# Patient Record
Sex: MALE | Race: WHITE | NOT HISPANIC OR LATINO
[De-identification: names, ages, dates, MRNs, and addresses within clinical notes are randomized per-mention and may not be internally consistent; named-entity substitution may affect disease eponyms.]

---

## 2016-12-20 RX ORDER — METFORMIN HYDROCHLORIDE 850 MG/1
1 TABLET ORAL
Qty: 0 | Refills: 0 | DISCHARGE
Start: 2016-12-20 | End: 2017-01-19

## 2016-12-28 NOTE — ED PROVIDER NOTE - OBJECTIVE STATEMENT
Enedelia Gilbert, DO: 66M with hx DM p/w left foot pain & cardiac stent x2 & h/o venous ulcer x 1 mo. Pt followed by o/p podiatrist (Dr. Salas) & was started on Bactrim last Thursday for necrosis of ulcer. Since Thursday, pt has had increasing pain and erythema at extending to dorsal aspect of foot with purulent drainage from site. Pt denies systemic symptoms including f/c, n/v.  Pending LE vascular stent but was delayed 2/2 cardiac stent x2.  PMD: Dr. Lissy Conley

## 2016-12-28 NOTE — ED PROVIDER NOTE - CONSTITUTIONAL NEGATIVE STATEMENT, MLM
+LLE ulcer with purulence & erythema extending up to foot.  No fever, no chills, no change in vision, no chest pain, no SOB, no cough, no abdominal pain, no nausea, no vomiting, no joint pain, no focal neurologic complaints, no psychiatric issues, otherwise as HPI or negative no fever and no chills.

## 2016-12-28 NOTE — ED ADULT NURSE NOTE - OBJECTIVE STATEMENT
65 yo M sent by podiatrist for IV abx for stage 2 ulcer on left foot. Pt c/o pain in left foot on ambulation, denies pain while at rest. VSS at this time. NAD. A&ox3, lung sounds clear and equal bilaterally. Afebrile.   Will continue to monitor this pt. PMHx of DM, HTN, PVD.

## 2016-12-28 NOTE — H&P ADULT. - FAMILY HISTORY
<<-----Click on this checkbox to enter Family History Family history of essential hypertension     Sibling  Still living? Unknown  Family history of diabetes mellitus, Age at diagnosis: Age Unknown

## 2016-12-28 NOTE — ED ADULT NURSE NOTE - FAMILY HISTORY
Mother  Still living? Unknown  Family history of essential hypertension, Age at diagnosis: Age Unknown     Sibling  Still living? Unknown  Family history of diabetes mellitus, Age at diagnosis: Age Unknown

## 2016-12-28 NOTE — ED PROVIDER NOTE - ATTENDING CONTRIBUTION TO CARE
66 yom pmhx cad w recent stents, recent dx of pad planning for outpatient vasc procdure for pad, recent dx of diabetes - blood sugars under improved control over past few days, recent discharge from hospital for left foot ulcer, presents today w worsening redness and swelling around site of left foot ulcer laterally. + erythema, no lymphangitis, some mild fluctuance but family states area was actively draining. seen by her podiatrist Dr. Salas and sent in for iv abx and admission. KARINA Haines MD 66 yom pmhx cad w recent stents, recent dx of pad planning for outpatient vasc procdure for pad, recent dx of diabetes - blood sugars under improved control over past few days, recent discharge from hospital for left foot ulcer, presents today w worsening redness and swelling around site of left foot ulcer laterally. + erythema, no lymphangitis, some mild fluctuance but family states area was actively draining. seen by her podiatrist Dr. Salas and sent in for iv abx and admission. xrays concerning for osteo - podiatry consulted. KARINA Haines MD

## 2016-12-28 NOTE — ED PROVIDER NOTE - MUSCULOSKELETAL MINIMAL EXAM
shallow ucler to lateral aspect of plantar left foot w minimal purulent drainage and surrounding erythema/normal range of motion

## 2016-12-28 NOTE — H&P ADULT. - PROBLEM SELECTOR PROBLEM 3
Type 2 diabetes mellitus with other circulatory complication, without long-term current use of insulin

## 2016-12-28 NOTE — H&P ADULT. - PROBLEM SELECTOR PLAN 4
recently diagnosed during recent admission on HUANG, lower extremity intervention held due to PCI/cardiac stenting  -vascular consult in am

## 2016-12-28 NOTE — H&P ADULT. - RADIOLOGY RESULTS AND INTERPRETATION
Foot XR: personally reviewed, Soft tissue ulcer adjacent to left fifth metatarsal head, with erosion of metatarsal head, new from the prior exam, concerning for osteomyelitis.

## 2016-12-28 NOTE — H&P ADULT. - PROBLEM SELECTOR PLAN 1
Patient with chronic foot ulcer, previous admission without signs of infection.  Since discharge new erythema, purulence.  Repeat foot xray on this admission shows a soft tissue ulcer adjacent to L fifth metatarsal head, with erosion of metatarsal head, new from the prior exam, concerning for osteomyelitis.  Podiatry evaluated in ED, s/p sharp excisional debridement at bedside.   -Continue IV antibiotics for now- vanc/zosyn, vanc trough prior to fourth dose  -f/u wound cultures  -repeat ESR/CRP  -f/u foot MRI to better evaluate for osteo   -daily dressing changes

## 2016-12-28 NOTE — ED PROVIDER NOTE - PHYSICAL EXAMINATION
Enedelia Gilbert, DO:  Gen: NAD, AOx3, non-toxic  Head: NCAT  HEENT: PERRL, oral mucosa moist, normal conjunctiva  Lung: CTAB, no respiratory distress, no wheezes/rhonchi/rales B/L, speaking in full sentences.  CV: rrr, no murmurs, Normal perfusion  Abd: soft, NTND, no guarding, no CVA tenderness  MSK: No edema, no visible deformities  Neuro: No focal sensory or motor deficits  Skin: No rash   Psych: normal affect Enedelia Gilbert, DO:  Gen: NAD, AOx3, non-toxic  Head: NCAT  HEENT: PERRL, oral mucosa moist, normal conjunctiva  Lung: CTAB, no respiratory distress, no wheezes/rhonchi/rales B/L, speaking in full sentences.  CV: rrr, no murmurs, Normal perfusion  MSK: LLE with 1+ pedal edema & 2x2 cm necrotic ulcer with purulent ulcer on left heel  Neuro: No focal sensory or motor deficits  Skin: LLE pedal erythema  Psych: normal affect

## 2016-12-28 NOTE — H&P ADULT. - HISTORY OF PRESENT ILLNESS
Mr. Hannah is a 65 y/o m with PMH DM II (A1c 10.7), CAD s/p PCI with RAMONA x 2 12/16, PAD, foot ulcer, recent admission 12/13-12/20 for evaluation of foot ulcer now sent in by outpatient podiatrist for concern for infected foot ulcer.  Patient recently admitted 12/13-12/20 for vascular/podiatry eval of foot ulcer x 1 month.  ESR/CRP normal, per ID no signs of infection and no need for abx.  Had vascular eval and found to have likely PAD.  Had abnormal stress, underwent PCI with RAMONA to LAD x2 12/19.  New diagnosis of DM, A1c 10.7.  Discharged 12/20 with outpatient f/u.  Patient noticed wound opened up a few days after discharge, went to see outpatient podiatrist Dr. Salas who cleaned the wound, noted new erythema and necrosis and started patient on bactrim.  Patient followed up with podiatrist last night who was concerned for wound infection and told patient to go to hospital for IV antibiotics and further evaluation.  Reports more erythema of the foot and some drainage that looked like pus.  Has pain when he stands on foot but that has been going on for a while.  Has not noticed new/more swelling.  No fevers, chills, nausea, vomiting, diarrhea, dysuria, anorexia.

## 2016-12-28 NOTE — H&P ADULT. - PMH
Coronary artery disease involving native coronary artery of native heart without angina pectoris    Essential hypertension    Peripheral artery disease    Type 2 diabetes mellitus with other circulatory complication, without long-term current use of insulin    Venous ulcer of left leg

## 2016-12-28 NOTE — H&P ADULT. - ASSESSMENT
Mr. Hannah is a 67 y/o m with PMH DM II (A1c 10.7), CAD s/p PCI with RAMONA x 2 12/16, PAD, foot ulcer, recent admission 12/13-12/20 for evaluation of foot ulcer now sent in by outpatient podiatrist given concern for new infection of foot ulcer.

## 2016-12-28 NOTE — H&P ADULT. - PROBLEM SELECTOR PLAN 3
hold oral hypoglycemics while inpatient   lantus + LAUREN, uptitrate as necessary  carb controlled diet hold oral hypoglycemics while inpatient    LAUREN, add lantus as needed to maintain FS <180   carb controlled diet hold oral hypoglycemics while inpatient    lantus + LAUREN, titrate as needed to maintain FS <180   carb controlled diet

## 2017-01-02 NOTE — PROVIDER CONTACT NOTE (OTHER) - ASSESSMENT
Pt bp 172/81, hr 69, other VSS. Pt making adequate urine output. Pt denies any chest pain or SOB. Pt resting comfortably in bed

## 2017-01-04 NOTE — DIETITIAN INITIAL EVALUATION ADULT. - OTHER INFO
Visited patient for length of stay. Presents with diabetes complication of foot ulcer. Patient has good appetite since admission.  Reports usual body weight of 234 pounds, current weight noted on (12/28) 229 pounds. Weight change possibly due to edema +1 on left foot, scale inaccuracies. States he has not received diabetes diet education in the past, but with current complication he is motivated to manage his diabetes; very receptive to diabetes diet education. Visited patient for length of stay. Presents with diabetes complication of foot ulcer. Patient has good appetite/intake (100% x3 meals/day) since admission.  Reports usual body weight of 234 pounds, current weight noted on (12/28) 229 pounds. Weight change possibly due to edema +1 on left foot, scale inaccuracies. States he has not received diabetes diet education in the past, but with current complication he is motivated to manage his diabetes; very receptive to diabetes diet education.

## 2017-01-04 NOTE — DIETITIAN INITIAL EVALUATION ADULT. - ENERGY NEEDS
Height 68", Weight (12/28) 229 pounds, BMI 35kg/m^2.  pounds.  pounds. 149% of IBW.  Pertinent Information: edema +1 Left foot. Wounds (1/3) left foot, right foot, left leg.   Patient with non-healing foot ulcer, (1/4) scheduled for angiogram LLE, possible angioplasty, possible stent.

## 2017-01-04 NOTE — DIETITIAN INITIAL EVALUATION ADULT. - ORAL INTAKE PTA
Follows regular/unrestricted diet PTA, with good appetite. Breakfast: butter roll, 2-3 cups coffee; lunch: sandwich (ham and cheese) and wine; dinner: pasta/pork chops/chicken cutlets, potatoes. Does not snack, drinks seltzer and no sugary drinks. Patient reports not taking supplements PTA. NKFA./good

## 2017-01-04 NOTE — BRIEF OPERATIVE NOTE - COMMENTS
Near conclusion of the procedure, the pt c/o tremor and lightheadedness. Pt was found to be hypertensive to 200/80's, tachypneic, and hypoxemic to high 70s. FS was 147, CBC, BMP,PT/PTT/INR, EKG, CXR was ordered and pt was brought to recovery. Pt was given 10mg labetolol. Near conclusion of the procedure, the pt c/o tremor and lightheadedness. Pt was found to be hypertensive to 200/80's, tachypneic, and hypoxemic to high 70s. FS was 147, CBC, BMP,PT/PTT/INR, EKG, CXR was ordered and pt was brought to recovery. Pt was given 10mg labetolol. BP and RR decreased and SpO2 increased above 95%. Trops negative x1, CBC, Elytes WNL. Near conclusion of the procedure, the pt c/o tremor and lightheadedness. Pt was found to be hypertensive to 200/80's, tachypneic, and hypoxemic to high 70s. FS was 147, CBC, BMP,PT/PTT/INR, EKG, CXR, and cardiac enzymes were ordered and pt was brought to recovery. Pt was given 10mg labetolol. BP and RR decreased and SpO2 increased above 95%. Trops negative x1, CBC, Elytes WNL.

## 2017-01-04 NOTE — PROVIDER CONTACT NOTE (CRITICAL VALUE NOTIFICATION) - ACTION/TREATMENT ORDERED:
Pt was in CT angio recovery  is notified  NP is aware
no action at this time. Will continue to monitor.

## 2017-01-04 NOTE — BRIEF OPERATIVE NOTE - PROCEDURE
Angioplasty of anterior tibial artery  01/04/2017    Active  [id number]  Angiogram, extremity, left  01/04/2017    Active  [id number]

## 2017-01-04 NOTE — DIETITIAN INITIAL EVALUATION ADULT. - PROBLEM SELECTOR PLAN 3
hold oral hypoglycemics while inpatient    lantus + LAUREN, titrate as needed to maintain FS <180   carb controlled diet

## 2017-01-04 NOTE — DIETITIAN INITIAL EVALUATION ADULT. - NS AS NUTRI INTERV MEALS SNACK
Reviewed consistent carb diet ( what foods have carbs, how to space out carbs throughout the day, pairing carbs with protein and fiber). Reviewed the plate method ( half the plate vegetables, quarter starch, quarter protein). All questions answered. Handout and contact for diabetes support groups distributed. Continue to monitor glucose and fingersticks. RDN remains available upon request./General/healthful diet/Carbohydrate - modified diet Reviewed consistent carb diet ( what foods have carbs, how to space out carbs throughout the day, pairing carbs with protein and fiber). Reviewed the plate method ( half the plate vegetables, quarter starch, quarter protein). All questions answered. Handout and contact for Diabetes Wellness Programs distributed. Continue to monitor glucose and fingersticks. RDN remains available upon request./Carbohydrate - modified diet/General/healthful diet

## 2017-01-05 NOTE — PROVIDER CONTACT NOTE (OTHER) - ASSESSMENT
critical results of aptt came back 186.7. RN spoke to MD Benitez if AM hep subq should be held. MD said yes. pt with right groin drsg CDI. no hematoma present critical results of aptt came back 186.7. RN spoke to MD Benitez if AM hep subq should be held. MD said yes. ASA and plavix was given in CT recovery room before pt arrive to floor. pt with right groin drsg CDI. no hematoma present

## 2017-01-07 NOTE — PROVIDER CONTACT NOTE (OTHER) - SITUATION
175/79, HR 69
/78, pt asymptomatic. Lisinopril 10 mg given at 2145 and BP repeated manually and was 177/82. NP notified
pt bp elevated

## 2017-01-07 NOTE — PHYSICAL THERAPY INITIAL EVALUATION ADULT - RANGE OF MOTION EXAMINATION, REHAB EVAL
Right UE ROM was WFL (within functional limits)/Left UE ROM was WFL (within functional limits)/Left LE ROM was WFL (within functional limits)/Right LE ROM was WFL (within functional limits)

## 2017-01-07 NOTE — PHYSICAL THERAPY INITIAL EVALUATION ADULT - PRECAUTIONS/LIMITATIONS, REHAB EVAL
ESR/CRP normal, per ID no signs of infection and no need for abx.  Had vascular eval and found to have likely PAD.  Had abnormal stress, underwent PCI with RAMONA to LAD x2 12/19.  New diagnosis of DM, A1c 10.7.  Discharged 12/20 with outpatient f/u.  Patient noticed wound opened up a few days after discharge, went to see outpatient podiatrist Dr. Salas who cleaned the wound, noted new erythema and necrosis and started patient on bactrim.  Patient followed up with podiatrist last night who was concerned for wound infection and told patient to go to hospital for IV antibiotics and further evaluation.  Reports more erythema of the foot and some drainage that looked like pus.  Has pain when he stands on foot but that has been going on for a while.  Has not noticed new/more swelling.  No fevers, chills, nausea, vomiting, diarrhea, dysuria, anorexia.

## 2017-01-07 NOTE — PHYSICAL THERAPY INITIAL EVALUATION ADULT - PERTINENT HX OF CURRENT PROBLEM, REHAB EVAL
Mr. Hannah is a 67 y/o m with PMH DM II (A1c 10.7), CAD s/p PCI with RAMONA x 2 12/16, PAD, foot ulcer, recent admission 12/13-12/20 for evaluation of foot ulcer now sent in by outpatient podiatrist for concern for infected foot ulcer.  Patient recently admitted 12/13-12/20 for vascular/podiatry eval of foot ulcer x 1 month.

## 2017-01-07 NOTE — PROVIDER CONTACT NOTE (OTHER) - ACTION/TREATMENT ORDERED:
MD Benitez made awared. will continue to monitor
NP Shallow aware, repeat BP manually and NP will come see pt.
No further interventions at this point. BECKA Jo states that we should wait 2 hrs from when the lopressor was given. Will continue to monitor.
will continue to monitor

## 2017-01-07 NOTE — PHYSICAL THERAPY INITIAL EVALUATION ADULT - PRECAUTIONS/LIMITATIONS, REHAB EVAL
no known precautions/limitations/ESR/CRP normal, per ID no signs of infection and no need for abx.  Had vascular eval and found to have likely PAD.  Had abnormal stress, underwent PCI with RAMONA to LAD x2 12/19.  New diagnosis of DM, A1c 10.7.  Discharged 12/20 with outpatient f/u.  Patient noticed wound opened up a few days after discharge, went to see outpatient podiatrist Dr. Salas who cleaned the wound, noted new erythema and necrosis and started patient on bactrim.  Patient followed up with podiatrist last night who was concerned for wound infection and told patient to go to hospital for IV antibiotics and further evaluation.  Reports more erythema of the foot and some drainage that looked like pus.  Has pain when he stands on foot but that has been going on for a while.  Has not noticed new/more swelling.  No fevers, chills, nausea, vomiting, diarrhea, dysuria, anorexia.

## 2017-01-09 NOTE — DISCHARGE NOTE ADULT - ADDITIONAL INSTRUCTIONS
Follow up with Dr. Conley early next week for management of your Diabetes. It is very important that you call tomorrow to schedule an appointment for Monday or Tuesday next week for follow up and management of your Diabetes.  Follow up with Dr. Farooq (Foot MD) within 1 week after discharge from hospital.   Follow up with Dr. Gomez (Infectious Disease MD) in 1 month.  [facility] Wound Care Nurse will come to your home for daily foot dressing changes. Follow up with Dr. Conley on Monday, 1/16 or Tuesday 1/17  next week for management of your Diabetes. It is very important that you call tomorrow to schedule an appointment for Monday or Tuesday next week for follow up and management of your Diabetes.  Follow up with Dr. Farooq (Foot MD) within 1 week after discharge from hospital.   Follow up with Dr. Gomez (Infectious Disease MD) in 1 month.  Follow up with Dr. Morrison (Vascular Surgery) within 1 week after discharge from hospital.  [facility] Wound Care Nurse will come to your home for daily foot dressing changes.

## 2017-01-09 NOTE — DISCHARGE NOTE ADULT - HOME CARE AGENCY
[place] [phone] for RN visit to assess for wound care teaching and skilled nursing needs for start of care the day after discharge

## 2017-01-09 NOTE — DISCHARGE NOTE ADULT - PATIENT PORTAL LINK FT
“You can access the [employer] Patient Portal, offered by [facility], by registering with the following website: [email or web address]”

## 2017-01-09 NOTE — DISCHARGE NOTE ADULT - HOSPITAL COURSE
osteomyelitis, fifth  metatarsal,  seen by  podiatry  and  vascular,  s/p  angio,  severe  disease, post  angio,  still  with  bad  pad,  no  intervention per  pofaitry  and  vascular  dr giles,  cad,  htn,  dm,  dc  on  ab, per  id, local  wound  care, f/p  omd, podiatry  and  vascular osteomyelitis, fifth  metatarsal,  seen by  podiatry  and  vascular,  s/p  angio,  severe  disease, post  angio,  still  with  bad  pad,  no  intervention per  podiatry  and  vascular  dr giles,  cad,  htn,  dm,  dc  on  ab, per  id, local  wound  care, f/p  omd, podiatry  and  vascular

## 2017-01-09 NOTE — DISCHARGE NOTE ADULT - MEDICATION SUMMARY - MEDICATIONS TO TAKE
I will START or STAY ON the medications listed below when I get home from the hospital:    aspirin 81 mg oral delayed release tablet  -- 1 tab(s) by mouth once a day  -- Indication: For Coronary artery disease involving native coronary artery of native heart without angina pectoris    lisinopril 40 mg oral tablet  -- 1 tab(s) by mouth once a day  -- Indication: For Essential hypertension    Amaryl 2 mg oral tablet  -- 1 tab(s) by mouth once a day  -- Avoid prolonged or excessive exposure to direct and/or artificial sunlight while taking this medication.  Do not drink alcoholic beverages when taking this medication.  It is very important that you take or use this exactly as directed.  Do not skip doses or discontinue unless directed by your doctor.    -- Indication: For Type 2 diabetes mellitus with other circulatory complication, without long-term current use of insulin    metFORMIN 500 mg oral tablet, extended release  -- 1 tab(s) by mouth once a day to start 12/21/2016  -- Check with your doctor before becoming pregnant.  Do not drink alcoholic beverages when taking this medication.  Obtain medical advice before taking any non-prescription drugs as some may affect the action of this medication.  Swallow whole.  Do not crush.  Take with food or milk.    -- Indication: For Type 2 diabetes mellitus with other circulatory complication, without long-term current use of insulin    atorvastatin 20 mg oral tablet  -- 1 tab(s) by mouth once a day (at bedtime)  -- Indication: For Cholesterol    clopidogrel 75 mg oral tablet  -- 1 tab(s) by mouth once a day  -- Indication: For Coronary artery disease involving native coronary artery of native heart without angina pectoris    metoprolol tartrate 50 mg oral tablet  -- 1 tab(s) by mouth 2 times a day  -- Indication: For Essential hypertension    cefadroxil 1000 mg oral tablet  -- 1 tab(s) by mouth every 12 hours  -- Finish all this medication unless otherwise directed by prescriber.    -- Indication: For Osteomyelitis

## 2017-01-09 NOTE — DISCHARGE NOTE ADULT - CARE PROVIDER_API CALL
Lissy Conley), Medicine  Hospitalists  [address] [place] [address]  Phone: [phone]  Fax: [phone]    Anahi Farooq (SUSANNA), Surgery Orthopaedic Surgery  [address] [facility] [address]  [place] [address]  Phone: [phone]  Fax: [phone]    Oscar Gomez), Infectious Disease; Internal Medicine  [address] [facility] [address] [place] [address]  Phone: [phone]  Fax: [phone] Lissy Conley), Medicine  Hospitalists  [address] [place] [address]  Phone: [phone]  Fax: [phone]    Anahi Farooq (SUSANNA), Surgery Orthopaedic Surgery  [address] [facility] [address]  [place] [address]  Phone: [phone]  Fax: [phone]    Oscar Gomez), Infectious Disease; Internal Medicine  [address] [facility] [address]  [place] [address]  Phone: [phone]  Fax: [phone]    Irma Morrison), Surgery  [address] [place] [address]  Phone: [phone]  Fax: [phone]

## 2017-01-09 NOTE — DISCHARGE NOTE ADULT - PLAN OF CARE
continue management of chronic osteomyelitis of left foot 5th mpj wound abx: finish course of abx per ID recommendation  wound care: cleanse wound with saline and apply betadine to left foot wound and cover with 4x4 gauze and wai. daily dressing change.  follow up: please follow up with Dr. Farooq within 1wk of discharge. Call [phone] to make an appointment.  weight bear: as tolerated in surgical shoe to left foot heel abx: finish course of abx per ID recommendation  Wound care:   Cleanse wound with saline and apply betadine to left  lateral left foot 5th MPJ foot wound and cover with 4x4 gauze and Ramon.   follow up: please follow up with Dr. Farooq within 1 wk of discharge. Call [phone] to make an appointment.  weight bear: as tolerated in surgical shoe to left foot heel Your Hemoglobin A1C (HgA1C) was 10.7 last admission.  Make sure you get your HgA1c checked every three months.  If you take oral diabetes medications, check your blood glucose two times a day.  It's important not to skip any meals.  Keep a log of your blood glucose results and always take it with you to your doctor appointments.  Keep a list of your current medications including injectables and over the counter medications and bring this medication list with you to all your doctor appointments.  If you have not seen your ophthalmologist this year call for appointment.  Check your feet daily for redness, sores, or openings. Do not self treat. If no improvement in two days call your primary care physician for an appointment.  Low blood sugar (hypoglycemia) is a blood sugar below 70mg/dl. Check your blood sugar if you feel signs/symptoms of hypoglycemia. If your blood sugar is below 70 take 15 grams of carbohydrates (ex 4 oz of apple juice, 3-4 glucose tablets, or 4-6 oz of regular soda) wait 15 minutes and repeat blood sugar to make sure it comes up above 70.  If your blood sugar is above 70 and you are due for a meal, have a meal.  If you are not due for a meal have a snack.  This snack helps keeps your blood sugar at a safe range.  ** Hemoglobin A1C noted to be 10.7 last admission. Discussed with Dr. Conley who advised that pt be discharged home on his previous oral home medications and recommends that pt follow up with her in her Office early next week for evaluation and continued care. Take your medications as prescribed.  Note that your dose of Lisinopril is increased.  Follow up with your medical doctor to establish long term blood pressure treatment goals. Take your medications as prescribed.  Follow up with your medical doctor to establish long term treatment goals. Antibiotics:   Finish course of antibiotics per ID recommendation    Wound care Daily:   Cleanse wound with saline and apply betadine to left  lateral left foot 5th MPJ foot wound and cover with 4x4 gauze and Ramon.   Follow up: Please follow up with Dr. Farooq within 1 wk of discharge. Call [phone] to make an appointment.  Weight bear: as tolerated in surgical shoe to left foot heel Your Hemoglobin A1C (HgA1C) was 10.7 on 12/14/2016.   Make sure you get your HgA1c checked every three months.  If you take oral diabetes medications, check your blood glucose two times a day.  It's important not to skip any meals.  Keep a log of your blood glucose results and always take it with you to your doctor appointments.  Keep a list of your current medications including injectables and over the counter medications and bring this medication list with you to all your doctor appointments.  If you have not seen your ophthalmologist this year call for appointment.  Check your feet daily for redness, sores, or openings. Do not self treat. If no improvement in two days call your primary care physician for an appointment.  Low blood sugar (hypoglycemia) is a blood sugar below 70mg/dl. Check your blood sugar if you feel signs/symptoms of hypoglycemia. If your blood sugar is below 70 take 15 grams of carbohydrates (ex 4 oz of apple juice, 3-4 glucose tablets, or 4-6 oz of regular soda) wait 15 minutes and repeat blood sugar to make sure it comes up above 70.  If your blood sugar is above 70 and you are due for a meal, have a meal.  If you are not due for a meal have a snack.  This snack helps keeps your blood sugar at a safe range.  ** Hemoglobin A1C noted to be 10.7 on 12/14/2016.  Discussed HbA1C with Dr. Conley who advised that pt be discharged home on his previous oral home diabetic medications and recommends that pt follow up with her in her Office early next week for evaluation and continued care.

## 2017-01-09 NOTE — DISCHARGE NOTE ADULT - SECONDARY DIAGNOSIS.
Type 2 diabetes mellitus with other circulatory complication, without long-term current use of insulin Essential hypertension Coronary artery disease involving native coronary artery of native heart without angina pectoris

## 2017-01-09 NOTE — DISCHARGE NOTE ADULT - CARE PLAN
Principal Discharge DX:	Foot ulcer due to secondary DM  Goal:	continue management of chronic osteomyelitis of left foot 5th mpj wound  Instructions for follow-up, activity and diet:	abx: finish course of abx per ID recommendation  wound care: cleanse wound with saline and apply betadine to left foot wound and cover with 4x4 gauze and wai. daily dressing change.  follow up: please follow up with Dr. Farooq within 1wk of discharge. Call [phone] to make an appointment.  weight bear: as tolerated in surgical shoe to left foot heel Principal Discharge DX:	Foot ulcer due to secondary DM  Goal:	continue management of chronic osteomyelitis of left foot 5th mpj wound  Instructions for follow-up, activity and diet:	abx: finish course of abx per ID recommendation  wound care: cleanse wound with saline and apply betadine to left foot wound and cover with 4x4 gauze and wai. daily dressing change.  follow up: please follow up with Dr. Farooq within 1wk of discharge. Call [phone] to make an appointment.  weight bear: as tolerated in surgical shoe to left foot heel Principal Discharge DX:	Foot ulcer due to secondary DM  Goal:	continue management of chronic osteomyelitis of left foot 5th mpj wound  Instructions for follow-up, activity and diet:	abx: finish course of abx per ID recommendation  Wound care:   Cleanse wound with saline and apply betadine to left  lateral left foot 5th MPJ foot wound and cover with 4x4 gauze and Ramon.   follow up: please follow up with Dr. Farooq within 1 wk of discharge. Call [phone] to make an appointment.  weight bear: as tolerated in surgical shoe to left foot heel Principal Discharge DX:	Foot ulcer due to secondary DM  Goal:	continue management of chronic osteomyelitis of left foot 5th mpj wound  Instructions for follow-up, activity and diet:	abx: finish course of abx per ID recommendation  Wound care:   Cleanse wound with saline and apply betadine to left  lateral left foot 5th MPJ foot wound and cover with 4x4 gauze and Ramon.   follow up: please follow up with Dr. Farooq within 1 wk of discharge. Call [phone] to make an appointment.  weight bear: as tolerated in surgical shoe to left foot heel Principal Discharge DX:	Foot ulcer due to secondary DM  Goal:	continue management of chronic osteomyelitis of left foot 5th mpj wound  Instructions for follow-up, activity and diet:	abx: finish course of abx per ID recommendation  Wound care:   Cleanse wound with saline and apply betadine to left  lateral left foot 5th MPJ foot wound and cover with 4x4 gauze and Ramon.   follow up: please follow up with Dr. Farooq within 1 wk of discharge. Call [phone] to make an appointment.  weight bear: as tolerated in surgical shoe to left foot heel Principal Discharge DX:	Foot ulcer due to secondary DM  Goal:	continue management of chronic osteomyelitis of left foot 5th mpj wound  Instructions for follow-up, activity and diet:	abx: finish course of abx per ID recommendation  Wound care:   Cleanse wound with saline and apply betadine to left  lateral left foot 5th MPJ foot wound and cover with 4x4 gauze and Ramon.   follow up: please follow up with Dr. Farooq within 1 wk of discharge. Call [phone] to make an appointment.  weight bear: as tolerated in surgical shoe to left foot heel Principal Discharge DX:	Foot ulcer due to secondary DM  Goal:	continue management of chronic osteomyelitis of left foot 5th mpj wound  Instructions for follow-up, activity and diet:	Antibiotics:   Finish course of antibiotics per ID recommendation    Wound care Daily:   Cleanse wound with saline and apply betadine to left  lateral left foot 5th MPJ foot wound and cover with 4x4 gauze and Ramon.   Follow up: Please follow up with Dr. Farooq within 1 wk of discharge. Call [phone] to make an appointment.  Weight bear: as tolerated in surgical shoe to left foot heel  Secondary Diagnosis:	Type 2 diabetes mellitus with other circulatory complication, without long-term current use of insulin  Instructions for follow-up, activity and diet:	Your Hemoglobin A1C (HgA1C) was 10.7 last admission.  Make sure you get your HgA1c checked every three months.  If you take oral diabetes medications, check your blood glucose two times a day.  It's important not to skip any meals.  Keep a log of your blood glucose results and always take it with you to your doctor appointments.  Keep a list of your current medications including injectables and over the counter medications and bring this medication list with you to all your doctor appointments.  If you have not seen your ophthalmologist this year call for appointment.  Check your feet daily for redness, sores, or openings. Do not self treat. If no improvement in two days call your primary care physician for an appointment.  Low blood sugar (hypoglycemia) is a blood sugar below 70mg/dl. Check your blood sugar if you feel signs/symptoms of hypoglycemia. If your blood sugar is below 70 take 15 grams of carbohydrates (ex 4 oz of apple juice, 3-4 glucose tablets, or 4-6 oz of regular soda) wait 15 minutes and repeat blood sugar to make sure it comes up above 70.  If your blood sugar is above 70 and you are due for a meal, have a meal.  If you are not due for a meal have a snack.  This snack helps keeps your blood sugar at a safe range.  ** Hemoglobin A1C noted to be 10.7 last admission. Discussed with Dr. Conley who advised that pt be discharged home on his previous oral home medications and recommends that pt follow up with her in her Office early next week for evaluation and continued care.  Secondary Diagnosis:	Essential hypertension  Instructions for follow-up, activity and diet:	Take your medications as prescribed.  Note that your dose of Lisinopril is increased.  Follow up with your medical doctor to establish long term blood pressure treatment goals.  Secondary Diagnosis:	Coronary artery disease involving native coronary artery of native heart without angina pectoris  Instructions for follow-up, activity and diet:	Take your medications as prescribed.  Follow up with your medical doctor to establish long term treatment goals. Principal Discharge DX:	Foot ulcer due to secondary DM  Goal:	continue management of chronic osteomyelitis of left foot 5th mpj wound  Instructions for follow-up, activity and diet:	Antibiotics:   Finish course of antibiotics per ID recommendation    Wound care Daily:   Cleanse wound with saline and apply betadine to left  lateral left foot 5th MPJ foot wound and cover with 4x4 gauze and Ramon.   Follow up: Please follow up with Dr. Farooq within 1 wk of discharge. Call [phone] to make an appointment.  Weight bear: as tolerated in surgical shoe to left foot heel  Secondary Diagnosis:	Type 2 diabetes mellitus with other circulatory complication, without long-term current use of insulin  Instructions for follow-up, activity and diet:	Your Hemoglobin A1C (HgA1C) was 10.7 on 12/14/2016.   Make sure you get your HgA1c checked every three months.  If you take oral diabetes medications, check your blood glucose two times a day.  It's important not to skip any meals.  Keep a log of your blood glucose results and always take it with you to your doctor appointments.  Keep a list of your current medications including injectables and over the counter medications and bring this medication list with you to all your doctor appointments.  If you have not seen your ophthalmologist this year call for appointment.  Check your feet daily for redness, sores, or openings. Do not self treat. If no improvement in two days call your primary care physician for an appointment.  Low blood sugar (hypoglycemia) is a blood sugar below 70mg/dl. Check your blood sugar if you feel signs/symptoms of hypoglycemia. If your blood sugar is below 70 take 15 grams of carbohydrates (ex 4 oz of apple juice, 3-4 glucose tablets, or 4-6 oz of regular soda) wait 15 minutes and repeat blood sugar to make sure it comes up above 70.  If your blood sugar is above 70 and you are due for a meal, have a meal.  If you are not due for a meal have a snack.  This snack helps keeps your blood sugar at a safe range.  ** Hemoglobin A1C noted to be 10.7 on 12/14/2016.  Discussed HbA1C with Dr. Conley who advised that pt be discharged home on his previous oral home diabetic medications and recommends that pt follow up with her in her Office early next week for evaluation and continued care.  Secondary Diagnosis:	Essential hypertension  Instructions for follow-up, activity and diet:	Take your medications as prescribed.  Note that your dose of Lisinopril is increased.  Follow up with your medical doctor to establish long term blood pressure treatment goals.  Secondary Diagnosis:	Coronary artery disease involving native coronary artery of native heart without angina pectoris  Instructions for follow-up, activity and diet:	Take your medications as prescribed.  Follow up with your medical doctor to establish long term treatment goals. Principal Discharge DX:	Foot ulcer due to secondary DM  Goal:	continue management of chronic osteomyelitis of left foot 5th mpj wound  Instructions for follow-up, activity and diet:	Antibiotics:   Finish course of antibiotics per ID recommendation    Wound care Daily:   Cleanse wound with saline and apply betadine to left  lateral left foot 5th MPJ foot wound and cover with 4x4 gauze and Ramon.   Follow up: Please follow up with Dr. Farooq within 1 wk of discharge. Call [phone] to make an appointment.  Weight bear: as tolerated in surgical shoe to left foot heel  Secondary Diagnosis:	Type 2 diabetes mellitus with other circulatory complication, without long-term current use of insulin  Instructions for follow-up, activity and diet:	Your Hemoglobin A1C (HgA1C) was 10.7 on 12/14/2016.   Make sure you get your HgA1c checked every three months.  If you take oral diabetes medications, check your blood glucose two times a day.  It's important not to skip any meals.  Keep a log of your blood glucose results and always take it with you to your doctor appointments.  Keep a list of your current medications including injectables and over the counter medications and bring this medication list with you to all your doctor appointments.  If you have not seen your ophthalmologist this year call for appointment.  Check your feet daily for redness, sores, or openings. Do not self treat. If no improvement in two days call your primary care physician for an appointment.  Low blood sugar (hypoglycemia) is a blood sugar below 70mg/dl. Check your blood sugar if you feel signs/symptoms of hypoglycemia. If your blood sugar is below 70 take 15 grams of carbohydrates (ex 4 oz of apple juice, 3-4 glucose tablets, or 4-6 oz of regular soda) wait 15 minutes and repeat blood sugar to make sure it comes up above 70.  If your blood sugar is above 70 and you are due for a meal, have a meal.  If you are not due for a meal have a snack.  This snack helps keeps your blood sugar at a safe range.  ** Hemoglobin A1C noted to be 10.7 on 12/14/2016.  Discussed HbA1C with Dr. Conley who advised that pt be discharged home on his previous oral home diabetic medications and recommends that pt follow up with her in her Office early next week for evaluation and continued care.  Secondary Diagnosis:	Essential hypertension  Instructions for follow-up, activity and diet:	Take your medications as prescribed.  Note that your dose of Lisinopril is increased.  Follow up with your medical doctor to establish long term blood pressure treatment goals.  Secondary Diagnosis:	Coronary artery disease involving native coronary artery of native heart without angina pectoris  Instructions for follow-up, activity and diet:	Take your medications as prescribed.  Follow up with your medical doctor to establish long term treatment goals.

## 2017-01-09 NOTE — DISCHARGE NOTE ADULT - PROVIDER TOKENS
TOKEN:'[id number]' TOKEN:'[id number]'

## 2017-01-09 NOTE — DISCHARGE NOTE ADULT - CARE PROVIDERS DIRECT ADDRESSES
,DirectAddress_Unknown,DirectAddress_Unknown,DirectAddress_Unknown,DirectAddress_Unknown ,DirectAddress_Unknown,DirectAddress_Unknown,DirectAddress_Unknown,DirectAddress_Unknown,DirectAddress_Unknown

## 2017-01-23 ENCOUNTER — APPOINTMENT (OUTPATIENT)
Dept: VASCULAR SURGERY | Facility: CLINIC | Age: 67
End: 2017-01-23

## 2017-01-23 VITALS
WEIGHT: 220 LBS | DIASTOLIC BLOOD PRESSURE: 98 MMHG | SYSTOLIC BLOOD PRESSURE: 179 MMHG | TEMPERATURE: 98.2 F | HEIGHT: 69 IN | HEART RATE: 65 BPM | BODY MASS INDEX: 32.58 KG/M2

## 2017-02-08 ENCOUNTER — APPOINTMENT (OUTPATIENT)
Dept: WOUND CARE | Facility: CLINIC | Age: 67
End: 2017-02-08

## 2017-02-22 ENCOUNTER — APPOINTMENT (OUTPATIENT)
Dept: WOUND CARE | Facility: CLINIC | Age: 67
End: 2017-02-22

## 2017-02-27 ENCOUNTER — APPOINTMENT (OUTPATIENT)
Dept: VASCULAR SURGERY | Facility: CLINIC | Age: 67
End: 2017-02-27

## 2017-02-27 VITALS
HEIGHT: 69 IN | SYSTOLIC BLOOD PRESSURE: 186 MMHG | WEIGHT: 220 LBS | TEMPERATURE: 98 F | BODY MASS INDEX: 32.58 KG/M2 | HEART RATE: 67 BPM | DIASTOLIC BLOOD PRESSURE: 93 MMHG

## 2017-03-08 ENCOUNTER — APPOINTMENT (OUTPATIENT)
Dept: WOUND CARE | Facility: CLINIC | Age: 67
End: 2017-03-08

## 2017-08-10 NOTE — PATIENT PROFILE ADULT. - BLOOD AVOIDANCE/RESTRICTIONS, PROFILE
Silver Nitrate Text: The wound bed was treated with silver nitrate after the biopsy was performed. none

## 2019-11-14 ENCOUNTER — INPATIENT (INPATIENT)
Facility: HOSPITAL | Age: 69
LOS: 18 days | Discharge: NURSING, REHAB OR LONG-TERM CARE | DRG: 253 | End: 2019-12-03
Attending: SURGERY | Admitting: STUDENT IN AN ORGANIZED HEALTH CARE EDUCATION/TRAINING PROGRAM
Payer: MEDICARE

## 2019-11-14 VITALS
TEMPERATURE: 98 F | WEIGHT: 229.94 LBS | HEIGHT: 68 IN | OXYGEN SATURATION: 99 % | RESPIRATION RATE: 20 BRPM | DIASTOLIC BLOOD PRESSURE: 85 MMHG | SYSTOLIC BLOOD PRESSURE: 200 MMHG | HEART RATE: 82 BPM

## 2019-11-14 LAB
ALBUMIN SERPL ELPH-MCNC: 4.6 G/DL — SIGNIFICANT CHANGE UP (ref 3.3–5)
ALP SERPL-CCNC: 73 U/L — SIGNIFICANT CHANGE UP (ref 40–120)
ALT FLD-CCNC: 13 U/L — SIGNIFICANT CHANGE UP (ref 10–45)
ANION GAP SERPL CALC-SCNC: 12 MMOL/L — SIGNIFICANT CHANGE UP (ref 5–17)
APTT BLD: 31 SEC — SIGNIFICANT CHANGE UP (ref 27.5–36.3)
AST SERPL-CCNC: 15 U/L — SIGNIFICANT CHANGE UP (ref 10–40)
BASOPHILS # BLD AUTO: 0.03 K/UL — SIGNIFICANT CHANGE UP (ref 0–0.2)
BASOPHILS NFR BLD AUTO: 0.3 % — SIGNIFICANT CHANGE UP (ref 0–2)
BILIRUB SERPL-MCNC: 0.4 MG/DL — SIGNIFICANT CHANGE UP (ref 0.2–1.2)
BUN SERPL-MCNC: 22 MG/DL — SIGNIFICANT CHANGE UP (ref 7–23)
CALCIUM SERPL-MCNC: 10.1 MG/DL — SIGNIFICANT CHANGE UP (ref 8.4–10.5)
CHLORIDE SERPL-SCNC: 102 MMOL/L — SIGNIFICANT CHANGE UP (ref 96–108)
CO2 SERPL-SCNC: 23 MMOL/L — SIGNIFICANT CHANGE UP (ref 22–31)
CREAT SERPL-MCNC: 0.97 MG/DL — SIGNIFICANT CHANGE UP (ref 0.5–1.3)
EOSINOPHIL # BLD AUTO: 0.09 K/UL — SIGNIFICANT CHANGE UP (ref 0–0.5)
EOSINOPHIL NFR BLD AUTO: 1 % — SIGNIFICANT CHANGE UP (ref 0–6)
ERYTHROCYTE [SEDIMENTATION RATE] IN BLOOD: 44 MM/HR — HIGH (ref 0–20)
GAS PNL BLDV: SIGNIFICANT CHANGE UP
GLUCOSE SERPL-MCNC: 166 MG/DL — HIGH (ref 70–99)
HCT VFR BLD CALC: 35.4 % — LOW (ref 39–50)
HGB BLD-MCNC: 12.4 G/DL — LOW (ref 13–17)
IMM GRANULOCYTES NFR BLD AUTO: 0.2 % — SIGNIFICANT CHANGE UP (ref 0–1.5)
INR BLD: 1 RATIO — SIGNIFICANT CHANGE UP (ref 0.88–1.16)
LYMPHOCYTES # BLD AUTO: 2.19 K/UL — SIGNIFICANT CHANGE UP (ref 1–3.3)
LYMPHOCYTES # BLD AUTO: 23.5 % — SIGNIFICANT CHANGE UP (ref 13–44)
MCHC RBC-ENTMCNC: 28.2 PG — SIGNIFICANT CHANGE UP (ref 27–34)
MCHC RBC-ENTMCNC: 35 GM/DL — SIGNIFICANT CHANGE UP (ref 32–36)
MCV RBC AUTO: 80.5 FL — SIGNIFICANT CHANGE UP (ref 80–100)
MONOCYTES # BLD AUTO: 0.62 K/UL — SIGNIFICANT CHANGE UP (ref 0–0.9)
MONOCYTES NFR BLD AUTO: 6.7 % — SIGNIFICANT CHANGE UP (ref 2–14)
NEUTROPHILS # BLD AUTO: 6.35 K/UL — SIGNIFICANT CHANGE UP (ref 1.8–7.4)
NEUTROPHILS NFR BLD AUTO: 68.3 % — SIGNIFICANT CHANGE UP (ref 43–77)
NRBC # BLD: 0 /100 WBCS — SIGNIFICANT CHANGE UP (ref 0–0)
PLATELET # BLD AUTO: 227 K/UL — SIGNIFICANT CHANGE UP (ref 150–400)
POTASSIUM SERPL-MCNC: 4.3 MMOL/L — SIGNIFICANT CHANGE UP (ref 3.5–5.3)
POTASSIUM SERPL-SCNC: 4.3 MMOL/L — SIGNIFICANT CHANGE UP (ref 3.5–5.3)
PROT SERPL-MCNC: 7.9 G/DL — SIGNIFICANT CHANGE UP (ref 6–8.3)
PROTHROM AB SERPL-ACNC: 11.5 SEC — SIGNIFICANT CHANGE UP (ref 10–12.9)
RBC # BLD: 4.4 M/UL — SIGNIFICANT CHANGE UP (ref 4.2–5.8)
RBC # FLD: 13.2 % — SIGNIFICANT CHANGE UP (ref 10.3–14.5)
SODIUM SERPL-SCNC: 137 MMOL/L — SIGNIFICANT CHANGE UP (ref 135–145)
WBC # BLD: 9.3 K/UL — SIGNIFICANT CHANGE UP (ref 3.8–10.5)
WBC # FLD AUTO: 9.3 K/UL — SIGNIFICANT CHANGE UP (ref 3.8–10.5)

## 2019-11-14 PROCEDURE — 99285 EMERGENCY DEPT VISIT HI MDM: CPT

## 2019-11-14 RX ORDER — CEFEPIME 1 G/1
2000 INJECTION, POWDER, FOR SOLUTION INTRAMUSCULAR; INTRAVENOUS ONCE
Refills: 0 | Status: COMPLETED | OUTPATIENT
Start: 2019-11-14 | End: 2019-11-14

## 2019-11-14 RX ORDER — VANCOMYCIN HCL 1 G
1000 VIAL (EA) INTRAVENOUS ONCE
Refills: 0 | Status: COMPLETED | OUTPATIENT
Start: 2019-11-14 | End: 2019-11-15

## 2019-11-14 RX ADMIN — CEFEPIME 100 MILLIGRAM(S): 1 INJECTION, POWDER, FOR SOLUTION INTRAMUSCULAR; INTRAVENOUS at 22:42

## 2019-11-14 NOTE — ED PROVIDER NOTE - MUSCULOSKELETAL MINIMAL EXAM
RLE: Right foot with wounds to lateral distal 5th metatarsal with necrotic center and additional wound noted to base of R great toe with extension into webspace with +foul purulent drainage. +periwound ttp both wounds. No crepitus. Full AROM ankle with 5/5 strength. Sensation intact. 2+ DP pulses b/l.

## 2019-11-14 NOTE — ED PROVIDER NOTE - PROGRESS NOTE DETAILS
Podiatry returned page, will see pt. - BILLIE GoetzC podiatry evaluated pt, given foul odor concerned for anaerobic infection, requested adding clindamycin on. attending aware, order in, pt tba medicine per podiatry. - Charles Felder PA-C Pt accepted to hospitalist service. Will revital. - Charles Felder PA-C

## 2019-11-14 NOTE — CONSULT NOTE ADULT - ASSESSMENT
68M w/ PMHx DM, HTN, CAD, HLD s/p stents w/ c/c of R foot chronic non-healing wounds  - Pt seen and evaluated bedside  - /85, WBC 9.3, ESR/CRP pending  - R foot 1st interspace wound, 2.0x2.0cm, depth to 1st and 2nd met capsule, wound bed necrotic, serous drainage, malodorous, periwound macerated, etiology 2/2 interdigital maceration  - R foot submetatarsal 1 wound, 2.5x1.5cm, wound bed fibrogranular, no active drainage, no malodor, periwound macerated, etiology 2/2 pressure   - R foot lateral border of foot stable eschar, 4.0x1.0cm, no active drainage, no malodor, periwound erythematous, etiology 2/2 pressure  - Excisional debridement of right foot 1st interspace & submetatarsal 1 wounds to the level of but not beyond subq  - Wound culture obtained from 1st interspace wound  - Dressed wounds with 1st interspace wound w/ iodoform packing, submet 1 wound w/ aquacel & DSD  - Pod rec empiric IV Abx coverage w/ Clindamycin & Zosyn   - Ordered HUANG/PVR to evaluate blood supply  - No acute podiatric surgical intervention  - Podiatry will continue to monitor  - d/w attending 68M w/ PMHx DM, HTN, CAD, HLD s/p stents w/ c/c of R foot chronic non-healing wounds  - Pt seen and evaluated bedside  - /85, WBC 9.3, ESR/CRP pending  - R foot 1st interspace wound, 2.0x2.0cm, depth to 1st and 2nd met capsule, wound bed necrotic, serous drainage, malodorous, periwound macerated, etiology 2/2 interdigital maceration  - R foot submetatarsal 1 wound, 2.5x1.5cm, wound bed fibrogranular, no active drainage, no malodor, periwound macerated, etiology 2/2 pressure   - R foot lateral border of foot stable eschar, 4.0x1.0cm, no active drainage, no malodor, periwound erythematous, etiology 2/2 pressure  - Excisional debridement of right foot 1st interspace & submetatarsal 1 wounds to the level of but not beyond subq  - Wound culture obtained from 1st interspace wound  - Dressed wounds with 1st interspace wound w/ iodoform packing, submet 1 wound w/ aquacel & DSD  - Pod rec empiric IV Abx coverage w/ Clindamycin & Zosyn   - Ordered HUANG/PVR to evaluate blood supply  - Pod rec vascular consult to evaluate vascular supply to lower extremity given history of angiogram on contralateral limb   - No acute podiatric surgical intervention  - Podiatry will continue to monitor  - d/w attending

## 2019-11-14 NOTE — ED PROVIDER NOTE - SKIN WOUND TYPE
+necrotic wound with slight purulent discharge noted to lateral 5th R metatarsal and base of R great toe.

## 2019-11-14 NOTE — ED PROVIDER NOTE - CLINICAL SUMMARY MEDICAL DECISION MAKING FREE TEXT BOX
DAVID Javed MD: 69 yo male PMhx HTN, T2DM, CAD s/p stents, HLD presents to the ED c/o diabetic R foot wound x 2-3 weeks, getting worse, sent in by podiatrist for further workup. Ddx includes, however, is not limited to: gangrene (wet vs. dry), PAD, infectious process, osteo, other. Plan: basic labs, bcx, IVF, IV ABX, Xrays, Podiatry consult, TBA

## 2019-11-14 NOTE — ED PROVIDER NOTE - OBJECTIVE STATEMENT
67 yo male PMhx HTN, T2DM, CAD s/p stents, HLD presents to the ED c/o R foot wound x 2-3 weeks. Reports has had wounds to lateral right foot and great toe for which he's been following up with podiatrist for, has been on 10 day course of abx (cannot recall name) with no improvement. Over the past few days has had drainage from both wounds with a foul odor. Saw Dr. Ojeda (associate of Dr. [place]) in office today for re-eval and was sent to hospital given state of wounds. Pt endorsing minimal pain to the foot. Denies fever/chills, weakness, numbness/tingling, n/v/d, trauma/injury, cp, sob.     Podiatrist: Ward Montana 69 yo male PMhx HTN, T2DM, CAD s/p stents, HLD presents to the ED c/o R foot wound x 2-3 weeks. Reports has had wounds to lateral right foot and great toe for which he's been following up with podiatrist for, has been on 10 day course of abx (cannot recall name) with no improvement. Over the past few days has had drainage from both wounds with a foul odor. Saw Dr. Ojeda (associate of Dr. [place]) in office today for re-eval and was sent to hospital given state of wounds. Pt endorsing minimal pain to the foot. Denies fever/chills, weakness, numbness/tingling, n/v/d, trauma/injury, cp, sob.   Podiatrist: Ward Montana

## 2019-11-14 NOTE — ED ADULT NURSE NOTE - OBJECTIVE STATEMENT
Pt is a 68y Male, A&Ox3, presents to [facility] ED c/o right foot ulcer. Pt saw podiatrist today and sent to ED for r/o osteo in rt foot. Pt arrived to ED with rt foot dressing, dressing appears clean, dry, and intact. Unable to assess wound. PMH of DM Type 2 on metformin. Denies pain at rest, rates pain 8/10 upon ambulation. Pt able to move foot, denies neuropathy, decreased sensation to foot. Wife at bedside, safety and comfort measures provided.

## 2019-11-14 NOTE — CONSULT NOTE ADULT - SUBJECTIVE AND OBJECTIVE BOX
Patient is a 68y old  Male who presents with a chief complaint of chronic R foot wounds with worsening appearance    HPI: 69 yo male PMhx HTN, T2DM, CAD s/p stents, HLD presents to the ED c/o R foot wound x 2-3 weeks. Reports has had wounds to lateral right foot and great toe for which he's been following up with podiatrist for, has been on 10 day course of abx (cannot recall name) with no improvement. Over the past few days has had drainage from both wounds with a foul odor. Saw Dr. Sandhu (associate of Dr. Montana) in office today for re-eval and was sent to hospital given state of wounds. Pt endorsing minimal pain to the foot. Denies fever/chills, weakness, numbness/tingling, n/v/d, trauma/injury, cp, sob.   Podiatrist: Ward Montana       PAST MEDICAL & SURGICAL HISTORY:  Essential hypertension  Coronary artery disease involving native coronary artery of native heart without angina pectoris  Peripheral artery disease  Type 2 diabetes mellitus with other circulatory complication, without long-term current use of insulin  Venous ulcer of left leg  No significant past surgical history      MEDICATIONS  (STANDING):  clindamycin IVPB 900 milliGRAM(s) IV Intermittent Once  vancomycin  IVPB 1000 milliGRAM(s) IV Intermittent once    MEDICATIONS  (PRN):      Allergies    No Known Allergies    Intolerances        VITALS:    Vital Signs Last 24 Hrs  T(C): 36.7 (14 Nov 2019 19:44), Max: 36.7 (14 Nov 2019 19:44)  T(F): 98 (14 Nov 2019 19:44), Max: 98 (14 Nov 2019 19:44)  HR: 82 (14 Nov 2019 19:44) (82 - 82)  BP: 200/85 (14 Nov 2019 19:44) (200/85 - 200/85)  BP(mean): --  RR: 20 (14 Nov 2019 19:44) (20 - 20)  SpO2: 99% (14 Nov 2019 19:44) (99% - 99%)    LABS:                          12.4   9.30  )-----------( 227      ( 14 Nov 2019 22:49 )             35.4       11-14    137  |  102  |  22  ----------------------------<  166<H>  4.3   |  23  |  0.97    Ca    10.1      14 Nov 2019 22:49    TPro  7.9  /  Alb  4.6  /  TBili  0.4  /  DBili  x   /  AST  15  /  ALT  13  /  AlkPhos  73  11-14      CAPILLARY BLOOD GLUCOSE          PT/INR - ( 14 Nov 2019 22:49 )   PT: 11.5 sec;   INR: 1.00 ratio         PTT - ( 14 Nov 2019 22:49 )  PTT:31.0 sec    LOWER EXTREMITY PHYSICAL EXAM:    Vascular: DP/PT 0/4, B/L, CFT <3 seconds B/L, Temperature gradient warm to cool B/L.   Neuro: Epicritic sensation grossly diminished to level of ankleB/L.  Skin:  Wound #1: R foot 1st interspace wound, 2.0x2.0cm, depth to 1st and 2nd met capsule, wound bed necrotic, serous drainage, malodorous, periwound macerated, etiology 2/2 interdigital maceration    Wound #2: R foot submetatarsal 1 wound, 2.5x1.5cm, wound bed fibrogranular, no active drainage, no malodor, periwound macerated, etiology 2/2 pressure     Wound #3: R foot lateral border of foot stable eschar, 4.0x1.0cm, no active drainage, no malodor, periwound erythematous, etiology 2/2 pressure    RADIOLOGY & ADDITIONAL STUDIES:

## 2019-11-15 DIAGNOSIS — E78.5 HYPERLIPIDEMIA, UNSPECIFIED: ICD-10-CM

## 2019-11-15 DIAGNOSIS — I10 ESSENTIAL (PRIMARY) HYPERTENSION: ICD-10-CM

## 2019-11-15 DIAGNOSIS — I25.10 ATHEROSCLEROTIC HEART DISEASE OF NATIVE CORONARY ARTERY WITHOUT ANGINA PECTORIS: ICD-10-CM

## 2019-11-15 DIAGNOSIS — Z98.890 OTHER SPECIFIED POSTPROCEDURAL STATES: Chronic | ICD-10-CM

## 2019-11-15 DIAGNOSIS — Z02.9 ENCOUNTER FOR ADMINISTRATIVE EXAMINATIONS, UNSPECIFIED: ICD-10-CM

## 2019-11-15 DIAGNOSIS — Z79.899 OTHER LONG TERM (CURRENT) DRUG THERAPY: ICD-10-CM

## 2019-11-15 DIAGNOSIS — I73.9 PERIPHERAL VASCULAR DISEASE, UNSPECIFIED: ICD-10-CM

## 2019-11-15 DIAGNOSIS — E11.9 TYPE 2 DIABETES MELLITUS WITHOUT COMPLICATIONS: ICD-10-CM

## 2019-11-15 DIAGNOSIS — Z29.9 ENCOUNTER FOR PROPHYLACTIC MEASURES, UNSPECIFIED: ICD-10-CM

## 2019-11-15 DIAGNOSIS — E11.628 TYPE 2 DIABETES MELLITUS WITH OTHER SKIN COMPLICATIONS: ICD-10-CM

## 2019-11-15 LAB
ALBUMIN SERPL ELPH-MCNC: 4.2 G/DL — SIGNIFICANT CHANGE UP (ref 3.3–5)
ALP SERPL-CCNC: 66 U/L — SIGNIFICANT CHANGE UP (ref 40–120)
ALT FLD-CCNC: 11 U/L — SIGNIFICANT CHANGE UP (ref 10–45)
ANION GAP SERPL CALC-SCNC: 12 MMOL/L — SIGNIFICANT CHANGE UP (ref 5–17)
AST SERPL-CCNC: 10 U/L — SIGNIFICANT CHANGE UP (ref 10–40)
BASOPHILS # BLD AUTO: 0.01 K/UL — SIGNIFICANT CHANGE UP (ref 0–0.2)
BASOPHILS NFR BLD AUTO: 0.1 % — SIGNIFICANT CHANGE UP (ref 0–2)
BILIRUB SERPL-MCNC: 0.4 MG/DL — SIGNIFICANT CHANGE UP (ref 0.2–1.2)
BUN SERPL-MCNC: 21 MG/DL — SIGNIFICANT CHANGE UP (ref 7–23)
CALCIUM SERPL-MCNC: 9.5 MG/DL — SIGNIFICANT CHANGE UP (ref 8.4–10.5)
CHLORIDE SERPL-SCNC: 100 MMOL/L — SIGNIFICANT CHANGE UP (ref 96–108)
CO2 SERPL-SCNC: 26 MMOL/L — SIGNIFICANT CHANGE UP (ref 22–31)
CREAT SERPL-MCNC: 0.87 MG/DL — SIGNIFICANT CHANGE UP (ref 0.5–1.3)
CRP SERPL-MCNC: 1.1 MG/DL — HIGH (ref 0–0.4)
EOSINOPHIL # BLD AUTO: 0.09 K/UL — SIGNIFICANT CHANGE UP (ref 0–0.5)
EOSINOPHIL NFR BLD AUTO: 1.2 % — SIGNIFICANT CHANGE UP (ref 0–6)
GLUCOSE SERPL-MCNC: 279 MG/DL — HIGH (ref 70–99)
HBA1C BLD-MCNC: 7.9 % — HIGH (ref 4–5.6)
HCT VFR BLD CALC: 33 % — LOW (ref 39–50)
HGB BLD-MCNC: 11.3 G/DL — LOW (ref 13–17)
IMM GRANULOCYTES NFR BLD AUTO: 0.3 % — SIGNIFICANT CHANGE UP (ref 0–1.5)
LYMPHOCYTES # BLD AUTO: 1.63 K/UL — SIGNIFICANT CHANGE UP (ref 1–3.3)
LYMPHOCYTES # BLD AUTO: 22.3 % — SIGNIFICANT CHANGE UP (ref 13–44)
MAGNESIUM SERPL-MCNC: 1.7 MG/DL — SIGNIFICANT CHANGE UP (ref 1.6–2.6)
MCHC RBC-ENTMCNC: 27.4 PG — SIGNIFICANT CHANGE UP (ref 27–34)
MCHC RBC-ENTMCNC: 34.2 GM/DL — SIGNIFICANT CHANGE UP (ref 32–36)
MCV RBC AUTO: 80.1 FL — SIGNIFICANT CHANGE UP (ref 80–100)
MONOCYTES # BLD AUTO: 0.66 K/UL — SIGNIFICANT CHANGE UP (ref 0–0.9)
MONOCYTES NFR BLD AUTO: 9 % — SIGNIFICANT CHANGE UP (ref 2–14)
NEUTROPHILS # BLD AUTO: 4.9 K/UL — SIGNIFICANT CHANGE UP (ref 1.8–7.4)
NEUTROPHILS NFR BLD AUTO: 67.1 % — SIGNIFICANT CHANGE UP (ref 43–77)
NRBC # BLD: 0 /100 WBCS — SIGNIFICANT CHANGE UP (ref 0–0)
PHOSPHATE SERPL-MCNC: 2.9 MG/DL — SIGNIFICANT CHANGE UP (ref 2.5–4.5)
PLATELET # BLD AUTO: 208 K/UL — SIGNIFICANT CHANGE UP (ref 150–400)
POTASSIUM SERPL-MCNC: 4.5 MMOL/L — SIGNIFICANT CHANGE UP (ref 3.5–5.3)
POTASSIUM SERPL-SCNC: 4.5 MMOL/L — SIGNIFICANT CHANGE UP (ref 3.5–5.3)
PROT SERPL-MCNC: 7.1 G/DL — SIGNIFICANT CHANGE UP (ref 6–8.3)
RBC # BLD: 4.12 M/UL — LOW (ref 4.2–5.8)
RBC # FLD: 13.2 % — SIGNIFICANT CHANGE UP (ref 10.3–14.5)
SODIUM SERPL-SCNC: 138 MMOL/L — SIGNIFICANT CHANGE UP (ref 135–145)
WBC # BLD: 7.31 K/UL — SIGNIFICANT CHANGE UP (ref 3.8–10.5)
WBC # FLD AUTO: 7.31 K/UL — SIGNIFICANT CHANGE UP (ref 3.8–10.5)

## 2019-11-15 PROCEDURE — 99223 1ST HOSP IP/OBS HIGH 75: CPT

## 2019-11-15 PROCEDURE — 99233 SBSQ HOSP IP/OBS HIGH 50: CPT

## 2019-11-15 PROCEDURE — 99223 1ST HOSP IP/OBS HIGH 75: CPT | Mod: GC

## 2019-11-15 PROCEDURE — 73718 MRI LOWER EXTREMITY W/O DYE: CPT | Mod: 26,RT

## 2019-11-15 PROCEDURE — 73630 X-RAY EXAM OF FOOT: CPT | Mod: 26,RT

## 2019-11-15 PROCEDURE — 12345: CPT | Mod: NC,GC

## 2019-11-15 RX ORDER — AMLODIPINE BESYLATE 2.5 MG/1
5 TABLET ORAL DAILY
Refills: 0 | Status: DISCONTINUED | OUTPATIENT
Start: 2019-11-15 | End: 2019-11-15

## 2019-11-15 RX ORDER — SODIUM CHLORIDE 9 MG/ML
1000 INJECTION, SOLUTION INTRAVENOUS
Refills: 0 | Status: DISCONTINUED | OUTPATIENT
Start: 2019-11-15 | End: 2019-11-20

## 2019-11-15 RX ORDER — LISINOPRIL 2.5 MG/1
40 TABLET ORAL EVERY 24 HOURS
Refills: 0 | Status: DISCONTINUED | OUTPATIENT
Start: 2019-11-16 | End: 2019-11-17

## 2019-11-15 RX ORDER — INSULIN GLARGINE 100 [IU]/ML
9 INJECTION, SOLUTION SUBCUTANEOUS AT BEDTIME
Refills: 0 | Status: DISCONTINUED | OUTPATIENT
Start: 2019-11-15 | End: 2019-11-15

## 2019-11-15 RX ORDER — INSULIN LISPRO 100/ML
VIAL (ML) SUBCUTANEOUS
Refills: 0 | Status: DISCONTINUED | OUTPATIENT
Start: 2019-11-15 | End: 2019-11-15

## 2019-11-15 RX ORDER — ASPIRIN/CALCIUM CARB/MAGNESIUM 324 MG
81 TABLET ORAL DAILY
Refills: 0 | Status: DISCONTINUED | OUTPATIENT
Start: 2019-11-15 | End: 2019-11-20

## 2019-11-15 RX ORDER — DEXTROSE 50 % IN WATER 50 %
25 SYRINGE (ML) INTRAVENOUS ONCE
Refills: 0 | Status: DISCONTINUED | OUTPATIENT
Start: 2019-11-15 | End: 2019-11-20

## 2019-11-15 RX ORDER — DEXTROSE 50 % IN WATER 50 %
15 SYRINGE (ML) INTRAVENOUS ONCE
Refills: 0 | Status: DISCONTINUED | OUTPATIENT
Start: 2019-11-15 | End: 2019-11-20

## 2019-11-15 RX ORDER — LISINOPRIL 2.5 MG/1
40 TABLET ORAL DAILY
Refills: 0 | Status: DISCONTINUED | OUTPATIENT
Start: 2019-11-15 | End: 2019-11-15

## 2019-11-15 RX ORDER — GLUCAGON INJECTION, SOLUTION 0.5 MG/.1ML
1 INJECTION, SOLUTION SUBCUTANEOUS ONCE
Refills: 0 | Status: DISCONTINUED | OUTPATIENT
Start: 2019-11-15 | End: 2019-11-20

## 2019-11-15 RX ORDER — ENOXAPARIN SODIUM 100 MG/ML
40 INJECTION SUBCUTANEOUS DAILY
Refills: 0 | Status: DISCONTINUED | OUTPATIENT
Start: 2019-11-15 | End: 2019-11-17

## 2019-11-15 RX ORDER — ISOSORBIDE MONONITRATE 60 MG/1
30 TABLET, EXTENDED RELEASE ORAL DAILY
Refills: 0 | Status: DISCONTINUED | OUTPATIENT
Start: 2019-11-15 | End: 2019-11-15

## 2019-11-15 RX ORDER — INFLUENZA VIRUS VACCINE 15; 15; 15; 15 UG/.5ML; UG/.5ML; UG/.5ML; UG/.5ML
0.5 SUSPENSION INTRAMUSCULAR ONCE
Refills: 0 | Status: DISCONTINUED | OUTPATIENT
Start: 2019-11-15 | End: 2019-12-03

## 2019-11-15 RX ORDER — DEXTROSE 50 % IN WATER 50 %
12.5 SYRINGE (ML) INTRAVENOUS ONCE
Refills: 0 | Status: DISCONTINUED | OUTPATIENT
Start: 2019-11-15 | End: 2019-11-20

## 2019-11-15 RX ORDER — INSULIN GLARGINE 100 [IU]/ML
15 INJECTION, SOLUTION SUBCUTANEOUS AT BEDTIME
Refills: 0 | Status: DISCONTINUED | OUTPATIENT
Start: 2019-11-15 | End: 2019-11-15

## 2019-11-15 RX ORDER — CLOPIDOGREL BISULFATE 75 MG/1
75 TABLET, FILM COATED ORAL DAILY
Refills: 0 | Status: DISCONTINUED | OUTPATIENT
Start: 2019-11-15 | End: 2019-11-20

## 2019-11-15 RX ORDER — INSULIN LISPRO 100/ML
VIAL (ML) SUBCUTANEOUS
Refills: 0 | Status: DISCONTINUED | OUTPATIENT
Start: 2019-11-15 | End: 2019-11-20

## 2019-11-15 RX ORDER — METOPROLOL TARTRATE 50 MG
50 TABLET ORAL
Refills: 0 | Status: DISCONTINUED | OUTPATIENT
Start: 2019-11-15 | End: 2019-11-20

## 2019-11-15 RX ORDER — METOPROLOL TARTRATE 50 MG
50 TABLET ORAL
Refills: 0 | Status: DISCONTINUED | OUTPATIENT
Start: 2019-11-15 | End: 2019-11-15

## 2019-11-15 RX ORDER — AMLODIPINE BESYLATE 2.5 MG/1
5 TABLET ORAL DAILY
Refills: 0 | Status: DISCONTINUED | OUTPATIENT
Start: 2019-11-15 | End: 2019-11-16

## 2019-11-15 RX ORDER — HYDROCHLOROTHIAZIDE 25 MG
25 TABLET ORAL DAILY
Refills: 0 | Status: DISCONTINUED | OUTPATIENT
Start: 2019-11-15 | End: 2019-11-15

## 2019-11-15 RX ORDER — SODIUM HYPOCHLORITE 0.125 %
1 SOLUTION, NON-ORAL MISCELLANEOUS DAILY
Refills: 0 | Status: DISCONTINUED | OUTPATIENT
Start: 2019-11-15 | End: 2019-11-20

## 2019-11-15 RX ORDER — PIPERACILLIN AND TAZOBACTAM 4; .5 G/20ML; G/20ML
3.38 INJECTION, POWDER, LYOPHILIZED, FOR SOLUTION INTRAVENOUS ONCE
Refills: 0 | Status: COMPLETED | OUTPATIENT
Start: 2019-11-15 | End: 2019-11-15

## 2019-11-15 RX ORDER — HYDROCHLOROTHIAZIDE 25 MG
25 TABLET ORAL DAILY
Refills: 0 | Status: DISCONTINUED | OUTPATIENT
Start: 2019-11-16 | End: 2019-11-17

## 2019-11-15 RX ORDER — ATORVASTATIN CALCIUM 80 MG/1
20 TABLET, FILM COATED ORAL AT BEDTIME
Refills: 0 | Status: DISCONTINUED | OUTPATIENT
Start: 2019-11-15 | End: 2019-11-20

## 2019-11-15 RX ORDER — INSULIN LISPRO 100/ML
5 VIAL (ML) SUBCUTANEOUS
Refills: 0 | Status: DISCONTINUED | OUTPATIENT
Start: 2019-11-15 | End: 2019-11-16

## 2019-11-15 RX ORDER — PIPERACILLIN AND TAZOBACTAM 4; .5 G/20ML; G/20ML
3.38 INJECTION, POWDER, LYOPHILIZED, FOR SOLUTION INTRAVENOUS EVERY 8 HOURS
Refills: 0 | Status: DISCONTINUED | OUTPATIENT
Start: 2019-11-15 | End: 2019-11-20

## 2019-11-15 RX ADMIN — Medication 5 UNIT(S): at 10:38

## 2019-11-15 RX ADMIN — Medication 100 MILLIGRAM(S): at 13:48

## 2019-11-15 RX ADMIN — Medication 4: at 10:40

## 2019-11-15 RX ADMIN — Medication 2: at 14:09

## 2019-11-15 RX ADMIN — Medication 5 UNIT(S): at 14:08

## 2019-11-15 RX ADMIN — Medication 100 MILLIGRAM(S): at 02:51

## 2019-11-15 RX ADMIN — CLOPIDOGREL BISULFATE 75 MILLIGRAM(S): 75 TABLET, FILM COATED ORAL at 17:15

## 2019-11-15 RX ADMIN — Medication 1 APPLICATION(S): at 12:52

## 2019-11-15 RX ADMIN — Medication 50 MILLIGRAM(S): at 17:10

## 2019-11-15 RX ADMIN — Medication 2: at 18:03

## 2019-11-15 RX ADMIN — ENOXAPARIN SODIUM 40 MILLIGRAM(S): 100 INJECTION SUBCUTANEOUS at 14:15

## 2019-11-15 RX ADMIN — Medication 81 MILLIGRAM(S): at 12:51

## 2019-11-15 RX ADMIN — ATORVASTATIN CALCIUM 20 MILLIGRAM(S): 80 TABLET, FILM COATED ORAL at 22:44

## 2019-11-15 RX ADMIN — AMLODIPINE BESYLATE 5 MILLIGRAM(S): 2.5 TABLET ORAL at 17:15

## 2019-11-15 RX ADMIN — Medication 250 MILLIGRAM(S): at 00:47

## 2019-11-15 RX ADMIN — PIPERACILLIN AND TAZOBACTAM 25 GRAM(S): 4; .5 INJECTION, POWDER, LYOPHILIZED, FOR SOLUTION INTRAVENOUS at 22:57

## 2019-11-15 RX ADMIN — PIPERACILLIN AND TAZOBACTAM 25 GRAM(S): 4; .5 INJECTION, POWDER, LYOPHILIZED, FOR SOLUTION INTRAVENOUS at 14:15

## 2019-11-15 RX ADMIN — Medication 5 UNIT(S): at 18:02

## 2019-11-15 RX ADMIN — LISINOPRIL 40 MILLIGRAM(S): 2.5 TABLET ORAL at 08:59

## 2019-11-15 RX ADMIN — Medication 100 MILLIGRAM(S): at 22:44

## 2019-11-15 RX ADMIN — PIPERACILLIN AND TAZOBACTAM 200 GRAM(S): 4; .5 INJECTION, POWDER, LYOPHILIZED, FOR SOLUTION INTRAVENOUS at 08:22

## 2019-11-15 NOTE — PROGRESS NOTE ADULT - PROBLEM SELECTOR PLAN 5
will c/w lisinopril  confirm outpatient BP meds -will resume home BP meds  - Metoprolol, Imdur, lisinopril, amlodipine, hctz -c/w atorvastatin

## 2019-11-15 NOTE — H&P ADULT - NSHPLABSRESULTS_GEN_ALL_CORE
12.4   9.30  )-----------( 227      ( 14 Nov 2019 22:49 )             35.4       11-14    137  |  102  |  22  ----------------------------<  166<H>  4.3   |  23  |  0.97    Ca    10.1      14 Nov 2019 22:49    TPro  7.9  /  Alb  4.6  /  TBili  0.4  /  DBili  x   /  AST  15  /  ALT  13  /  AlkPhos  73  11-14                  PT/INR - ( 14 Nov 2019 22:49 )   PT: 11.5 sec;   INR: 1.00 ratio         PTT - ( 14 Nov 2019 22:49 )  PTT:31.0 sec    Lactate Trend            CAPILLARY BLOOD GLUCOSE Labs reviewed   12.4   9.30  )-----------( 227      ( 14 Nov 2019 22:49 )             35.4       11-14    137  |  102  |  22  ----------------------------<  166<H>  4.3   |  23  |  0.97    Ca    10.1      14 Nov 2019 22:49    TPro  7.9  /  Alb  4.6  /  TBili  0.4  /  DBili  x   /  AST  15  /  ALT  13  /  AlkPhos  73  11-14                  PT/INR - ( 14 Nov 2019 22:49 )   PT: 11.5 sec;   INR: 1.00 ratio         PTT - ( 14 Nov 2019 22:49 )  PTT:31.0 sec    Lactate Trend      EKG ordered    XR foot pending  HUANG/PVR ordered

## 2019-11-15 NOTE — PROGRESS NOTE ADULT - PROBLEM SELECTOR PLAN 1
-Podiatry Dressed wounds with 1st interspace wound w/ iodoform packing, submet 1 wound w/ aquacel & DSD  -s/p IV vanc/cefepime; will initiate empiric IV Abx coverage w/ IV Clindamycin & Zosyn as per podiatry recs  - f/u HUANG/PVR to evaluate blood supply  - Pod rec vascular consult to evaluate vascular supply to lower extremity given history of angiogram on contralateral limb   - No plan for acute podiatric surgical intervention  - trend ESR  - f/u wound culture; check blood culture -Podiatry Dressed wounds with 1st interspace wound w/ iodoform packing, submet 1 wound w/ aquacel & DSD  -s/p IV vanc/cefepime; will initiate empiric IV Abx coverage w/ IV Clindamycin & Zosyn as per podiatry recs  - f/u HUANG/PVR to evaluate blood supply  - if HUANG showing PAD will consult vasc for possible revascularization  - No plan for acute podiatric surgical intervention  - f/u wound culture; check blood culture Concern for osteo given elevated ESR: and Mild cortical irregularity seen on xray  -Podiatry Dressed wounds with 1st interspace wound w/ iodoform packing, submet 1 wound w/ aquacel & DSD  -c/w IV Clindamycin & Zosyn as per podiatry recs  -MRI R foot pending  - f/u wound culture; f/u blood culture

## 2019-11-15 NOTE — H&P ADULT - PROBLEM SELECTOR PLAN 2
A1C 10.6 in 2016, will obtain repeat A1C 10.6 in 2016, will obtain repeat  start on ISS, given that diabetes is poorly controlled will also start on 15 lantus for now, can consider adding premeal as well A1C 10.6 in 2016, will obtain repeat  start on ISS, given that diabetes is poorly controlled will also start on 15 lantus for now, can add 5u premeal as well

## 2019-11-15 NOTE — PROGRESS NOTE ADULT - ASSESSMENT
68M w/ PMHx DM, HTN, CAD, HLD s/p stents w/ c/c of R foot chronic non-healing wounds  - Pt seen and evaluated bedside  - afebrile, no leukocytosis  - right foot 1st interspace wound necrotic and malodorous, remaining wounds stable  - rx: dakins  - redressed w/ DSD  - Pod rec vascular consult to evaluate vascular supply to lower extremity given history of angiogram on contralateral limb, will f/u recs   - Ordered HUANG/PVR  - concern for potential OM underlying 1st interspace wound  - Ordered RF MRI  - awaiting culture results, cont w/ IV abx  - final pod plan pending [facility] recs/ MRI results   - Podiatry will continue to monitor  - seen w/ attending

## 2019-11-15 NOTE — PATIENT PROFILE ADULT - INDICATE TYPE
Health Care Proxy (HCP) Health Care Proxy (HCP)/pt is instructed to bring the copy of healthcare pham

## 2019-11-15 NOTE — PROGRESS NOTE ADULT - PROBLEM SELECTOR PLAN 8
Transitions of Care Status:  1.  Name of PCP:  2.  PCP Contacted on Admission: [ ] Y    [ ] N    3.  PCP contacted at Discharge: [ ] Y    [ ] N    [ ] N/A  4.  Post-Discharge Appointment Date and Location:  5.  Summary of Handoff given to PCP: Transitions of Care Status:  1.  Name of PCP:   2.  PCP Contacted on Admission: [ ] Y    [ ] N    3.  PCP contacted at Discharge: [ ] Y    [ ] N    [ ] N/A  4.  Post-Discharge Appointment Date and Location:  5.  Summary of Handoff given to PCP:

## 2019-11-15 NOTE — PROGRESS NOTE ADULT - ASSESSMENT
69 yo male PMhx HTN, T2DM, CAD s/p stents, HLD presents admitted for diabetic wound infection requiring IV abx with podiatry following 69 yo male PMhx HTN, T2DM, CAD s/p stents, PAD, HLD presents admitted for diabetic wound infection requiring IV abx r/o osteo

## 2019-11-15 NOTE — H&P ADULT - NSICDXPASTSURGICALHX_GEN_ALL_CORE_FT
PAST SURGICAL HISTORY:  No significant past surgical history PAST SURGICAL HISTORY:  S/P debridement

## 2019-11-15 NOTE — PROVIDER CONTACT NOTE (OTHER) - SITUATION
Pt is hypertensive. BP is 174/78,  Pt is asymptomatic at this time. Denies any headache or blurry vision.

## 2019-11-15 NOTE — H&P ADULT - PROBLEM SELECTOR PLAN 6
Pt does not know his medications except that her takes metformin 500mg BID at home and does not take insulin; conveyed that his family will bring in med list in AM, pharmacy is closed, allscripts does not appear to have full med list, please follow up med rec in AM, will use old med rec from previous hospital stay to continue meds for chronic medical conditions

## 2019-11-15 NOTE — PROGRESS NOTE ADULT - PROBLEM SELECTOR PLAN 2
A1C 10.6 in 2016, will obtain repeat  start on ISS, given that diabetes is poorly controlled will also start on 15 lantus for now, can add 5u premeal as well s/p LLE intervention in 2017  -HUANG pending  -if HUANG showing PAD will consult vasc for possible revascularization  -c/w home ASA/plavix

## 2019-11-15 NOTE — PROGRESS NOTE ADULT - PROBLEM SELECTOR PLAN 3
c/w ASA, atorvastatin  ?plavix; will need to confirm  last stent 3 years ago - c/w ASA, plavix, atorvastatin  - last stent 3 years ago not on insulin at home, a1c pending  -c/w Lispro 5 units TID with meals  -lower to low dose sliding scale  -given  and downtrending, will not give lantus tonight  -monitor FS

## 2019-11-15 NOTE — H&P ADULT - NSICDXFAMILYHX_GEN_ALL_CORE_FT
FAMILY HISTORY:  Family history of essential hypertension    Sibling  Still living? Unknown  Family history of diabetes mellitus, Age at diagnosis: Age Unknown FAMILY HISTORY:  Family history of essential hypertension  FH: diabetes mellitus, mother    Sibling  Still living? Unknown  Family history of diabetes mellitus, Age at diagnosis: Age Unknown

## 2019-11-15 NOTE — H&P ADULT - ASSESSMENT
69 yo male PMhx HTN, T2DM, CAD s/p stents, HLD presents to the ED c/o R foot wound x 2-3 weeks. Reports has had wounds to lateral right foot and great toe for which he's been following up with podiatrist for, has been on 10 day course of abx (cannot recall name) with no improvement. Over the past few days has had drainage from both wounds with a foul odor. Saw Dr. Ojeda (associate of Dr. [place]) in office today for re-eval and was sent to hospital given state of wounds. Pt endorsing minimal pain to the foot. Denies fever/chills, weakness, numbness/tingling, n/v/d, trauma/injury, cp, sob. 69 yo male PMhx HTN, T2DM, CAD s/p stents, HLD presents admitted for diabetic wound infection requiring IV abx with podiatry following

## 2019-11-15 NOTE — PROGRESS NOTE ADULT - SUBJECTIVE AND OBJECTIVE BOX
Patient is a 68y old  Male who presents with a chief complaint of Foot infection (15 Nov 2019 04:31)     INTERVAL HPI/OVERNIGHT EVENTS: No acute events overnight.    SUBJECTIVE: Pt seen and examined this morning.    MEDICATIONS  (STANDING):  aspirin  chewable 81 milliGRAM(s) Oral daily  atorvastatin 20 milliGRAM(s) Oral at bedtime  clindamycin IVPB 600 milliGRAM(s) IV Intermittent every 8 hours  dextrose 5%. 1000 milliLiter(s) (50 mL/Hr) IV Continuous <Continuous>  dextrose 50% Injectable 12.5 Gram(s) IV Push once  dextrose 50% Injectable 25 Gram(s) IV Push once  dextrose 50% Injectable 25 Gram(s) IV Push once  enoxaparin Injectable 40 milliGRAM(s) SubCutaneous daily  influenza   Vaccine 0.5 milliLiter(s) IntraMuscular once  insulin glargine Injectable (LANTUS) 15 Unit(s) SubCutaneous at bedtime  insulin lispro (HumaLOG) corrective regimen sliding scale   SubCutaneous three times a day before meals  insulin lispro Injectable (HumaLOG) 5 Unit(s) SubCutaneous three times a day before meals  lisinopril 40 milliGRAM(s) Oral daily  piperacillin/tazobactam IVPB. 3.375 Gram(s) IV Intermittent once  piperacillin/tazobactam IVPB.. 3.375 Gram(s) IV Intermittent every 8 hours    MEDICATIONS  (PRN):  dextrose 40% Gel 15 Gram(s) Oral once PRN Blood Glucose LESS THAN 70 milliGRAM(s)/deciliter  glucagon  Injectable 1 milliGRAM(s) IntraMuscular once PRN Glucose LESS THAN 70 milligrams/deciliter    Allergies    No Known Allergies    Intolerances    REVIEW OF SYSTEMS:  CONSTITUTIONAL: No fever, weight loss, or fatigue  EYES: No eye pain, visual disturbances, or discharge  ENMT:  No difficulty hearing, tinnitus, vertigo; No sinus or throat pain  RESPIRATORY: No cough, wheezing, chills or hemoptysis; No shortness of breath  CARDIOVASCULAR: No chest pain, palpitations, dizziness, or leg swelling  GASTROINTESTINAL: No abdominal or epigastric pain. No nausea, vomiting, or hematemesis; No diarrhea or constipation. No melena or hematochezia.  GENITOURINARY: No dysuria, frequency, hematuria, or incontinence  NEUROLOGICAL: No headaches, loss of strength, numbness, or tremors  SKIN: No itching, burning, rashes, or lesions   LYMPH NODES: No enlarged glands  ENDOCRINE: No heat or cold intolerance; No polydipsia or polyuria  MUSCULOSKELETAL: No joint pain or swelling;   PSYCHIATRIC: Denies depression, anxiety  HEME/LYMPH: No easy bruising, or bleeding gums  ALLERGY AND IMMUNOLOGIC: No hives or eczema    Vital Signs Last 24 Hrs  T(C): 36.8 (15 Nov 2019 02:10), Max: 36.9 (15 Nov 2019 01:32)  T(F): 98.3 (15 Nov 2019 02:10), Max: 98.5 (15 Nov 2019 01:32)  HR: 79 (15 Nov 2019 02:10) (74 - 82)  BP: 188/82 (15 Nov 2019 02:10) (173/74 - 200/85)  BP(mean): --  RR: 18 (15 Nov 2019 02:10) (18 - 20)  SpO2: 98% (15 Nov 2019 02:10) (97% - 100%)    PHYSICAL EXAM:  GENERAL: NAD, well-developed  HEENT: NC/AT, EOMI, PERRL  NECK: Supple, No JVD  CHEST/LUNG: Clear to auscultation bilaterally; No rales, rhonchi, wheezing, or rubs  CARD: Regular rate and rhythm; No murmurs, rubs, or gallops. No LE edema  ABDOMEN: Soft, Nontender, Nondistended; Bowel sounds present  EXTREMITIES:  2+ pitting edema present up to knees b/l. right foot with dressing present, malodorous. left foot with healed wound at site of previous debridement.   SKIN: No rashes or lesions  NEURO: AOx3, moving all extremities    LABS:                        12.4   9.30  )-----------( 227      ( 14 Nov 2019 22:49 )             35.4     14 Nov 2019 22:49    137    |  102    |  22     ----------------------------<  166    4.3     |  23     |  0.97     Ca    10.1       14 Nov 2019 22:49    TPro  7.9    /  Alb  4.6    /  TBili  0.4    /  DBili  x      /  AST  15     /  ALT  13     /  AlkPhos  73     14 Nov 2019 22:49    PT/INR - ( 14 Nov 2019 22:49 )   PT: 11.5 sec;   INR: 1.00 ratio         PTT - ( 14 Nov 2019 22:49 )  PTT:31.0 sec  CAPILLARY BLOOD GLUCOSE    BLOOD CULTURE    RADIOLOGY & ADDITIONAL TESTS:    Imaging Personally Reviewed:  [ ] YES     Consultant(s) Notes Reviewed:      Care Discussed with Consultants/Other Providers: Patient is a 68y old  Male who presents with a chief complaint of Foot infection (15 Nov 2019 04:31)     INTERVAL HPI/OVERNIGHT EVENTS: No acute events overnight.    SUBJECTIVE: Pt seen and examined this morning. States he feels generally well this morning. Denies any pain in his feet. Denies any fever, N/V, abd pain, SOB, CP.     MEDICATIONS  (STANDING):  aspirin  chewable 81 milliGRAM(s) Oral daily  atorvastatin 20 milliGRAM(s) Oral at bedtime  clindamycin IVPB 600 milliGRAM(s) IV Intermittent every 8 hours  dextrose 5%. 1000 milliLiter(s) (50 mL/Hr) IV Continuous <Continuous>  dextrose 50% Injectable 12.5 Gram(s) IV Push once  dextrose 50% Injectable 25 Gram(s) IV Push once  dextrose 50% Injectable 25 Gram(s) IV Push once  enoxaparin Injectable 40 milliGRAM(s) SubCutaneous daily  influenza   Vaccine 0.5 milliLiter(s) IntraMuscular once  insulin glargine Injectable (LANTUS) 15 Unit(s) SubCutaneous at bedtime  insulin lispro (HumaLOG) corrective regimen sliding scale   SubCutaneous three times a day before meals  insulin lispro Injectable (HumaLOG) 5 Unit(s) SubCutaneous three times a day before meals  lisinopril 40 milliGRAM(s) Oral daily  piperacillin/tazobactam IVPB. 3.375 Gram(s) IV Intermittent once  piperacillin/tazobactam IVPB.. 3.375 Gram(s) IV Intermittent every 8 hours    MEDICATIONS  (PRN):  dextrose 40% Gel 15 Gram(s) Oral once PRN Blood Glucose LESS THAN 70 milliGRAM(s)/deciliter  glucagon  Injectable 1 milliGRAM(s) IntraMuscular once PRN Glucose LESS THAN 70 milligrams/deciliter    Allergies    No Known Allergies    Intolerances    REVIEW OF SYSTEMS:  CONSTITUTIONAL: No fever, weight loss, or fatigue  EYES: No eye pain, visual disturbances, or discharge  ENMT:  No difficulty hearing, tinnitus, vertigo; No sinus or throat pain  RESPIRATORY: No cough, wheezing, chills or hemoptysis; No shortness of breath  CARDIOVASCULAR: No chest pain, palpitations, dizziness, or leg swelling  GASTROINTESTINAL: No abdominal or epigastric pain. No nausea, vomiting, or hematemesis; No diarrhea or constipation. No melena or hematochezia.  GENITOURINARY: No dysuria, frequency, hematuria, or incontinence  NEUROLOGICAL: No headaches, loss of strength, numbness, or tremors  SKIN: No itching, burning, rashes, or lesions   LYMPH NODES: No enlarged glands  ENDOCRINE: No heat or cold intolerance; No polydipsia or polyuria  MUSCULOSKELETAL: No joint pain or swelling;   PSYCHIATRIC: Denies depression, anxiety  HEME/LYMPH: No easy bruising, or bleeding gums  ALLERGY AND IMMUNOLOGIC: No hives or eczema    Vital Signs Last 24 Hrs  T(C): 36.8 (15 Nov 2019 02:10), Max: 36.9 (15 Nov 2019 01:32)  T(F): 98.3 (15 Nov 2019 02:10), Max: 98.5 (15 Nov 2019 01:32)  HR: 79 (15 Nov 2019 02:10) (74 - 82)  BP: 188/82 (15 Nov 2019 02:10) (173/74 - 200/85)  BP(mean): --  RR: 18 (15 Nov 2019 02:10) (18 - 20)  SpO2: 98% (15 Nov 2019 02:10) (97% - 100%)    PHYSICAL EXAM:  GENERAL: NAD, well-developed  HEENT: NC/AT, EOMI, PERRL  NECK: Supple, No JVD  CHEST/LUNG: Clear to auscultation bilaterally; No rales, rhonchi, wheezing, or rubs  CARD: Regular rate and rhythm; No murmurs, rubs, or gallops. No LE edema  ABDOMEN: Soft, Nontender, Nondistended; Bowel sounds present  EXTREMITIES:  2+ pitting edema present up to knees b/l. right foot with dressing present, malodorous. left foot with healed wound at site of previous debridement. Old abrasion over left anterior leg covered with bandage.  SKIN: No rashes or lesions  NEURO: AOx3, moving all extremities    LABS:                        12.4   9.30  )-----------( 227      ( 14 Nov 2019 22:49 )             35.4     14 Nov 2019 22:49    137    |  102    |  22     ----------------------------<  166    4.3     |  23     |  0.97     Ca    10.1       14 Nov 2019 22:49    TPro  7.9    /  Alb  4.6    /  TBili  0.4    /  DBili  x      /  AST  15     /  ALT  13     /  AlkPhos  73     14 Nov 2019 22:49    PT/INR - ( 14 Nov 2019 22:49 )   PT: 11.5 sec;   INR: 1.00 ratio         PTT - ( 14 Nov 2019 22:49 )  PTT:31.0 sec  CAPILLARY BLOOD GLUCOSE    BLOOD CULTURE    RADIOLOGY & ADDITIONAL TESTS:    Imaging Personally Reviewed:  [ ] YES     Consultant(s) Notes Reviewed:      Care Discussed with Consultants/Other Providers: Patient is a 68y old  Male who presents with a chief complaint of Foot infection (15 Nov 2019 04:31)     INTERVAL HPI/OVERNIGHT EVENTS: No acute events overnight.    SUBJECTIVE: Pt seen and examined this morning. States he feels generally well this morning. Denies any pain in his feet. Denies any fever, chills, N/V, abd pain, SOB, CP.     MEDICATIONS  (STANDING):  aspirin  chewable 81 milliGRAM(s) Oral daily  atorvastatin 20 milliGRAM(s) Oral at bedtime  clindamycin IVPB 600 milliGRAM(s) IV Intermittent every 8 hours  dextrose 5%. 1000 milliLiter(s) (50 mL/Hr) IV Continuous <Continuous>  dextrose 50% Injectable 12.5 Gram(s) IV Push once  dextrose 50% Injectable 25 Gram(s) IV Push once  dextrose 50% Injectable 25 Gram(s) IV Push once  enoxaparin Injectable 40 milliGRAM(s) SubCutaneous daily  influenza   Vaccine 0.5 milliLiter(s) IntraMuscular once  insulin glargine Injectable (LANTUS) 15 Unit(s) SubCutaneous at bedtime  insulin lispro (HumaLOG) corrective regimen sliding scale   SubCutaneous three times a day before meals  insulin lispro Injectable (HumaLOG) 5 Unit(s) SubCutaneous three times a day before meals  lisinopril 40 milliGRAM(s) Oral daily  piperacillin/tazobactam IVPB. 3.375 Gram(s) IV Intermittent once  piperacillin/tazobactam IVPB.. 3.375 Gram(s) IV Intermittent every 8 hours    MEDICATIONS  (PRN):  dextrose 40% Gel 15 Gram(s) Oral once PRN Blood Glucose LESS THAN 70 milliGRAM(s)/deciliter  glucagon  Injectable 1 milliGRAM(s) IntraMuscular once PRN Glucose LESS THAN 70 milligrams/deciliter    Allergies        Vital Signs Last 24 Hrs  T(C): 36.8 (15 Nov 2019 02:10), Max: 36.9 (15 Nov 2019 01:32)  T(F): 98.3 (15 Nov 2019 02:10), Max: 98.5 (15 Nov 2019 01:32)  HR: 79 (15 Nov 2019 02:10) (74 - 82)  BP: 188/82 (15 Nov 2019 02:10) (173/74 - 200/85)  BP(mean): --  RR: 18 (15 Nov 2019 02:10) (18 - 20)  SpO2: 98% (15 Nov 2019 02:10) (97% - 100%)    PHYSICAL EXAM:  GENERAL: NAD, well-developed  HEENT: NC/AT,   NECK: Supple, No JVD  CHEST/LUNG: Clear to auscultation bilaterally; No rales, rhonchi, wheezing, or rubs  CARD: Regular rate and rhythm; No murmurs, rubs, or gallops. No LE edema  ABDOMEN: Soft, Nontender, Nondistended; Bowel sounds present  EXTREMITIES:  b/l. right foot with dressing present, malodorous. left foot with dry healed wound at site of previous debridement. Old abrasion over left anterior leg covered with bandage.  SKIN: No rashes or lesions  NEURO: AOx3, moving all extremities    LABS:                        12.4   9.30  )-----------( 227      ( 14 Nov 2019 22:49 )             35.4     14 Nov 2019 22:49    137    |  102    |  22     ----------------------------<  166    4.3     |  23     |  0.97     Ca    10.1       14 Nov 2019 22:49    TPro  7.9    /  Alb  4.6    /  TBili  0.4    /  DBili  x      /  AST  15     /  ALT  13     /  AlkPhos  73     14 Nov 2019 22:49    PT/INR - ( 14 Nov 2019 22:49 )   PT: 11.5 sec;   INR: 1.00 ratio         PTT - ( 14 Nov 2019 22:49 )  PTT:31.0 sec  CAPILLARY BLOOD GLUCOSE    BLOOD CULTURE    RADIOLOGY & ADDITIONAL TESTS:    Imaging Personally Reviewed:  [ x] YES  R foot xray: Impression: Mild cortical irregularity/questionable erosion at the medial   aspect of the distal phalanx of the right first toe which may represent   osteomyelitis. An MRI may be performed for confirmation.    Consultant(s) Notes Reviewed:      Care Discussed with Consultants/Other Providers:

## 2019-11-15 NOTE — PROGRESS NOTE ADULT - PROBLEM SELECTOR PLAN 6
Pt does not know his medications except that her takes metformin 500mg BID at home and does not take insulin; conveyed that his family will bring in med list in AM, pharmacy is closed, [employer]scripts does not appear to have full med list, please follow up med rec in AM, will use old med rec from previous hospital stay to continue meds for chronic medical conditions - updated list brought in by patient's wife  - med rec updated BP elevated but improved to 120s  -c/w Metoprolol and amlodipine,   -hold and restart isosorbide, HCTZ, lisinopril pending further trend of bp  -monitor bp

## 2019-11-15 NOTE — H&P ADULT - NSICDXPASTMEDICALHX_GEN_ALL_CORE_FT
PAST MEDICAL HISTORY:  Coronary artery disease involving native coronary artery of native heart without angina pectoris     Essential hypertension     Peripheral artery disease     Type 2 diabetes mellitus with other circulatory complication, without long-term current use of insulin     Venous ulcer of left leg

## 2019-11-15 NOTE — H&P ADULT - PROBLEM SELECTOR PLAN 5
will start on amlodipine 5mg daily will start on amlodipine 5mg daily and lisinopril  confirm outpatient BP meds will c/w lisinopril  confirm outpatient BP meds

## 2019-11-15 NOTE — H&P ADULT - NSHPSOCIALHISTORY_GEN_ALL_CORE
Pt lives with his wife, is able to ambulate and carry out his ADLs.  He drinks socially and smoked 1/3 pack per day for 20 yrs, but quit 30 yrs ago.

## 2019-11-15 NOTE — H&P ADULT - PROBLEM SELECTOR PLAN 1
-Podiatry Dressed wounds with 1st interspace wound w/ iodoform packing, submet 1 wound w/ aquacel & DSD  -will initiate empiric IV Abx coverage w/ Clindamycin & Zosyn as per podiatry recs  - f/u HUANG/PVR to evaluate blood supply  - Pod rec vascular consult to evaluate vascular supply to lower extremity given history of angiogram on contralateral limb   - No plan for acute podiatric surgical intervention -Podiatry Dressed wounds with 1st interspace wound w/ iodoform packing, submet 1 wound w/ aquacel & DSD  -s/p IV vanc/cefepime; will initiate empiric IV Abx coverage w/ IV Clindamycin & Zosyn as per podiatry recs  - f/u HUANG/PVR to evaluate blood supply  - Pod rec vascular consult to evaluate vascular supply to lower extremity given history of angiogram on contralateral limb   - No plan for acute podiatric surgical intervention  - trend ESR  - f/u wound culture; check blood culture

## 2019-11-15 NOTE — H&P ADULT - NSHPPHYSICALEXAM_GEN_ALL_CORE
VITAL SIGNS (Last 24 hrs):  T(C): 36.8 (11-15-19 @ 02:10), Max: 36.9 (11-15-19 @ 01:32)  HR: 79 (11-15-19 @ 02:10) (74 - 82)  BP: 188/82 (11-15-19 @ 02:10) (173/74 - 200/85)  RR: 18 (11-15-19 @ 02:10) (18 - 20)  SpO2: 98% (11-15-19 @ 02:10) (97% - 100%)  Wt(kg): --  Daily Height in cm: 172.72 (15 Nov 2019 02:10)    Daily     I&O's Summary    PHYSICAL EXAM:    GENERAL: Comfortable, no acute distress   HEAD:  Normocephalic, atraumatic  HEENT: Moist mucous membranes  NECK: Supple, No JVD  NERVOUS SYSTEM:  Alert & Oriented X3, Motor Strength 5/5 B/L upper and lower extremities  CHEST/LUNG: Clear to auscultation bilaterally  HEART: Regular rate and rhythm, no murmur   ABDOMEN: Soft, Nontender, Nondistended, Bowel sounds present  EXTREMITIES:  Bandaged R foot with decreased sensation of the great toe, foot is malodorous; no wounds visible in left foot; lateral portion of L foot has a scab at the site of the previous debridement; 2+ pitting edema noted b/l  MUSCULOSKELETAL- No muscle tenderness, no joint tenderness  SKIN- warm and dry, no rash

## 2019-11-15 NOTE — H&P ADULT - NSHPREVIEWOFSYSTEMS_GEN_ALL_CORE
REVIEW OF SYSTEMS:    CONSTITUTIONAL: No weakness, fevers or chills  EYES/ENT: No visual changes;  No vertigo or throat pain   NECK: No pain or stiffness  RESPIRATORY: No cough, wheezing, hemoptysis; No shortness of breath  CARDIOVASCULAR: No chest pain or palpitations  GASTROINTESTINAL: No abdominal or epigastric pain. No nausea, vomiting, or hematemesis; No diarrhea or constipation. No melena or hematochezia.  GENITOURINARY: No dysuria, frequency or hematuria  NEUROLOGICAL:+mild decreased sensation in R foot  SKIN: No itching, burning, rashes, or lesions   All other review of systems is negative unless indicated above. REVIEW OF SYSTEMS:    CONSTITUTIONAL: No weakness, fevers or chills  EYES/ENT: No visual changes;  No vertigo or throat pain   NECK: No pain or stiffness  RESPIRATORY: No cough, wheezing, hemoptysis; No shortness of breath  CARDIOVASCULAR: No chest pain or palpitations  GASTROINTESTINAL: No abdominal or epigastric pain. No nausea, vomiting, or hematemesis; No diarrhea or constipation. No melena or hematochezia.  GENITOURINARY: No dysuria, frequency or hematuria  NEUROLOGICAL:+mild decreased sensation in R foot  SKIN: +R foot wound  All other review of systems is negative unless indicated above.

## 2019-11-15 NOTE — PROGRESS NOTE ADULT - PROBLEM SELECTOR PLAN 7
DVT ppx: lovenox  Diet: Carb consistent  Dispo: pending PT eval DVT ppx: lovenox  Diet: Carb consistent

## 2019-11-15 NOTE — H&P ADULT - HISTORY OF PRESENT ILLNESS
12/30/16 noted to have cutaneous ulceration along the lateral aspect of the L fifth MPJ with adjacent osteomyelitis in the fifth metatarsal head and base of  the fifth proximal phalanx. Treated w/ andrew/zosyn and debrided by podiatry .He should continue with local wound care and hyperbaric oxygen therapy per wound care center recommendations. 67 yo male PMhx HTN, T2DM, CAD s/p stents, HLD presents to the ED c/o R foot wound x 5 weeks. The pt reports that he was found to have calluses in the R foot by his podiatrist, who removed the calluses, which caused a wound to form that was worsening. It was associated with malodorous discharge. The pt received regular wound care and bandaging, but it continued to get worse.  He received a 10 day course of Abx, which has not helped resolve his symptoms.The pt reports to experiencing pain when he stands on the foot, but reports that he has been able to carry out his ADLs despite the foot pain. The pt saw Dr. Ojeda (associate of Dr. [place]) in office today for re-eval and was sent to hospital given state of wounds.       Reports has had wounds to lateral right foot and great toe for which he's been following up with podiatrist for, has been on 10 day course of abx (cannot recall name) with no improvement. Over the past few days has had drainage from both wounds with a foul odor. Saw Dr. Ojeda (associate of Dr. [place]) in office yesterday for re-eval and was sent to hospital for further management. The pt reports decreased sensation in his foot, but denie, fever/chills, weakness,  n/v/d/SOB/CP  or other issues. Pt also has a hx of osteo of the fifth metatarsal head and base of  the fifth proximal phalanx.MRI on 12/30/16 showed a cutaneous ulceration along the lateral aspect of the L fifth MPJ with adjacent osteomyelitis in the fifth metatarsal head and base of  the fifth proximal phalanx. Treated w/ vanc/zosyn and debrided by podiatry.It was recommended that He should continue with local wound care and hyperbaric oxygen therapy per wound care center recommendations. 67 yo male PMhx HTN, T2DM, CAD s/p stents, HLD presents to the ED c/o R foot wound x 5 weeks. The pt reports that he was found to have calluses in the R foot by his podiatrist, who removed the calluses, which caused a wound to form that was worsening. It was associated with malodorous discharge. The pt received regular wound care and bandaging, but it continued to get worse.  He received a 10 day course of Abx, which has not helped resolve his symptoms.The pt reports to experiencing pain when he stands on the foot, but reports that he has been able to carry out his ADLs despite the foot pain. The pt saw Dr. Ojeda (associate of Dr. [place]) in office yesterday for re-eval and was sent to hospital given state of wounds. The pt reports decreased sensation in his foot, but denies fever/chills, weakness,  n/v/d/SOB/CP  or other issues. Pt also has a hx of osteo of the fifth metatarsal head and base of  the fifth proximal phalanx.MRI on 12/30/16 showed a cutaneous ulceration along the lateral aspect of the L fifth MPJ with adjacent osteomyelitis in the fifth metatarsal head and base of  the fifth proximal phalanx. Treated w/ vanc/zosyn and debrided by podiatry.It was recommended that He should continue with local wound care and hyperbaric oxygen therapy per wound care center recommendations.

## 2019-11-15 NOTE — PROGRESS NOTE ADULT - SUBJECTIVE AND OBJECTIVE BOX
Patient is a 68y old  Male who presents with a chief complaint of Foot infection (15 Nov 2019 07:13)       INTERVAL HPI/OVERNIGHT EVENTS:  Patient seen and evaluated at bedside.  Pt is resting comfortable in NAD. Denies N/V/F/C.    Allergies    No Known Allergies    Intolerances        Vital Signs Last 24 Hrs  T(C): 36.7 (15 Nov 2019 08:27), Max: 36.9 (15 Nov 2019 01:32)  T(F): 98.1 (15 Nov 2019 08:27), Max: 98.5 (15 Nov 2019 01:32)  HR: 80 (15 Nov 2019 08:27) (74 - 82)  BP: 174/78 (15 Nov 2019 08:27) (173/74 - 200/85)  BP(mean): --  RR: 18 (15 Nov 2019 02:10) (18 - 20)  SpO2: 98% (15 Nov 2019 08:27) (97% - 100%)    LABS:                        11.3   7.31  )-----------( 208      ( 15 Nov 2019 07:12 )             33.0     11-15    138  |  100  |  21  ----------------------------<  279<H>  4.5   |  26  |  0.87    Ca    9.5      15 Nov 2019 07:12  Phos  2.9     11-15  Mg     1.7     11-15    TPro  7.1  /  Alb  4.2  /  TBili  0.4  /  DBili  x   /  AST  10  /  ALT  11  /  AlkPhos  66  11-15    PT/INR - ( 14 Nov 2019 22:49 )   PT: 11.5 sec;   INR: 1.00 ratio         PTT - ( 14 Nov 2019 22:49 )  PTT:31.0 sec    CAPILLARY BLOOD GLUCOSE      POCT Blood Glucose.: 248 mg/dL (15 Nov 2019 08:32)      Lower Extremity Physical Exam:  Vascular: DP/PT 0/4, B/L, CFT <3 seconds B/L, Temperature gradient warm to cool B/L.   Neuro: Epicritic sensation grossly diminished to level of ankleB/L.  Skin:  Wound #1: R foot 1st interspace wound, 2.0x2.0cm, depth to 1st and 2nd met capsule, wound bed necrotic, serous drainage, malodorous, periwound macerated, etiology 2/2 interdigital maceration/ arterial insufficiency     Wound #2: R foot submetatarsal 1 wound, 2.5x1.5cm, wound bed fibrogranular, no active drainage, no malodor, periwound macerated, etiology 2/2 pressure     Wound #3: R foot lateral border of foot stable eschar, 4.0x1.0cm, no active drainage, no malodor, periwound erythematous, etiology 2/2 pressure    RADIOLOGY & ADDITIONAL TESTS:    < from: Xray Foot AP + Lateral + Oblique, Right (11.15.19 @ 00:26) >  EXAM:  FOOT COMPLETE RIGHT (MIN 3 VIEW)                            PROCEDURE DATE:  11/15/2019            INTERPRETATION:  Indication: Right fifth metatarsal wound. Right first   toe wound. Rule out osteomyelitis.    Technique: 3 views of the rightfoot.    Comparison: None.    Findings: No acute fracture or dislocation is seen in the right foot.   There is mild cortical irregularity/questionable erosion at the medial   aspect of the distal phalanx of the right first toe. There are mild   hammertoe deformities. There are vascular calcifications. No subcutaneous   air is seen.    Impression: Mild cortical irregularity/questionable erosion at the medial   aspect of the distal phalanx of the right first toe which may represent   osteomyelitis.An MRI may be performed for confirmation.                    SEBASTIAN LE M.D., ATTENDING RADIOLOGIST  This document has been electronically signed. Nov 15 2019  8:26AM              < end of copied text >

## 2019-11-16 DIAGNOSIS — R11.2 NAUSEA WITH VOMITING, UNSPECIFIED: ICD-10-CM

## 2019-11-16 DIAGNOSIS — M86.9 OSTEOMYELITIS, UNSPECIFIED: ICD-10-CM

## 2019-11-16 LAB
ANION GAP SERPL CALC-SCNC: 13 MMOL/L — SIGNIFICANT CHANGE UP (ref 5–17)
ANION GAP SERPL CALC-SCNC: 13 MMOL/L — SIGNIFICANT CHANGE UP (ref 5–17)
BUN SERPL-MCNC: 17 MG/DL — SIGNIFICANT CHANGE UP (ref 7–23)
BUN SERPL-MCNC: 18 MG/DL — SIGNIFICANT CHANGE UP (ref 7–23)
CALCIUM SERPL-MCNC: 9.2 MG/DL — SIGNIFICANT CHANGE UP (ref 8.4–10.5)
CALCIUM SERPL-MCNC: 9.9 MG/DL — SIGNIFICANT CHANGE UP (ref 8.4–10.5)
CHLORIDE SERPL-SCNC: 100 MMOL/L — SIGNIFICANT CHANGE UP (ref 96–108)
CHLORIDE SERPL-SCNC: 102 MMOL/L — SIGNIFICANT CHANGE UP (ref 96–108)
CK MB CFR SERPL CALC: 2.8 NG/ML — SIGNIFICANT CHANGE UP (ref 0–6.7)
CK MB CFR SERPL CALC: 2.9 NG/ML — SIGNIFICANT CHANGE UP (ref 0–6.7)
CK SERPL-CCNC: 59 U/L — SIGNIFICANT CHANGE UP (ref 30–200)
CK SERPL-CCNC: 69 U/L — SIGNIFICANT CHANGE UP (ref 30–200)
CO2 SERPL-SCNC: 23 MMOL/L — SIGNIFICANT CHANGE UP (ref 22–31)
CO2 SERPL-SCNC: 24 MMOL/L — SIGNIFICANT CHANGE UP (ref 22–31)
CREAT SERPL-MCNC: 0.79 MG/DL — SIGNIFICANT CHANGE UP (ref 0.5–1.3)
CREAT SERPL-MCNC: 0.8 MG/DL — SIGNIFICANT CHANGE UP (ref 0.5–1.3)
CRP SERPL-MCNC: 2.15 MG/DL — HIGH (ref 0–0.4)
GLUCOSE SERPL-MCNC: 216 MG/DL — HIGH (ref 70–99)
GLUCOSE SERPL-MCNC: 274 MG/DL — HIGH (ref 70–99)
HCT VFR BLD CALC: 34.4 % — LOW (ref 39–50)
HCT VFR BLD CALC: 35.8 % — LOW (ref 39–50)
HCV AB S/CO SERPL IA: 0.29 S/CO — SIGNIFICANT CHANGE UP (ref 0–0.99)
HCV AB SERPL-IMP: SIGNIFICANT CHANGE UP
HGB BLD-MCNC: 11.7 G/DL — LOW (ref 13–17)
HGB BLD-MCNC: 12.6 G/DL — LOW (ref 13–17)
MAGNESIUM SERPL-MCNC: 1.7 MG/DL — SIGNIFICANT CHANGE UP (ref 1.6–2.6)
MAGNESIUM SERPL-MCNC: 1.9 MG/DL — SIGNIFICANT CHANGE UP (ref 1.6–2.6)
MCHC RBC-ENTMCNC: 27 PG — SIGNIFICANT CHANGE UP (ref 27–34)
MCHC RBC-ENTMCNC: 28.3 PG — SIGNIFICANT CHANGE UP (ref 27–34)
MCHC RBC-ENTMCNC: 34 GM/DL — SIGNIFICANT CHANGE UP (ref 32–36)
MCHC RBC-ENTMCNC: 35.2 GM/DL — SIGNIFICANT CHANGE UP (ref 32–36)
MCV RBC AUTO: 79.3 FL — LOW (ref 80–100)
MCV RBC AUTO: 80.3 FL — SIGNIFICANT CHANGE UP (ref 80–100)
NRBC # BLD: 0 /100 WBCS — SIGNIFICANT CHANGE UP (ref 0–0)
NRBC # BLD: 0 /100 WBCS — SIGNIFICANT CHANGE UP (ref 0–0)
PHOSPHATE SERPL-MCNC: 3.3 MG/DL — SIGNIFICANT CHANGE UP (ref 2.5–4.5)
PHOSPHATE SERPL-MCNC: 3.5 MG/DL — SIGNIFICANT CHANGE UP (ref 2.5–4.5)
PLATELET # BLD AUTO: 210 K/UL — SIGNIFICANT CHANGE UP (ref 150–400)
PLATELET # BLD AUTO: 229 K/UL — SIGNIFICANT CHANGE UP (ref 150–400)
POTASSIUM SERPL-MCNC: 4.2 MMOL/L — SIGNIFICANT CHANGE UP (ref 3.5–5.3)
POTASSIUM SERPL-MCNC: 4.6 MMOL/L — SIGNIFICANT CHANGE UP (ref 3.5–5.3)
POTASSIUM SERPL-SCNC: 4.2 MMOL/L — SIGNIFICANT CHANGE UP (ref 3.5–5.3)
POTASSIUM SERPL-SCNC: 4.6 MMOL/L — SIGNIFICANT CHANGE UP (ref 3.5–5.3)
RBC # BLD: 4.34 M/UL — SIGNIFICANT CHANGE UP (ref 4.2–5.8)
RBC # BLD: 4.46 M/UL — SIGNIFICANT CHANGE UP (ref 4.2–5.8)
RBC # FLD: 13.1 % — SIGNIFICANT CHANGE UP (ref 10.3–14.5)
RBC # FLD: 13.3 % — SIGNIFICANT CHANGE UP (ref 10.3–14.5)
SODIUM SERPL-SCNC: 136 MMOL/L — SIGNIFICANT CHANGE UP (ref 135–145)
SODIUM SERPL-SCNC: 139 MMOL/L — SIGNIFICANT CHANGE UP (ref 135–145)
TROPONIN T, HIGH SENSITIVITY RESULT: 19 NG/L — SIGNIFICANT CHANGE UP (ref 0–51)
TROPONIN T, HIGH SENSITIVITY RESULT: 19 NG/L — SIGNIFICANT CHANGE UP (ref 0–51)
WBC # BLD: 10.55 K/UL — HIGH (ref 3.8–10.5)
WBC # BLD: 7.9 K/UL — SIGNIFICANT CHANGE UP (ref 3.8–10.5)
WBC # FLD AUTO: 10.55 K/UL — HIGH (ref 3.8–10.5)
WBC # FLD AUTO: 7.9 K/UL — SIGNIFICANT CHANGE UP (ref 3.8–10.5)

## 2019-11-16 PROCEDURE — 71045 X-RAY EXAM CHEST 1 VIEW: CPT | Mod: 26

## 2019-11-16 PROCEDURE — 93010 ELECTROCARDIOGRAM REPORT: CPT

## 2019-11-16 PROCEDURE — 99233 SBSQ HOSP IP/OBS HIGH 50: CPT | Mod: GC

## 2019-11-16 RX ORDER — INSULIN LISPRO 100/ML
6 VIAL (ML) SUBCUTANEOUS
Refills: 0 | Status: DISCONTINUED | OUTPATIENT
Start: 2019-11-16 | End: 2019-11-20

## 2019-11-16 RX ORDER — INSULIN GLARGINE 100 [IU]/ML
9 INJECTION, SOLUTION SUBCUTANEOUS AT BEDTIME
Refills: 0 | Status: DISCONTINUED | OUTPATIENT
Start: 2019-11-16 | End: 2019-11-17

## 2019-11-16 RX ORDER — ISOSORBIDE MONONITRATE 60 MG/1
30 TABLET, EXTENDED RELEASE ORAL DAILY
Refills: 0 | Status: DISCONTINUED | OUTPATIENT
Start: 2019-11-16 | End: 2019-11-20

## 2019-11-16 RX ORDER — AMLODIPINE BESYLATE 2.5 MG/1
5 TABLET ORAL ONCE
Refills: 0 | Status: COMPLETED | OUTPATIENT
Start: 2019-11-16 | End: 2019-11-16

## 2019-11-16 RX ORDER — AMLODIPINE BESYLATE 2.5 MG/1
10 TABLET ORAL DAILY
Refills: 0 | Status: DISCONTINUED | OUTPATIENT
Start: 2019-11-16 | End: 2019-11-16

## 2019-11-16 RX ORDER — AMLODIPINE BESYLATE 2.5 MG/1
10 TABLET ORAL DAILY
Refills: 0 | Status: DISCONTINUED | OUTPATIENT
Start: 2019-11-17 | End: 2019-11-20

## 2019-11-16 RX ORDER — HYDROMORPHONE HYDROCHLORIDE 2 MG/ML
0.5 INJECTION INTRAMUSCULAR; INTRAVENOUS; SUBCUTANEOUS ONCE
Refills: 0 | Status: DISCONTINUED | OUTPATIENT
Start: 2019-11-16 | End: 2019-11-16

## 2019-11-16 RX ORDER — INSULIN GLARGINE 100 [IU]/ML
6 INJECTION, SOLUTION SUBCUTANEOUS AT BEDTIME
Refills: 0 | Status: DISCONTINUED | OUTPATIENT
Start: 2019-11-16 | End: 2019-11-16

## 2019-11-16 RX ORDER — VANCOMYCIN HCL 1 G
1000 VIAL (EA) INTRAVENOUS EVERY 12 HOURS
Refills: 0 | Status: DISCONTINUED | OUTPATIENT
Start: 2019-11-16 | End: 2019-11-17

## 2019-11-16 RX ORDER — ONDANSETRON 8 MG/1
4 TABLET, FILM COATED ORAL ONCE
Refills: 0 | Status: COMPLETED | OUTPATIENT
Start: 2019-11-16 | End: 2019-11-16

## 2019-11-16 RX ORDER — ONDANSETRON 8 MG/1
4 TABLET, FILM COATED ORAL EVERY 6 HOURS
Refills: 0 | Status: DISCONTINUED | OUTPATIENT
Start: 2019-11-16 | End: 2019-11-20

## 2019-11-16 RX ADMIN — HYDROMORPHONE HYDROCHLORIDE 0.5 MILLIGRAM(S): 2 INJECTION INTRAMUSCULAR; INTRAVENOUS; SUBCUTANEOUS at 12:08

## 2019-11-16 RX ADMIN — AMLODIPINE BESYLATE 5 MILLIGRAM(S): 2.5 TABLET ORAL at 13:52

## 2019-11-16 RX ADMIN — Medication 100 MILLIGRAM(S): at 05:02

## 2019-11-16 RX ADMIN — Medication 81 MILLIGRAM(S): at 12:08

## 2019-11-16 RX ADMIN — LISINOPRIL 40 MILLIGRAM(S): 2.5 TABLET ORAL at 12:09

## 2019-11-16 RX ADMIN — Medication 2: at 13:45

## 2019-11-16 RX ADMIN — Medication 100 MILLIGRAM(S): at 13:00

## 2019-11-16 RX ADMIN — Medication 25 MILLIGRAM(S): at 05:12

## 2019-11-16 RX ADMIN — CLOPIDOGREL BISULFATE 75 MILLIGRAM(S): 75 TABLET, FILM COATED ORAL at 12:08

## 2019-11-16 RX ADMIN — Medication 2: at 09:24

## 2019-11-16 RX ADMIN — Medication 250 MILLIGRAM(S): at 20:25

## 2019-11-16 RX ADMIN — Medication 50 MILLIGRAM(S): at 05:12

## 2019-11-16 RX ADMIN — HYDROMORPHONE HYDROCHLORIDE 0.5 MILLIGRAM(S): 2 INJECTION INTRAMUSCULAR; INTRAVENOUS; SUBCUTANEOUS at 12:38

## 2019-11-16 RX ADMIN — Medication 0.5 MILLIGRAM(S): at 18:11

## 2019-11-16 RX ADMIN — ISOSORBIDE MONONITRATE 30 MILLIGRAM(S): 60 TABLET, EXTENDED RELEASE ORAL at 17:31

## 2019-11-16 RX ADMIN — PIPERACILLIN AND TAZOBACTAM 25 GRAM(S): 4; .5 INJECTION, POWDER, LYOPHILIZED, FOR SOLUTION INTRAVENOUS at 05:37

## 2019-11-16 RX ADMIN — PIPERACILLIN AND TAZOBACTAM 25 GRAM(S): 4; .5 INJECTION, POWDER, LYOPHILIZED, FOR SOLUTION INTRAVENOUS at 15:06

## 2019-11-16 RX ADMIN — INSULIN GLARGINE 9 UNIT(S): 100 INJECTION, SOLUTION SUBCUTANEOUS at 22:05

## 2019-11-16 RX ADMIN — AMLODIPINE BESYLATE 5 MILLIGRAM(S): 2.5 TABLET ORAL at 05:12

## 2019-11-16 RX ADMIN — ENOXAPARIN SODIUM 40 MILLIGRAM(S): 100 INJECTION SUBCUTANEOUS at 12:09

## 2019-11-16 RX ADMIN — Medication 50 MILLIGRAM(S): at 17:32

## 2019-11-16 RX ADMIN — ONDANSETRON 4 MILLIGRAM(S): 8 TABLET, FILM COATED ORAL at 17:41

## 2019-11-16 RX ADMIN — ATORVASTATIN CALCIUM 20 MILLIGRAM(S): 80 TABLET, FILM COATED ORAL at 22:09

## 2019-11-16 RX ADMIN — PIPERACILLIN AND TAZOBACTAM 25 GRAM(S): 4; .5 INJECTION, POWDER, LYOPHILIZED, FOR SOLUTION INTRAVENOUS at 22:09

## 2019-11-16 RX ADMIN — Medication 1 APPLICATION(S): at 12:09

## 2019-11-16 RX ADMIN — ONDANSETRON 4 MILLIGRAM(S): 8 TABLET, FILM COATED ORAL at 12:41

## 2019-11-16 RX ADMIN — Medication 4: at 18:16

## 2019-11-16 RX ADMIN — Medication 5 UNIT(S): at 09:24

## 2019-11-16 NOTE — PROGRESS NOTE ADULT - PROBLEM SELECTOR PLAN 6
BP elevated but improved to 120s  -c/w Metoprolol and amlodipine,   -hold and restart isosorbide, HCTZ, lisinopril pending further trend of bp  -monitor bp -c/w atorvastatin

## 2019-11-16 NOTE — PHYSICAL THERAPY INITIAL EVALUATION ADULT - PLANNED THERAPY INTERVENTIONS, PT EVAL
wound care management balance training/gait training/wound care management; GOAL: Pt will negotiate 3 stairs with no HR/straight cane and step to pattern with (I) in 2 weeks.

## 2019-11-16 NOTE — PHYSICAL THERAPY INITIAL EVALUATION ADULT - MODALITIES TREATMENT COMMENTS
PT wound care received for L shin diabetic ulcer, wound received C/D/I, measured: 3.5cmx2.0cmx0.1cm, no erythema, no purulent, no malodor, cleansed with NS, cavilon to periwound, medihoney to wound bed to promote autolytic reaction, covered by allyvene foam,

## 2019-11-16 NOTE — PROGRESS NOTE ADULT - ASSESSMENT
68M w/ R foot chronic non-healing wounds  -Pt seen and evaluated   -Vitals stable, no leukocytosis  -Right foot 1st interspace wound w/ fibronecrotic base and malodorous. Remaining wounds are stable w/ no signs of infection.  -Sharp excisional debridement of fibronecrotic tissue in 1st interspace wound to level of and including subcutaneous tissue.  -Wound was flushed w/ copious saline and dressed w/ dakins and dry sterile dressing  -MRI showing osteo of sesamoids and 1st proximal phalanx  -Recommend vascular consult to evaluate supply to lower extremity given nonpalpable pulses and history of angiogram on contralateral limb  -Awaiting HUANG/PVR and vascular recs   -Will likely need OR for debridement and resection of infected bone pending vascular recs  -Please document medical/cardiac optimization for surgery w/ local anesthesia and IV sedation   -Continue IV abx  -Discussed w/ attending

## 2019-11-16 NOTE — PHYSICAL THERAPY INITIAL EVALUATION ADULT - PERTINENT HX OF CURRENT PROBLEM, REHAB EVAL
68y old  Male who presents with a chief complaint of Foot infection, concerning for osteomyelitis, XR R foot 11/15, mild cortical irregularity at the medial aspect of the distal phalanx of the R first toe

## 2019-11-16 NOTE — PROGRESS NOTE ADULT - PROBLEM SELECTOR PLAN 9
Transitions of Care Status:  1.  Name of PCP:   2.  PCP Contacted on Admission: [ ] Y    [ ] N    3.  PCP contacted at Discharge: [ ] Y    [ ] N    [ ] N/A  4.  Post-Discharge Appointment Date and Location:  5.  Summary of Handoff given to PCP:

## 2019-11-16 NOTE — PROGRESS NOTE ADULT - PROBLEM SELECTOR PLAN 1
Concern for osteo given elevated ESR: and Mild cortical irregularity seen on xray  -Podiatry Dressed wounds with 1st interspace wound w/ iodoform packing, submet 1 wound w/ aquacel & DSD  -c/w IV Clindamycin & Zosyn as per podiatry recs  -MRI R foot pending  - f/u wound culture; f/u blood culture Concern for osteo given elevated ESR: and Mild cortical irregularity seen on xray  -Podiatry Dressed wounds with 1st interspace wound w/ iodoform packing, submet 1 wound w/ aquacel & DSD  - c/w IV Clindamycin & Zosyn as per podiatry recs  - MRI R foot performed, pending read  - f/u wound culture; f/u blood culture Concern for osteo given elevated ESR: and Mild cortical irregularity seen on xray  -Podiatry Dressed wounds with 1st interspace wound w/ iodoform packing, submet 1 wound w/ aquacel & DSD  - c/w IV Zosyn as per podiatry recs, clinda d/c'd due to possible side effect of nausea. can consider vanc  - MRI R foot performed, pending read  - f/u wound culture; f/u blood culture confirmed R foot osteo on MRI, elevated ESR/CRP:  - c/w IV Zosyn, clinda d/c'd due to possible side effect of nausea, vomiting, started vanc 1 gram q12, monitor troughs  - wound culture with GBS and pseudomonas, f/u sensitivies  - blood culture NGTD

## 2019-11-16 NOTE — PROGRESS NOTE ADULT - PROBLEM SELECTOR PLAN 5
-c/w atorvastatin - last stents 3 years ago, trops negative, ekg nsr with RBBB unclear if new, no chest pain, sob  - c/w ASA, plavix, atorvastatin, restart imdur 30mg

## 2019-11-16 NOTE — PROGRESS NOTE ADULT - PROBLEM SELECTOR PLAN 8
Transitions of Care Status:  1.  Name of PCP:   2.  PCP Contacted on Admission: [ ] Y    [ ] N    3.  PCP contacted at Discharge: [ ] Y    [ ] N    [ ] N/A  4.  Post-Discharge Appointment Date and Location:  5.  Summary of Handoff given to PCP: DVT ppx: lovenox  Diet: Carb consistent

## 2019-11-16 NOTE — PROGRESS NOTE ADULT - SUBJECTIVE AND OBJECTIVE BOX
Patient is a 68y old  Male who presents with a chief complaint of Foot infection (15 Nov 2019 09:39)    INTERVAL HPI/OVERNIGHT EVENTS: No acute events overnight.    SUBJECTIVE: Pt seen and examined this morning.    MEDICATIONS  (STANDING):  amLODIPine   Tablet 5 milliGRAM(s) Oral daily  aspirin  chewable 81 milliGRAM(s) Oral daily  atorvastatin 20 milliGRAM(s) Oral at bedtime  clindamycin IVPB 600 milliGRAM(s) IV Intermittent every 8 hours  clopidogrel Tablet 75 milliGRAM(s) Oral daily  Dakins Solution - 1/4 Strength 1 Application(s) Topical daily  dextrose 5%. 1000 milliLiter(s) (50 mL/Hr) IV Continuous <Continuous>  dextrose 50% Injectable 12.5 Gram(s) IV Push once  dextrose 50% Injectable 25 Gram(s) IV Push once  dextrose 50% Injectable 25 Gram(s) IV Push once  enoxaparin Injectable 40 milliGRAM(s) SubCutaneous daily  hydrochlorothiazide 25 milliGRAM(s) Oral daily  influenza   Vaccine 0.5 milliLiter(s) IntraMuscular once  insulin lispro (HumaLOG) corrective regimen sliding scale   SubCutaneous three times a day before meals  insulin lispro Injectable (HumaLOG) 5 Unit(s) SubCutaneous three times a day before meals  lisinopril 40 milliGRAM(s) Oral every 24 hours  metoprolol tartrate 50 milliGRAM(s) Oral two times a day  piperacillin/tazobactam IVPB.. 3.375 Gram(s) IV Intermittent every 8 hours    MEDICATIONS  (PRN):  dextrose 40% Gel 15 Gram(s) Oral once PRN Blood Glucose LESS THAN 70 milliGRAM(s)/deciliter  glucagon  Injectable 1 milliGRAM(s) IntraMuscular once PRN Glucose LESS THAN 70 milligrams/deciliter    Allergies    No Known Allergies    Intolerances    REVIEW OF SYSTEMS:  CONSTITUTIONAL: No fever, weight loss, or fatigue  EYES: No eye pain, visual disturbances, or discharge  ENMT:  No difficulty hearing, tinnitus, vertigo; No sinus or throat pain  RESPIRATORY: No cough, wheezing, chills or hemoptysis; No shortness of breath  CARDIOVASCULAR: No chest pain, palpitations, dizziness, or leg swelling  GASTROINTESTINAL: No abdominal or epigastric pain. No nausea, vomiting, or hematemesis; No diarrhea or constipation. No melena or hematochezia.  GENITOURINARY: No dysuria, frequency, hematuria, or incontinence  NEUROLOGICAL: No headaches, loss of strength, numbness, or tremors  SKIN: No itching, burning, rashes, or lesions   LYMPH NODES: No enlarged glands  ENDOCRINE: No heat or cold intolerance; No polydipsia or polyuria  MUSCULOSKELETAL: No joint pain or swelling;   PSYCHIATRIC: Denies depression, anxiety  HEME/LYMPH: No easy bruising, or bleeding gums  ALLERGY AND IMMUNOLOGIC: No hives or eczema    Vital Signs Last 24 Hrs  T(C): 36.9 (16 Nov 2019 04:25), Max: 37 (15 Nov 2019 14:30)  T(F): 98.4 (16 Nov 2019 04:25), Max: 98.6 (15 Nov 2019 14:30)  HR: 73 (16 Nov 2019 04:25) (73 - 88)  BP: 139/71 (16 Nov 2019 04:25) (124/75 - 174/78)  BP(mean): --  RR: 18 (16 Nov 2019 04:25) (18 - 18)  SpO2: 98% (16 Nov 2019 04:25) (96% - 99%)    PHYSICAL EXAM:  GENERAL: NAD, well-developed  HEENT: NC/AT,   NECK: Supple, No JVD  CHEST/LUNG: Clear to auscultation bilaterally; No rales, rhonchi, wheezing, or rubs  CARD: Regular rate and rhythm; No murmurs, rubs, or gallops. No LE edema  ABDOMEN: Soft, Nontender, Nondistended; Bowel sounds present  EXTREMITIES:  b/l. right foot with dressing present, malodorous. left foot with dry healed wound at site of previous debridement. Old abrasion over left anterior leg covered with bandage.  SKIN: No rashes or lesions  NEURO: AOx3, moving all extremities      LABS:                        11.3   7.31  )-----------( 208      ( 15 Nov 2019 07:12 )             33.0     15 Nov 2019 07:12    138    |  100    |  21     ----------------------------<  279    4.5     |  26     |  0.87     Ca    9.5        15 Nov 2019 07:12  Phos  2.9       15 Nov 2019 07:12  Mg     1.7       15 Nov 2019 07:12    TPro  7.1    /  Alb  4.2    /  TBili  0.4    /  DBili  x      /  AST  10     /  ALT  11     /  AlkPhos  66     15 Nov 2019 07:12    PT/INR - ( 14 Nov 2019 22:49 )   PT: 11.5 sec;   INR: 1.00 ratio      PTT - ( 14 Nov 2019 22:49 )  PTT:31.0 sec  CAPILLARY BLOOD GLUCOSE    POCT Blood Glucose.: 189 mg/dL (15 Nov 2019 22:26)  POCT Blood Glucose.: 161 mg/dL (15 Nov 2019 17:58)  POCT Blood Glucose.: 173 mg/dL (15 Nov 2019 14:07)  POCT Blood Glucose.: 241 mg/dL (15 Nov 2019 10:40)  POCT Blood Glucose.: 248 mg/dL (15 Nov 2019 08:32)    BLOOD CULTURE    RADIOLOGY & ADDITIONAL TESTS:  < from: Xray Foot AP + Lateral + Oblique, Right (11.15.19 @ 00:26) >  Impression: Mild cortical irregularity/questionable erosion at the medial   aspect of the distal phalanx of the right first toe which may represent   osteomyelitis.An MRI may be performed for confirmation.    Imaging Personally Reviewed:  [ ] YES     Consultant(s) Notes Reviewed:      Care Discussed with Consultants/Other Providers: Patient is a 68y old  Male who presents with a chief complaint of Foot infection (15 Nov 2019 09:39)    INTERVAL HPI/OVERNIGHT EVENTS: No acute events overnight.    SUBJECTIVE: Pt seen and examined this morning. Continues to endorse dull ache around his right foot. Unchanged from yesterday. Denies any other complaints. No CP, SOB, fever, N/V or abdominal pain.    MEDICATIONS  (STANDING):  amLODIPine   Tablet 5 milliGRAM(s) Oral daily  aspirin  chewable 81 milliGRAM(s) Oral daily  atorvastatin 20 milliGRAM(s) Oral at bedtime  clindamycin IVPB 600 milliGRAM(s) IV Intermittent every 8 hours  clopidogrel Tablet 75 milliGRAM(s) Oral daily  Dakins Solution - 1/4 Strength 1 Application(s) Topical daily  dextrose 5%. 1000 milliLiter(s) (50 mL/Hr) IV Continuous <Continuous>  dextrose 50% Injectable 12.5 Gram(s) IV Push once  dextrose 50% Injectable 25 Gram(s) IV Push once  dextrose 50% Injectable 25 Gram(s) IV Push once  enoxaparin Injectable 40 milliGRAM(s) SubCutaneous daily  hydrochlorothiazide 25 milliGRAM(s) Oral daily  influenza   Vaccine 0.5 milliLiter(s) IntraMuscular once  insulin lispro (HumaLOG) corrective regimen sliding scale   SubCutaneous three times a day before meals  insulin lispro Injectable (HumaLOG) 5 Unit(s) SubCutaneous three times a day before meals  lisinopril 40 milliGRAM(s) Oral every 24 hours  metoprolol tartrate 50 milliGRAM(s) Oral two times a day  piperacillin/tazobactam IVPB.. 3.375 Gram(s) IV Intermittent every 8 hours    MEDICATIONS  (PRN):  dextrose 40% Gel 15 Gram(s) Oral once PRN Blood Glucose LESS THAN 70 milliGRAM(s)/deciliter  glucagon  Injectable 1 milliGRAM(s) IntraMuscular once PRN Glucose LESS THAN 70 milligrams/deciliter    Allergies    No Known Allergies    Intolerances    REVIEW OF SYSTEMS:  CONSTITUTIONAL: No fever, weight loss, or fatigue  EYES: No eye pain, visual disturbances, or discharge  ENMT:  No difficulty hearing, tinnitus, vertigo; No sinus or throat pain  RESPIRATORY: No cough, wheezing, chills or hemoptysis; No shortness of breath  CARDIOVASCULAR: No chest pain, palpitations, dizziness, or leg swelling  GASTROINTESTINAL: No abdominal or epigastric pain. No nausea, vomiting, or hematemesis; No diarrhea or constipation. No melena or hematochezia.  GENITOURINARY: No dysuria, frequency, hematuria, or incontinence  NEUROLOGICAL: No headaches, loss of strength, numbness, or tremors  SKIN: No itching, burning, rashes, or lesions   LYMPH NODES: No enlarged glands  ENDOCRINE: No heat or cold intolerance; No polydipsia or polyuria  MUSCULOSKELETAL: No joint pain or swelling;   PSYCHIATRIC: Denies depression, anxiety  HEME/LYMPH: No easy bruising, or bleeding gums  ALLERGY AND IMMUNOLOGIC: No hives or eczema    Vital Signs Last 24 Hrs  T(C): 36.9 (16 Nov 2019 04:25), Max: 37 (15 Nov 2019 14:30)  T(F): 98.4 (16 Nov 2019 04:25), Max: 98.6 (15 Nov 2019 14:30)  HR: 73 (16 Nov 2019 04:25) (73 - 88)  BP: 139/71 (16 Nov 2019 04:25) (124/75 - 174/78)  BP(mean): --  RR: 18 (16 Nov 2019 04:25) (18 - 18)  SpO2: 98% (16 Nov 2019 04:25) (96% - 99%)    PHYSICAL EXAM:  GENERAL: NAD, well-developed  HEENT: NC/AT,   NECK: Supple, No JVD  CHEST/LUNG: Clear to auscultation bilaterally; No rales, rhonchi, wheezing, or rubs  CARD: Regular rate and rhythm; No murmurs, rubs, or gallops. No LE edema  ABDOMEN: Soft, Nontender, Nondistended; Bowel sounds present  EXTREMITIES:  b/l. right foot with dressing present, malodorous. left foot with dry healed wound at site of previous debridement. Old abrasion over left anterior leg covered with bandage.  SKIN: No rashes or lesions  NEURO: AOx3, moving all extremities    LABS:                        11.3   7.31  )-----------( 208      ( 15 Nov 2019 07:12 )             33.0     15 Nov 2019 07:12    138    |  100    |  21     ----------------------------<  279    4.5     |  26     |  0.87     Ca    9.5        15 Nov 2019 07:12  Phos  2.9       15 Nov 2019 07:12  Mg     1.7       15 Nov 2019 07:12    TPro  7.1    /  Alb  4.2    /  TBili  0.4    /  DBili  x      /  AST  10     /  ALT  11     /  AlkPhos  66     15 Nov 2019 07:12    PT/INR - ( 14 Nov 2019 22:49 )   PT: 11.5 sec;   INR: 1.00 ratio      PTT - ( 14 Nov 2019 22:49 )  PTT:31.0 sec  CAPILLARY BLOOD GLUCOSE    POCT Blood Glucose.: 189 mg/dL (15 Nov 2019 22:26)  POCT Blood Glucose.: 161 mg/dL (15 Nov 2019 17:58)  POCT Blood Glucose.: 173 mg/dL (15 Nov 2019 14:07)  POCT Blood Glucose.: 241 mg/dL (15 Nov 2019 10:40)  POCT Blood Glucose.: 248 mg/dL (15 Nov 2019 08:32)    BLOOD CULTURE    RADIOLOGY & ADDITIONAL TESTS:  < from: Xray Foot AP + Lateral + Oblique, Right (11.15.19 @ 00:26) >  Impression: Mild cortical irregularity/questionable erosion at the medial   aspect of the distal phalanx of the right first toe which may represent   osteomyelitis.An MRI may be performed for confirmation.    Imaging Personally Reviewed:  [ ] YES     Consultant(s) Notes Reviewed:      Care Discussed with Consultants/Other Providers: Patient is a 68y old  Male who presents with a chief complaint of Foot infection (15 Nov 2019 09:39)    INTERVAL HPI/OVERNIGHT EVENTS: No acute events overnight.    SUBJECTIVE: Pt seen and examined this morning. Continues to endorse dull ache around his right foot. Unchanged from yesterday. Had nausea and vomiting today, denies chest pain, sob, palpitations, lightheadedness, headache, diarrhea, abdominal pain.     MEDICATIONS  (STANDING):  amLODIPine   Tablet 5 milliGRAM(s) Oral daily  aspirin  chewable 81 milliGRAM(s) Oral daily  atorvastatin 20 milliGRAM(s) Oral at bedtime  clindamycin IVPB 600 milliGRAM(s) IV Intermittent every 8 hours  clopidogrel Tablet 75 milliGRAM(s) Oral daily  Dakins Solution - 1/4 Strength 1 Application(s) Topical daily  dextrose 5%. 1000 milliLiter(s) (50 mL/Hr) IV Continuous <Continuous>  dextrose 50% Injectable 12.5 Gram(s) IV Push once  dextrose 50% Injectable 25 Gram(s) IV Push once  dextrose 50% Injectable 25 Gram(s) IV Push once  enoxaparin Injectable 40 milliGRAM(s) SubCutaneous daily  hydrochlorothiazide 25 milliGRAM(s) Oral daily  influenza   Vaccine 0.5 milliLiter(s) IntraMuscular once  insulin lispro (HumaLOG) corrective regimen sliding scale   SubCutaneous three times a day before meals  insulin lispro Injectable (HumaLOG) 5 Unit(s) SubCutaneous three times a day before meals  lisinopril 40 milliGRAM(s) Oral every 24 hours  metoprolol tartrate 50 milliGRAM(s) Oral two times a day  piperacillin/tazobactam IVPB.. 3.375 Gram(s) IV Intermittent every 8 hours    MEDICATIONS  (PRN):  dextrose 40% Gel 15 Gram(s) Oral once PRN Blood Glucose LESS THAN 70 milliGRAM(s)/deciliter  glucagon  Injectable 1 milliGRAM(s) IntraMuscular once PRN Glucose LESS THAN 70 milligrams/deciliter    Allergies    No Known Allergies        Vital Signs Last 24 Hrs  T(C): 36.9 (16 Nov 2019 04:25), Max: 37 (15 Nov 2019 14:30)  T(F): 98.4 (16 Nov 2019 04:25), Max: 98.6 (15 Nov 2019 14:30)  HR: 73 (16 Nov 2019 04:25) (73 - 88)  BP: 139/71 (16 Nov 2019 04:25) (124/75 - 174/78)  BP(mean): --  RR: 18 (16 Nov 2019 04:25) (18 - 18)  SpO2: 98% (16 Nov 2019 04:25) (96% - 99%)    PHYSICAL EXAM:  GENERAL: NAD, well-developed, nontoxic  HEENT: NC/AT,   NECK: Supple, No JVD  CHEST/LUNG: Clear to auscultation bilaterally; No rales, rhonchi, wheezing, or rubs  CARD: Regular rate and rhythm; No murmurs, rubs, or gallops. No LE edema  ABDOMEN: Soft, Nontender, Nondistended; Bowel sounds present  EXTREMITIES:  b/l. right foot with dressing present, malodorous. left foot with dry healed wound at site of previous debridement. Old abrasion over left anterior leg covered with bandage.  SKIN: No rashes or lesions  NEURO: AOx3, moving all extremities    LABS:                        11.3   7.31  )-----------( 208      ( 15 Nov 2019 07:12 )             33.0     15 Nov 2019 07:12    138    |  100    |  21     ----------------------------<  279    4.5     |  26     |  0.87     Ca    9.5        15 Nov 2019 07:12  Phos  2.9       15 Nov 2019 07:12  Mg     1.7       15 Nov 2019 07:12    TPro  7.1    /  Alb  4.2    /  TBili  0.4    /  DBili  x      /  AST  10     /  ALT  11     /  AlkPhos  66     15 Nov 2019 07:12    PT/INR - ( 14 Nov 2019 22:49 )   PT: 11.5 sec;   INR: 1.00 ratio      PTT - ( 14 Nov 2019 22:49 )  PTT:31.0 sec  CAPILLARY BLOOD GLUCOSE    POCT Blood Glucose.: 189 mg/dL (15 Nov 2019 22:26)  POCT Blood Glucose.: 161 mg/dL (15 Nov 2019 17:58)  POCT Blood Glucose.: 173 mg/dL (15 Nov 2019 14:07)  POCT Blood Glucose.: 241 mg/dL (15 Nov 2019 10:40)  POCT Blood Glucose.: 248 mg/dL (15 Nov 2019 08:32)    BLOOD CULTURE    RADIOLOGY & ADDITIONAL TESTS:  < from: Xray Foot AP + Lateral + Oblique, Right (11.15.19 @ 00:26) >  Impression: Mild cortical irregularity/questionable erosion at the medial   aspect of the distal phalanx of the right first toe which may represent   osteomyelitis.An MRI may be performed for confirmation.    Imaging Personally Reviewed:  [x ] YES  MRI R foot: Impression:  Plantar medial soft tissue ulceration with osteomyelitis of the adjacent   hallux sesamoids.  Dorsal soft tissue ulceration at the first interspace with osteomyelitis   at the proximal lateral aspect of the first proximal phalanx.  Soft tissue ulceration with osteomyelitis within the adjacent lateral   aspect of the fifth metatarsal head.      Consultant(s) Notes Reviewed:      Care Discussed with Consultants/Other Providers:

## 2019-11-16 NOTE — PROVIDER CONTACT NOTE (OTHER) - SITUATION
Pt is nauseous and profusely sweating. BP is 170 / 74 with HR 61  Pt is afebrile; 98.0F Pt is nauseous and profusely sweating. BP is 170 / 74 with HR  of 61  Pt is afebrile; 98.0F  Zofran given at 12:41 with no relief. Pt is nauseous, vomiting and profusely sweating. BP is 170 / 74 with HR  of 61  Pt is afebrile; 98.0F  Zofran given at 12:41 with no relief.

## 2019-11-16 NOTE — PROGRESS NOTE ADULT - ASSESSMENT
67 yo male PMhx HTN, T2DM, CAD s/p stents, PAD, HLD presents admitted for diabetic wound infection requiring IV abx r/o osteo, now pending MRI, HUANG and further vascular workup. 67 yo male PMhx HTN, T2DM, CAD s/p stents, PAD, HLD presents admitted for diabetic wound infection confirmed osteo awaiting ABIs and vascular workup.

## 2019-11-16 NOTE — PHYSICAL THERAPY INITIAL EVALUATION ADULT - ADDITIONAL COMMENTS
lives in pvt house, 4 steps to enter house with rail as per pt, lives in pvt house with spouse, 4 steps to enter house with rail, one floor set up, PTA, pt amb (I), (I) with ADLs. as per pt, lives in pvt house with spouse, 3 steps to enter house with rail, one floor set up (except 2 steps to den, no HR), PTA, pt amb (I), (I) with ADLs.

## 2019-11-16 NOTE — PROVIDER CONTACT NOTE (OTHER) - SITUATION
Pt is still nauseous and vomiting. BP is elevated as well. Bp 170/86   30mg of Isorsobide and 50mg of Lopressor given as ordered.

## 2019-11-16 NOTE — PROGRESS NOTE ADULT - PROBLEM SELECTOR PLAN 3
not on insulin at home, a1c pending  -c/w Lispro 5 units TID with meals  -lower to low dose sliding scale  -given  and downtrending, will not give lantus tonight  -monitor FS s/p LLE intervention in 2017  -HUANG pending  -if HUANG showing PAD will consult vasc for possible revascularization  -c/w home ASA/plavix

## 2019-11-16 NOTE — PROGRESS NOTE ADULT - SUBJECTIVE AND OBJECTIVE BOX
Podiatry pager #: [phone] ([facility])/ [phone] ([facility])    Patient is a 68y old  Male who presents with a chief complaint of Foot infection (16 Nov 2019 06:17)       INTERVAL HPI/OVERNIGHT EVENTS:  Patient seen and evaluated at bedside.  Pt is resting comfortable in NAD. Denies N/V/F/C.     Allergies    No Known Allergies    Intolerances        Vital Signs Last 24 Hrs  T(C): 36.7 (16 Nov 2019 13:05), Max: 37 (15 Nov 2019 14:30)  T(F): 98 (16 Nov 2019 13:05), Max: 98.6 (15 Nov 2019 14:30)  HR: 91 (16 Nov 2019 13:05) (73 - 91)  BP: 170/74 (16 Nov 2019 13:05) (124/75 - 170/74)  BP(mean): --  RR: 18 (16 Nov 2019 04:25) (18 - 18)  SpO2: 98% (16 Nov 2019 04:25) (97% - 99%)    LABS:                        11.7   7.90  )-----------( 210      ( 16 Nov 2019 06:54 )             34.4     11-16    136  |  100  |  18  ----------------------------<  216<H>  4.2   |  23  |  0.80    Ca    9.2      16 Nov 2019 06:46  Phos  3.5     11-16  Mg     1.7     11-16    TPro  7.1  /  Alb  4.2  /  TBili  0.4  /  DBili  x   /  AST  10  /  ALT  11  /  AlkPhos  66  11-15    PT/INR - ( 14 Nov 2019 22:49 )   PT: 11.5 sec;   INR: 1.00 ratio         PTT - ( 14 Nov 2019 22:49 )  PTT:31.0 sec    CAPILLARY BLOOD GLUCOSE      POCT Blood Glucose.: 210 mg/dL (16 Nov 2019 09:22)  POCT Blood Glucose.: 189 mg/dL (15 Nov 2019 22:26)  POCT Blood Glucose.: 161 mg/dL (15 Nov 2019 17:58)  POCT Blood Glucose.: 173 mg/dL (15 Nov 2019 14:07)      Lower Extremity Physical Exam:  Vascular: DP/PT 0/4, B/L, CFT <3 seconds B/L, Temperature gradient warm to cool B/L.   Neuro: Epicritic sensation grossly diminished to level of ankleB/L.  Skin:  Wound #1: R foot 1st interspace wound, 2.0x2.0cm, depth to 1st and 2nd met capsule, wound bed necrotic, serous drainage, malodorous, periwound macerated, etiology 2/2 interdigital maceration/ arterial insufficiency     Wound #2: R foot submetatarsal 1 wound, 2.5x1.5cm, wound bed fibrogranular, no active drainage, no malodor, periwound macerated, etiology 2/2 pressure     Wound #3: R foot lateral border of foot stable eschar, 4.0x1.0cm, no active drainage, no malodor, periwound erythematous, etiology 2/2 pressure      RADIOLOGY & ADDITIONAL TESTS:  < from: MR Foot No Cont, Right (11.15.19 @ 23:27) >  EXAM:  MR FOOT RT                            PROCEDURE DATE:  11/15/2019            INTERPRETATION:  Clinical Information: Right foot wound at the first   interspace and second metatarsal region, rule out osteomyelitis.    Comparison: Right foot radiographs from 11/15/2019.    Technique: MRI of the right forefoot was performed utilizing multiplanar   imaging. Intravenous contrast could not be administered due to the   patient's inability to tolerate the examination.    Findings:     There is a skin ulceration spanning 1 cm at the plantar medial aspect of   the foot beneath the tibial hallux sesamoid. There is hypointense T1 and   hyperintense T2 marrow signal within the tibial hallux sesamoid   consistent with osteomyelitis. There is also marrow signal abnormality   within the fibular hallux sesamoid which is nonspecific but also   suggestive of osteomyelitis in the clinical setting of infection.     There is also hypointense T1 and hyperintense T2 marrow signal and within   the plantaraspect of the proximal metaphysis of the first proximal   phalanx laterally with an associated soft tissue ulceration at the dorsal   aspect of the first interspace which is also consistent with   osteomyelitis.     There is loss of the soft tissues laterally over the head of the fifth   metatarsal with adjacent hyperintense T2 marrow signal and the suggestion   of erosions also consistent with osteomyelitis.    There is soft tissue edema and intramuscular edema consistent with   cellulitis and myositis which is nonspecific but may be infectious in   etiology.    Impression:  Plantar medial soft tissue ulceration with osteomyelitis of the adjacent   hallux sesamoids.  Dorsal soft tissue ulceration at the first interspace with osteomyelitis   at the proximal lateral aspect of the first proximal phalanx.  Soft tissue ulceration with osteomyelitis within the adjacent lateral   aspect of the fifth metatarsal head.                      KARYNA YOUNG M.D., ATTENDING RADIOLOGIST  This document has been electronically signed. Nov 16 2019  1:19PM    < end of copied text >

## 2019-11-16 NOTE — PROGRESS NOTE ADULT - PROBLEM SELECTOR PLAN 2
s/p LLE intervention in 2017  -HUANG pending  -if HUANG showing PAD will consult vasc for possible revascularization  -c/w home ASA/plavix nausea and vomiting today with no other cardiac or GI symptoms, possibly related to side effect of IV clindamycin  -EKG today with NSR and RBBB which is new from 2017 (no ekg in between), cardiac enzymes flat at 19  x2, ACS ruled out   -FS elevated, but AG wnl, not DKA  -BP also elevated, will do CT head r/o intracranial process  -d/c clinda, change to vanc  -zofran prn  -monitor for improvement

## 2019-11-16 NOTE — PROGRESS NOTE ADULT - PROBLEM SELECTOR PLAN 4
- last stents 3 years ago  - c/w ASA, plavix, atorvastatin not on insulin at home, a1c 7.9, FS elevated  -start lantus 9 units qhs  -increase Lispro to 6 units TID with meals  -c/w LAUREN  -monitor FS

## 2019-11-16 NOTE — PROGRESS NOTE ADULT - PROBLEM SELECTOR PLAN 7
DVT ppx: lovenox  Diet: Carb consistent BP elevated   -c/w Metoprolol and amlodipine to 10mg  -restart home isosorbide 30mg, HCTZ 25mg lisinopril 40mg  -monitor bp

## 2019-11-17 DIAGNOSIS — N17.9 ACUTE KIDNEY FAILURE, UNSPECIFIED: ICD-10-CM

## 2019-11-17 LAB
-  AMIKACIN: SIGNIFICANT CHANGE UP
-  AZTREONAM: SIGNIFICANT CHANGE UP
-  CEFEPIME: SIGNIFICANT CHANGE UP
-  CEFTAZIDIME: SIGNIFICANT CHANGE UP
-  CIPROFLOXACIN: SIGNIFICANT CHANGE UP
-  GENTAMICIN: SIGNIFICANT CHANGE UP
-  IMIPENEM: SIGNIFICANT CHANGE UP
-  LEVOFLOXACIN: SIGNIFICANT CHANGE UP
-  MEROPENEM: SIGNIFICANT CHANGE UP
-  PIPERACILLIN/TAZOBACTAM: SIGNIFICANT CHANGE UP
-  TOBRAMYCIN: SIGNIFICANT CHANGE UP
ANION GAP SERPL CALC-SCNC: 15 MMOL/L — SIGNIFICANT CHANGE UP (ref 5–17)
BUN SERPL-MCNC: 28 MG/DL — HIGH (ref 7–23)
CALCIUM SERPL-MCNC: 9 MG/DL — SIGNIFICANT CHANGE UP (ref 8.4–10.5)
CHLORIDE SERPL-SCNC: 94 MMOL/L — LOW (ref 96–108)
CO2 SERPL-SCNC: 25 MMOL/L — SIGNIFICANT CHANGE UP (ref 22–31)
CREAT SERPL-MCNC: 1.31 MG/DL — HIGH (ref 0.5–1.3)
CULTURE RESULTS: SIGNIFICANT CHANGE UP
GLUCOSE SERPL-MCNC: 269 MG/DL — HIGH (ref 70–99)
HCT VFR BLD CALC: 31.7 % — LOW (ref 39–50)
HGB BLD-MCNC: 10.8 G/DL — LOW (ref 13–17)
MAGNESIUM SERPL-MCNC: 1.9 MG/DL — SIGNIFICANT CHANGE UP (ref 1.6–2.6)
MCHC RBC-ENTMCNC: 27.3 PG — SIGNIFICANT CHANGE UP (ref 27–34)
MCHC RBC-ENTMCNC: 34.1 GM/DL — SIGNIFICANT CHANGE UP (ref 32–36)
MCV RBC AUTO: 80.1 FL — SIGNIFICANT CHANGE UP (ref 80–100)
METHOD TYPE: SIGNIFICANT CHANGE UP
NRBC # BLD: 0 /100 WBCS — SIGNIFICANT CHANGE UP (ref 0–0)
ORGANISM # SPEC MICROSCOPIC CNT: SIGNIFICANT CHANGE UP
ORGANISM # SPEC MICROSCOPIC CNT: SIGNIFICANT CHANGE UP
PHOSPHATE SERPL-MCNC: 4.2 MG/DL — SIGNIFICANT CHANGE UP (ref 2.5–4.5)
PLATELET # BLD AUTO: 222 K/UL — SIGNIFICANT CHANGE UP (ref 150–400)
POTASSIUM SERPL-MCNC: 4.1 MMOL/L — SIGNIFICANT CHANGE UP (ref 3.5–5.3)
POTASSIUM SERPL-SCNC: 4.1 MMOL/L — SIGNIFICANT CHANGE UP (ref 3.5–5.3)
RBC # BLD: 3.96 M/UL — LOW (ref 4.2–5.8)
RBC # FLD: 13.1 % — SIGNIFICANT CHANGE UP (ref 10.3–14.5)
SODIUM SERPL-SCNC: 134 MMOL/L — LOW (ref 135–145)
SPECIMEN SOURCE: SIGNIFICANT CHANGE UP
WBC # BLD: 8.87 K/UL — SIGNIFICANT CHANGE UP (ref 3.8–10.5)
WBC # FLD AUTO: 8.87 K/UL — SIGNIFICANT CHANGE UP (ref 3.8–10.5)

## 2019-11-17 PROCEDURE — 70450 CT HEAD/BRAIN W/O DYE: CPT | Mod: 26

## 2019-11-17 PROCEDURE — 99233 SBSQ HOSP IP/OBS HIGH 50: CPT | Mod: GC

## 2019-11-17 RX ORDER — SODIUM CHLORIDE 9 MG/ML
1000 INJECTION INTRAMUSCULAR; INTRAVENOUS; SUBCUTANEOUS
Refills: 0 | Status: DISCONTINUED | OUTPATIENT
Start: 2019-11-17 | End: 2019-11-19

## 2019-11-17 RX ORDER — INSULIN GLARGINE 100 [IU]/ML
12 INJECTION, SOLUTION SUBCUTANEOUS AT BEDTIME
Refills: 0 | Status: DISCONTINUED | OUTPATIENT
Start: 2019-11-17 | End: 2019-11-17

## 2019-11-17 RX ORDER — HEPARIN SODIUM 5000 [USP'U]/ML
5000 INJECTION INTRAVENOUS; SUBCUTANEOUS EVERY 12 HOURS
Refills: 0 | Status: DISCONTINUED | OUTPATIENT
Start: 2019-11-17 | End: 2019-11-20

## 2019-11-17 RX ORDER — HYDRALAZINE HCL 50 MG
10 TABLET ORAL EVERY 8 HOURS
Refills: 0 | Status: DISCONTINUED | OUTPATIENT
Start: 2019-11-17 | End: 2019-11-17

## 2019-11-17 RX ORDER — INSULIN GLARGINE 100 [IU]/ML
14 INJECTION, SOLUTION SUBCUTANEOUS AT BEDTIME
Refills: 0 | Status: DISCONTINUED | OUTPATIENT
Start: 2019-11-17 | End: 2019-11-18

## 2019-11-17 RX ADMIN — INSULIN GLARGINE 14 UNIT(S): 100 INJECTION, SOLUTION SUBCUTANEOUS at 21:28

## 2019-11-17 RX ADMIN — PIPERACILLIN AND TAZOBACTAM 25 GRAM(S): 4; .5 INJECTION, POWDER, LYOPHILIZED, FOR SOLUTION INTRAVENOUS at 14:55

## 2019-11-17 RX ADMIN — Medication 1 APPLICATION(S): at 12:26

## 2019-11-17 RX ADMIN — Medication 4: at 17:32

## 2019-11-17 RX ADMIN — AMLODIPINE BESYLATE 10 MILLIGRAM(S): 2.5 TABLET ORAL at 05:18

## 2019-11-17 RX ADMIN — SODIUM CHLORIDE 60 MILLILITER(S): 9 INJECTION INTRAMUSCULAR; INTRAVENOUS; SUBCUTANEOUS at 15:01

## 2019-11-17 RX ADMIN — Medication 50 MILLIGRAM(S): at 05:17

## 2019-11-17 RX ADMIN — HEPARIN SODIUM 5000 UNIT(S): 5000 INJECTION INTRAVENOUS; SUBCUTANEOUS at 17:33

## 2019-11-17 RX ADMIN — Medication 6 UNIT(S): at 08:33

## 2019-11-17 RX ADMIN — PIPERACILLIN AND TAZOBACTAM 25 GRAM(S): 4; .5 INJECTION, POWDER, LYOPHILIZED, FOR SOLUTION INTRAVENOUS at 21:28

## 2019-11-17 RX ADMIN — ISOSORBIDE MONONITRATE 30 MILLIGRAM(S): 60 TABLET, EXTENDED RELEASE ORAL at 12:25

## 2019-11-17 RX ADMIN — Medication 6 UNIT(S): at 12:25

## 2019-11-17 RX ADMIN — Medication 250 MILLIGRAM(S): at 08:37

## 2019-11-17 RX ADMIN — Medication 2: at 08:34

## 2019-11-17 RX ADMIN — Medication 81 MILLIGRAM(S): at 12:25

## 2019-11-17 RX ADMIN — CLOPIDOGREL BISULFATE 75 MILLIGRAM(S): 75 TABLET, FILM COATED ORAL at 12:25

## 2019-11-17 RX ADMIN — Medication 3: at 12:25

## 2019-11-17 RX ADMIN — PIPERACILLIN AND TAZOBACTAM 25 GRAM(S): 4; .5 INJECTION, POWDER, LYOPHILIZED, FOR SOLUTION INTRAVENOUS at 05:18

## 2019-11-17 RX ADMIN — Medication 50 MILLIGRAM(S): at 17:40

## 2019-11-17 RX ADMIN — Medication 25 MILLIGRAM(S): at 05:17

## 2019-11-17 RX ADMIN — ATORVASTATIN CALCIUM 20 MILLIGRAM(S): 80 TABLET, FILM COATED ORAL at 21:28

## 2019-11-17 RX ADMIN — Medication 6 UNIT(S): at 17:33

## 2019-11-17 NOTE — PROGRESS NOTE ADULT - PROBLEM SELECTOR PLAN 5
- last stents 3 years ago, trops negative, ekg nsr with RBBB unclear if new, no chest pain, sob  - c/w ASA, plavix, atorvastatin, imdur 30mg BP stable  -hold ACE/HCTZ given DENISSE, if BP uptrends can uptitrate home Imdur currently 30mg daily  -c/w home Metoprolol, amlodipine 10mg  -monitor bp

## 2019-11-17 NOTE — PROGRESS NOTE ADULT - PROBLEM SELECTOR PLAN 1
confirmed R foot osteo on MRI, elevated ESR/CRP:  - c/w IV Zosyn, clinda d/c'd due to possible side effect of nausea, vomiting, started vanc 1 gram q12, monitor troughs  - wound culture with GBS and pseudomonas, f/u sensitivies  - blood culture NGTD confirmed R foot osteo on MRI, elevated ESR/CRP:  - c/w IV Zosyn given pseudomonas on wound cx  - clinda d/c'd due to side effect of nausea, vomiting  - vanc dc'ed as no MRSA on culture and new DENISSE, nephrotoxic combo of vanc/zosyn. if decompensation, can restart vancomycin and dose by levels  -ID consult   -wound culture with GBS and pseudomonas  -podiatry following  - blood culture NGTD, afebrile, leukocytosis resolved, monitor cbc

## 2019-11-17 NOTE — PROGRESS NOTE ADULT - PROBLEM SELECTOR PLAN 4
not on insulin at home, a1c 7.9, FS elevated  - C/w lantus 9 units qhs  - C/w Lispro to 6 units TID with meals  -c/w LAUREN  -monitor FS s/p LLE intervention in 2017  -HUANG pending, previous HUANG showed PAD b/l  -vascular consulted  -c/w home ASA/plavix

## 2019-11-17 NOTE — PROVIDER CONTACT NOTE (OTHER) - ACTION/TREATMENT ORDERED:
Dr Patterson at bedside
Dr Patterson aware.   Zofran to be ordered.
Dr Patterson aware.
MD made aware. Ok to not give lantus tonight.
MD made aware, no action required at this time, continue to monitor

## 2019-11-17 NOTE — CONSULT NOTE ADULT - SUBJECTIVE AND OBJECTIVE BOX
GENERAL SURGERY CONSULT NOTE  --------------------------------------------------------------------------------------------    Patient is a 68y old  Male who presents with a chief complaint of Foot infection (17 Nov 2019 22:59)      HPI: 68M with PMH HTN, DM2, CAD s/p stents, HLD presented to the ED complaining of R foot wound x5 weeks. Pt reports that he had a callus removed 5 weeks ago and since then the wound has been worsening. He noticed some malodorous discharge that was white in color. He was put on 10 day course of antibiotics and provided local wound care, but his wounds did not improve. Pt was sent to the ER with concerns for osteomyelitis. No fever, chills, lightheadedness, dizziness, SOB, chest pain, nausea, vomiting.     In January 2017 that patient had a LLE angiogram for a non-healing ulcer that showed occlusive disease of left PT, AT, and peroneal arteries. Pt's wife reports that the angioplasty was not able to be completed so he saw Dr. Herring as an outpatient who did an additional angiogram and angioplasty. After this procedure, the patient's LLE wound healed. In 2017 the R HUANG was 1.03 and L HUANG was 0.64. R toe was 0.5 and L toe was 0.9.     ROS: 10-system review is otherwise negative except HPI above.      PAST MEDICAL & SURGICAL HISTORY:  Essential hypertension  Coronary artery disease involving native coronary artery of native heart without angina pectoris  Peripheral artery disease  Type 2 diabetes mellitus with other circulatory complication, without long-term current use of insulin  Venous ulcer of left leg  S/P debridement    FAMILY HISTORY:  FH: diabetes mellitus: mother  Family history of diabetes mellitus (Sibling): brother  Family history of essential hypertension    SOCIAL HISTORY: smoked 0.25 pack per day for 20-25 years. quit smoking 35 years ago. no drug use. alcohol on special occasions. lives with wife.     ALLERGIES: No Known Allergies    CURRENT MEDICATIONS  MEDICATIONS (STANDING): amLODIPine   Tablet 10 milliGRAM(s) Oral daily  aspirin  chewable 81 milliGRAM(s) Oral daily  atorvastatin 20 milliGRAM(s) Oral at bedtime  clopidogrel Tablet 75 milliGRAM(s) Oral daily  dextrose 5%. 1000 milliLiter(s) IV Continuous <Continuous>  dextrose 50% Injectable 12.5 Gram(s) IV Push once  dextrose 50% Injectable 25 Gram(s) IV Push once  dextrose 50% Injectable 25 Gram(s) IV Push once  heparin  Injectable 5000 Unit(s) SubCutaneous every 12 hours  influenza   Vaccine 0.5 milliLiter(s) IntraMuscular once  insulin glargine Injectable (LANTUS) 14 Unit(s) SubCutaneous at bedtime  insulin lispro (HumaLOG) corrective regimen sliding scale   SubCutaneous three times a day before meals  insulin lispro Injectable (HumaLOG) 6 Unit(s) SubCutaneous three times a day before meals  isosorbide   mononitrate ER Tablet (IMDUR) 30 milliGRAM(s) Oral daily  metoprolol tartrate 50 milliGRAM(s) Oral two times a day  piperacillin/tazobactam IVPB.. 3.375 Gram(s) IV Intermittent every 8 hours  sodium chloride 0.9%. 1000 milliLiter(s) IV Continuous <Continuous>    MEDICATIONS (PRN):dextrose 40% Gel 15 Gram(s) Oral once PRN Blood Glucose LESS THAN 70 milliGRAM(s)/deciliter  glucagon  Injectable 1 milliGRAM(s) IntraMuscular once PRN Glucose LESS THAN 70 milligrams/deciliter  ondansetron Injectable 4 milliGRAM(s) IV Push every 6 hours PRN Nausea and/or Vomiting    --------------------------------------------------------------------------------------------    Vitals:   T(C): 37 (11-17-19 @ 21:53), Max: 37 (11-17-19 @ 21:53)  HR: 74 (11-17-19 @ 21:53) (61 - 74)  BP: 135/65 (11-17-19 @ 21:53) (124/62 - 135/65)  RR: 18 (11-17-19 @ 21:53) (18 - 18)  SpO2: 97% (11-17-19 @ 21:53) (97% - 97%)  CAPILLARY BLOOD GLUCOSE    11-16 @ 07:01  -  11-17 @ 07:00  --------------------------------------------------------  IN:  Total IN: 0 mL    OUT:    Voided: 1000 mL  Total OUT: 1000 mL    Total NET: -1000 mL      11-17 @ 07:01  -  11-17 @ 23:05  --------------------------------------------------------  IN:    IV PiggyBack: 250 mL    Oral Fluid: 600 mL  Total IN: 850 mL    OUT:    Voided: 950 mL  Total OUT: 950 mL    Total NET: -100 mL    Height (cm): 172.7 (11-15 @ 02:10)  Weight (kg): 106.4 (11-15 @ 02:10)  BMI (kg/m2): 35.7 (11-15 @ 02:10)  BSA (m2): 2.19 (11-15 @ 02:10)    PHYSICAL EXAM:   General: NAD, Lying in bed comfortably  Neuro: A+Ox3  HEENT: NC/AT, EOMI  Neck: Soft, supple  Cardio: RRR, nml S1/S2  Resp: Good effort, CTA b/l  GI/Abd: Soft, NT/ND, no rebound/guarding, no masses palpated  Vascular: All 4 extremities warm. palpable femoral pulse b/l.   RLE: doppler DP/PT, warm to touch. open wound between 1st and 2nd toe and on dorsal surface beneath 1st toe. no purulent drainage.   LLE: doppler DP/PT, warm  Skin: Intact, no breakdown  Lymphatic/Nodes: No palpable lymphadenopathy  Musculoskeletal: All 4 extremities moving spontaneously, no limitations  --------------------------------------------------------------------------------------------    LABS  CBC (11-17 @ 07:40)                              10.8<L>                         8.87    )----------------(  222        --    % Neutrophils, --    % Lymphocytes, ANC: --                                  31.7<L>  CBC (11-16 @ 13:57)                              12.6<L>                         10.55<H>  )----------------(  229        --    % Neutrophils, --    % Lymphocytes, ANC: --                                  35.8<L>    BMP (11-17 @ 07:40)             134<L>  |  94<L>   |  28<H> 		Ca++ --      Ca 9.0                ---------------------------------( 269<H>		Mg 1.9                4.1     |  25      |  1.31<H>			Ph 4.2     BMP (11-16 @ 13:57)             139     |  102     |  17    		Ca++ --      Ca 9.9                ---------------------------------( 274<H>		Mg 1.9                4.6     |  24      |  0.79  			Ph 3.3           Cardiac Markers (11-16 @ 18:34)     HSTrop: -- / CKMB: -- / CK: 59  Cardiac Markers (11-16 @ 13:57)     HSTrop: -- / CKMB: -- / CK: 69        --------------------------------------------------------------------------------------------    MICROBIOLOGY    -> .Blood Blood Culture (11-15 @ 17:02)     NG    NG    No growth to date.    -> .Abscess Leg - Right Culture (11-15 @ 03:30)     NG    Pseudomonas aeruginosa    Moderate Pseudomonas aeruginosa  Moderate Streptococcus agalactiae (Group B) isolated  Group B streptococci are susceptible to ampicillin,  penicillin and cefazolin, but may be resistant to  erythromycin and clindamycin.  Recommendations for intrapartum prophylaxis for Group B  streptococci are penicillin or ampicillin.      --------------------------------------------------------------------------------------------    IMAGING  MRI R foot - concern for osteomyelitis

## 2019-11-17 NOTE — PROGRESS NOTE ADULT - ASSESSMENT
67 yo male PMhx HTN, T2DM, CAD s/p stents, PAD, HLD presents admitted for diabetic wound infection confirmed osteo awaiting ABIs and vascular workup.

## 2019-11-17 NOTE — PROGRESS NOTE ADULT - PROBLEM SELECTOR PLAN 9
Transitions of Care Status:  1.  Name of PCP:   2.  PCP Contacted on Admission: [ ] Y    [ ] N    3.  PCP contacted at Discharge: [ ] Y    [ ] N    [ ] N/A  4.  Post-Discharge Appointment Date and Location:  5.  Summary of Handoff given to PCP: DVT ppx: change to hsq given DENISSE  Diet: Carb consistent

## 2019-11-17 NOTE — CONSULT NOTE ADULT - ASSESSMENT
ASSESSMENT: Patient is a 68M with chronic, non-healing R foot wounds secondary to PVD.     PLAN:    - HUANG/PVRs  - Ambulate as tolerated  - Wound care as per podiatry  - Discussed with vascular surgery fellow

## 2019-11-17 NOTE — PROVIDER CONTACT NOTE (OTHER) - BACKGROUND
Pt admitted with nonhealing ulcer in right foot
Diabetic pt, admitted with non healing ulcer in right foot.
Pt admitted for diabetic foot wounds

## 2019-11-17 NOTE — PROGRESS NOTE ADULT - PROBLEM SELECTOR PLAN 6
-c/w atorvastatin not on insulin at home, a1c 7.9, FS elevated but improving, no DKA  - increase lantus to 14 units qhs  - C/w Lispro 6 units TID with meals  -c/w LAUREN, adjust insulin, judicious use given DNEISSE  -monitor FS

## 2019-11-17 NOTE — PROVIDER CONTACT NOTE (OTHER) - ASSESSMENT
Pt. AOx4. VSS. Pt not in any distress.
Pt A&Ox4. VSS. No s/s of hyperglycemia. Pt stated that he ate fruit prior to FS. No bedtime sliding scale at this time, only lantus ordered.

## 2019-11-17 NOTE — PROGRESS NOTE ADULT - PROBLEM SELECTOR PLAN 2
nausea and vomiting today with no other cardiac or GI symptoms, possibly related to side effect of IV clindamycin  -EKG 11/16 with NSR and RBBB which is new from 2017 (no ekg in between), cardiac enzymes flat at 19  x2, ACS ruled out   -FS elevated, but AG wnl, not DKA  -BP also elevated, will do CT head r/o intracranial process  -d/c clinda, change to vanc  -zofran prn  -monitor for improvement new DENISSE likely prerenal given vomiting yesterday, poor intake, ACE/HCTZ  -d/c vanc given nephrotoxic combo with zosyn and low suspicion for MRSA,   -start IVH  -bladder scan  -repeat bmp, if creatinine worsening will require renal eval and urine studies  -hold ACE and HCTZ until DENISSE resolved

## 2019-11-17 NOTE — PROGRESS NOTE ADULT - SUBJECTIVE AND OBJECTIVE BOX
PROGRESS NOTE:   Authored by Dr. Adithya Leung MD, PAGER: [phone] [facility], [id number] [facility]    Patient is a 68y old  Male who presents with a chief complaint of Foot infection (16 Nov 2019 13:27)      SUBJECTIVE / OVERNIGHT EVENTS:    ADDITIONAL REVIEW OF SYSTEMS:    MEDICATIONS  (STANDING):  amLODIPine   Tablet 10 milliGRAM(s) Oral daily  aspirin  chewable 81 milliGRAM(s) Oral daily  atorvastatin 20 milliGRAM(s) Oral at bedtime  clopidogrel Tablet 75 milliGRAM(s) Oral daily  Dakins Solution - 1/4 Strength 1 Application(s) Topical daily  dextrose 5%. 1000 milliLiter(s) (50 mL/Hr) IV Continuous <Continuous>  dextrose 50% Injectable 12.5 Gram(s) IV Push once  dextrose 50% Injectable 25 Gram(s) IV Push once  dextrose 50% Injectable 25 Gram(s) IV Push once  enoxaparin Injectable 40 milliGRAM(s) SubCutaneous daily  hydrochlorothiazide 25 milliGRAM(s) Oral daily  influenza   Vaccine 0.5 milliLiter(s) IntraMuscular once  insulin glargine Injectable (LANTUS) 9 Unit(s) SubCutaneous at bedtime  insulin lispro (HumaLOG) corrective regimen sliding scale   SubCutaneous three times a day before meals  insulin lispro Injectable (HumaLOG) 6 Unit(s) SubCutaneous three times a day before meals  isosorbide   mononitrate ER Tablet (IMDUR) 30 milliGRAM(s) Oral daily  lisinopril 40 milliGRAM(s) Oral every 24 hours  metoprolol tartrate 50 milliGRAM(s) Oral two times a day  piperacillin/tazobactam IVPB.. 3.375 Gram(s) IV Intermittent every 8 hours  vancomycin  IVPB 1000 milliGRAM(s) IV Intermittent every 12 hours    MEDICATIONS  (PRN):  dextrose 40% Gel 15 Gram(s) Oral once PRN Blood Glucose LESS THAN 70 milliGRAM(s)/deciliter  glucagon  Injectable 1 milliGRAM(s) IntraMuscular once PRN Glucose LESS THAN 70 milligrams/deciliter  ondansetron Injectable 4 milliGRAM(s) IV Push every 6 hours PRN Nausea and/or Vomiting      CAPILLARY BLOOD GLUCOSE      POCT Blood Glucose.: 328 mg/dL (16 Nov 2019 21:24)  POCT Blood Glucose.: 307 mg/dL (16 Nov 2019 18:00)  POCT Blood Glucose.: 237 mg/dL (16 Nov 2019 13:44)  POCT Blood Glucose.: 210 mg/dL (16 Nov 2019 09:22)    I&O's Summary    16 Nov 2019 07:01  -  17 Nov 2019 07:00  --------------------------------------------------------  IN: 0 mL / OUT: 1000 mL / NET: -1000 mL        PHYSICAL EXAM:  Vital Signs Last 24 Hrs  T(C): 36.5 (17 Nov 2019 04:53), Max: 36.7 (16 Nov 2019 13:05)  T(F): 97.7 (17 Nov 2019 04:53), Max: 98 (16 Nov 2019 13:05)  HR: 61 (17 Nov 2019 04:53) (60 - 91)  BP: 124/62 (17 Nov 2019 04:53) (124/62 - 170/86)  BP(mean): --  RR: 18 (17 Nov 2019 04:53) (18 - 18)  SpO2: 97% (17 Nov 2019 04:53) (97% - 98%)    CONSTITUTIONAL: NAD, well-developed  RESPIRATORY: Normal respiratory effort; lungs are clear to auscultation bilaterally  CARDIOVASCULAR: Regular rate and rhythm, normal S1 and S2, no murmur/rub/gallop; No lower extremity edema; Peripheral pulses are 2+ bilaterally  ABDOMEN: Nontender to palpation, normoactive bowel sounds, no rebound/guarding; No hepatosplenomegaly  MUSCLOSKELETAL: no clubbing or cyanosis of digits; no joint swelling or tenderness to palpation  PSYCH: A+O to person, place, and time; affect appropriate    LABS:                        10.8   8.87  )-----------( 222      ( 17 Nov 2019 07:40 )             31.7     11-17    134<L>  |  94<L>  |  28<H>  ----------------------------<  269<H>  4.1   |  25  |  1.31<H>    Ca    9.0      17 Nov 2019 07:40  Phos  4.2     11-17  Mg     1.9     11-17        CARDIAC MARKERS ( 16 Nov 2019 18:34 )  x     / x     / 59 U/L / x     / 2.8 ng/mL  CARDIAC MARKERS ( 16 Nov 2019 13:57 )  x     / x     / 69 U/L / x     / 2.9 ng/mL          Culture - Blood (collected 15 Nov 2019 17:02)  Source: .Blood Blood  Preliminary Report (16 Nov 2019 18:00):    No growth to date.    Culture - Blood (collected 15 Nov 2019 17:02)  Source: .Blood Blood  Preliminary Report (16 Nov 2019 18:00):    No growth to date.    Culture - Abscess with Gram Stain (collected 15 Nov 2019 03:30)  Source: .Abscess Leg - Right  Preliminary Report (16 Nov 2019 10:37):    Moderate Pseudomonas aeruginosa    Moderate Streptococcus agalactiae (Group B) isolated    Group B streptococci are susceptible to ampicillin,    penicillin and cefazolin, but may be resistant to    erythromycin and clindamycin.    Recommendations for intrapartum prophylaxis for Group B    streptococci are penicillin or ampicillin.        RADIOLOGY & ADDITIONAL TESTS:  Results Reviewed:   Imaging Personally Reviewed:  Electrocardiogram Personally Reviewed:    COORDINATION OF CARE:  Care Discussed with Consultants/Other Providers [Y/N]:  Prior or Outpatient Records Reviewed [Y/N]: PROGRESS NOTE:   Authored by Dr. Adithya Leung MD, PAGER: [phone] [facility], [id number] [facility]    Patient is a 68y old  Male who presents with a chief complaint of Foot infection (16 Nov 2019 13:27)      SUBJECTIVE / OVERNIGHT EVENTS:  No overnight events. Feels good today, no n/v, cp, sob,  palpitations, lightheadedness, f/c. No LE pain    ADDITIONAL REVIEW OF SYSTEMS: negative    MEDICATIONS  (STANDING):  amLODIPine   Tablet 10 milliGRAM(s) Oral daily  aspirin  chewable 81 milliGRAM(s) Oral daily  atorvastatin 20 milliGRAM(s) Oral at bedtime  clopidogrel Tablet 75 milliGRAM(s) Oral daily  Dakins Solution - 1/4 Strength 1 Application(s) Topical daily  dextrose 5%. 1000 milliLiter(s) (50 mL/Hr) IV Continuous <Continuous>  dextrose 50% Injectable 12.5 Gram(s) IV Push once  dextrose 50% Injectable 25 Gram(s) IV Push once  dextrose 50% Injectable 25 Gram(s) IV Push once  enoxaparin Injectable 40 milliGRAM(s) SubCutaneous daily  hydrochlorothiazide 25 milliGRAM(s) Oral daily  influenza   Vaccine 0.5 milliLiter(s) IntraMuscular once  insulin glargine Injectable (LANTUS) 9 Unit(s) SubCutaneous at bedtime  insulin lispro (HumaLOG) corrective regimen sliding scale   SubCutaneous three times a day before meals  insulin lispro Injectable (HumaLOG) 6 Unit(s) SubCutaneous three times a day before meals  isosorbide   mononitrate ER Tablet (IMDUR) 30 milliGRAM(s) Oral daily  lisinopril 40 milliGRAM(s) Oral every 24 hours  metoprolol tartrate 50 milliGRAM(s) Oral two times a day  piperacillin/tazobactam IVPB.. 3.375 Gram(s) IV Intermittent every 8 hours  vancomycin  IVPB 1000 milliGRAM(s) IV Intermittent every 12 hours    MEDICATIONS  (PRN):  dextrose 40% Gel 15 Gram(s) Oral once PRN Blood Glucose LESS THAN 70 milliGRAM(s)/deciliter  glucagon  Injectable 1 milliGRAM(s) IntraMuscular once PRN Glucose LESS THAN 70 milligrams/deciliter  ondansetron Injectable 4 milliGRAM(s) IV Push every 6 hours PRN Nausea and/or Vomiting      CAPILLARY BLOOD GLUCOSE      POCT Blood Glucose.: 328 mg/dL (16 Nov 2019 21:24)  POCT Blood Glucose.: 307 mg/dL (16 Nov 2019 18:00)  POCT Blood Glucose.: 237 mg/dL (16 Nov 2019 13:44)  POCT Blood Glucose.: 210 mg/dL (16 Nov 2019 09:22)    I&O's Summary    16 Nov 2019 07:01  -  17 Nov 2019 07:00  --------------------------------------------------------  IN: 0 mL / OUT: 1000 mL / NET: -1000 mL        PHYSICAL EXAM:  Vital Signs Last 24 Hrs  T(C): 36.5 (17 Nov 2019 04:53), Max: 36.7 (16 Nov 2019 13:05)  T(F): 97.7 (17 Nov 2019 04:53), Max: 98 (16 Nov 2019 13:05)  HR: 61 (17 Nov 2019 04:53) (60 - 91)  BP: 124/62 (17 Nov 2019 04:53) (124/62 - 170/86)  BP(mean): --  RR: 18 (17 Nov 2019 04:53) (18 - 18)  SpO2: 97% (17 Nov 2019 04:53) (97% - 98%)    PHYSICAL EXAM:  GENERAL: NAD, well-developed, nontoxic  HEENT: NC/AT,   NECK: Supple, No JVD  CHEST/LUNG: Clear to auscultation bilaterally; No rales, rhonchi, wheezing, or rubs  CARD: Regular rate and rhythm; No murmurs, rubs, or gallops. No LE edema  ABDOMEN: Soft, Nontender, Nondistended; Bowel sounds present  EXTREMITIES:  b/l. right foot with dressing present, malodorous. left foot with dry healed wound at site of previous debridement. Old abrasion over left anterior leg covered with bandage.  SKIN: No rashes or lesions  NEURO: AOx3, moving all extremities  LABS:                        10.8   8.87  )-----------( 222      ( 17 Nov 2019 07:40 )             31.7     11-17    134<L>  |  94<L>  |  28<H>  ----------------------------<  269<H>  4.1   |  25  |  1.31<H>    Ca    9.0      17 Nov 2019 07:40  Phos  4.2     11-17  Mg     1.9     11-17        CARDIAC MARKERS ( 16 Nov 2019 18:34 )  x     / x     / 59 U/L / x     / 2.8 ng/mL  CARDIAC MARKERS ( 16 Nov 2019 13:57 )  x     / x     / 69 U/L / x     / 2.9 ng/mL          Culture - Blood (collected 15 Nov 2019 17:02)  Source: .Blood Blood  Preliminary Report (16 Nov 2019 18:00):    No growth to date.    Culture - Blood (collected 15 Nov 2019 17:02)  Source: .Blood Blood  Preliminary Report (16 Nov 2019 18:00):    No growth to date.    Culture - Abscess with Gram Stain (collected 15 Nov 2019 03:30)  Source: .Abscess Leg - Right  Preliminary Report (16 Nov 2019 10:37):    Moderate Pseudomonas aeruginosa    Moderate Streptococcus agalactiae (Group B) isolated    Group B streptococci are susceptible to ampicillin,    penicillin and cefazolin, but may be resistant to    erythromycin and clindamycin.    Recommendations for intrapartum prophylaxis for Group B    streptococci are penicillin or ampicillin.        RADIOLOGY & ADDITIONAL TESTS:  Results Reviewed:   Imaging Personally Reviewed:  Electrocardiogram Personally Reviewed:    COORDINATION OF CARE:  Care Discussed with Consultants/Other Providers [Y/N]:  Prior or Outpatient Records Reviewed [Y/N]:

## 2019-11-17 NOTE — PROGRESS NOTE ADULT - PROBLEM SELECTOR PLAN 7
BP elevated   -c/w Metoprolol and amlodipine to 10mg  -restart home isosorbide 30mg, HCTZ 25mg lisinopril 40mg  -monitor bp - last stents 3 years ago, trops negative, ekg nsr with RBBB unclear if new, no chest pain, sob, aCS ruled out  - c/w ASA, plavix, atorvastatin, imdur 30mg

## 2019-11-17 NOTE — PROGRESS NOTE ADULT - PROBLEM SELECTOR PLAN 3
s/p LLE intervention in 2017  -HUANG pending  -if HUANG showing PAD will consult vasc for possible revascularization  -c/w home ASA/plavix resolved, related to side effect of IV clindamycin now dc/ed  -EKG 11/16 with NSR and RBBB which is new from 2017 (no ekg in between), cardiac enzymes flat at 19  x2, ACS ruled out   -CT head negative intracranial process  -zofran prn

## 2019-11-18 LAB
ALBUMIN SERPL ELPH-MCNC: 4 G/DL — SIGNIFICANT CHANGE UP (ref 3.3–5)
ALP SERPL-CCNC: 61 U/L — SIGNIFICANT CHANGE UP (ref 40–120)
ALT FLD-CCNC: 10 U/L — SIGNIFICANT CHANGE UP (ref 10–45)
ANION GAP SERPL CALC-SCNC: 11 MMOL/L — SIGNIFICANT CHANGE UP (ref 5–17)
AST SERPL-CCNC: 11 U/L — SIGNIFICANT CHANGE UP (ref 10–40)
BILIRUB SERPL-MCNC: 0.4 MG/DL — SIGNIFICANT CHANGE UP (ref 0.2–1.2)
BUN SERPL-MCNC: 26 MG/DL — HIGH (ref 7–23)
CALCIUM SERPL-MCNC: 9.3 MG/DL — SIGNIFICANT CHANGE UP (ref 8.4–10.5)
CHLORIDE SERPL-SCNC: 101 MMOL/L — SIGNIFICANT CHANGE UP (ref 96–108)
CO2 SERPL-SCNC: 26 MMOL/L — SIGNIFICANT CHANGE UP (ref 22–31)
CREAT SERPL-MCNC: 1.17 MG/DL — SIGNIFICANT CHANGE UP (ref 0.5–1.3)
GLUCOSE SERPL-MCNC: 205 MG/DL — HIGH (ref 70–99)
HCT VFR BLD CALC: 32.1 % — LOW (ref 39–50)
HGB BLD-MCNC: 11.1 G/DL — LOW (ref 13–17)
MAGNESIUM SERPL-MCNC: 1.9 MG/DL — SIGNIFICANT CHANGE UP (ref 1.6–2.6)
MCHC RBC-ENTMCNC: 27.5 PG — SIGNIFICANT CHANGE UP (ref 27–34)
MCHC RBC-ENTMCNC: 34.6 GM/DL — SIGNIFICANT CHANGE UP (ref 32–36)
MCV RBC AUTO: 79.7 FL — LOW (ref 80–100)
NRBC # BLD: 0 /100 WBCS — SIGNIFICANT CHANGE UP (ref 0–0)
PHOSPHATE SERPL-MCNC: 3.2 MG/DL — SIGNIFICANT CHANGE UP (ref 2.5–4.5)
PLATELET # BLD AUTO: 207 K/UL — SIGNIFICANT CHANGE UP (ref 150–400)
POTASSIUM SERPL-MCNC: 4.1 MMOL/L — SIGNIFICANT CHANGE UP (ref 3.5–5.3)
POTASSIUM SERPL-SCNC: 4.1 MMOL/L — SIGNIFICANT CHANGE UP (ref 3.5–5.3)
PROT SERPL-MCNC: 6.7 G/DL — SIGNIFICANT CHANGE UP (ref 6–8.3)
RBC # BLD: 4.03 M/UL — LOW (ref 4.2–5.8)
RBC # FLD: 13 % — SIGNIFICANT CHANGE UP (ref 10.3–14.5)
SODIUM SERPL-SCNC: 138 MMOL/L — SIGNIFICANT CHANGE UP (ref 135–145)
VANCOMYCIN FLD-MCNC: 6.5 UG/ML — SIGNIFICANT CHANGE UP
WBC # BLD: 7.69 K/UL — SIGNIFICANT CHANGE UP (ref 3.8–10.5)
WBC # FLD AUTO: 7.69 K/UL — SIGNIFICANT CHANGE UP (ref 3.8–10.5)

## 2019-11-18 PROCEDURE — 99232 SBSQ HOSP IP/OBS MODERATE 35: CPT

## 2019-11-18 PROCEDURE — 93923 UPR/LXTR ART STDY 3+ LVLS: CPT | Mod: 26

## 2019-11-18 PROCEDURE — 99223 1ST HOSP IP/OBS HIGH 75: CPT

## 2019-11-18 PROCEDURE — 99233 SBSQ HOSP IP/OBS HIGH 50: CPT | Mod: GC

## 2019-11-18 RX ORDER — INSULIN GLARGINE 100 [IU]/ML
18 INJECTION, SOLUTION SUBCUTANEOUS AT BEDTIME
Refills: 0 | Status: DISCONTINUED | OUTPATIENT
Start: 2019-11-18 | End: 2019-11-19

## 2019-11-18 RX ORDER — INSULIN LISPRO 100/ML
VIAL (ML) SUBCUTANEOUS AT BEDTIME
Refills: 0 | Status: DISCONTINUED | OUTPATIENT
Start: 2019-11-18 | End: 2019-11-20

## 2019-11-18 RX ADMIN — PIPERACILLIN AND TAZOBACTAM 25 GRAM(S): 4; .5 INJECTION, POWDER, LYOPHILIZED, FOR SOLUTION INTRAVENOUS at 05:50

## 2019-11-18 RX ADMIN — ATORVASTATIN CALCIUM 20 MILLIGRAM(S): 80 TABLET, FILM COATED ORAL at 21:38

## 2019-11-18 RX ADMIN — CLOPIDOGREL BISULFATE 75 MILLIGRAM(S): 75 TABLET, FILM COATED ORAL at 13:59

## 2019-11-18 RX ADMIN — PIPERACILLIN AND TAZOBACTAM 25 GRAM(S): 4; .5 INJECTION, POWDER, LYOPHILIZED, FOR SOLUTION INTRAVENOUS at 14:10

## 2019-11-18 RX ADMIN — Medication 6 UNIT(S): at 09:56

## 2019-11-18 RX ADMIN — AMLODIPINE BESYLATE 10 MILLIGRAM(S): 2.5 TABLET ORAL at 05:49

## 2019-11-18 RX ADMIN — HEPARIN SODIUM 5000 UNIT(S): 5000 INJECTION INTRAVENOUS; SUBCUTANEOUS at 17:28

## 2019-11-18 RX ADMIN — INSULIN GLARGINE 18 UNIT(S): 100 INJECTION, SOLUTION SUBCUTANEOUS at 22:12

## 2019-11-18 RX ADMIN — Medication 1 APPLICATION(S): at 14:03

## 2019-11-18 RX ADMIN — Medication 81 MILLIGRAM(S): at 14:00

## 2019-11-18 RX ADMIN — Medication 2: at 14:00

## 2019-11-18 RX ADMIN — Medication 2: at 09:56

## 2019-11-18 RX ADMIN — Medication 50 MILLIGRAM(S): at 05:52

## 2019-11-18 RX ADMIN — ISOSORBIDE MONONITRATE 30 MILLIGRAM(S): 60 TABLET, EXTENDED RELEASE ORAL at 14:00

## 2019-11-18 RX ADMIN — Medication 2: at 17:29

## 2019-11-18 RX ADMIN — Medication 6 UNIT(S): at 14:00

## 2019-11-18 RX ADMIN — Medication 6 UNIT(S): at 17:29

## 2019-11-18 RX ADMIN — HEPARIN SODIUM 5000 UNIT(S): 5000 INJECTION INTRAVENOUS; SUBCUTANEOUS at 05:49

## 2019-11-18 RX ADMIN — PIPERACILLIN AND TAZOBACTAM 25 GRAM(S): 4; .5 INJECTION, POWDER, LYOPHILIZED, FOR SOLUTION INTRAVENOUS at 21:38

## 2019-11-18 RX ADMIN — Medication 50 MILLIGRAM(S): at 17:29

## 2019-11-18 NOTE — PROGRESS NOTE ADULT - SUBJECTIVE AND OBJECTIVE BOX
Surgery Progress Note    S: No acute distress. Continuing to complain of some pain R. LE.  Afebrile.     O:  Vital Signs Last 24 Hrs  T(C): 36.6 (18 Nov 2019 14:09), Max: 37 (17 Nov 2019 21:53)  T(F): 97.9 (18 Nov 2019 14:09), Max: 98.6 (17 Nov 2019 21:53)  HR: 72 (18 Nov 2019 14:09) (72 - 76)  BP: 157/72 (18 Nov 2019 14:09) (135/65 - 157/72)  BP(mean): --  RR: 18 (18 Nov 2019 14:09) (18 - 18)  SpO2: 98% (18 Nov 2019 14:09) (97% - 98%)    I&O's Detail    17 Nov 2019 07:01  -  18 Nov 2019 07:00  --------------------------------------------------------  IN:    IV PiggyBack: 450 mL    Oral Fluid: 600 mL    sodium chloride 0.9%.: 720 mL  Total IN: 1770 mL    OUT:    Voided: 1630 mL  Total OUT: 1630 mL    Total NET: 140 mL      18 Nov 2019 07:01  -  18 Nov 2019 17:34  --------------------------------------------------------  IN:    Oral Fluid: 480 mL  Total IN: 480 mL    OUT:    Voided: 900 mL  Total OUT: 900 mL    Total NET: -420 mL          MEDICATIONS  (STANDING):  amLODIPine   Tablet 10 milliGRAM(s) Oral daily  aspirin  chewable 81 milliGRAM(s) Oral daily  atorvastatin 20 milliGRAM(s) Oral at bedtime  clopidogrel Tablet 75 milliGRAM(s) Oral daily  Dakins Solution - 1/4 Strength 1 Application(s) Topical daily  dextrose 5%. 1000 milliLiter(s) (50 mL/Hr) IV Continuous <Continuous>  dextrose 50% Injectable 12.5 Gram(s) IV Push once  dextrose 50% Injectable 25 Gram(s) IV Push once  dextrose 50% Injectable 25 Gram(s) IV Push once  heparin  Injectable 5000 Unit(s) SubCutaneous every 12 hours  influenza   Vaccine 0.5 milliLiter(s) IntraMuscular once  insulin glargine Injectable (LANTUS) 14 Unit(s) SubCutaneous at bedtime  insulin lispro (HumaLOG) corrective regimen sliding scale   SubCutaneous three times a day before meals  insulin lispro Injectable (HumaLOG) 6 Unit(s) SubCutaneous three times a day before meals  isosorbide   mononitrate ER Tablet (IMDUR) 30 milliGRAM(s) Oral daily  metoprolol tartrate 50 milliGRAM(s) Oral two times a day  piperacillin/tazobactam IVPB.. 3.375 Gram(s) IV Intermittent every 8 hours  sodium chloride 0.9%. 1000 milliLiter(s) (60 mL/Hr) IV Continuous <Continuous>    MEDICATIONS  (PRN):  dextrose 40% Gel 15 Gram(s) Oral once PRN Blood Glucose LESS THAN 70 milliGRAM(s)/deciliter  glucagon  Injectable 1 milliGRAM(s) IntraMuscular once PRN Glucose LESS THAN 70 milligrams/deciliter  ondansetron Injectable 4 milliGRAM(s) IV Push every 6 hours PRN Nausea and/or Vomiting                            11.1   7.69  )-----------( 207      ( 18 Nov 2019 07:25 )             32.1       11-18    138  |  101  |  26<H>  ----------------------------<  205<H>  4.1   |  26  |  1.17    Ca    9.3      18 Nov 2019 07:19  Phos  3.2     11-18  Mg     1.9     11-18    TPro  6.7  /  Alb  4.0  /  TBili  0.4  /  DBili  x   /  AST  11  /  ALT  10  /  AlkPhos  61  11-18      PHYSICAL EXAM:   General: NAD, Lying in bed comfortably  Neuro: A+Ox3  HEENT: NC/AT, EOMI  Neck: Soft, supple  Cardio: RRR, nml S1/S2  Resp: Good effort, CTA b/l  GI/Abd: Soft, NT/ND, no rebound/guarding, no masses palpated  Vascular: All 4 extremities warm. palpable femoral pulse b/l.   RLE: doppler DP/PT, warm to touch. open wound between 1st and 2nd toe and on dorsal surface beneath 1st toe. no purulent drainage.   LLE: doppler DP/PT, warm  Skin: Intact, no breakdown  Lymphatic/Nodes: No palpable lymphadenopathy  Musculoskeletal: All 4 extremities moving spontaneously, no limitations

## 2019-11-18 NOTE — PROGRESS NOTE ADULT - SUBJECTIVE AND OBJECTIVE BOX
Patient is a 68y old  Male who presents with a chief complaint of Foot infection (18 Nov 2019 05:59)       INTERVAL HPI/OVERNIGHT EVENTS:  Patient seen and evaluated at bedside.  Pt is resting comfortable in NAD. Denies N/V/F/C.      Allergies    No Known Allergies    Intolerances        Vital Signs Last 24 Hrs  T(C): 36.6 (18 Nov 2019 05:44), Max: 37 (17 Nov 2019 21:53)  T(F): 97.9 (18 Nov 2019 05:44), Max: 98.6 (17 Nov 2019 21:53)  HR: 76 (18 Nov 2019 05:44) (69 - 76)  BP: 151/73 (18 Nov 2019 05:44) (127/69 - 151/73)  BP(mean): --  RR: 18 (18 Nov 2019 05:44) (18 - 18)  SpO2: 98% (18 Nov 2019 05:44) (97% - 98%)    LABS:                        11.1   7.69  )-----------( 207      ( 18 Nov 2019 07:25 )             32.1     11-18    138  |  101  |  26<H>  ----------------------------<  205<H>  4.1   |  26  |  1.17    Ca    9.3      18 Nov 2019 07:19  Phos  3.2     11-18  Mg     1.9     11-18    TPro  6.7  /  Alb  4.0  /  TBili  0.4  /  DBili  x   /  AST  11  /  ALT  10  /  AlkPhos  61  11-18        CAPILLARY BLOOD GLUCOSE      POCT Blood Glucose.: 248 mg/dL (17 Nov 2019 21:26)  POCT Blood Glucose.: 350 mg/dL (17 Nov 2019 17:07)  POCT Blood Glucose.: 266 mg/dL (17 Nov 2019 12:10)  POCT Blood Glucose.: 228 mg/dL (17 Nov 2019 08:25)      Lower Extremity Physical Exam:  Vascular: DP/PT 0/4, B/L, CFT <3 seconds B/L, Temperature gradient warm to cool B/L.   Neuro: Epicritic sensation grossly diminished to level of ankleB/L.  Skin:  Wound #1: R foot 1st interspace wound, 2.0x2.0cm, depth to 1st and 2nd met capsule, wound bed necrotic, serous drainage, malodorous, periwound macerated, etiology 2/2 interdigital maceration/ arterial insufficiency     Wound #2: R foot submetatarsal 1 wound, 2.5x1.5cm, wound bed fibrogranular, no active drainage, no malodor, periwound macerated, etiology 2/2 pressure     Wound #3: R foot lateral border of foot stable eschar, 4.0x1.0cm, no active drainage, no malodor, periwound erythematous, etiology 2/2 pressure

## 2019-11-18 NOTE — PROGRESS NOTE ADULT - PROBLEM SELECTOR PLAN 1
confirmed R foot osteo on MRI, elevated ESR/CRP:  - c/w IV Zosyn (started 11/15) given pseudomonas on wound cx  - clinda d/c'd due to side effect of nausea, vomiting  - vanc dc'ed as no MRSA on culture and new DENISSE, nephrotoxic combo of vanc/zosyn. if decompensation, can restart vancomycin and dose by levels  -ID consult   -wound culture with GBS and pseudomonas  -podiatry following  - blood culture NGTD, afebrile, leukocytosis resolved, monitor cbc confirmed R foot osteo on MRI, elevated ESR/CRP:  - c/w IV Zosyn (started 11/15) given pseudomonas on wound cx  - clinda d/c'd due to side effect of nausea, vomiting  - vanc dc'ed as no MRSA on culture and new DENISSE, nephrotoxic combo of vanc/zosyn  - ID consulted, rec zosyn only  - wound culture with GBS and pseudomonas  - podiatry following  - blood culture NGTD, afebrile, leukocytosis resolved, monitor cbc

## 2019-11-18 NOTE — CONSULT NOTE ADULT - ASSESSMENT
68M with DM, CAD s/p PCI, admitted 11/15/19 with a Right foot wound for five weeks, MRI showing osteomyelitis of the hallux.   Cultures growing Pseudomonas (pan sensitive)   Afebrile, WBC normal   On Zosyn   OR resection Thurs tentatively     Darryl Coleman MD   Infectious Disease   Pager [phone]   After 5PM and on weekends please page fellow on call or call [phone] 68M with DM, CAD s/p PCI, left foot osteomyelitis 12/2016, admitted 11/15/19 with a necrotic Right foot ulcer in the first interspace with osteomyelitis on MRI; also wound with osteomyelitis or the fifth metatarsal head.   Cultures growing Pseudomonas (pan sensitive) and GBS.   No MRSA in 2016, only GBS.   Afebrile, no leukocytosis, clinically well.   Tentative OR resection on Thurs. I think surgery offers the best chance of cure although he's reluctant about losing part of his foot.     Suggest  -continue Zosyn   -f/u HUANG/PVR   -intervention per surgery  -diabetic control per primary team     Spoke with primary team     Darryl Coleman MD   Infectious Disease   Pager [phone]   After 5PM and on weekends please page fellow on call or call [phone]

## 2019-11-18 NOTE — PROGRESS NOTE ADULT - ASSESSMENT
68M w/ R foot chronic non-healing wounds  -Pt seen and evaluated   -Right foot 1st interspace wound w/ fibronecrotic base and malodorous. Remaining wounds are stable w/ no signs of infection.  -Wound was flushed w/ copious saline and dressed w/ dakins and dry sterile dressing  -MRI showing osteo of sesamoids and 1st proximal phalanx  -Awaiting HUANG/PVR and vascular recs   -Will likely need OR for debridement and resection of infected bone pending vascular recs  -Please document medical/cardiac optimization for surgery w/ local anesthesia and IV sedation   -Continue IV abx  -Discussed w/ attending 68M w/ R foot chronic non-healing wounds  -Pt seen and evaluated   -Right foot 1st interspace wound w/ fibronecrotic base and malodorous. Remaining wounds are stable w/ no signs of infection.  -Wound was flushed w/ copious saline and dressed w/ dakins and dry sterile dressing  -MRI showing osteo of sesamoids and 1st proximal phalanx  -Awaiting HUANG/PVR and vascular recs   -Tentatively booked for OR for wound debridement and partial 1st ray resection on Thurs 11/21 @ 3pm pending vascular recs  -Please document medical/cardiac optimization for surgery w/ local anesthesia and IV sedation   -Continue IV abx  -Discussed w/ attending

## 2019-11-18 NOTE — PROGRESS NOTE ADULT - ASSESSMENT
69 yo male PMhx HTN, T2DM, CAD s/p stents, PAD, HLD presents admitted for diabetic wound infection, confirmed osteo via imaging, with course c/b DENISSE likely 2/2 nephrotoxic medications currently on IV abx awaiting ABIs and vascular workup.

## 2019-11-18 NOTE — PROGRESS NOTE ADULT - ASSESSMENT
68M with chronic, non-healing R foot wounds secondary to PVD.     PLAN:    - HUANG/PVRs  - Wound care as per podiatry  - ID recs appreciated     Vascular Surgery   [phone]

## 2019-11-18 NOTE — PROGRESS NOTE ADULT - PROBLEM SELECTOR PLAN 2
new DENISSE likely prerenal given vomiting on 11/16, poor intake, ACE/HCTZ  -vanco d/c'd given nephrotoxic combo with zosyn and low suspicion for MRSA,   -IVF  -bladder scan  -monitor bmps, if creatinine worsening will require renal eval and urine studies  -hold ACE and HCTZ until DENISSE resolved new DENISSE likely prerenal given vomiting on 11/16, poor intake, ACE/HCTZ  -vanco d/c'd given nephrotoxic combo with zosyn and low suspicion for MRSA,   -IVF, NS at 60cc/hr  -bladder scan  -monitor bmps, if creatinine worsening will require renal eval and urine studies  -hold ACE and HCTZ until DENISSE resolved

## 2019-11-18 NOTE — PROGRESS NOTE ADULT - PROBLEM SELECTOR PLAN 5
BP stable  -holding ACE/HCTZ given DENISSE, if BP uptrends can uptitrate home Imdur currently 30mg daily  -c/w home Metoprolol, amlodipine 10mg  -monitor bp

## 2019-11-18 NOTE — PROGRESS NOTE ADULT - PROBLEM SELECTOR PLAN 3
resolved, likely related to side effect of IV clindamycin now dc/ed  -EKG 11/16 with NSR and RBBB which is new from 2017 (no ekg in between), cardiac enzymes flat at 19  x2, ACS ruled out   -CT head negative intracranial process  -zofran prn

## 2019-11-18 NOTE — PROGRESS NOTE ADULT - PROBLEM SELECTOR PLAN 4
s/p LLE intervention in 2017  -HUANG pending, previous HUANG showed PAD b/l  -vascular consulted  -c/w home ASA/plavix s/p LLE intervention in 2017  -HUANG performed today, previous HUAGN showed PAD b/l  -vascular consulted  -c/w home ASA/plavix

## 2019-11-18 NOTE — CONSULT NOTE ADULT - SUBJECTIVE AND OBJECTIVE BOX
HPI:  68M with DM, CAD s/p PCI, admitted 11/15/19 with a Right foot wound for five weeks, MRI showing osteomyelitis of the hallux.   Cultures growing Pseudomonas (pan sensitive)     Wound occurred after calluses were removed     s/p 10 day course of Abx,       PAST MEDICAL & SURGICAL HISTORY:  Essential hypertension  Coronary artery disease involving native coronary artery of native heart without angina pectoris  Peripheral artery disease  Type 2 diabetes mellitus with other circulatory complication, without long-term current use of insulin  Venous ulcer of left leg  S/P debridement      Allergies    No Known Allergies    Intolerances        ANTIMICROBIALS:  piperacillin/tazobactam IVPB.. 3.375 every 8 hours      OTHER MEDS:  amLODIPine   Tablet 10 milliGRAM(s) Oral daily  aspirin  chewable 81 milliGRAM(s) Oral daily  atorvastatin 20 milliGRAM(s) Oral at bedtime  clopidogrel Tablet 75 milliGRAM(s) Oral daily  Dakins Solution - 1/4 Strength 1 Application(s) Topical daily  dextrose 40% Gel 15 Gram(s) Oral once PRN  dextrose 5%. 1000 milliLiter(s) IV Continuous <Continuous>  dextrose 50% Injectable 12.5 Gram(s) IV Push once  dextrose 50% Injectable 25 Gram(s) IV Push once  dextrose 50% Injectable 25 Gram(s) IV Push once  glucagon  Injectable 1 milliGRAM(s) IntraMuscular once PRN  heparin  Injectable 5000 Unit(s) SubCutaneous every 12 hours  influenza   Vaccine 0.5 milliLiter(s) IntraMuscular once  insulin glargine Injectable (LANTUS) 14 Unit(s) SubCutaneous at bedtime  insulin lispro (HumaLOG) corrective regimen sliding scale   SubCutaneous three times a day before meals  insulin lispro Injectable (HumaLOG) 6 Unit(s) SubCutaneous three times a day before meals  isosorbide   mononitrate ER Tablet (IMDUR) 30 milliGRAM(s) Oral daily  metoprolol tartrate 50 milliGRAM(s) Oral two times a day  ondansetron Injectable 4 milliGRAM(s) IV Push every 6 hours PRN  sodium chloride 0.9%. 1000 milliLiter(s) IV Continuous <Continuous>      SOCIAL HISTORY:    FAMILY HISTORY:  FH: diabetes mellitus: mother  Family history of diabetes mellitus (Sibling): brother  Family history of essential hypertension      ROS:  Unobtainable because:   All other systems negative   Constitutional: no fever, no chills, no weight loss, no night sweats  Eye: no vision changes  ENT:  no sore throat, no rhinorrhea  Cardiovascular:  no chest pain, no palpitation  Respiratory:  no SOB, no cough  GI:  no abd pain, no vomiting, no diarrhea  urinary: no dysuria, no hematuria, no flank pain  musculoskeletal:  no joint pain, no joint swelling  skin:  no rash  neurology:  no headache, no seizure, no change in mental status  psych: no anxiety    Physical Exam:  General:    NAD, non toxic  Head: atraumatic, normocephalic  Eyes: normal sclera and conjunctiva  ENT:   no oropharyngeal lesions, no LAD, neck supple  Cardio:    regular rate and rhythm   Respiratory:   clear b/l, no wheezing  abd:   soft, BS +, not tender, no hepatosplenomegaly  :  no CVAT, no suprapubic tenderness, no dyer  Musculoskeletal: no joint swelling, no edema  Skin:    no rash  vascular: no phlebitis  Neurologic:     no focal deficits  psych: normal affect       Drug Dosing Weight  Height (cm): 172.7 (15 Nov 2019 02:10)  Weight (kg): 106.4 (15 Nov 2019 02:10)  BMI (kg/m2): 35.7 (15 Nov 2019 02:10)  BSA (m2): 2.19 (15 Nov 2019 02:10)    Vital Signs Last 24 Hrs  T(F): 97.9 (11-18-19 @ 05:44), Max: 98.6 (11-15-19 @ 14:30)    Vital Signs Last 24 Hrs  HR: 76 (11-18-19 @ 05:44) (69 - 76)  BP: 151/73 (11-18-19 @ 05:44) (127/69 - 151/73)  RR: 18 (11-18-19 @ 05:44)  SpO2: 98% (11-18-19 @ 05:44) (97% - 98%)  Wt(kg): --                          11.1   7.69  )-----------( 207      ( 18 Nov 2019 07:25 )             32.1       11-18    138  |  101  |  26<H>  ----------------------------<  205<H>  4.1   |  26  |  1.17    Ca    9.3      18 Nov 2019 07:19  Phos  3.2     11-18  Mg     1.9     11-18    TPro  6.7  /  Alb  4.0  /  TBili  0.4  /  DBili  x   /  AST  11  /  ALT  10  /  AlkPhos  61  11-18          MICROBIOLOGY:  Vancomycin Level, Random: 6.5 ug/mL (11-18-19 @ 07:22)  v  .Blood Blood  11-15-19   No growth to date.  --  --      .Abscess Leg - Right  11-15-19   Moderate Pseudomonas aeruginosa  Moderate Streptococcus agalactiae (Group B) isolated  Group B streptococci are susceptible to ampicillin,  penicillin and cefazolin, but may be resistant to  erythromycin and clindamycin.  Recommendations for intrapartum prophylaxis for Group B  streptococci are penicillin or ampicillin.  --  Pseudomonas aeruginosa                  RADIOLOGY:    Images below reviewed personally HPI:  68M with DM, CAD s/p PCI, osteomyelitis of the left fifth metatarsal head 12/2016, admitted 11/15/19 with a Right foot wound. He initially developed the wound after calluses were removed and he developed associated cellulitis in October, treated for 10 tens with PO antibiotics with improvement but the wound continued to worsen.   Here MRI showing osteomyelitis of the hallux and fifth metatarsal head.   Cultures growing Pseudomonas (pan sensitive).   Never had fevers or chills. Only has pain when he's walking. No diarrhea, cough, dysuria.   He's hoping he can avoid surgery but if not, that he won't lose any toes.     PAST MEDICAL & SURGICAL HISTORY:  Essential hypertension  Coronary artery disease involving native coronary artery of native heart without angina pectoris  Peripheral artery disease  Type 2 diabetes mellitus with other circulatory complication, without long-term current use of insulin  Venous ulcer of left leg  S/P debridement    Allergies    No Known Allergies    Intolerances    ANTIMICROBIALS:  piperacillin/tazobactam IVPB.. 3.375 every 8 hours    OTHER MEDS:  amLODIPine   Tablet 10 milliGRAM(s) Oral daily  aspirin  chewable 81 milliGRAM(s) Oral daily  atorvastatin 20 milliGRAM(s) Oral at bedtime  clopidogrel Tablet 75 milliGRAM(s) Oral daily  Dakins Solution - 1/4 Strength 1 Application(s) Topical daily  dextrose 40% Gel 15 Gram(s) Oral once PRN  dextrose 5%. 1000 milliLiter(s) IV Continuous <Continuous>  dextrose 50% Injectable 12.5 Gram(s) IV Push once  dextrose 50% Injectable 25 Gram(s) IV Push once  dextrose 50% Injectable 25 Gram(s) IV Push once  glucagon  Injectable 1 milliGRAM(s) IntraMuscular once PRN  heparin  Injectable 5000 Unit(s) SubCutaneous every 12 hours  influenza   Vaccine 0.5 milliLiter(s) IntraMuscular once  insulin glargine Injectable (LANTUS) 14 Unit(s) SubCutaneous at bedtime  insulin lispro (HumaLOG) corrective regimen sliding scale   SubCutaneous three times a day before meals  insulin lispro Injectable (HumaLOG) 6 Unit(s) SubCutaneous three times a day before meals  isosorbide   mononitrate ER Tablet (IMDUR) 30 milliGRAM(s) Oral daily  metoprolol tartrate 50 milliGRAM(s) Oral two times a day  ondansetron Injectable 4 milliGRAM(s) IV Push every 6 hours PRN  sodium chloride 0.9%. 1000 milliLiter(s) IV Continuous <Continuous>    SOCIAL HISTORY: Former smoker. .     FAMILY HISTORY:  FH: diabetes mellitus: mother  Family history of diabetes mellitus (Sibling): brother  Family history of essential hypertension    ROS:  All other systems negative   Constitutional: no fever, no chills   Eye: no vision changes  ENT:  no sore throat, no rhinorrhea  Cardiovascular: no chest pain, no palpitation  Respiratory:  no SOB, no cough  GI:  no abd pain, no vomiting, no diarrhea  urinary: no dysuria, no hematuria, no flank pain  musculoskeletal: no joint pain, no joint swelling  skin:  right foot wound   neurology: no headache, no change in mental status  psych: no anxiety    Physical Exam:  General: NAD, non toxic, overweight   Head: atraumatic, normocephalic  Eyes: normal sclera and conjunctiva  ENT: no oropharyngeal lesions, no LAD, neck supple  Cardio: regular rate and rhythm   Respiratory: nonlabored on room air, clear b/l, no wheezing  abd: soft, BS +, not tender   : no suprapubic tenderness, no dyer  Musculoskeletal: no joint swelling, no edema  Skin: right foot first interweb space with deep necrotic ulcer   vascular: no phlebitis  Neurologic:  no focal deficits  psych: normal affect       Drug Dosing Weight  Height (cm): 172.7 (15 Nov 2019 02:10)  Weight (kg): 106.4 (15 Nov 2019 02:10)  BMI (kg/m2): 35.7 (15 Nov 2019 02:10)  BSA (m2): 2.19 (15 Nov 2019 02:10)    Vital Signs Last 24 Hrs  T(F): 97.9 (11-18-19 @ 05:44), Max: 98.6 (11-15-19 @ 14:30)    Vital Signs Last 24 Hrs  HR: 76 (11-18-19 @ 05:44) (69 - 76)  BP: 151/73 (11-18-19 @ 05:44) (127/69 - 151/73)  RR: 18 (11-18-19 @ 05:44)  SpO2: 98% (11-18-19 @ 05:44) (97% - 98%)  Wt(kg): --                          11.1   7.69  )-----------( 207      ( 18 Nov 2019 07:25 )             32.1       11-18    138  |  101  |  26<H>  ----------------------------<  205<H>  4.1   |  26  |  1.17    Ca    9.3      18 Nov 2019 07:19  Phos  3.2     11-18  Mg     1.9     11-18    TPro  6.7  /  Alb  4.0  /  TBili  0.4  /  DBili  x   /  AST  11  /  ALT  10  /  AlkPhos  61  11-18    MICROBIOLOGY:  Vancomycin Level, Random: 6.5 ug/mL (11-18-19 @ 07:22)    Culture - Blood (collected 11-15-19 @ 17:02)  Source: .Blood Blood  Preliminary Report (11-16-19 @ 18:00):    No growth to date.    Culture - Blood (collected 11-15-19 @ 17:02)  Source: .Blood Blood  Preliminary Report (11-16-19 @ 18:00):    No growth to date.    Culture - Abscess with Gram Stain (collected 11-15-19 @ 03:30)  Source: .Abscess Leg - Right  Final Report (11-17-19 @ 13:34):    Moderate Pseudomonas aeruginosa    Moderate Streptococcus agalactiae (Group B)     Group B streptococci are susceptible to ampicillin,    penicillin and cefazolin, but may be resistant to    erythromycin and clindamycin.    Recommendations for intrapartum prophylaxis for Group B    streptococci are penicillin or ampicillin.  Organism: Pseudomonas aeruginosa (11-17-19 @ 13:34)  Organism: Pseudomonas aeruginosa (11-17-19 @ 13:34)      -  Amikacin: S <=16      -  Aztreonam: S <=4      -  Cefepime: S <=2      -  Ceftazidime: S 4      -  Ciprofloxacin: S <=1      -  Gentamicin: S 4      -  Imipenem: S <=1      -  Levofloxacin: S <=2      -  Meropenem: S <=1      -  Piperacillin/Tazobactam: S <=8      -  Tobramycin: S <=2      Method Type: KRYSTLE    RADIOLOGY:  Images below reviewed personally  MR Foot No Cont, Right (11.15.19 @ 23:27)   Plantar medial soft tissue ulceration with osteomyelitis of the adjacent hallux sesamoids.  Dorsal soft tissue ulceration at the first interspace with osteomyelitis at the proximal lateral aspect of the first proximal phalanx.  Soft tissue ulceration with osteomyelitis within the adjacent lateral aspect of the fifth metatarsal head.    MRI Foot w/wo Cont, Left (12.30.16 @ 10:23)   Cutaneous ulceration along the lateral aspect of the fifth MPJ with adjacent osteomyelitis in the fifth metatarsal head and base of the fifth proximal phalanx.

## 2019-11-18 NOTE — PROGRESS NOTE ADULT - SUBJECTIVE AND OBJECTIVE BOX
PROGRESS NOTE:   Authored By: Alfonzo Patterson MD   Pager: [phone], [phone]    Patient is a 68y old  Male who presents with a chief complaint of Foot infection (17 Nov 2019 22:59)    OVERNIGHT EVENTS: No acute events overnight    SUBJECTIVE: Pt seen and examined at bedside this morning.     ADDITIONAL REVIEW OF SYSTEMS:    MEDICATIONS  (STANDING):  amLODIPine   Tablet 10 milliGRAM(s) Oral daily  aspirin  chewable 81 milliGRAM(s) Oral daily  atorvastatin 20 milliGRAM(s) Oral at bedtime  clopidogrel Tablet 75 milliGRAM(s) Oral daily  Dakins Solution - 1/4 Strength 1 Application(s) Topical daily  dextrose 5%. 1000 milliLiter(s) (50 mL/Hr) IV Continuous <Continuous>  dextrose 50% Injectable 12.5 Gram(s) IV Push once  dextrose 50% Injectable 25 Gram(s) IV Push once  dextrose 50% Injectable 25 Gram(s) IV Push once  heparin  Injectable 5000 Unit(s) SubCutaneous every 12 hours  influenza   Vaccine 0.5 milliLiter(s) IntraMuscular once  insulin glargine Injectable (LANTUS) 14 Unit(s) SubCutaneous at bedtime  insulin lispro (HumaLOG) corrective regimen sliding scale   SubCutaneous three times a day before meals  insulin lispro Injectable (HumaLOG) 6 Unit(s) SubCutaneous three times a day before meals  isosorbide   mononitrate ER Tablet (IMDUR) 30 milliGRAM(s) Oral daily  metoprolol tartrate 50 milliGRAM(s) Oral two times a day  piperacillin/tazobactam IVPB.. 3.375 Gram(s) IV Intermittent every 8 hours  sodium chloride 0.9%. 1000 milliLiter(s) (60 mL/Hr) IV Continuous <Continuous>    MEDICATIONS  (PRN):  dextrose 40% Gel 15 Gram(s) Oral once PRN Blood Glucose LESS THAN 70 milliGRAM(s)/deciliter  glucagon  Injectable 1 milliGRAM(s) IntraMuscular once PRN Glucose LESS THAN 70 milligrams/deciliter  ondansetron Injectable 4 milliGRAM(s) IV Push every 6 hours PRN Nausea and/or Vomiting    CAPILLARY BLOOD GLUCOSE    POCT Blood Glucose.: 248 mg/dL (17 Nov 2019 21:26)  POCT Blood Glucose.: 350 mg/dL (17 Nov 2019 17:07)  POCT Blood Glucose.: 266 mg/dL (17 Nov 2019 12:10)  POCT Blood Glucose.: 228 mg/dL (17 Nov 2019 08:25)    I&O's Summary    16 Nov 2019 07:01  -  17 Nov 2019 07:00  --------------------------------------------------------  IN: 0 mL / OUT: 1000 mL / NET: -1000 mL    17 Nov 2019 07:01  -  18 Nov 2019 06:01  --------------------------------------------------------  IN: 850 mL / OUT: 950 mL / NET: -100 mL    PHYSICAL EXAM:  Vital Signs Last 24 Hrs  T(C): 36.6 (18 Nov 2019 05:44), Max: 37 (17 Nov 2019 21:53)  T(F): 97.9 (18 Nov 2019 05:44), Max: 98.6 (17 Nov 2019 21:53)  HR: 76 (18 Nov 2019 05:44) (69 - 76)  BP: 151/73 (18 Nov 2019 05:44) (127/69 - 151/73)  BP(mean): --  RR: 18 (18 Nov 2019 05:44) (18 - 18)  SpO2: 98% (18 Nov 2019 05:44) (97% - 98%)    GENERAL: NAD, well-developed, appears comfortable  HEENT: NC/AT,   NECK: Supple, No JVD  CHEST/LUNG: Clear to auscultation bilaterally; No rales, rhonchi, wheezing, or rubs  CARD: Regular rate and rhythm; No murmurs, rubs, or gallops. No LE edema  ABDOMEN: Soft, Nontender, Nondistended; Bowel sounds present  EXTREMITIES:  b/l. right foot with dressing present, malodorous. Left foot with dry healed wound at site of previous debridement. Old abrasion over left anterior leg covered with bandage.  SKIN: No rashes or lesions  NEURO: AOx3, moving all extremities    LABS:                        10.8   8.87  )-----------( 222      ( 17 Nov 2019 07:40 )             31.7     11-17    134<L>  |  94<L>  |  28<H>  ----------------------------<  269<H>  4.1   |  25  |  1.31<H>    Ca    9.0      17 Nov 2019 07:40  Phos  4.2     11-17  Mg     1.9     11-17    CARDIAC MARKERS ( 16 Nov 2019 18:34 )  x     / x     / 59 U/L / x     / 2.8 ng/mL  CARDIAC MARKERS ( 16 Nov 2019 13:57 )  x     / x     / 69 U/L / x     / 2.9 ng/mL    Culture - Blood (collected 15 Nov 2019 17:02)  Source: .Blood Blood  Preliminary Report (16 Nov 2019 18:00):    No growth to date.    Culture - Blood (collected 15 Nov 2019 17:02)  Source: .Blood Blood  Preliminary Report (16 Nov 2019 18:00):    No growth to date.    RADIOLOGY & ADDITIONAL TESTS:  Results Reviewed:   < from: MR Foot No Cont, Right (11.15.19 @ 23:27) >  Impression:  Plantar medial soft tissue ulceration with osteomyelitis of the adjacent   hallux sesamoids.  Dorsal soft tissue ulceration at the first interspace with osteomyelitis   at the proximal lateral aspect of the first proximal phalanx.  Soft tissue ulceration with osteomyelitis within the adjacent lateral   aspect of the fifth metatarsal head.    < end of copied text >    Imaging Personally Reviewed:  Electrocardiogram Personally Reviewed:    COORDINATION OF CARE:  Care Discussed with Consultants/Other Providers [Y/N]:  Prior or Outpatient Records Reviewed [Y/N]: PROGRESS NOTE:   Authored By: Alfonzo Patterson MD   Pager: [phone], [phone]    Patient is a 68y old  Male who presents with a chief complaint of Foot infection (17 Nov 2019 22:59)    OVERNIGHT EVENTS: No acute events overnight    SUBJECTIVE: Pt seen and examined at bedside this morning. Continues to endorse dull ache in his right foot. Reports that his nausea from previous has improved. Denies any numbness or tingling. Denies any chest pain, SOB or abdominal pain.    ADDITIONAL REVIEW OF SYSTEMS:    MEDICATIONS  (STANDING):  amLODIPine   Tablet 10 milliGRAM(s) Oral daily  aspirin  chewable 81 milliGRAM(s) Oral daily  atorvastatin 20 milliGRAM(s) Oral at bedtime  clopidogrel Tablet 75 milliGRAM(s) Oral daily  Dakins Solution - 1/4 Strength 1 Application(s) Topical daily  dextrose 5%. 1000 milliLiter(s) (50 mL/Hr) IV Continuous <Continuous>  dextrose 50% Injectable 12.5 Gram(s) IV Push once  dextrose 50% Injectable 25 Gram(s) IV Push once  dextrose 50% Injectable 25 Gram(s) IV Push once  heparin  Injectable 5000 Unit(s) SubCutaneous every 12 hours  influenza   Vaccine 0.5 milliLiter(s) IntraMuscular once  insulin glargine Injectable (LANTUS) 14 Unit(s) SubCutaneous at bedtime  insulin lispro (HumaLOG) corrective regimen sliding scale   SubCutaneous three times a day before meals  insulin lispro Injectable (HumaLOG) 6 Unit(s) SubCutaneous three times a day before meals  isosorbide   mononitrate ER Tablet (IMDUR) 30 milliGRAM(s) Oral daily  metoprolol tartrate 50 milliGRAM(s) Oral two times a day  piperacillin/tazobactam IVPB.. 3.375 Gram(s) IV Intermittent every 8 hours  sodium chloride 0.9%. 1000 milliLiter(s) (60 mL/Hr) IV Continuous <Continuous>    MEDICATIONS  (PRN):  dextrose 40% Gel 15 Gram(s) Oral once PRN Blood Glucose LESS THAN 70 milliGRAM(s)/deciliter  glucagon  Injectable 1 milliGRAM(s) IntraMuscular once PRN Glucose LESS THAN 70 milligrams/deciliter  ondansetron Injectable 4 milliGRAM(s) IV Push every 6 hours PRN Nausea and/or Vomiting    CAPILLARY BLOOD GLUCOSE    POCT Blood Glucose.: 248 mg/dL (17 Nov 2019 21:26)  POCT Blood Glucose.: 350 mg/dL (17 Nov 2019 17:07)  POCT Blood Glucose.: 266 mg/dL (17 Nov 2019 12:10)  POCT Blood Glucose.: 228 mg/dL (17 Nov 2019 08:25)    I&O's Summary    16 Nov 2019 07:01  -  17 Nov 2019 07:00  --------------------------------------------------------  IN: 0 mL / OUT: 1000 mL / NET: -1000 mL    17 Nov 2019 07:01  -  18 Nov 2019 06:01  --------------------------------------------------------  IN: 850 mL / OUT: 950 mL / NET: -100 mL    PHYSICAL EXAM:  Vital Signs Last 24 Hrs  T(C): 36.6 (18 Nov 2019 05:44), Max: 37 (17 Nov 2019 21:53)  T(F): 97.9 (18 Nov 2019 05:44), Max: 98.6 (17 Nov 2019 21:53)  HR: 76 (18 Nov 2019 05:44) (69 - 76)  BP: 151/73 (18 Nov 2019 05:44) (127/69 - 151/73)  BP(mean): --  RR: 18 (18 Nov 2019 05:44) (18 - 18)  SpO2: 98% (18 Nov 2019 05:44) (97% - 98%)    GENERAL: NAD, well-developed, appears comfortable  HEENT: NC/AT,   NECK: Supple, No JVD  CHEST/LUNG: Clear to auscultation bilaterally; No rales, rhonchi, wheezing, or rubs  CARD: Regular rate and rhythm; No murmurs, rubs, or gallops. No LE edema  ABDOMEN: Soft, Nontender, Nondistended; Bowel sounds present  EXTREMITIES:  b/l. right foot with dressing present, malodorous. Left foot with dry healed wound at site of previous debridement. Old abrasion over left anterior leg covered with bandage.  SKIN: No rashes or lesions  NEURO: AOx3, moving all extremities    LABS:                        10.8   8.87  )-----------( 222      ( 17 Nov 2019 07:40 )             31.7     11-17    134<L>  |  94<L>  |  28<H>  ----------------------------<  269<H>  4.1   |  25  |  1.31<H>    Ca    9.0      17 Nov 2019 07:40  Phos  4.2     11-17  Mg     1.9     11-17    CARDIAC MARKERS ( 16 Nov 2019 18:34 )  x     / x     / 59 U/L / x     / 2.8 ng/mL  CARDIAC MARKERS ( 16 Nov 2019 13:57 )  x     / x     / 69 U/L / x     / 2.9 ng/mL    Culture - Blood (collected 15 Nov 2019 17:02)  Source: .Blood Blood  Preliminary Report (16 Nov 2019 18:00):    No growth to date.    Culture - Blood (collected 15 Nov 2019 17:02)  Source: .Blood Blood  Preliminary Report (16 Nov 2019 18:00):    No growth to date.    RADIOLOGY & ADDITIONAL TESTS:  Results Reviewed:   < from: MR Foot No Cont, Right (11.15.19 @ 23:27) >  Impression:  Plantar medial soft tissue ulceration with osteomyelitis of the adjacent   hallux sesamoids.  Dorsal soft tissue ulceration at the first interspace with osteomyelitis   at the proximal lateral aspect of the first proximal phalanx.  Soft tissue ulceration with osteomyelitis within the adjacent lateral   aspect of the fifth metatarsal head.    < end of copied text >    Imaging Personally Reviewed:  Electrocardiogram Personally Reviewed:    COORDINATION OF CARE:  Care Discussed with Consultants/Other Providers [Y/N]:  Prior or Outpatient Records Reviewed [Y/N]:

## 2019-11-18 NOTE — PROGRESS NOTE ADULT - PROBLEM SELECTOR PLAN 7
- last stents 3 years ago, trops negative, ekg nsr with RBBB unclear if new, no chest pain, sob, aCS ruled out  - c/w ASA, plavix, atorvastatin, imdur 30mg

## 2019-11-18 NOTE — PROGRESS NOTE ADULT - ATTENDING COMMENTS
I have discussed the case with the surgical house staff. I have personally seen, examined, and participated in the care of this patient. I have reviewed all pertinent clinical information.    Right foot wounds for several weeks. Followed by podiatry. Trial of abx for cellulitis pre hospital admission.  Denies history of claudication.     Palpable femoral pulses. Non palpable pedal pulses.   Foot exam as above.    Plan for HUANG/PVR.  Likely will need RLE angiogram with possible intervention.  Will need medical/cardiac optimization prior to procedure.  Plan discussed with patient and wife at bedside. All questions answered.

## 2019-11-19 ENCOUNTER — TRANSCRIPTION ENCOUNTER (OUTPATIENT)
Age: 69
End: 2019-11-19

## 2019-11-19 DIAGNOSIS — Z01.818 ENCOUNTER FOR OTHER PREPROCEDURAL EXAMINATION: ICD-10-CM

## 2019-11-19 LAB
ANION GAP SERPL CALC-SCNC: 14 MMOL/L — SIGNIFICANT CHANGE UP (ref 5–17)
APPEARANCE UR: CLEAR — SIGNIFICANT CHANGE UP
BILIRUB UR-MCNC: NEGATIVE — SIGNIFICANT CHANGE UP
BUN SERPL-MCNC: 22 MG/DL — SIGNIFICANT CHANGE UP (ref 7–23)
CALCIUM SERPL-MCNC: 9.5 MG/DL — SIGNIFICANT CHANGE UP (ref 8.4–10.5)
CHLORIDE SERPL-SCNC: 100 MMOL/L — SIGNIFICANT CHANGE UP (ref 96–108)
CO2 SERPL-SCNC: 26 MMOL/L — SIGNIFICANT CHANGE UP (ref 22–31)
COLOR SPEC: SIGNIFICANT CHANGE UP
CREAT SERPL-MCNC: 1.1 MG/DL — SIGNIFICANT CHANGE UP (ref 0.5–1.3)
DIFF PNL FLD: NEGATIVE — SIGNIFICANT CHANGE UP
GLUCOSE SERPL-MCNC: 182 MG/DL — HIGH (ref 70–99)
GLUCOSE UR QL: NEGATIVE — SIGNIFICANT CHANGE UP
HCT VFR BLD CALC: 33.9 % — LOW (ref 39–50)
HGB BLD-MCNC: 11.5 G/DL — LOW (ref 13–17)
KETONES UR-MCNC: NEGATIVE — SIGNIFICANT CHANGE UP
LEUKOCYTE ESTERASE UR-ACNC: NEGATIVE — SIGNIFICANT CHANGE UP
MAGNESIUM SERPL-MCNC: 2 MG/DL — SIGNIFICANT CHANGE UP (ref 1.6–2.6)
MCHC RBC-ENTMCNC: 27.1 PG — SIGNIFICANT CHANGE UP (ref 27–34)
MCHC RBC-ENTMCNC: 33.9 GM/DL — SIGNIFICANT CHANGE UP (ref 32–36)
MCV RBC AUTO: 79.8 FL — LOW (ref 80–100)
NITRITE UR-MCNC: NEGATIVE — SIGNIFICANT CHANGE UP
NRBC # BLD: 0 /100 WBCS — SIGNIFICANT CHANGE UP (ref 0–0)
PH UR: 6 — SIGNIFICANT CHANGE UP (ref 5–8)
PHOSPHATE SERPL-MCNC: 2.9 MG/DL — SIGNIFICANT CHANGE UP (ref 2.5–4.5)
PLATELET # BLD AUTO: 215 K/UL — SIGNIFICANT CHANGE UP (ref 150–400)
POTASSIUM SERPL-MCNC: 4.3 MMOL/L — SIGNIFICANT CHANGE UP (ref 3.5–5.3)
POTASSIUM SERPL-SCNC: 4.3 MMOL/L — SIGNIFICANT CHANGE UP (ref 3.5–5.3)
PROT UR-MCNC: ABNORMAL
RBC # BLD: 4.25 M/UL — SIGNIFICANT CHANGE UP (ref 4.2–5.8)
RBC # FLD: 13 % — SIGNIFICANT CHANGE UP (ref 10.3–14.5)
SODIUM SERPL-SCNC: 140 MMOL/L — SIGNIFICANT CHANGE UP (ref 135–145)
SP GR SPEC: 1.02 — SIGNIFICANT CHANGE UP (ref 1.01–1.02)
UROBILINOGEN FLD QL: NEGATIVE — SIGNIFICANT CHANGE UP
WBC # BLD: 7.78 K/UL — SIGNIFICANT CHANGE UP (ref 3.8–10.5)
WBC # FLD AUTO: 7.78 K/UL — SIGNIFICANT CHANGE UP (ref 3.8–10.5)

## 2019-11-19 PROCEDURE — 99233 SBSQ HOSP IP/OBS HIGH 50: CPT | Mod: GC

## 2019-11-19 PROCEDURE — 99232 SBSQ HOSP IP/OBS MODERATE 35: CPT

## 2019-11-19 RX ORDER — SODIUM CHLORIDE 9 MG/ML
1000 INJECTION, SOLUTION INTRAVENOUS
Refills: 0 | Status: DISCONTINUED | OUTPATIENT
Start: 2019-11-19 | End: 2019-11-20

## 2019-11-19 RX ORDER — INSULIN GLARGINE 100 [IU]/ML
4.5 INJECTION, SOLUTION SUBCUTANEOUS AT BEDTIME
Refills: 0 | Status: DISCONTINUED | OUTPATIENT
Start: 2019-11-19 | End: 2019-11-20

## 2019-11-19 RX ORDER — INSULIN GLARGINE 100 [IU]/ML
9 INJECTION, SOLUTION SUBCUTANEOUS AT BEDTIME
Refills: 0 | Status: DISCONTINUED | OUTPATIENT
Start: 2019-11-19 | End: 2019-11-19

## 2019-11-19 RX ADMIN — AMLODIPINE BESYLATE 10 MILLIGRAM(S): 2.5 TABLET ORAL at 05:55

## 2019-11-19 RX ADMIN — INSULIN GLARGINE 4.5 UNIT(S): 100 INJECTION, SOLUTION SUBCUTANEOUS at 23:18

## 2019-11-19 RX ADMIN — Medication 50 MILLIGRAM(S): at 05:55

## 2019-11-19 RX ADMIN — HEPARIN SODIUM 5000 UNIT(S): 5000 INJECTION INTRAVENOUS; SUBCUTANEOUS at 05:55

## 2019-11-19 RX ADMIN — Medication 1: at 17:51

## 2019-11-19 RX ADMIN — Medication 50 MILLIGRAM(S): at 17:52

## 2019-11-19 RX ADMIN — PIPERACILLIN AND TAZOBACTAM 25 GRAM(S): 4; .5 INJECTION, POWDER, LYOPHILIZED, FOR SOLUTION INTRAVENOUS at 14:56

## 2019-11-19 RX ADMIN — Medication 1: at 09:10

## 2019-11-19 RX ADMIN — PIPERACILLIN AND TAZOBACTAM 25 GRAM(S): 4; .5 INJECTION, POWDER, LYOPHILIZED, FOR SOLUTION INTRAVENOUS at 22:33

## 2019-11-19 RX ADMIN — Medication 6 UNIT(S): at 09:10

## 2019-11-19 RX ADMIN — CLOPIDOGREL BISULFATE 75 MILLIGRAM(S): 75 TABLET, FILM COATED ORAL at 12:30

## 2019-11-19 RX ADMIN — Medication 81 MILLIGRAM(S): at 12:30

## 2019-11-19 RX ADMIN — Medication 6 UNIT(S): at 12:30

## 2019-11-19 RX ADMIN — HEPARIN SODIUM 5000 UNIT(S): 5000 INJECTION INTRAVENOUS; SUBCUTANEOUS at 17:52

## 2019-11-19 RX ADMIN — Medication 1 APPLICATION(S): at 13:00

## 2019-11-19 RX ADMIN — Medication 2: at 12:30

## 2019-11-19 RX ADMIN — ATORVASTATIN CALCIUM 20 MILLIGRAM(S): 80 TABLET, FILM COATED ORAL at 22:32

## 2019-11-19 RX ADMIN — PIPERACILLIN AND TAZOBACTAM 25 GRAM(S): 4; .5 INJECTION, POWDER, LYOPHILIZED, FOR SOLUTION INTRAVENOUS at 05:55

## 2019-11-19 RX ADMIN — Medication 6 UNIT(S): at 17:51

## 2019-11-19 RX ADMIN — ISOSORBIDE MONONITRATE 30 MILLIGRAM(S): 60 TABLET, EXTENDED RELEASE ORAL at 12:30

## 2019-11-19 NOTE — PROGRESS NOTE ADULT - PROBLEM SELECTOR PLAN 8
-c/w atorvastatin - last stents 3 years ago, trops negative, ekg nsr with RBBB unclear if new, no chest pain, sob, aCS ruled out  - c/w ASA, plavix, atorvastatin, imdur 30mg

## 2019-11-19 NOTE — PROGRESS NOTE ADULT - ASSESSMENT
68M with DM, CAD s/p PCI, left foot osteomyelitis 12/2016, admitted 11/15/19 with a necrotic Right foot ulcer in the first interspace with osteomyelitis on MRI, also smaller lesions with osteomyelitis of the fifth metatarsal head.   Cultures growing Pseudomonas (pan sensitive) and GBS.   No MRSA in 2016, only GBS.   Afebrile, no leukocytosis, clinically well.   Worsening arterial supply to right foot with severe diease of the small arteries compared to 2017.   Tentative OR resection on Thurs. I think surgery offers the best chance of cure although vasculopathy and patient preference may be limiting.     Suggest  -continue Zosyn  -intervention per podiatry/vascular surgery   -diabetic control per primary team     Darryl Coleman MD   Infectious Disease   Pager [phone]   After 5PM and on weekends please page fellow on call or call [phone]

## 2019-11-19 NOTE — PROGRESS NOTE ADULT - ATTENDING COMMENTS
I have discussed the case with the surgical house staff. I have personally seen, examined, and participated in the care of this patient. I have reviewed all pertinent clinical information.     Plan for RLE angiogram.

## 2019-11-19 NOTE — PROGRESS NOTE ADULT - PROBLEM SELECTOR PLAN 3
resolved, likely related to side effect of IV clindamycin now dc/ed  -EKG 11/16 with NSR and RBBB which is new from 2017 (no ekg in between), cardiac enzymes flat at 19  x2, ACS ruled out   -CT head negative for intracranial process  -zofran prn new DENISSE likely prerenal given vomiting on 11/16, poor intake, ACE/HCTZ  -vanco d/c'd given nephrotoxic combo with zosyn and low suspicion for MRSA,   -hold ACE and HCTZ until DENISSE resolved  -IVF, NS at 60cc/hr  -Cr improving  -monitor bmps, DENISSE likely prerenal given vomiting on 11/16, poor intake, ACE/HCTZ  - Cr. now improving.  -vanco d/c'd given nephrotoxic combo with zosyn and low suspicion for MRSA,   -hold ACE and HCTZ until DENISSE resolved  -c/w gentle IVF hydration, LR at 60cc/hr  -monitor bmps,

## 2019-11-19 NOTE — PROGRESS NOTE ADULT - PROBLEM SELECTOR PLAN 6
not on insulin at home, a1c 7.9, FS elevated but improving, no DKA  - increased lantus to 18 units from 14 qhs  -c/w Lispro 6 units TID with meals  -c/w LAUREN, adjust insulin, judicious use given DENISSE  - will add bedtime insulin sliding scale coverage.  -monitor FS BP stable  -holding ACE/HCTZ given DENISSE, if BP uptrends can uptitrate home Imdur currently 30mg daily  -c/w home Metoprolol, amlodipine 10mg  -monitor bp

## 2019-11-19 NOTE — PROGRESS NOTE ADULT - SUBJECTIVE AND OBJECTIVE BOX
Surgery Progress Note    S: Patient seen and examined. No acute events overnight. Pain well controlled. Plan for angiogram tomorrow, possible angioplasty.     O:  Vital Signs Last 24 Hrs  T(C): 36.7 (19 Nov 2019 11:52), Max: 36.8 (18 Nov 2019 21:01)  T(F): 98.1 (19 Nov 2019 11:52), Max: 98.2 (18 Nov 2019 21:01)  HR: 78 (19 Nov 2019 11:52) (71 - 78)  BP: 152/71 (19 Nov 2019 11:52) (147/71 - 160/67)  BP(mean): --  RR: 18 (19 Nov 2019 11:52) (18 - 18)  SpO2: 96% (19 Nov 2019 11:52) (95% - 98%)    I&O's Detail    18 Nov 2019 07:01  -  19 Nov 2019 07:00  --------------------------------------------------------  IN:    IV PiggyBack: 100 mL    Oral Fluid: 830 mL    sodium chloride 0.9%.: 100 mL  Total IN: 1030 mL    OUT:    Voided: 1600 mL  Total OUT: 1600 mL    Total NET: -570 mL      19 Nov 2019 07:01  -  19 Nov 2019 13:23  --------------------------------------------------------  IN:    Oral Fluid: 780 mL  Total IN: 780 mL    OUT:    Voided: 950 mL  Total OUT: 950 mL    Total NET: -170 mL          MEDICATIONS  (STANDING):  amLODIPine   Tablet 10 milliGRAM(s) Oral daily  aspirin  chewable 81 milliGRAM(s) Oral daily  atorvastatin 20 milliGRAM(s) Oral at bedtime  clopidogrel Tablet 75 milliGRAM(s) Oral daily  Dakins Solution - 1/4 Strength 1 Application(s) Topical daily  dextrose 5%. 1000 milliLiter(s) (50 mL/Hr) IV Continuous <Continuous>  dextrose 50% Injectable 12.5 Gram(s) IV Push once  dextrose 50% Injectable 25 Gram(s) IV Push once  dextrose 50% Injectable 25 Gram(s) IV Push once  heparin  Injectable 5000 Unit(s) SubCutaneous every 12 hours  influenza   Vaccine 0.5 milliLiter(s) IntraMuscular once  insulin glargine Injectable (LANTUS) 18 Unit(s) SubCutaneous at bedtime  insulin lispro (HumaLOG) corrective regimen sliding scale   SubCutaneous at bedtime  insulin lispro (HumaLOG) corrective regimen sliding scale   SubCutaneous three times a day before meals  insulin lispro Injectable (HumaLOG) 6 Unit(s) SubCutaneous three times a day before meals  isosorbide   mononitrate ER Tablet (IMDUR) 30 milliGRAM(s) Oral daily  lactated ringers. 1000 milliLiter(s) (60 mL/Hr) IV Continuous <Continuous>  metoprolol tartrate 50 milliGRAM(s) Oral two times a day  piperacillin/tazobactam IVPB.. 3.375 Gram(s) IV Intermittent every 8 hours  sodium chloride 0.9%. 1000 milliLiter(s) (60 mL/Hr) IV Continuous <Continuous>    MEDICATIONS  (PRN):  dextrose 40% Gel 15 Gram(s) Oral once PRN Blood Glucose LESS THAN 70 milliGRAM(s)/deciliter  glucagon  Injectable 1 milliGRAM(s) IntraMuscular once PRN Glucose LESS THAN 70 milligrams/deciliter  ondansetron Injectable 4 milliGRAM(s) IV Push every 6 hours PRN Nausea and/or Vomiting                            11.5   7.78  )-----------( 215      ( 19 Nov 2019 07:05 )             33.9       11-19    140  |  100  |  22  ----------------------------<  182<H>  4.3   |  26  |  1.10    Ca    9.5      19 Nov 2019 07:02  Phos  2.9     11-19  Mg     2.0     11-19    TPro  6.7  /  Alb  4.0  /  TBili  0.4  /  DBili  x   /  AST  11  /  ALT  10  /  AlkPhos  61  11-18    PHYSICAL EXAM:   General: NAD, Lying in bed comfortably  Neuro: A+Ox3  HEENT: NC/AT, EOMI  Neck: Soft, supple  Cardio: RRR, nml S1/S2  Resp: Good effort, CTA b/l  GI/Abd: Soft, NT/ND, no rebound/guarding, no masses palpated  Vascular: All 4 extremities warm. palpable femoral pulse b/l.   RLE: doppler DP/PT, warm to touch. open wound between 1st and 2nd toe and on dorsal surface beneath 1st toe. no purulent drainage.   LLE: doppler DP/PT, warm  Skin: Intact, no breakdown  Lymphatic/Nodes: No palpable lymphadenopathy  Musculoskeletal: All 4 extremities moving spontaneously, no limitations

## 2019-11-19 NOTE — PROGRESS NOTE ADULT - ASSESSMENT
68M with chronic, non-healing R foot wounds secondary to PVD.     PLAN:    - planning for angiogram tomorrow possible angioplasty   - Wound care as per podiatry  - ID recs appreciated     Vascular Surgery   [phone]

## 2019-11-19 NOTE — PROGRESS NOTE ADULT - PROBLEM SELECTOR PLAN 5
BP stable  -holding ACE/HCTZ given DENISSE, if BP uptrends can uptitrate home Imdur currently 30mg daily  -c/w home Metoprolol, amlodipine 10mg  -monitor bp s/p LLE intervention in 2017  -HUANG performed, previous HUANG showed PAD b/l  -vascular surgery planning for angiogram of RLE on 11/20  -c/w home ASA/plavix

## 2019-11-19 NOTE — PROGRESS NOTE ADULT - SUBJECTIVE AND OBJECTIVE BOX
Podiatry pager #: [phone] ([place])/ [phone] ([facility])    Patient is a 68y old  Male who presents with a chief complaint of Foot infection (19 Nov 2019 07:03)       INTERVAL HPI/OVERNIGHT EVENTS:  Patient seen and evaluated at bedside.  Pt is resting comfortable in NAD. Denies N/V/F/C.     Allergies    No Known Allergies    Intolerances        Vital Signs Last 24 Hrs  T(C): 36.7 (19 Nov 2019 04:46), Max: 36.8 (18 Nov 2019 21:01)  T(F): 98.1 (19 Nov 2019 04:46), Max: 98.2 (18 Nov 2019 21:01)  HR: 71 (19 Nov 2019 04:46) (71 - 77)  BP: 160/67 (19 Nov 2019 04:46) (147/71 - 160/67)  BP(mean): --  RR: 18 (19 Nov 2019 04:46) (18 - 18)  SpO2: 95% (19 Nov 2019 04:46) (95% - 98%)    LABS:                        11.5   7.78  )-----------( 215      ( 19 Nov 2019 07:05 )             33.9     11-19    140  |  100  |  22  ----------------------------<  182<H>  4.3   |  26  |  1.10    Ca    9.5      19 Nov 2019 07:02  Phos  2.9     11-19  Mg     2.0     11-19    TPro  6.7  /  Alb  4.0  /  TBili  0.4  /  DBili  x   /  AST  11  /  ALT  10  /  AlkPhos  61  11-18        CAPILLARY BLOOD GLUCOSE      POCT Blood Glucose.: 187 mg/dL (19 Nov 2019 09:01)  POCT Blood Glucose.: 179 mg/dL (18 Nov 2019 21:59)  POCT Blood Glucose.: 220 mg/dL (18 Nov 2019 17:28)  POCT Blood Glucose.: 208 mg/dL (18 Nov 2019 13:40)      Lower Extremity Physical Exam:  Vascular: DP/PT 0/4, B/L, CFT <3 seconds B/L, Temperature gradient warm to cool B/L.   Neuro: Epicritic sensation grossly diminished to level of ankleB/L.  Skin:  Wound #1: R foot 1st interspace wound, 2.0x2.0cm, depth to 1st and 2nd met capsule, wound bed necrotic, serous drainage, malodorous, periwound macerated, etiology 2/2 interdigital maceration/ arterial insufficiency     Wound #2: R foot submetatarsal 1 wound, 2.5x1.5cm, wound bed fibrogranular, no active drainage, no malodor, periwound macerated, etiology 2/2 pressure     Wound #3: R foot lateral border of foot stable eschar, 4.0x1.0cm, no active drainage, no malodor, periwound erythematous, etiology 2/2 pressure

## 2019-11-19 NOTE — PROGRESS NOTE ADULT - PROBLEM SELECTOR PLAN 1
confirmed R foot osteo on MRI, elevated ESR/CRP:  - c/w IV Zosyn (started 11/15) given pseudomonas on wound cx  - clinda d/c'd due to side effect of nausea, vomiting  - vanc dc'ed as no MRSA on culture and new DENISSE, nephrotoxic combo of vanc/zosyn  - ID consulted, rec zosyn only  - wound culture with GBS and pseudomonas  - podiatry following  - blood culture NGTD, afebrile, leukocytosis resolved, monitor cbc Pt is RCRI class III risk (1 point each due to ischemic heart disease, preop requirement for insulin) with 10.1% risk of death, MI or cardiac arrest within 30 days of procedure. Pt is currently moderate to high risk for a moderate risk procedure (RLE angiogram with possible intervention). Pt is currently without any CP, SOB, or fever. He has good functional status at home and is independent in all ADLs. EKG performed during this admission showed right bundle branch block, however unclear if new, recent ischemic workup on 11/16 negative. No history of heart failure, TTE from 2016 showing low-normal LV systolic fxn with EF of 55%. Patient is currently medically optimized for this procedure.

## 2019-11-19 NOTE — PROGRESS NOTE ADULT - SUBJECTIVE AND OBJECTIVE BOX
Follow Up: Diabetic foot osteomyelitis     Interval History/ROS: Afebrile, feels fine, nothing new from yesterday. No diarrhea.     Allergies  No Known Allergies    ANTIMICROBIALS:  piperacillin/tazobactam IVPB.. 3.375 every 8 hours      OTHER MEDS:  amLODIPine   Tablet 10 milliGRAM(s) Oral daily  aspirin  chewable 81 milliGRAM(s) Oral daily  atorvastatin 20 milliGRAM(s) Oral at bedtime  clopidogrel Tablet 75 milliGRAM(s) Oral daily  Dakins Solution - 1/4 Strength 1 Application(s) Topical daily  dextrose 40% Gel 15 Gram(s) Oral once PRN  dextrose 5%. 1000 milliLiter(s) IV Continuous <Continuous>  dextrose 50% Injectable 12.5 Gram(s) IV Push once  dextrose 50% Injectable 25 Gram(s) IV Push once  dextrose 50% Injectable 25 Gram(s) IV Push once  glucagon  Injectable 1 milliGRAM(s) IntraMuscular once PRN  heparin  Injectable 5000 Unit(s) SubCutaneous every 12 hours  influenza   Vaccine 0.5 milliLiter(s) IntraMuscular once  insulin glargine Injectable (LANTUS) 18 Unit(s) SubCutaneous at bedtime  insulin lispro (HumaLOG) corrective regimen sliding scale   SubCutaneous at bedtime  insulin lispro (HumaLOG) corrective regimen sliding scale   SubCutaneous three times a day before meals  insulin lispro Injectable (HumaLOG) 6 Unit(s) SubCutaneous three times a day before meals  isosorbide   mononitrate ER Tablet (IMDUR) 30 milliGRAM(s) Oral daily  metoprolol tartrate 50 milliGRAM(s) Oral two times a day  ondansetron Injectable 4 milliGRAM(s) IV Push every 6 hours PRN  sodium chloride 0.9%. 1000 milliLiter(s) IV Continuous <Continuous>      Vital Signs Last 24 Hrs  T(C): 36.7 (19 Nov 2019 04:46), Max: 36.8 (18 Nov 2019 21:01)  T(F): 98.1 (19 Nov 2019 04:46), Max: 98.2 (18 Nov 2019 21:01)  HR: 71 (19 Nov 2019 04:46) (71 - 77)  BP: 160/67 (19 Nov 2019 04:46) (147/71 - 160/67)  BP(mean): --  RR: 18 (19 Nov 2019 04:46) (18 - 18)  SpO2: 95% (19 Nov 2019 04:46) (95% - 98%)    Physical Exam:  General: NAD, non toxic  Head: atraumatic, normocephalic  Eye: normal sclera and conjunctiva  Cardio:  regular rate and rhythm   Respiratory: nonlabored on room air, clear bilaterally, no wheezing  abd: soft, BS +, no tenderness  : no  dyer  Musculoskeletal: no joint swelling, no edema  Skin: right foot necrotic ulcer in first interspace. small circular lesions lateral aspect right foot around fifth metatarsal head                           11.5   7.78  )-----------( 215      ( 19 Nov 2019 07:05 )             33.9       11-19    140  |  100  |  22  ----------------------------<  182<H>  4.3   |  26  |  1.10    Ca    9.5      19 Nov 2019 07:02  Phos  2.9     11-19  Mg     2.0     11-19    TPro  6.7  /  Alb  4.0  /  TBili  0.4  /  DBili  x   /  AST  11  /  ALT  10  /  AlkPhos  61  11-18    MICROBIOLOGY:  Culture - Blood (collected 11-15-19 @ 17:02)  Source: .Blood Blood  Preliminary Report (11-16-19 @ 18:00):    No growth to date.    Culture - Blood (collected 11-15-19 @ 17:02)  Source: .Blood Blood  Preliminary Report (11-16-19 @ 18:00):    No growth to date.    Culture - Abscess with Gram Stain (collected 11-15-19 @ 03:30)  Source: .Abscess Leg - Right  Final Report (11-17-19 @ 13:34):    Moderate Pseudomonas aeruginosa    Moderate Streptococcus agalactiae (Group B) isolated    Group B streptococci are susceptible to ampicillin,    penicillin and cefazolin, but may be resistant to    erythromycin and clindamycin.    Recommendations for intrapartum prophylaxis for Group B    streptococci are penicillin or ampicillin.  Organism: Pseudomonas aeruginosa (11-17-19 @ 13:34)  Organism: Pseudomonas aeruginosa (11-17-19 @ 13:34)      -  Amikacin: S <=16      -  Aztreonam: S <=4      -  Cefepime: S <=2      -  Ceftazidime: S 4      -  Ciprofloxacin: S <=1      -  Gentamicin: S 4      -  Imipenem: S <=1      -  Levofloxacin: S <=2      -  Meropenem: S <=1      -  Piperacillin/Tazobactam: S <=8      -  Tobramycin: S <=2      Method Type: KRYSTLE    RADIOLOGY:  VA Physiol Extremity Lower 3+ Level, BI (11.18.19 @ 12:54)   The quality of the examination is adversely impacted by the noncompressible nature of the arteries.   The examination is sufficient to show deterioration of the arterial blood flow to the right foot, the side of the nonhealing ulcer, in the interval between examinations.   There is severe vascular disease affecting the small arteries of the right foot.

## 2019-11-19 NOTE — PROGRESS NOTE ADULT - SUBJECTIVE AND OBJECTIVE BOX
PROGRESS NOTE:   Authored By: Alfonzo Patterson MD   Pager: [phone], [phone]    Patient is a 68y old  Male who presents with a chief complaint of Foot infection (18 Nov 2019 17:34)    OVERNIGHT EVENTS: No acute events overnight    SUBJECTIVE: Pt seen and examined at bedside this morning.     ADDITIONAL REVIEW OF SYSTEMS:    MEDICATIONS  (STANDING):  amLODIPine   Tablet 10 milliGRAM(s) Oral daily  aspirin  chewable 81 milliGRAM(s) Oral daily  atorvastatin 20 milliGRAM(s) Oral at bedtime  clopidogrel Tablet 75 milliGRAM(s) Oral daily  Dakins Solution - 1/4 Strength 1 Application(s) Topical daily  dextrose 5%. 1000 milliLiter(s) (50 mL/Hr) IV Continuous <Continuous>  dextrose 50% Injectable 12.5 Gram(s) IV Push once  dextrose 50% Injectable 25 Gram(s) IV Push once  dextrose 50% Injectable 25 Gram(s) IV Push once  heparin  Injectable 5000 Unit(s) SubCutaneous every 12 hours  influenza   Vaccine 0.5 milliLiter(s) IntraMuscular once  insulin glargine Injectable (LANTUS) 18 Unit(s) SubCutaneous at bedtime  insulin lispro (HumaLOG) corrective regimen sliding scale   SubCutaneous at bedtime  insulin lispro (HumaLOG) corrective regimen sliding scale   SubCutaneous three times a day before meals  insulin lispro Injectable (HumaLOG) 6 Unit(s) SubCutaneous three times a day before meals  isosorbide   mononitrate ER Tablet (IMDUR) 30 milliGRAM(s) Oral daily  metoprolol tartrate 50 milliGRAM(s) Oral two times a day  piperacillin/tazobactam IVPB.. 3.375 Gram(s) IV Intermittent every 8 hours  sodium chloride 0.9%. 1000 milliLiter(s) (60 mL/Hr) IV Continuous <Continuous>    MEDICATIONS  (PRN):  dextrose 40% Gel 15 Gram(s) Oral once PRN Blood Glucose LESS THAN 70 milliGRAM(s)/deciliter  glucagon  Injectable 1 milliGRAM(s) IntraMuscular once PRN Glucose LESS THAN 70 milligrams/deciliter  ondansetron Injectable 4 milliGRAM(s) IV Push every 6 hours PRN Nausea and/or Vomiting    CAPILLARY BLOOD GLUCOSE    POCT Blood Glucose.: 179 mg/dL (18 Nov 2019 21:59)  POCT Blood Glucose.: 220 mg/dL (18 Nov 2019 17:28)  POCT Blood Glucose.: 208 mg/dL (18 Nov 2019 13:40)  POCT Blood Glucose.: 218 mg/dL (18 Nov 2019 08:58)    I&O's Summary    18 Nov 2019 07:01  -  19 Nov 2019 07:00  --------------------------------------------------------  IN: 1030 mL / OUT: 1600 mL / NET: -570 mL    PHYSICAL EXAM:  Vital Signs Last 24 Hrs  T(C): 36.7 (19 Nov 2019 04:46), Max: 36.8 (18 Nov 2019 21:01)  T(F): 98.1 (19 Nov 2019 04:46), Max: 98.2 (18 Nov 2019 21:01)  HR: 71 (19 Nov 2019 04:46) (71 - 77)  BP: 160/67 (19 Nov 2019 04:46) (147/71 - 160/67)  BP(mean): --  RR: 18 (19 Nov 2019 04:46) (18 - 18)  SpO2: 95% (19 Nov 2019 04:46) (95% - 98%)    GENERAL: NAD, well-developed, appears comfortable  HEENT: NC/AT,   NECK: Supple, No JVD  CHEST/LUNG: Clear to auscultation bilaterally; No rales, rhonchi, wheezing, or rubs  CARD: Regular rate and rhythm; No murmurs, rubs, or gallops. No LE edema  ABDOMEN: Soft, Nontender, Nondistended; Bowel sounds present  EXTREMITIES:  b/l. right foot with dressing present, malodorous. Left foot with dry healed wound at site of previous debridement. Old abrasion over left anterior leg covered with bandage.  SKIN: No rashes or lesions  NEURO: AOx3, moving all extremities    LABS:                        11.1   7.69  )-----------( 207      ( 18 Nov 2019 07:25 )             32.1     11-18    138  |  101  |  26<H>  ----------------------------<  205<H>  4.1   |  26  |  1.17    Ca    9.3      18 Nov 2019 07:19  Phos  3.2     11-18  Mg     1.9     11-18    TPro  6.7  /  Alb  4.0  /  TBili  0.4  /  DBili  x   /  AST  11  /  ALT  10  /  AlkPhos  61  11-18    RADIOLOGY & ADDITIONAL TESTS:  Results Reviewed:   < from: MR Foot No Cont, Right (11.15.19 @ 23:27) >  Impression:  Plantar medial soft tissue ulceration with osteomyelitis of the adjacent   hallux sesamoids.  Dorsal soft tissue ulceration at the first interspace with osteomyelitis   at the proximal lateral aspect of the first proximal phalanx.  Soft tissue ulceration with osteomyelitis within the adjacent lateral   aspect of the fifth metatarsal head.    < end of copied text >  Imaging Personally Reviewed:  Electrocardiogram Personally Reviewed:    COORDINATION OF CARE:  Care Discussed with Consultants/Other Providers [Y/N]:  Prior or Outpatient Records Reviewed [Y/N]: PROGRESS NOTE:   Authored By: Alfonzo Patterson MD   Pager: [phone], [phone] [phone]    Patient is a 68y old  Male who presents with a chief complaint of Foot infection (18 Nov 2019 17:34)    OVERNIGHT EVENTS: No acute events overnight    SUBJECTIVE: Pt seen and examined at bedside this morning. Continues to endorse dull ache in right foot. However unchanged from previous. Denies any CP, SOB, N/V, abdominal pain or fever.    ADDITIONAL REVIEW OF SYSTEMS:    MEDICATIONS  (STANDING):  amLODIPine   Tablet 10 milliGRAM(s) Oral daily  aspirin  chewable 81 milliGRAM(s) Oral daily  atorvastatin 20 milliGRAM(s) Oral at bedtime  clopidogrel Tablet 75 milliGRAM(s) Oral daily  Dakins Solution - 1/4 Strength 1 Application(s) Topical daily  dextrose 5%. 1000 milliLiter(s) (50 mL/Hr) IV Continuous <Continuous>  dextrose 50% Injectable 12.5 Gram(s) IV Push once  dextrose 50% Injectable 25 Gram(s) IV Push once  dextrose 50% Injectable 25 Gram(s) IV Push once  heparin  Injectable 5000 Unit(s) SubCutaneous every 12 hours  influenza   Vaccine 0.5 milliLiter(s) IntraMuscular once  insulin glargine Injectable (LANTUS) 18 Unit(s) SubCutaneous at bedtime  insulin lispro (HumaLOG) corrective regimen sliding scale   SubCutaneous at bedtime  insulin lispro (HumaLOG) corrective regimen sliding scale   SubCutaneous three times a day before meals  insulin lispro Injectable (HumaLOG) 6 Unit(s) SubCutaneous three times a day before meals  isosorbide   mononitrate ER Tablet (IMDUR) 30 milliGRAM(s) Oral daily  metoprolol tartrate 50 milliGRAM(s) Oral two times a day  piperacillin/tazobactam IVPB.. 3.375 Gram(s) IV Intermittent every 8 hours  sodium chloride 0.9%. 1000 milliLiter(s) (60 mL/Hr) IV Continuous <Continuous>    MEDICATIONS  (PRN):  dextrose 40% Gel 15 Gram(s) Oral once PRN Blood Glucose LESS THAN 70 milliGRAM(s)/deciliter  glucagon  Injectable 1 milliGRAM(s) IntraMuscular once PRN Glucose LESS THAN 70 milligrams/deciliter  ondansetron Injectable 4 milliGRAM(s) IV Push every 6 hours PRN Nausea and/or Vomiting    CAPILLARY BLOOD GLUCOSE    POCT Blood Glucose.: 179 mg/dL (18 Nov 2019 21:59)  POCT Blood Glucose.: 220 mg/dL (18 Nov 2019 17:28)  POCT Blood Glucose.: 208 mg/dL (18 Nov 2019 13:40)  POCT Blood Glucose.: 218 mg/dL (18 Nov 2019 08:58)    I&O's Summary    18 Nov 2019 07:01  -  19 Nov 2019 07:00  --------------------------------------------------------  IN: 1030 mL / OUT: 1600 mL / NET: -570 mL    PHYSICAL EXAM:  Vital Signs Last 24 Hrs  T(C): 36.7 (19 Nov 2019 04:46), Max: 36.8 (18 Nov 2019 21:01)  T(F): 98.1 (19 Nov 2019 04:46), Max: 98.2 (18 Nov 2019 21:01)  HR: 71 (19 Nov 2019 04:46) (71 - 77)  BP: 160/67 (19 Nov 2019 04:46) (147/71 - 160/67)  BP(mean): --  RR: 18 (19 Nov 2019 04:46) (18 - 18)  SpO2: 95% (19 Nov 2019 04:46) (95% - 98%)    GENERAL: NAD, well-developed, appears comfortable  HEENT: NC/AT,   NECK: Supple, No JVD  CHEST/LUNG: Clear to auscultation bilaterally; No rales, rhonchi, wheezing, or rubs  CARD: Regular rate and rhythm; No murmurs, rubs, or gallops. No LE edema  ABDOMEN: Soft, Nontender, Nondistended; Bowel sounds present  EXTREMITIES:  b/l right foot with dressing present, malodorous. Left foot with dry healed wound at site of previous debridement. Old abrasion over left anterior leg covered with bandage  SKIN: No rashes or lesions  NEURO: AOx3, moving all extremities    LABS:                        11.1   7.69  )-----------( 207      ( 18 Nov 2019 07:25 )             32.1     11-18    138  |  101  |  26<H>  ----------------------------<  205<H>  4.1   |  26  |  1.17    Ca    9.3      18 Nov 2019 07:19  Phos  3.2     11-18  Mg     1.9     11-18    TPro  6.7  /  Alb  4.0  /  TBili  0.4  /  DBili  x   /  AST  11  /  ALT  10  /  AlkPhos  61  11-18    RADIOLOGY & ADDITIONAL TESTS:  Results Reviewed:   < from: MR Foot No Cont, Right (11.15.19 @ 23:27) >  Impression:  Plantar medial soft tissue ulceration with osteomyelitis of the adjacent   hallux sesamoids.  Dorsal soft tissue ulceration at the first interspace with osteomyelitis   at the proximal lateral aspect of the first proximal phalanx.  Soft tissue ulceration with osteomyelitis within the adjacent lateral   aspect of the fifth metatarsal head.    < end of copied text >  Imaging Personally Reviewed:  Electrocardiogram Personally Reviewed:    COORDINATION OF CARE:  Care Discussed with Consultants/Other Providers [Y/N]:  Prior or Outpatient Records Reviewed [Y/N]:

## 2019-11-19 NOTE — PROGRESS NOTE ADULT - ASSESSMENT
68M w/ R foot chronic non-healing wounds  -Pt seen and evaluated   -Right foot 1st interspace wound w/ fibronecrotic base and malodorous. Remaining wounds are stable w/ no signs of infection.  -Wound was flushed w/ copious saline and dressed w/ dakins and dry sterile dressing  -MRI showing osteo of sesamoids and 1st proximal phalanx  -HUANG/TBI poor, awaiting [facility] recs/plan  -Tentatively booked for OR for right foot wound debridement and partial 1st ray resection on Thurs 11/21 @ 3pm however will likely cancel due to poor HUANG/TBI of R foot- pt will likely need vascular intervention prior to podiatric surgery. Will follow up on [facility] recs.  -Please document medical/cardiac optimization for surgery w/ local anesthesia and IV sedation   -Discussed w/ attending

## 2019-11-19 NOTE — PROGRESS NOTE ADULT - PROBLEM SELECTOR PLAN 7
- last stents 3 years ago, trops negative, ekg nsr with RBBB unclear if new, no chest pain, sob, aCS ruled out  - c/w ASA, plavix, atorvastatin, imdur 30mg not on insulin at home, a1c 7.9, FS elevated but improving, no DKA  -lantus at 18U qhs, will reduce to 9U prior to procedure  -c/w Lispro 6 units TID with meals  -c/w LAUREN, adjust insulin, judicious use given DENISSE  -monitor FS not on insulin at home, a1c 7.9, FS elevated but improving, no DKA  -lantus at 18U qhs, will reduce to 9U prior to procedure because he will be NPO  -c/w Lispro 6 units TID with meals  -c/w LAUREN, adjust insulin  -monitor FS

## 2019-11-19 NOTE — PROGRESS NOTE ADULT - PROBLEM SELECTOR PLAN 4
s/p LLE intervention in 2017  -HUANG performed today, previous HUANG showed PAD b/l  -vascular consulted  -c/w home ASA/plavix resolved, likely related to side effect of IV clindamycin now dc/ed  -EKG 11/16 with NSR and RBBB which is new from 2017 (no ekg in between), cardiac enzymes flat at 19  x2, ACS ruled out   -CT head negative for intracranial process  -zofran prn

## 2019-11-20 LAB
ANION GAP SERPL CALC-SCNC: 10 MMOL/L — SIGNIFICANT CHANGE UP (ref 5–17)
APTT BLD: 28 SEC — SIGNIFICANT CHANGE UP (ref 27.5–36.3)
BLD GP AB SCN SERPL QL: NEGATIVE — SIGNIFICANT CHANGE UP
BUN SERPL-MCNC: 18 MG/DL — SIGNIFICANT CHANGE UP (ref 7–23)
CALCIUM SERPL-MCNC: 9.1 MG/DL — SIGNIFICANT CHANGE UP (ref 8.4–10.5)
CHLORIDE SERPL-SCNC: 102 MMOL/L — SIGNIFICANT CHANGE UP (ref 96–108)
CO2 SERPL-SCNC: 24 MMOL/L — SIGNIFICANT CHANGE UP (ref 22–31)
CREAT SERPL-MCNC: 1 MG/DL — SIGNIFICANT CHANGE UP (ref 0.5–1.3)
CULTURE RESULTS: SIGNIFICANT CHANGE UP
CULTURE RESULTS: SIGNIFICANT CHANGE UP
GLUCOSE SERPL-MCNC: 292 MG/DL — HIGH (ref 70–99)
HCT VFR BLD CALC: 31.7 % — LOW (ref 39–50)
HGB BLD-MCNC: 11 G/DL — LOW (ref 13–17)
INR BLD: 1.02 RATIO — SIGNIFICANT CHANGE UP (ref 0.88–1.16)
MAGNESIUM SERPL-MCNC: 2 MG/DL — SIGNIFICANT CHANGE UP (ref 1.6–2.6)
MCHC RBC-ENTMCNC: 27.6 PG — SIGNIFICANT CHANGE UP (ref 27–34)
MCHC RBC-ENTMCNC: 34.7 GM/DL — SIGNIFICANT CHANGE UP (ref 32–36)
MCV RBC AUTO: 79.4 FL — LOW (ref 80–100)
NRBC # BLD: 0 /100 WBCS — SIGNIFICANT CHANGE UP (ref 0–0)
PHOSPHATE SERPL-MCNC: 3.1 MG/DL — SIGNIFICANT CHANGE UP (ref 2.5–4.5)
PLATELET # BLD AUTO: 192 K/UL — SIGNIFICANT CHANGE UP (ref 150–400)
POTASSIUM SERPL-MCNC: 4.4 MMOL/L — SIGNIFICANT CHANGE UP (ref 3.5–5.3)
POTASSIUM SERPL-SCNC: 4.4 MMOL/L — SIGNIFICANT CHANGE UP (ref 3.5–5.3)
PROTHROM AB SERPL-ACNC: 11.6 SEC — SIGNIFICANT CHANGE UP (ref 10–12.9)
RBC # BLD: 3.99 M/UL — LOW (ref 4.2–5.8)
RBC # FLD: 13 % — SIGNIFICANT CHANGE UP (ref 10.3–14.5)
RH IG SCN BLD-IMP: POSITIVE — SIGNIFICANT CHANGE UP
SODIUM SERPL-SCNC: 136 MMOL/L — SIGNIFICANT CHANGE UP (ref 135–145)
SPECIMEN SOURCE: SIGNIFICANT CHANGE UP
SPECIMEN SOURCE: SIGNIFICANT CHANGE UP
WBC # BLD: 6.6 K/UL — SIGNIFICANT CHANGE UP (ref 3.8–10.5)
WBC # FLD AUTO: 6.6 K/UL — SIGNIFICANT CHANGE UP (ref 3.8–10.5)

## 2019-11-20 PROCEDURE — 99232 SBSQ HOSP IP/OBS MODERATE 35: CPT

## 2019-11-20 PROCEDURE — 75710 ARTERY X-RAYS ARM/LEG: CPT | Mod: 26

## 2019-11-20 PROCEDURE — 99233 SBSQ HOSP IP/OBS HIGH 50: CPT | Mod: GC

## 2019-11-20 PROCEDURE — 76937 US GUIDE VASCULAR ACCESS: CPT | Mod: 26

## 2019-11-20 PROCEDURE — 36245 INS CATH ABD/L-EXT ART 1ST: CPT

## 2019-11-20 RX ORDER — ISOSORBIDE MONONITRATE 60 MG/1
30 TABLET, EXTENDED RELEASE ORAL DAILY
Refills: 0 | Status: DISCONTINUED | OUTPATIENT
Start: 2019-11-20 | End: 2019-11-25

## 2019-11-20 RX ORDER — INSULIN LISPRO 100/ML
VIAL (ML) SUBCUTANEOUS AT BEDTIME
Refills: 0 | Status: DISCONTINUED | OUTPATIENT
Start: 2019-11-20 | End: 2019-11-25

## 2019-11-20 RX ORDER — ONDANSETRON 8 MG/1
4 TABLET, FILM COATED ORAL ONCE
Refills: 0 | Status: DISCONTINUED | OUTPATIENT
Start: 2019-11-20 | End: 2019-11-20

## 2019-11-20 RX ORDER — INSULIN GLARGINE 100 [IU]/ML
4.5 INJECTION, SOLUTION SUBCUTANEOUS AT BEDTIME
Refills: 0 | Status: DISCONTINUED | OUTPATIENT
Start: 2019-11-20 | End: 2019-11-21

## 2019-11-20 RX ORDER — GLUCAGON INJECTION, SOLUTION 0.5 MG/.1ML
1 INJECTION, SOLUTION SUBCUTANEOUS ONCE
Refills: 0 | Status: DISCONTINUED | OUTPATIENT
Start: 2019-11-20 | End: 2019-11-25

## 2019-11-20 RX ORDER — AMLODIPINE BESYLATE 2.5 MG/1
10 TABLET ORAL DAILY
Refills: 0 | Status: DISCONTINUED | OUTPATIENT
Start: 2019-11-20 | End: 2019-11-25

## 2019-11-20 RX ORDER — INSULIN LISPRO 100/ML
6 VIAL (ML) SUBCUTANEOUS
Refills: 0 | Status: DISCONTINUED | OUTPATIENT
Start: 2019-11-20 | End: 2019-11-25

## 2019-11-20 RX ORDER — SODIUM CHLORIDE 9 MG/ML
1000 INJECTION, SOLUTION INTRAVENOUS
Refills: 0 | Status: DISCONTINUED | OUTPATIENT
Start: 2019-11-20 | End: 2019-11-20

## 2019-11-20 RX ORDER — LISINOPRIL 2.5 MG/1
40 TABLET ORAL DAILY
Refills: 0 | Status: DISCONTINUED | OUTPATIENT
Start: 2019-11-20 | End: 2019-11-25

## 2019-11-20 RX ORDER — SODIUM CHLORIDE 9 MG/ML
1000 INJECTION, SOLUTION INTRAVENOUS
Refills: 0 | Status: DISCONTINUED | OUTPATIENT
Start: 2019-11-20 | End: 2019-11-25

## 2019-11-20 RX ORDER — ATORVASTATIN CALCIUM 80 MG/1
20 TABLET, FILM COATED ORAL AT BEDTIME
Refills: 0 | Status: DISCONTINUED | OUTPATIENT
Start: 2019-11-20 | End: 2019-11-25

## 2019-11-20 RX ORDER — PIPERACILLIN AND TAZOBACTAM 4; .5 G/20ML; G/20ML
4.5 INJECTION, POWDER, LYOPHILIZED, FOR SOLUTION INTRAVENOUS EVERY 8 HOURS
Refills: 0 | Status: DISCONTINUED | OUTPATIENT
Start: 2019-11-20 | End: 2019-11-20

## 2019-11-20 RX ORDER — METOPROLOL TARTRATE 50 MG
50 TABLET ORAL
Refills: 0 | Status: DISCONTINUED | OUTPATIENT
Start: 2019-11-20 | End: 2019-11-25

## 2019-11-20 RX ORDER — SODIUM CHLORIDE 9 MG/ML
1000 INJECTION, SOLUTION INTRAVENOUS
Refills: 0 | Status: DISCONTINUED | OUTPATIENT
Start: 2019-11-20 | End: 2019-11-24

## 2019-11-20 RX ORDER — HYDRALAZINE HCL 50 MG
10 TABLET ORAL EVERY 8 HOURS
Refills: 0 | Status: DISCONTINUED | OUTPATIENT
Start: 2019-11-20 | End: 2019-11-25

## 2019-11-20 RX ORDER — HYDROCHLOROTHIAZIDE 25 MG
25 TABLET ORAL DAILY
Refills: 0 | Status: DISCONTINUED | OUTPATIENT
Start: 2019-11-20 | End: 2019-11-25

## 2019-11-20 RX ORDER — SODIUM HYPOCHLORITE 0.125 %
1 SOLUTION, NON-ORAL MISCELLANEOUS DAILY
Refills: 0 | Status: DISCONTINUED | OUTPATIENT
Start: 2019-11-20 | End: 2019-11-25

## 2019-11-20 RX ADMIN — ISOSORBIDE MONONITRATE 30 MILLIGRAM(S): 60 TABLET, EXTENDED RELEASE ORAL at 12:53

## 2019-11-20 RX ADMIN — SODIUM CHLORIDE 60 MILLILITER(S): 9 INJECTION, SOLUTION INTRAVENOUS at 12:55

## 2019-11-20 RX ADMIN — Medication 50 MILLIGRAM(S): at 06:32

## 2019-11-20 RX ADMIN — SODIUM CHLORIDE 60 MILLILITER(S): 9 INJECTION, SOLUTION INTRAVENOUS at 20:41

## 2019-11-20 RX ADMIN — Medication 2: at 08:35

## 2019-11-20 RX ADMIN — PIPERACILLIN AND TAZOBACTAM 25 GRAM(S): 4; .5 INJECTION, POWDER, LYOPHILIZED, FOR SOLUTION INTRAVENOUS at 13:42

## 2019-11-20 RX ADMIN — PIPERACILLIN AND TAZOBACTAM 25 GRAM(S): 4; .5 INJECTION, POWDER, LYOPHILIZED, FOR SOLUTION INTRAVENOUS at 06:32

## 2019-11-20 RX ADMIN — AMLODIPINE BESYLATE 10 MILLIGRAM(S): 2.5 TABLET ORAL at 06:32

## 2019-11-20 RX ADMIN — Medication 1 APPLICATION(S): at 23:11

## 2019-11-20 RX ADMIN — Medication 1: at 13:43

## 2019-11-20 RX ADMIN — SODIUM CHLORIDE 60 MILLILITER(S): 9 INJECTION, SOLUTION INTRAVENOUS at 13:42

## 2019-11-20 RX ADMIN — INSULIN GLARGINE 4.5 UNIT(S): 100 INJECTION, SOLUTION SUBCUTANEOUS at 22:10

## 2019-11-20 RX ADMIN — ATORVASTATIN CALCIUM 20 MILLIGRAM(S): 80 TABLET, FILM COATED ORAL at 21:48

## 2019-11-20 RX ADMIN — SODIUM CHLORIDE 60 MILLILITER(S): 9 INJECTION, SOLUTION INTRAVENOUS at 00:00

## 2019-11-20 NOTE — PROGRESS NOTE ADULT - PROBLEM SELECTOR PLAN 8
- last stents 3 years ago, trops negative, ekg nsr with RBBB unclear if new, no chest pain, sob, aCS ruled out  - c/w ASA, plavix, atorvastatin, imdur 30mg - last stents 3 years ago, trops negative, ekg nsr with RBBB unclear if new, no chest pain, sob, ACS ruled out  - c/w ASA, plavix, atorvastatin, imdur 30mg

## 2019-11-20 NOTE — PROGRESS NOTE ADULT - ASSESSMENT
69 yo male PMhx HTN, T2DM, CAD s/p stents, PAD, HLD presents admitted for diabetic wound infection, confirmed osteo via imaging, with course c/b DENISSE likely 2/2 nephrotoxic medications currently on IV abx awaiting vascular studies and intervention for revascularization.

## 2019-11-20 NOTE — PROGRESS NOTE ADULT - PROBLEM SELECTOR PLAN 3
DENISSE likely prerenal given vomiting on 11/16, poor intake, ACE/HCTZ  - Cr. now improving.  -vanco d/c'd given nephrotoxic combo with zosyn and low suspicion for MRSA,   -hold ACE and HCTZ until DENISSE resolved  -c/w gentle IVF hydration, LR at 60cc/hr  -monitor bmps, DENISSE likely prerenal given vomiting on 11/16, poor intake and ACE/HCTZ use  -Cr. now improving.  -vanco d/c'd given nephrotoxic combo with zosyn and low suspicion for MRSA,   -hold ACE and HCTZ until DENISSE resolved  -c/w gentle IVF hydration, LR at 60cc/hr  -monitor bmps,

## 2019-11-20 NOTE — PROGRESS NOTE ADULT - PROBLEM SELECTOR PLAN 6
BP stable  -holding ACE/HCTZ given DENISSE, if BP uptrends can uptitrate home Imdur currently 30mg daily  -c/w home Metoprolol, amlodipine 10mg  -monitor bp BP stable  -holding ACE/HCTZ given recent bump in SCr, if BP uptrends can uptitrate home Imdur currently 30mg daily  -c/w home Metoprolol, amlodipine 10mg  -monitor bp

## 2019-11-20 NOTE — PROGRESS NOTE ADULT - ATTENDING COMMENTS
pt seen and examined.  above plan discussed on rounds today.  In addition,  pt going to angiogram today with vascular. patient is hoping to avoid amputation and treat his wound with bedridement and IV abx after potential revascularization.

## 2019-11-20 NOTE — PROGRESS NOTE ADULT - ASSESSMENT
68M with chronic, non-healing R foot wounds secondary to PVD.     PLAN:    - planning for angiogram today possible angioplasty   - Wound care as per podiatry  - ID recs appreciated     Vascular Surgery   [phone]

## 2019-11-20 NOTE — PROGRESS NOTE ADULT - ATTENDING COMMENTS
I have discussed the case with the surgical house staff. I have personally seen, examined, and participated in the care of this patient. I have reviewed all pertinent clinical information.     Plan for RLE angiogram with poss intervention today. Risks, benefits, and alternatives of the procedure were discussed with the patient and family. All questions were answered. They agree to proceed with the procedure.

## 2019-11-20 NOTE — PROGRESS NOTE ADULT - PROBLEM SELECTOR PLAN 5
s/p LLE intervention in 2017  -HUANG performed, previous HUANG showed PAD b/l  -vascular surgery planning for angiogram of RLE on 11/20  -c/w home ASA/plavix

## 2019-11-20 NOTE — PROGRESS NOTE ADULT - PROBLEM SELECTOR PLAN 2
confirmed R foot osteo on MRI, elevated ESR/CRP:  - c/w IV Zosyn (started 11/15) given pseudomonas on wound cx  - clinda d/c'd due to side effect of nausea, vomiting  - vanc dc'ed as no MRSA on culture and new DENISSE, nephrotoxic combo of vanc/zosyn  - ID consulted, rec zosyn only  - wound culture with GBS and pseudomonas  - podiatry following, pt may require resection, however will require revascularization due to PAD first  - blood culture NGTD, afebrile, leukocytosis resolved, monitor cbc

## 2019-11-20 NOTE — PROGRESS NOTE ADULT - PROBLEM SELECTOR PLAN 7
not on insulin at home, a1c 7.9, FS elevated but improving, no DKA  -Lantus at reduced dose currently, will resume Lantus at 18U qhs after procedure if patient is tolerating diet  -c/w Lispro 6 units TID with meals  -c/w LAUREN, adjust insulin  -monitor FS

## 2019-11-20 NOTE — PROGRESS NOTE ADULT - SUBJECTIVE AND OBJECTIVE BOX
PROGRESS NOTE:   Authored By: Alfonzo Patterson MD   Pager: [phone]    Patient is a 68y old  Male who presents with a chief complaint of Foot infection (2019 07:06)    OVERNIGHT EVENTS: No acute events overnight. Lantus dose overnight lowered to 4.5U due to lower than usual glucose levels.    SUBJECTIVE: Pt seen and examined at bedside this morning.     ADDITIONAL REVIEW OF SYSTEMS:    MEDICATIONS  (STANDING):  amLODIPine   Tablet 10 milliGRAM(s) Oral daily  aspirin  chewable 81 milliGRAM(s) Oral daily  atorvastatin 20 milliGRAM(s) Oral at bedtime  clopidogrel Tablet 75 milliGRAM(s) Oral daily  Dakins Solution - 1/4 Strength 1 Application(s) Topical daily  dextrose 5%. 1000 milliLiter(s) (50 mL/Hr) IV Continuous <Continuous>  dextrose 50% Injectable 12.5 Gram(s) IV Push once  dextrose 50% Injectable 25 Gram(s) IV Push once  dextrose 50% Injectable 25 Gram(s) IV Push once  heparin  Injectable 5000 Unit(s) SubCutaneous every 12 hours  influenza   Vaccine 0.5 milliLiter(s) IntraMuscular once  insulin glargine Injectable (LANTUS) 4.5 Unit(s) SubCutaneous at bedtime  insulin lispro (HumaLOG) corrective regimen sliding scale   SubCutaneous at bedtime  insulin lispro (HumaLOG) corrective regimen sliding scale   SubCutaneous three times a day before meals  insulin lispro Injectable (HumaLOG) 6 Unit(s) SubCutaneous three times a day before meals  isosorbide   mononitrate ER Tablet (IMDUR) 30 milliGRAM(s) Oral daily  lactated ringers. 1000 milliLiter(s) (60 mL/Hr) IV Continuous <Continuous>  metoprolol tartrate 50 milliGRAM(s) Oral two times a day  piperacillin/tazobactam IVPB.. 3.375 Gram(s) IV Intermittent every 8 hours    MEDICATIONS  (PRN):  dextrose 40% Gel 15 Gram(s) Oral once PRN Blood Glucose LESS THAN 70 milliGRAM(s)/deciliter  glucagon  Injectable 1 milliGRAM(s) IntraMuscular once PRN Glucose LESS THAN 70 milligrams/deciliter  ondansetron Injectable 4 milliGRAM(s) IV Push every 6 hours PRN Nausea and/or Vomiting    CAPILLARY BLOOD GLUCOSE    POCT Blood Glucose.: 200 mg/dL (2019 22:36)  POCT Blood Glucose.: 194 mg/dL (2019 17:44)  POCT Blood Glucose.: 224 mg/dL (2019 12:20)  POCT Blood Glucose.: 187 mg/dL (2019 09:01)    I&O's Summary    2019 07:01  -  2019 07:00  --------------------------------------------------------  IN: 1580 mL / OUT: 1700 mL / NET: -120 mL    PHYSICAL EXAM:  Vital Signs Last 24 Hrs  T(C): 36.8 (2019 05:29), Max: 36.8 (2019 22:42)  T(F): 98.3 (2019 05:29), Max: 98.3 (2019 05:29)  HR: 72 (2019 05:29) (66 - 78)  BP: 160/69 (2019 05:29) (151/73 - 160/70)  BP(mean): --  RR: 18 (2019 05:29) (18 - 18)  SpO2: 96% (2019 05:29) (96% - 96%)    GENERAL: NAD, well-developed, appears comfortable  HEENT: NC/AT,   NECK: Supple, No JVD  CHEST/LUNG: Clear to auscultation bilaterally; No rales, rhonchi, wheezing, or rubs  CARD: Regular rate and rhythm; No murmurs, rubs, or gallops. No LE edema  ABDOMEN: Soft, Nontender, Nondistended; Bowel sounds present  EXTREMITIES:  b/l right foot with dressing present, malodorous. Left foot with dry healed wound at site of previous debridement. Old abrasion over left anterior leg covered with bandage  SKIN: No rashes or lesions  NEURO: AOx3, moving all extremities    LABS:                        11.0   6.60  )-----------( 192      ( 2019 03:58 )             31.7     11-    136  |  102  |  18  ----------------------------<  292<H>  4.4   |  24  |  1.00    Ca    9.1      2019 03:58  Phos  3.1     11-20  Mg     2.0     11-20    PT/INR - ( 2019 03:58 )   PT: 11.6 sec;   INR: 1.02 ratio      PTT - ( 2019 03:58 )  PTT:28.0 sec    Urinalysis Basic - ( 2019 22:47 )    Color: Light Yellow / Appearance: Clear / S.024 / pH: x  Gluc: x / Ketone: Negative  / Bili: Negative / Urobili: Negative   Blood: x / Protein: 30 mg/dL / Nitrite: Negative   Leuk Esterase: Negative / RBC: 1 /hpf / WBC 1 /HPF   Sq Epi: x / Non Sq Epi: 0 /hpf / Bacteria: Negative    RADIOLOGY & ADDITIONAL TESTS:  < from: MR Foot No Cont, Right (11.15.19 @ 23:27) >  Impression:  Plantar medial soft tissue ulceration with osteomyelitis of the adjacent   hallux sesamoids.  Dorsal soft tissue ulceration at the first interspace with osteomyelitis   at the proximal lateral aspect of the first proximal phalanx.  Soft tissue ulceration with osteomyelitis within the adjacent lateral   aspect of the fifth metatarsal head.    < end of copied text >      Results Reviewed:   Imaging Personally Reviewed:  Electrocardiogram Personally Reviewed:    COORDINATION OF CARE:  Care Discussed with Consultants/Other Providers [Y/N]:  Prior or Outpatient Records Reviewed [Y/N]: PROGRESS NOTE:   Authored By: Alfonzo Patterson MD   Pager: [phone]    Patient is a 68y old  Male who presents with a chief complaint of Foot infection (2019 07:06)    OVERNIGHT EVENTS: No acute events overnight. Lantus dose overnight lowered to 4.5U due to lower than usual glucose levels.    SUBJECTIVE: Pt seen and examined at bedside this morning. Pt states that he feels well. Denies any complaints this morning. No CP, SOB, fever, N/V or abdominal pain.    ADDITIONAL REVIEW OF SYSTEMS:    MEDICATIONS  (STANDING):  amLODIPine   Tablet 10 milliGRAM(s) Oral daily  aspirin  chewable 81 milliGRAM(s) Oral daily  atorvastatin 20 milliGRAM(s) Oral at bedtime  clopidogrel Tablet 75 milliGRAM(s) Oral daily  Dakins Solution - 1/4 Strength 1 Application(s) Topical daily  dextrose 5%. 1000 milliLiter(s) (50 mL/Hr) IV Continuous <Continuous>  dextrose 50% Injectable 12.5 Gram(s) IV Push once  dextrose 50% Injectable 25 Gram(s) IV Push once  dextrose 50% Injectable 25 Gram(s) IV Push once  heparin  Injectable 5000 Unit(s) SubCutaneous every 12 hours  influenza   Vaccine 0.5 milliLiter(s) IntraMuscular once  insulin glargine Injectable (LANTUS) 4.5 Unit(s) SubCutaneous at bedtime  insulin lispro (HumaLOG) corrective regimen sliding scale   SubCutaneous at bedtime  insulin lispro (HumaLOG) corrective regimen sliding scale   SubCutaneous three times a day before meals  insulin lispro Injectable (HumaLOG) 6 Unit(s) SubCutaneous three times a day before meals  isosorbide   mononitrate ER Tablet (IMDUR) 30 milliGRAM(s) Oral daily  lactated ringers. 1000 milliLiter(s) (60 mL/Hr) IV Continuous <Continuous>  metoprolol tartrate 50 milliGRAM(s) Oral two times a day  piperacillin/tazobactam IVPB.. 3.375 Gram(s) IV Intermittent every 8 hours    MEDICATIONS  (PRN):  dextrose 40% Gel 15 Gram(s) Oral once PRN Blood Glucose LESS THAN 70 milliGRAM(s)/deciliter  glucagon  Injectable 1 milliGRAM(s) IntraMuscular once PRN Glucose LESS THAN 70 milligrams/deciliter  ondansetron Injectable 4 milliGRAM(s) IV Push every 6 hours PRN Nausea and/or Vomiting    CAPILLARY BLOOD GLUCOSE    POCT Blood Glucose.: 200 mg/dL (2019 22:36)  POCT Blood Glucose.: 194 mg/dL (2019 17:44)  POCT Blood Glucose.: 224 mg/dL (2019 12:20)  POCT Blood Glucose.: 187 mg/dL (2019 09:01)    I&O's Summary    2019 07:01  -  2019 07:00  --------------------------------------------------------  IN: 1580 mL / OUT: 1700 mL / NET: -120 mL    PHYSICAL EXAM:  Vital Signs Last 24 Hrs  T(C): 36.8 (2019 05:29), Max: 36.8 (2019 22:42)  T(F): 98.3 (2019 05:29), Max: 98.3 (2019 05:29)  HR: 72 (2019 05:29) (66 - 78)  BP: 160/69 (2019 05:29) (151/73 - 160/70)  BP(mean): --  RR: 18 (2019 05:29) (18 - 18)  SpO2: 96% (2019 05:29) (96% - 96%)    GENERAL: NAD, well-developed, appears comfortable  HEENT: NC/AT,   NECK: Supple, No JVD  CHEST/LUNG: Clear to auscultation bilaterally; No rales, rhonchi, wheezing, or rubs  CARD: Regular rate and rhythm; No murmurs, rubs, or gallops. No LE edema  ABDOMEN: Soft, Nontender, Nondistended; Bowel sounds present  EXTREMITIES:  b/l right foot with dressing present, Left foot with dry healed wound at site of previous debridement. Old abrasion over left anterior leg covered with dressings, C/D/I  SKIN: No rashes or lesions  NEURO: AOx3, moving all extremities    LABS:                        11.0   6.60  )-----------( 192      ( 2019 03:58 )             31.7     11-20    136  |  102  |  18  ----------------------------<  292<H>  4.4   |  24  |  1.00    Ca    9.1      2019 03:58  Phos  3.1       Mg     2.0         PT/INR - ( 2019 03:58 )   PT: 11.6 sec;   INR: 1.02 ratio      PTT - ( 2019 03:58 )  PTT:28.0 sec    Urinalysis Basic - ( 2019 22:47 )    Color: Light Yellow / Appearance: Clear / S.024 / pH: x  Gluc: x / Ketone: Negative  / Bili: Negative / Urobili: Negative   Blood: x / Protein: 30 mg/dL / Nitrite: Negative   Leuk Esterase: Negative / RBC: 1 /hpf / WBC 1 /HPF   Sq Epi: x / Non Sq Epi: 0 /hpf / Bacteria: Negative    RADIOLOGY & ADDITIONAL TESTS:  < from: MR Foot No Cont, Right (11.15.19 @ 23:27) >  Impression:  Plantar medial soft tissue ulceration with osteomyelitis of the adjacent   hallux sesamoids.  Dorsal soft tissue ulceration at the first interspace with osteomyelitis   at the proximal lateral aspect of the first proximal phalanx.  Soft tissue ulceration with osteomyelitis within the adjacent lateral   aspect of the fifth metatarsal head.    < end of copied text >      Results Reviewed:   Imaging Personally Reviewed:  Electrocardiogram Personally Reviewed:    COORDINATION OF CARE:  Care Discussed with Consultants/Other Providers [Y/N]:  Prior or Outpatient Records Reviewed [Y/N]:

## 2019-11-20 NOTE — PROGRESS NOTE ADULT - SUBJECTIVE AND OBJECTIVE BOX
Follow Up:  Right foot OM     Interval History/ROS: Feels ok. Afebrile. Planned for angiogram with vascular and debridement in OR with podiatry.     Allergies  No Known Allergies    ANTIMICROBIALS:  piperacillin/tazobactam IVPB.. 3.375 every 8 hours    OTHER MEDS:  amLODIPine   Tablet 10 milliGRAM(s) Oral daily  aspirin  chewable 81 milliGRAM(s) Oral daily  atorvastatin 20 milliGRAM(s) Oral at bedtime  clopidogrel Tablet 75 milliGRAM(s) Oral daily  Dakins Solution - 1/4 Strength 1 Application(s) Topical daily  dextrose 40% Gel 15 Gram(s) Oral once PRN  dextrose 5%. 1000 milliLiter(s) IV Continuous <Continuous>  dextrose 50% Injectable 12.5 Gram(s) IV Push once  dextrose 50% Injectable 25 Gram(s) IV Push once  dextrose 50% Injectable 25 Gram(s) IV Push once  glucagon  Injectable 1 milliGRAM(s) IntraMuscular once PRN  heparin  Injectable 5000 Unit(s) SubCutaneous every 12 hours  influenza   Vaccine 0.5 milliLiter(s) IntraMuscular once  insulin glargine Injectable (LANTUS) 4.5 Unit(s) SubCutaneous at bedtime  insulin lispro (HumaLOG) corrective regimen sliding scale   SubCutaneous at bedtime  insulin lispro (HumaLOG) corrective regimen sliding scale   SubCutaneous three times a day before meals  insulin lispro Injectable (HumaLOG) 6 Unit(s) SubCutaneous three times a day before meals  isosorbide   mononitrate ER Tablet (IMDUR) 30 milliGRAM(s) Oral daily  lactated ringers. 1000 milliLiter(s) IV Continuous <Continuous>  metoprolol tartrate 50 milliGRAM(s) Oral two times a day  ondansetron Injectable 4 milliGRAM(s) IV Push every 6 hours PRN      Vital Signs Last 24 Hrs  T(C): 36.8 (2019 05:29), Max: 36.8 (2019 22:42)  T(F): 98.3 (2019 05:29), Max: 98.3 (2019 05:29)  HR: 72 (2019 05:29) (66 - 78)  BP: 160/69 (2019 05:29) (151/73 - 160/70)  BP(mean): --  RR: 18 (2019 05:29) (18 - 18)  SpO2: 96% (2019 05:29) (96% - 96%)    Physical Exam:  General: NAD, non toxic  Head: atraumatic, normocephalic  Eye: normal sclera and conjunctiva  Cardio: regular rate and rhythm   Respiratory: nonlabored on room air, clear bilaterally, no wheezing  abd: soft, BS +, no tenderness  Skin: right foot bandaged   psych: normal affect                          11.0   6.60  )-----------( 192      ( 2019 03:58 )             31.7           136  |  102  |  18  ----------------------------<  292<H>  4.4   |  24  |  1.00    Ca    9.1      2019 03:58  Phos  3.1       Mg     2.0             Urinalysis Basic - ( 2019 22:47 )    Color: Light Yellow / Appearance: Clear / S.024 / pH: x  Gluc: x / Ketone: Negative  / Bili: Negative / Urobili: Negative   Blood: x / Protein: 30 mg/dL / Nitrite: Negative   Leuk Esterase: Negative / RBC: 1 /hpf / WBC 1 /HPF   Sq Epi: x / Non Sq Epi: 0 /hpf / Bacteria: Negative    MICROBIOLOGY:  Culture - Blood (collected 11-15-19 @ 17:02)  Source: .Blood Blood  Preliminary Report (19 @ 18:00):    No growth to date.    Culture - Blood (collected 11-15-19 @ 17:02)  Source: .Blood Blood  Preliminary Report (19 @ 18:00):    No growth to date.    Culture - Abscess with Gram Stain (collected 11-15-19 @ 03:30)  Source: .Abscess Leg - Right  Final Report (19 @ 13:34):    Moderate Pseudomonas aeruginosa    Moderate Streptococcus agalactiae (Group B) isolated    Group B streptococci are susceptible to ampicillin,    penicillin and cefazolin, but may be resistant to    erythromycin and clindamycin.    Recommendations for intrapartum prophylaxis for Group B    streptococci are penicillin or ampicillin.  Organism: Pseudomonas aeruginosa (19 @ 13:34)  Organism: Pseudomonas aeruginosa (19 @ 13:34)      -  Amikacin: S <=16      -  Aztreonam: S <=4      -  Cefepime: S <=2      -  Ceftazidime: S 4      -  Ciprofloxacin: S <=1      -  Gentamicin: S 4      -  Imipenem: S <=1      -  Levofloxacin: S <=2      -  Meropenem: S <=1      -  Piperacillin/Tazobactam: S <=8      -  Tobramycin: S <=2      Method Type: KRYSTLE    RADIOLOGY:  Images below reviewed personally  MR Foot No Cont, Right (11.15.19 @ 23:27)   Plantar medial soft tissue ulceration with osteomyelitis of the adjacent hallux sesamoids.  Dorsal soft tissue ulceration at the first interspace with osteomyelitis   at the proximal lateral aspect of the first proximal phalanx.  Soft tissue ulceration with osteomyelitis within the adjacent lateral   aspect of the fifth metatarsal head.

## 2019-11-20 NOTE — PROGRESS NOTE ADULT - PROBLEM SELECTOR PLAN 4
resolved, likely related to side effect of IV clindamycin now dc/ed  -EKG 11/16 with NSR and RBBB which is new from 2017 (no ekg in between), cardiac enzymes flat at 19  x2, ACS ruled out   -CT head negative for intracranial process  -zofran prn

## 2019-11-20 NOTE — PROGRESS NOTE ADULT - SUBJECTIVE AND OBJECTIVE BOX
Podiatry pager #: [phone] ([place])/ [phone] ([facility])    Patient is a 68y old  Male who presents with a chief complaint of Foot infection (2019 13:23)       INTERVAL HPI/OVERNIGHT EVENTS:  Patient seen and evaluated at bedside.  Pt is resting comfortable in NAD. Denies N/V/F/C.     Allergies    No Known Allergies    Intolerances        Vital Signs Last 24 Hrs  T(C): 36.8 (2019 05:29), Max: 36.8 (2019 22:42)  T(F): 98.3 (2019 05:29), Max: 98.3 (2019 05:29)  HR: 72 (2019 05:29) (66 - 78)  BP: 160/69 (2019 05:29) (151/73 - 160/70)  BP(mean): --  RR: 18 (2019 05:29) (18 - 18)  SpO2: 96% (2019 05:29) (96% - 96%)    LABS:                        11.0   6.60  )-----------( 192      ( 2019 03:58 )             31.7     11-20    136  |  102  |  18  ----------------------------<  292<H>  4.4   |  24  |  1.00    Ca    9.1      2019 03:58  Phos  3.1     11-20  Mg     2.0     11-20    TPro  6.7  /  Alb  4.0  /  TBili  0.4  /  DBili  x   /  AST  11  /  ALT  10  /  AlkPhos  61  11-18    PT/INR - ( 2019 03:58 )   PT: 11.6 sec;   INR: 1.02 ratio         PTT - ( 2019 03:58 )  PTT:28.0 sec  Urinalysis Basic - ( 2019 22:47 )    Color: Light Yellow / Appearance: Clear / S.024 / pH: x  Gluc: x / Ketone: Negative  / Bili: Negative / Urobili: Negative   Blood: x / Protein: 30 mg/dL / Nitrite: Negative   Leuk Esterase: Negative / RBC: 1 /hpf / WBC 1 /HPF   Sq Epi: x / Non Sq Epi: 0 /hpf / Bacteria: Negative      CAPILLARY BLOOD GLUCOSE      POCT Blood Glucose.: 200 mg/dL (2019 22:36)  POCT Blood Glucose.: 194 mg/dL (2019 17:44)  POCT Blood Glucose.: 224 mg/dL (2019 12:20)  POCT Blood Glucose.: 187 mg/dL (2019 09:01)      Lower Extremity Physical Exam:  Vascular: DP/PT 0/4, B/L, CFT <3 seconds B/L, Temperature gradient warm to cool B/L.   Neuro: Epicritic sensation grossly diminished to level of ankleB/L.  Skin:  Wound #1: R foot 1st interspace wound, 2.0x2.0cm, depth to 1st and 2nd met capsule, wound bed necrotic, serous drainage, malodorous, periwound macerated, etiology 2/2 interdigital maceration/ arterial insufficiency     Wound #2: R foot submetatarsal 1 wound, 2.5x1.5cm, wound bed fibrogranular, no active drainage, no malodor, periwound macerated, etiology 2/2 pressure     Wound #3: R foot lateral border of foot stable eschar, 4.0x1.0cm, no active drainage, no malodor, periwound erythematous, etiology 2/2 pressure

## 2019-11-20 NOTE — PROGRESS NOTE ADULT - ASSESSMENT
68M w/ R foot chronic non-healing wounds  -Pt seen and evaluated  -Right foot 1st interspace wound w/ fibronecrotic base and malodorous. Remaining wounds are stable w/ no signs of infection. Well-adhered eschar on lateral aspect of 5th met.  -Wound was flushed w/ copious saline and dressed w/ dakins and dry sterile dressing  -MRI showing osteo of sesamoids, 1st proximal phalanx, 5th met head  -HUANG/TBI poor, vasc planning angiogram today possibly angioplasty  -Tentatively booked for OR for right foot wound debridement and partial 1st ray resection on Thurs 11/21 pending vasc intervention. Pt does not want an amputation at this time, so will likely proceed with wound debridement and graft application pending vasc intervention today.   -Seen w/ attending 68M w/ R foot chronic non-healing wounds  -Pt seen and evaluated  -Right foot 1st interspace wound w/ fibronecrotic base and malodorous. Remaining wounds are stable w/ no signs of infection. Well-adhered eschar on lateral aspect of 5th met.  -Wound was flushed w/ copious saline and dressed w/ dakins and dry sterile dressing  -MRI showing osteo of sesamoids, 1st proximal phalanx, 5th met head  -HUANG/TBI poor, vasc planning angiogram today possibly angioplasty  -Pt does not want an amputation at this time, so will likely proceed with wound debridement and graft application pending vasc intervention today.   -s/p RLE angiogram, and pt will need a bypass per vasc. Will hold off on OR until optimized by vasc.  -Seen w/ attending

## 2019-11-20 NOTE — PROGRESS NOTE ADULT - PROBLEM SELECTOR PLAN 1
Pt is RCRI class III risk (1 point each due to ischemic heart disease, preop requirement for insulin) with 10.1% risk of death, MI or cardiac arrest within 30 days of procedure. Pt is currently moderate to high risk for a moderate risk procedure (RLE angiogram with possible intervention). Pt is currently without any CP, SOB, or fever. He has good functional status at home and is independent in all ADLs. EKG performed during this admission showed right bundle branch block, however unclear if new, recent ischemic workup on 11/16 negative. No history of heart failure, TTE from 2016 showing low-normal LV systolic fxn with EF of 55%. Patient is currently medically optimized for this procedure.

## 2019-11-20 NOTE — PROGRESS NOTE ADULT - SUBJECTIVE AND OBJECTIVE BOX
Pre-operative Note    - Pre-operative Diagnosis: Right LE wound     - Procedure: RLE angiogram, with possible stent, possible angioplasty     - Labs:                        11.0   6.60  )-----------( 192      ( 20 Nov 2019 03:58 )             31.7     11-20    136  |  102  |  18  ----------------------------<  292<H>  4.4   |  24  |  1.00    Ca    9.1      20 Nov 2019 03:58  Phos  3.1     11-20  Mg     2.0     11-20    TPro  6.7  /  Alb  4.0  /  TBili  0.4  /  DBili  x   /  AST  11  /  ALT  10  /  AlkPhos  61  11-18    PT/INR - ( 20 Nov 2019 03:58 )   PT: 11.6 sec;   INR: 1.02 ratio         PTT - ( 20 Nov 2019 03:58 )  PTT:28.0 sec  Type & Screen #1: Type + Screen (11.20.19 @ 04:00)    ABO Interpretation: B    Rh Interpretation: Positive    Antibody Screen: Negative      Type & Screen #2:     - CXR:    - EKG:    - AICD/Pacemaker Interrogation Date:     - Blood: Not needed.     - Pregnancy Test:     - Orders:  > NPO at midnight  > IVF at midnight  > Perioperative antibiotics not needed.  > Morning Labs: CBC, BMP, coags, type & screen  > Diabetic orders adjusted for NPO period.    - Permits:  > Consent in chart.  > Case scheduled with OR. Pre-operative Note    - Pre-operative Diagnosis: Right LE wound     - Procedure: RLE angiogram, with possible stent, possible angioplasty     - Labs:                        11.0   6.60  )-----------( 192      ( 20 Nov 2019 03:58 )             31.7     11-20    136  |  102  |  18  ----------------------------<  292<H>  4.4   |  24  |  1.00    Ca    9.1      20 Nov 2019 03:58  Phos  3.1     11-20  Mg     2.0     11-20    TPro  6.7  /  Alb  4.0  /  TBili  0.4  /  DBili  x   /  AST  11  /  ALT  10  /  AlkPhos  61  11-18    PT/INR - ( 20 Nov 2019 03:58 )   PT: 11.6 sec;   INR: 1.02 ratio         PTT - ( 20 Nov 2019 03:58 )  PTT:28.0 sec  Type & Screen #1: Type + Screen (11.20.19 @ 04:00)    ABO Interpretation: B    Rh Interpretation: Positive    Antibody Screen: Negative      Type & Screen #2: Type + Screen (11.20.19 @ 04:00)    ABO Interpretation: B    Rh Interpretation: Positive    Antibody Screen: Negative      - CXR: < from: Xray Chest 1 View- PORTABLE-Urgent (11.16.19 @ 14:18) >      INTERPRETATION:  HISTORY: Nausea and shortness of breath    TECHNIQUE: A single AP view of the chest was obtained.    COMPARISON: 1/4/2017    FINDINGS:  The cardiac silhouette is normal in size. There are no focal   consolidations or pleural effusions. The hilar and mediastinal structures   appear unremarkable. The osseous structures are intact.    IMPRESSION: Clear lungs.    - EKG: < from: 12 Lead ECG (11.16.19 @ 13:28) >  Ventricular Rate 66 BPM    Atrial Rate 66 BPM    P-R Interval 152 ms    QRS Duration 144 ms    Q-T Interval 466 ms    QTC Calculation(Bezet) 488 ms    P Axis 31 degrees    R Axis -11 degrees    T Axis 3 degrees    Diagnosis Line SINUS RHYTHM WITH PREMATURE SUPRAVENTRICULAR COMPLEXES  RIGHT BUNDLE BRANCH BLOCK  MODERATE VOLTAGE CRITERIA FOR LVH, MAY BE NORMAL VARIANT  ABNORMAL ECG        - Blood: 2U on hold       - Orders:  > NPO at midnight  > IVF at midnight  > Perioperative antibiotics not needed.  > Morning Labs: CBC, BMP, coags, type & screen  > Diabetic orders adjusted for NPO period.    - Permits:  > Consent in chart.  > Case scheduled with OR.

## 2019-11-20 NOTE — PROGRESS NOTE ADULT - ASSESSMENT
68M with DM, CAD s/p PCI, left foot osteomyelitis 12/2016 (GBS, no MRSA), admitted 11/15/19 with a Right foot ulcers with osteomyelitis on MRI (hallux and fifth metatarsal) in the setting of worsening arterial supply on HUANG compared to 2017.   Cultures growing Pseudomonas (pan sensitive) and GBS.   Afebrile, no leukocytosis, clinically well.   Planned for angiogram with possible angioplasty today and tentative OR Thurs 11/21 for debridement (patient does not want amputation).     Suggest  -continue Zosyn, increase to 4.5GM IV q8h   -intervention per podiatry/vascular surgery   -without amputation I anticipate he'll need 6 weeks of IV antibiotics, could use Cefepime for ease of administration at home   -diabetic control per primary team     Spoke with primary team     Darryl Coleman MD   Infectious Disease   Pager [phone]   After 5PM and on weekends please page fellow on call or call [phone] 68M with DM, CAD s/p PCI, left foot osteomyelitis 12/2016 (GBS, no MRSA), admitted 11/15/19 with a Right foot ulcers with osteomyelitis on MRI (hallux and fifth metatarsal) in the setting of worsening arterial supply on HUANG compared to 2017.   Cultures growing Pseudomonas (pan sensitive) and GBS.   Afebrile, no leukocytosis, clinically well.   Planned for angiogram with possible angioplasty today and tentative OR Thurs 11/21 for debridement (patient does not want amputation).     Suggest  -continue Zosyn, increase to 4.5GM IV q8h   -intervention per podiatry/vascular surgery   -without amputation I anticipate he'll need 6 weeks of IV antibiotics, could use Cefepime for ease of administration at home   -diabetic control per primary team     Spoke with primary team     I will be away tomorrow, returning Friday 11/22. Please page ID fellow if sooner follow up is needed.     Darryl Coleman MD   Infectious Disease   Pager [phone]   After 5PM and on weekends please page fellow on call or call [phone]

## 2019-11-21 PROCEDURE — 99233 SBSQ HOSP IP/OBS HIGH 50: CPT | Mod: GC

## 2019-11-21 RX ORDER — PIPERACILLIN AND TAZOBACTAM 4; .5 G/20ML; G/20ML
4.5 INJECTION, POWDER, LYOPHILIZED, FOR SOLUTION INTRAVENOUS EVERY 8 HOURS
Refills: 0 | Status: DISCONTINUED | OUTPATIENT
Start: 2019-11-21 | End: 2019-11-25

## 2019-11-21 RX ORDER — INSULIN LISPRO 100/ML
VIAL (ML) SUBCUTANEOUS
Refills: 0 | Status: DISCONTINUED | OUTPATIENT
Start: 2019-11-21 | End: 2019-11-25

## 2019-11-21 RX ORDER — ASPIRIN/CALCIUM CARB/MAGNESIUM 324 MG
81 TABLET ORAL DAILY
Refills: 0 | Status: DISCONTINUED | OUTPATIENT
Start: 2019-11-21 | End: 2019-11-25

## 2019-11-21 RX ORDER — INSULIN GLARGINE 100 [IU]/ML
18 INJECTION, SOLUTION SUBCUTANEOUS AT BEDTIME
Refills: 0 | Status: DISCONTINUED | OUTPATIENT
Start: 2019-11-21 | End: 2019-11-24

## 2019-11-21 RX ORDER — CLOPIDOGREL BISULFATE 75 MG/1
75 TABLET, FILM COATED ORAL DAILY
Refills: 0 | Status: DISCONTINUED | OUTPATIENT
Start: 2019-11-21 | End: 2019-11-25

## 2019-11-21 RX ADMIN — Medication 6 UNIT(S): at 08:42

## 2019-11-21 RX ADMIN — Medication 10 MILLIGRAM(S): at 02:40

## 2019-11-21 RX ADMIN — Medication 50 MILLIGRAM(S): at 16:59

## 2019-11-21 RX ADMIN — Medication 4: at 12:56

## 2019-11-21 RX ADMIN — LISINOPRIL 40 MILLIGRAM(S): 2.5 TABLET ORAL at 07:35

## 2019-11-21 RX ADMIN — Medication 6 UNIT(S): at 17:24

## 2019-11-21 RX ADMIN — PIPERACILLIN AND TAZOBACTAM 25 GRAM(S): 4; .5 INJECTION, POWDER, LYOPHILIZED, FOR SOLUTION INTRAVENOUS at 23:10

## 2019-11-21 RX ADMIN — Medication 2: at 08:43

## 2019-11-21 RX ADMIN — Medication 25 MILLIGRAM(S): at 07:48

## 2019-11-21 RX ADMIN — Medication 81 MILLIGRAM(S): at 12:31

## 2019-11-21 RX ADMIN — AMLODIPINE BESYLATE 10 MILLIGRAM(S): 2.5 TABLET ORAL at 06:21

## 2019-11-21 RX ADMIN — ATORVASTATIN CALCIUM 20 MILLIGRAM(S): 80 TABLET, FILM COATED ORAL at 22:58

## 2019-11-21 RX ADMIN — Medication 4: at 17:24

## 2019-11-21 RX ADMIN — Medication 6 UNIT(S): at 12:57

## 2019-11-21 RX ADMIN — Medication 50 MILLIGRAM(S): at 06:21

## 2019-11-21 RX ADMIN — ISOSORBIDE MONONITRATE 30 MILLIGRAM(S): 60 TABLET, EXTENDED RELEASE ORAL at 12:31

## 2019-11-21 RX ADMIN — PIPERACILLIN AND TAZOBACTAM 25 GRAM(S): 4; .5 INJECTION, POWDER, LYOPHILIZED, FOR SOLUTION INTRAVENOUS at 13:17

## 2019-11-21 RX ADMIN — INSULIN GLARGINE 18 UNIT(S): 100 INJECTION, SOLUTION SUBCUTANEOUS at 23:10

## 2019-11-21 RX ADMIN — CLOPIDOGREL BISULFATE 75 MILLIGRAM(S): 75 TABLET, FILM COATED ORAL at 12:32

## 2019-11-21 RX ADMIN — Medication 1 APPLICATION(S): at 07:38

## 2019-11-21 NOTE — PROGRESS NOTE ADULT - PROBLEM SELECTOR PLAN 6
not on insulin at home, a1c 7.9, FS elevated but improving, no DKA  -will resume Lantus at 18U qhs after procedure if patient is tolerating diet  -c/w Lispro 6 units TID with meals  -c/w LAUREN, adjust insulin  -monitor FS

## 2019-11-21 NOTE — PROGRESS NOTE ADULT - PROBLEM SELECTOR PLAN 2
DENISSE likely prerenal given vomiting on 11/16, poor intake and ACE/HCTZ use  -Cr. now improving.  -hold ACE and HCTZ until DENISSE resolved  -c/w gentle IVF hydration, LR at 60cc/hr  -monitor bmps DENISSE likely prerenal given vomiting on 11/16, poor intake and ACE/HCTZ use  -Cr now improving.  -hold ACE and HCTZ until DENISSE resolved  -c/w gentle IVF hydration, LR at 60cc/hr  -monitor bmps

## 2019-11-21 NOTE — PROGRESS NOTE ADULT - PROBLEM SELECTOR PLAN 4
s/p LLE intervention in 2017  -HUANG performed, previous HUANG showed PAD b/l  -angiogram performed 11/20, showing significant atherosclerotic   -c/w home ASA/plavix s/p LLE intervention in 2017  -HUANG performed, previous HUANG showed PAD b/l  -angiogram performed 11/20, showing significant atherosclerotic disease  -pt being planned for bypass by vascular prior to podiatric surgery  -c/w home ASA/plavix

## 2019-11-21 NOTE — CHART NOTE - NSCHARTNOTEFT_GEN_A_CORE
POST-OPERATIVE NOTE    Subjective:  Patient is s/p angiogram of RLE angiogram for Right LE wound. Denies nausea, vomiting, chest pain, sob, fevers chills. Pain is well controlled.     Vital Signs Last 24 Hrs  T(C): 36.7 (21 Nov 2019 02:12), Max: 36.8 (20 Nov 2019 05:29)  T(F): 98 (21 Nov 2019 02:12), Max: 98.3 (20 Nov 2019 05:29)  HR: 68 (21 Nov 2019 02:12) (61 - 72)  BP: 167/70 (21 Nov 2019 02:12) (146/59 - 187/77)  BP(mean): 102 (20 Nov 2019 22:00) (96 - 110)  RR: 18 (21 Nov 2019 02:12) (16 - 18)  SpO2: 97% (21 Nov 2019 02:12) (96% - 100%)  I&O's Detail    19 Nov 2019 07:01  -  20 Nov 2019 07:00  --------------------------------------------------------  IN:    IV PiggyBack: 200 mL    lactated ringers.: 600 mL    Oral Fluid: 780 mL  Total IN: 1580 mL    OUT:    Voided: 1700 mL  Total OUT: 1700 mL    Total NET: -120 mL      20 Nov 2019 07:01  -  21 Nov 2019 02:33  --------------------------------------------------------  IN:    IV PiggyBack: 100 mL    lactated ringers.: 120 mL    Oral Fluid: 100 mL  Total IN: 320 mL    OUT:    Voided: 400 mL  Total OUT: 400 mL    Total NET: -80 mL          Physical Exam:  General: NAD, resting comfortably in bed  Pulmonary: Nonlabored breathing, no respiratory distress  Cardiovascular: NSR  Abdominal: soft, NT/ND  : right groin dressing clean, dry, and intact  Extremities: [facility]    LABS:                        11.0   6.60  )-----------( 192      ( 20 Nov 2019 03:58 )             31.7     11-20    136  |  102  |  18  ----------------------------<  292<H>  4.4   |  24  |  1.00    Ca    9.1      20 Nov 2019 03:58  Phos  3.1     11-20  Mg     2.0     11-20      PT/INR - ( 20 Nov 2019 03:58 )   PT: 11.6 sec;   INR: 1.02 ratio         PTT - ( 20 Nov 2019 03:58 )  PTT:28.0 sec      Assessment:  The patient is a 68y Male who is s/p RLE angiogram for Right LE wound. Patient now recovering appropriately in PACU and is stable for transfer to floor    Plan:  - Pain control as needed  - Wound care as per podiatry  - ID recs [facility]     Vascular Surgery   [phone] POST-OPERATIVE NOTE    Subjective:  Patient is s/p angiogram of RLE angiogram for Right LE wound. Denies nausea, vomiting, chest pain, sob, fevers chills. Pain is well controlled.     Vital Signs Last 24 Hrs  T(C): 36.7 (21 Nov 2019 02:12), Max: 36.8 (20 Nov 2019 05:29)  T(F): 98 (21 Nov 2019 02:12), Max: 98.3 (20 Nov 2019 05:29)  HR: 68 (21 Nov 2019 02:12) (61 - 72)  BP: 167/70 (21 Nov 2019 02:12) (146/59 - 187/77)  BP(mean): 102 (20 Nov 2019 22:00) (96 - 110)  RR: 18 (21 Nov 2019 02:12) (16 - 18)  SpO2: 97% (21 Nov 2019 02:12) (96% - 100%)  I&O's Detail    19 Nov 2019 07:01  -  20 Nov 2019 07:00  --------------------------------------------------------  IN:    IV PiggyBack: 200 mL    lactated ringers.: 600 mL    Oral Fluid: 780 mL  Total IN: 1580 mL    OUT:    Voided: 1700 mL  Total OUT: 1700 mL    Total NET: -120 mL      20 Nov 2019 07:01  -  21 Nov 2019 02:33  --------------------------------------------------------  IN:    IV PiggyBack: 100 mL    lactated ringers.: 120 mL    Oral Fluid: 100 mL  Total IN: 320 mL    OUT:    Voided: 400 mL  Total OUT: 400 mL    Total NET: -80 mL          Physical Exam:  General: NAD, resting comfortably in bed  Pulmonary: Nonlabored breathing, no respiratory distress  Cardiovascular: NSR  Abdominal: soft, NT/ND  : right groin dressing clean, dry, and intact  Extremities: [facility]    LABS:                        11.0   6.60  )-----------( 192      ( 20 Nov 2019 03:58 )             31.7     11-20    136  |  102  |  18  ----------------------------<  292<H>  4.4   |  24  |  1.00    Ca    9.1      20 Nov 2019 03:58  Phos  3.1     11-20  Mg     2.0     11-20      PT/INR - ( 20 Nov 2019 03:58 )   PT: 11.6 sec;   INR: 1.02 ratio         PTT - ( 20 Nov 2019 03:58 )  PTT:28.0 sec      Assessment:  The patient is a 68y Male who is s/p RLE angiogram for Right LE wound. Patient now recovering appropriately in PACU and is stable for transfer to floor    Plan:  - Pain control as needed  - Wound care as per podiatry    Vascular Surgery   [phone]

## 2019-11-21 NOTE — PROGRESS NOTE ADULT - PROBLEM SELECTOR PLAN 1
confirmed R foot osteo on MRI, elevated ESR/CRP:  - c/w IV Zosyn (started 11/15) given pseudomonas on wound cx  - ID consulted, rec zosyn only  - wound culture with GBS and pseudomonas  - podiatry following, pt may require resection, now s/p angiogram, pending further podiatry recs and operative planning.  - blood culture NGTD, afebrile, leukocytosis resolved, monitor cbc

## 2019-11-21 NOTE — DIETITIAN INITIAL EVALUATION ADULT. - PERTINENT MEDS FT
MEDICATIONS  (STANDING):  amLODIPine   Tablet 10 milliGRAM(s) Oral daily  aspirin  chewable 81 milliGRAM(s) Oral daily  atorvastatin 20 milliGRAM(s) Oral at bedtime  clopidogrel Tablet 75 milliGRAM(s) Oral daily  Dakins Solution - 1/4 Strength 1 Application(s) Topical daily  dextrose 5%. 1000 milliLiter(s) (50 mL/Hr) IV Continuous <Continuous>  hydrochlorothiazide 25 milliGRAM(s) Oral daily  influenza   Vaccine 0.5 milliLiter(s) IntraMuscular once  insulin glargine Injectable (LANTUS) 4.5 Unit(s) SubCutaneous at bedtime  insulin lispro (HumaLOG) corrective regimen sliding scale   SubCutaneous three times a day before meals  insulin lispro (HumaLOG) corrective regimen sliding scale   SubCutaneous at bedtime  insulin lispro Injectable (HumaLOG) 6 Unit(s) SubCutaneous three times a day before meals  isosorbide   mononitrate ER Tablet (IMDUR) 30 milliGRAM(s) Oral daily  lactated ringers. 1000 milliLiter(s) (60 mL/Hr) IV Continuous <Continuous>  lisinopril 40 milliGRAM(s) Oral daily  metoprolol tartrate 50 milliGRAM(s) Oral two times a day    MEDICATIONS  (PRN):  glucagon  Injectable 1 milliGRAM(s) IntraMuscular once PRN Glucose LESS THAN 70 milligrams/deciliter  hydrALAZINE 10 milliGRAM(s) Oral every 8 hours PRN Systolic blood pressure >160

## 2019-11-21 NOTE — PROGRESS NOTE ADULT - SUBJECTIVE AND OBJECTIVE BOX
PROGRESS NOTE:   Authored By: Alfonzo Patterson MD   Pager: [phone], [phone]    Patient is a 68y old  Male who presents with a chief complaint of Foot infection (2019 10:17)    OVERNIGHT EVENTS: No acute events overnight    SUBJECTIVE: Pt seen and examined at bedside this morning.     ADDITIONAL REVIEW OF SYSTEMS:    MEDICATIONS  (STANDING):  amLODIPine   Tablet 10 milliGRAM(s) Oral daily  atorvastatin 20 milliGRAM(s) Oral at bedtime  Dakins Solution - 1/4 Strength 1 Application(s) Topical daily  dextrose 5%. 1000 milliLiter(s) (50 mL/Hr) IV Continuous <Continuous>  hydrochlorothiazide 25 milliGRAM(s) Oral daily  influenza   Vaccine 0.5 milliLiter(s) IntraMuscular once  insulin glargine Injectable (LANTUS) 4.5 Unit(s) SubCutaneous at bedtime  insulin lispro (HumaLOG) corrective regimen sliding scale   SubCutaneous at bedtime  insulin lispro Injectable (HumaLOG) 6 Unit(s) SubCutaneous three times a day before meals  isosorbide   mononitrate ER Tablet (IMDUR) 30 milliGRAM(s) Oral daily  lactated ringers. 1000 milliLiter(s) (60 mL/Hr) IV Continuous <Continuous>  lisinopril 40 milliGRAM(s) Oral daily  metoprolol tartrate 50 milliGRAM(s) Oral two times a day    MEDICATIONS  (PRN):  glucagon  Injectable 1 milliGRAM(s) IntraMuscular once PRN Glucose LESS THAN 70 milligrams/deciliter  hydrALAZINE 10 milliGRAM(s) Oral every 8 hours PRN Systolic blood pressure >160    CAPILLARY BLOOD GLUCOSE    POCT Blood Glucose.: 111 mg/dL (2019 21:43)  POCT Blood Glucose.: 138 mg/dL (2019 18:17)  POCT Blood Glucose.: 127 mg/dL (2019 16:26)  POCT Blood Glucose.: 151 mg/dL (2019 13:12)  POCT Blood Glucose.: 206 mg/dL (2019 07:38)    I&O's Summary    2019 07:01  -  2019 07:00  --------------------------------------------------------  IN: 320 mL / OUT: 600 mL / NET: -280 mL    PHYSICAL EXAM:  Vital Signs Last 24 Hrs  T(C): 36.7 (2019 05:02), Max: 36.8 (2019 22:00)  T(F): 98 (2019 05:02), Max: 98.2 (2019 22:00)  HR: 69 (2019 05:02) (61 - 69)  BP: 165/80 (2019 05:02) (146/59 - 187/77)  BP(mean): 102 (2019 22:00) (96 - 110)  RR: 18 (2019 05:02) (16 - 18)  SpO2: 97% (2019 05:02) (96% - 100%)    GENERAL: NAD, well-developed, appears comfortable  HEENT: NC/AT,   NECK: Supple, No JVD  CHEST/LUNG: Clear to auscultation bilaterally; No rales, rhonchi, wheezing, or rubs  CARD: Regular rate and rhythm; No murmurs, rubs, or gallops. No LE edema  ABDOMEN: Soft, Nontender, Nondistended; Bowel sounds present  EXTREMITIES:  b/l right foot with dressing present, Left foot with dry healed wound at site of previous debridement. Old abrasion over left anterior leg covered with dressings, dressings C/D/I  SKIN: No rashes or lesions  NEURO: AOx3, moving all extremities    LABS:                        11.0   6.60  )-----------( 192      ( 2019 03:58 )             31.7     11-20    136  |  102  |  18  ----------------------------<  292<H>  4.4   |  24  |  1.00    Ca    9.1      2019 03:58  Phos  3.1     11-20  Mg     2.0     11-20    PT/INR - ( 2019 03:58 )   PT: 11.6 sec;   INR: 1.02 ratio      PTT - ( 2019 03:58 )  PTT:28.0 sec    Urinalysis Basic - ( 2019 22:47 )    Color: Light Yellow / Appearance: Clear / S.024 / pH: x  Gluc: x / Ketone: Negative  / Bili: Negative / Urobili: Negative   Blood: x / Protein: 30 mg/dL / Nitrite: Negative   Leuk Esterase: Negative / RBC: 1 /hpf / WBC 1 /HPF   Sq Epi: x / Non Sq Epi: 0 /hpf / Bacteria: Negative    RADIOLOGY & ADDITIONAL TESTS:  < from: MR Foot No Cont, Right (11.15.19 @ 23:27) >  Impression:  Plantar medial soft tissue ulceration with osteomyelitis of the adjacent   hallux sesamoids.  Dorsal soft tissue ulceration at the first interspace with osteomyelitis   at the proximal lateral aspect of the first proximal phalanx.  Soft tissue ulceration with osteomyelitis within the adjacent lateral   aspect of the fifth metatarsal head.    < end of copied text >    Results Reviewed:   Imaging Personally Reviewed:  Electrocardiogram Personally Reviewed:    COORDINATION OF CARE:  Care Discussed with Consultants/Other Providers [Y/N]:  Prior or Outpatient Records Reviewed [Y/N]: PROGRESS NOTE:   Authored By: Alfonzo Patterson MD   Pager: [phone], [phone]    Patient is a 68y old  Male who presents with a chief complaint of Foot infection (2019 10:17)    OVERNIGHT EVENTS: No acute events overnight.    SUBJECTIVE: Pt seen and examined at bedside this morning. Reports that he feels well this morning. Continues to endorse dull pain in his right foot that is worse when he stands on it or walks. Denies any N/V, abd pain, SOB, CP or fever.     ADDITIONAL REVIEW OF SYSTEMS:    MEDICATIONS  (STANDING):  amLODIPine   Tablet 10 milliGRAM(s) Oral daily  atorvastatin 20 milliGRAM(s) Oral at bedtime  Dakins Solution - 1/4 Strength 1 Application(s) Topical daily  dextrose 5%. 1000 milliLiter(s) (50 mL/Hr) IV Continuous <Continuous>  hydrochlorothiazide 25 milliGRAM(s) Oral daily  influenza   Vaccine 0.5 milliLiter(s) IntraMuscular once  insulin glargine Injectable (LANTUS) 4.5 Unit(s) SubCutaneous at bedtime  insulin lispro (HumaLOG) corrective regimen sliding scale   SubCutaneous at bedtime  insulin lispro Injectable (HumaLOG) 6 Unit(s) SubCutaneous three times a day before meals  isosorbide   mononitrate ER Tablet (IMDUR) 30 milliGRAM(s) Oral daily  lactated ringers. 1000 milliLiter(s) (60 mL/Hr) IV Continuous <Continuous>  lisinopril 40 milliGRAM(s) Oral daily  metoprolol tartrate 50 milliGRAM(s) Oral two times a day    MEDICATIONS  (PRN):  glucagon  Injectable 1 milliGRAM(s) IntraMuscular once PRN Glucose LESS THAN 70 milligrams/deciliter  hydrALAZINE 10 milliGRAM(s) Oral every 8 hours PRN Systolic blood pressure >160    CAPILLARY BLOOD GLUCOSE    POCT Blood Glucose.: 111 mg/dL (2019 21:43)  POCT Blood Glucose.: 138 mg/dL (2019 18:17)  POCT Blood Glucose.: 127 mg/dL (2019 16:26)  POCT Blood Glucose.: 151 mg/dL (2019 13:12)  POCT Blood Glucose.: 206 mg/dL (2019 07:38)    I&O's Summary    2019 07:01  -  2019 07:00  --------------------------------------------------------  IN: 320 mL / OUT: 600 mL / NET: -280 mL    PHYSICAL EXAM:  Vital Signs Last 24 Hrs  T(C): 36.7 (2019 05:02), Max: 36.8 (2019 22:00)  T(F): 98 (2019 05:02), Max: 98.2 (2019 22:00)  HR: 69 (2019 05:) (61 - 69)  BP: 165/80 (2019 05:) (146/59 - 187/77)  BP(mean): 102 (2019 22:00) (96 - 110)  RR: 18 (2019 05:) (16 - 18)  SpO2: 97% (2019 05:02) (96% - 100%)    GENERAL: NAD, well-developed, appears comfortable  HEENT: NC/AT,   NECK: Supple, No JVD  CHEST/LUNG: Clear to auscultation bilaterally; No rales, rhonchi, wheezing, or rubs  CARD: Regular rate and rhythm; No murmurs, rubs, or gallops. No LE edema  ABDOMEN: Soft, Nontender, Nondistended; Bowel sounds present  EXTREMITIES:  b/l right foot with dressing present, C/D/I. Left foot with dry healed wound at site of previous debridement. Old abrasion over left anterior leg covered with dressings, dressings C/D/I.  SKIN: No rashes or lesions  NEURO: AOx3, moving all extremities    LABS:                        11.0   6.60  )-----------( 192      ( 2019 03:58 )             31.7     11-20    136  |  102  |  18  ----------------------------<  292<H>  4.4   |  24  |  1.00    Ca    9.1      2019 03:58  Phos  3.1     11-20  Mg     2.0     11-20    PT/INR - ( 2019 03:58 )   PT: 11.6 sec;   INR: 1.02 ratio      PTT - ( 2019 03:58 )  PTT:28.0 sec    Urinalysis Basic - ( 2019 22:47 )    Color: Light Yellow / Appearance: Clear / S.024 / pH: x  Gluc: x / Ketone: Negative  / Bili: Negative / Urobili: Negative   Blood: x / Protein: 30 mg/dL / Nitrite: Negative   Leuk Esterase: Negative / RBC: 1 /hpf / WBC 1 /HPF   Sq Epi: x / Non Sq Epi: 0 /hpf / Bacteria: Negative    RADIOLOGY & ADDITIONAL TESTS:  < from: MR Foot No Cont, Right (11.15.19 @ 23:27) >  Impression:  Plantar medial soft tissue ulceration with osteomyelitis of the adjacent   hallux sesamoids.  Dorsal soft tissue ulceration at the first interspace with osteomyelitis   at the proximal lateral aspect of the first proximal phalanx.  Soft tissue ulceration with osteomyelitis within the adjacent lateral   aspect of the fifth metatarsal head.    < end of copied text >    Results Reviewed:   Imaging Personally Reviewed:  Electrocardiogram Personally Reviewed:    COORDINATION OF CARE:  Care Discussed with Consultants/Other Providers [Y/N]:  Prior or Outpatient Records Reviewed [Y/N]:

## 2019-11-21 NOTE — PROGRESS NOTE ADULT - SUBJECTIVE AND OBJECTIVE BOX
Podiatry pager #: [phone] ([place])/ [phone] ([facility])    Patient is a 68y old  Male who presents with a chief complaint of Foot infection (2019 07:32)       INTERVAL HPI/OVERNIGHT EVENTS:  Patient seen and evaluated at bedside.  Pt is resting comfortable in NAD. Denies N/V/F/C.     Allergies    No Known Allergies    Intolerances    ciprofloxacin (Vomiting)      Vital Signs Last 24 Hrs  T(C): 36.6 (2019 09:36), Max: 36.8 (2019 22:00)  T(F): 97.9 (2019 09:36), Max: 98.2 (2019 22:00)  HR: 72 (2019 09:36) (61 - 72)  BP: 163/73 (2019 09:36) (146/59 - 187/77)  BP(mean): 102 (2019 22:00) (96 - 110)  RR: 18 (2019 09:36) (16 - 18)  SpO2: 97% (2019 09:36) (96% - 100%)    LABS:                        11.0   6.60  )-----------( 192      ( 2019 03:58 )             31.7     11-20    136  |  102  |  18  ----------------------------<  292<H>  4.4   |  24  |  1.00    Ca    9.1      2019 03:58  Phos  3.1     11-20  Mg     2.0     11-20      PT/INR - ( 2019 03:58 )   PT: 11.6 sec;   INR: 1.02 ratio         PTT - ( 2019 03:58 )  PTT:28.0 sec  Urinalysis Basic - ( 2019 22:47 )    Color: Light Yellow / Appearance: Clear / S.024 / pH: x  Gluc: x / Ketone: Negative  / Bili: Negative / Urobili: Negative   Blood: x / Protein: 30 mg/dL / Nitrite: Negative   Leuk Esterase: Negative / RBC: 1 /hpf / WBC 1 /HPF   Sq Epi: x / Non Sq Epi: 0 /hpf / Bacteria: Negative      CAPILLARY BLOOD GLUCOSE      POCT Blood Glucose.: 175 mg/dL (2019 08:28)  POCT Blood Glucose.: 111 mg/dL (2019 21:43)  POCT Blood Glucose.: 138 mg/dL (2019 18:17)  POCT Blood Glucose.: 127 mg/dL (2019 16:26)  POCT Blood Glucose.: 151 mg/dL (2019 13:12)      Lower Extremity Physical Exam:  Vascular: DP/PT 0/4, B/L, CFT <3 seconds B/L, Temperature gradient warm to cool B/L.   Neuro: Epicritic sensation grossly diminished to level of ankleB/L.  Skin:  Wound #1: R foot 1st interspace wound, 2.0x2.0cm, depth to 1st and 2nd met capsule, wound bed necrotic, serous drainage, malodorous, periwound macerated, etiology 2/2 interdigital maceration/ arterial insufficiency     Wound #2: R foot submetatarsal 1 wound, 2.5x1.5cm, wound bed fibrogranular, no active drainage, no malodor, periwound macerated, etiology 2/2 pressure     Wound #3: R foot lateral border of foot stable eschar, 4.0x1.0cm, no active drainage, no malodor, periwound erythematous, etiology 2/2 pressure

## 2019-11-21 NOTE — DIETITIAN INITIAL EVALUATION ADULT. - PROBLEM SELECTOR PLAN 2
A1C 10.6 in 2016, will obtain repeat  start on ISS, given that diabetes is poorly controlled will also start on 15 lantus for now, can add 5u premeal as well

## 2019-11-21 NOTE — PROGRESS NOTE ADULT - PROBLEM SELECTOR PLAN 8
Pt is RCRI class III risk (1 point each due to ischemic heart disease, preop requirement for insulin) with 10.1% risk of death, MI or cardiac arrest within 30 days of procedure. Pt is currently moderate to high risk for a moderate risk procedure (RLE angiogram with possible intervention). Pt is currently without any CP, SOB, or fever. He has good functional status at home and is independent in all ADLs. EKG performed during this admission showed right bundle branch block, however unclear if new, recent ischemic workup on 11/16 negative. No history of heart failure, TTE from 2016 showing low-normal LV systolic fxn with EF of 55%. Patient is currently medically optimized for this procedure. Pt is RCRI class III risk (1 point each due to ischemic heart disease, preop requirement for insulin) with 10.1% risk of death, MI or cardiac arrest within 30 days of procedure. Pt is currently moderate to high risk for a moderate-high risk procedure (LE vascular bypass/revascularization). Pt is currently without any CP, SOB, or fever. He has good functional status at home and is independent in all ADLs. EKG performed during this admission showed right bundle branch block, however unclear if new, recent ischemic workup on 11/16 negative. No history of heart failure, TTE from 2016 showing low-normal LV systolic fxn with EF of 55%. Pt with recent LE angiogram performed with no anesthetic or surgical complications. Patient is currently medically optimized for this procedure.

## 2019-11-21 NOTE — DIETITIAN INITIAL EVALUATION ADULT. - ADD RECOMMEND
1) continue Consistent Carbohydrate diet 2) Multivitamin and vitamin C to aid in wound healing 3) Monitor po intake, weights, skin, and labs

## 2019-11-21 NOTE — PROGRESS NOTE ADULT - PROBLEM SELECTOR PLAN 5
BP stable  -holding ACE/HCTZ given recent bump in SCr, if BP uptrends can uptitrate home Imdur currently 30mg daily  -c/w home Metoprolol, amlodipine 10mg  -monitor bp

## 2019-11-21 NOTE — DIETITIAN INITIAL EVALUATION ADULT. - PERTINENT LABORATORY DATA
CAPILLARY BLOOD GLUCOSE  POCT Blood Glucose.: 175 mg/dL (21 Nov 2019 08:28)  POCT Blood Glucose.: 111 mg/dL (20 Nov 2019 21:43)  POCT Blood Glucose.: 138 mg/dL (20 Nov 2019 18:17)  POCT Blood Glucose.: 127 mg/dL (20 Nov 2019 16:26)  POCT Blood Glucose.: 151 mg/dL (20 Nov 2019 13:12)

## 2019-11-21 NOTE — PROGRESS NOTE ADULT - ASSESSMENT
68M w/ R foot chronic non-healing wounds  -Pt seen and evaluated  -Right foot 1st interspace wound w/ fibronecrotic base and malodorous. Remaining wounds are stable w/ no signs of infection. Well-adhered eschar on lateral aspect of 5th met.  -Wound was flushed w/ saline and dressed w/ dakins and dry sterile dressing  -MRI showing osteo of sesamoids, 1st proximal phalanx, 5th met head  -HUANG/TBI poor  -s/p RLE diagnostic angiogram, pt will now need a bypass per vasc  -Will plan for wound debridement and graft application once pt is optimized by vasc  -Discussed w/ attending

## 2019-11-21 NOTE — DIETITIAN INITIAL EVALUATION ADULT. - PROBLEM SELECTOR PLAN 1
-Podiatry Dressed wounds with 1st interspace wound w/ iodoform packing, submet 1 wound w/ aquacel & DSD  -s/p IV vanc/cefepime; will initiate empiric IV Abx coverage w/ IV Clindamycin & Zosyn as per podiatry recs  - f/u HUANG/PVR to evaluate blood supply  - Pod rec vascular consult to evaluate vascular supply to lower extremity given history of angiogram on contralateral limb   - No plan for acute podiatric surgical intervention  - trend ESR  - f/u wound culture; check blood culture

## 2019-11-21 NOTE — PROGRESS NOTE ADULT - ASSESSMENT
67 yo male PMhx HTN, T2DM, CAD s/p stents, PAD, HLD presents admitted for diabetic wound infection, confirmed osteo via imaging, with course c/b DENISSE likely 2/2 nephrotoxic medications currently on IV abx awaiting vascular studies and intervention for revascularization. 69 yo male PMhx HTN, T2DM, CAD s/p stents, PAD, HLD presents admitted for diabetic wound infection, confirmed osteo via imaging, with course c/b DENISSE likely 2/2 nephrotoxic medications currently on IV abx awaiting vascular studies and intervention for revascularization and podiatric debridement.

## 2019-11-21 NOTE — DIETITIAN INITIAL EVALUATION ADULT. - OTHER INFO
Pt reported good appetite and po intake PTA, followed a low sodium diet and watched his sugar intake, ate 3 meals per day. Checked his FS 2x/d; am and pm. BG ranged 140-200 mg/dL, reportedly compliant with DM medication; metformin. Denied any food allergies, chewing or swallowing difficulties. Denied taking any micronutrient or oral supplements PTA. Denied any wt changes in the past 3-6 months, reported .4 kg/234#, admit wt 106.41 kg/234#, wt stable. Edema 2+ right foot. Noted with four wounds; right first toe, right submetatarsal, right lateral foot, left anterior shin. Provided education on Consistent Carbohydrate diet. Pt was receptive, engaged, and appreciated education. Handout provided

## 2019-11-21 NOTE — PROGRESS NOTE ADULT - ATTENDING COMMENTS
pt seen and examined.  above plan discussed on rounds today.  In addition,  pt with severe peripheral vascular disease. s/p angiogram with vascular, unable to stent any lesions an now they are planning a bypass procedure for revascularization of his foot. patient is hoping to avoid amputation and treat his wound with bedridement and IV abx after revascularization.

## 2019-11-22 PROCEDURE — 99233 SBSQ HOSP IP/OBS HIGH 50: CPT | Mod: GC

## 2019-11-22 PROCEDURE — 93926 LOWER EXTREMITY STUDY: CPT | Mod: 26,RT

## 2019-11-22 PROCEDURE — 99232 SBSQ HOSP IP/OBS MODERATE 35: CPT

## 2019-11-22 PROCEDURE — 76978 US TRGT DYN MBUBB 1ST LES: CPT | Mod: 26

## 2019-11-22 PROCEDURE — 93970 EXTREMITY STUDY: CPT | Mod: 26,59

## 2019-11-22 PROCEDURE — 93971 EXTREMITY STUDY: CPT | Mod: 26,LT

## 2019-11-22 RX ADMIN — INSULIN GLARGINE 18 UNIT(S): 100 INJECTION, SOLUTION SUBCUTANEOUS at 22:27

## 2019-11-22 RX ADMIN — Medication 50 MILLIGRAM(S): at 17:33

## 2019-11-22 RX ADMIN — AMLODIPINE BESYLATE 10 MILLIGRAM(S): 2.5 TABLET ORAL at 06:42

## 2019-11-22 RX ADMIN — Medication 1 APPLICATION(S): at 13:11

## 2019-11-22 RX ADMIN — Medication 2: at 13:08

## 2019-11-22 RX ADMIN — PIPERACILLIN AND TAZOBACTAM 25 GRAM(S): 4; .5 INJECTION, POWDER, LYOPHILIZED, FOR SOLUTION INTRAVENOUS at 22:28

## 2019-11-22 RX ADMIN — Medication 50 MILLIGRAM(S): at 06:42

## 2019-11-22 RX ADMIN — Medication 81 MILLIGRAM(S): at 13:10

## 2019-11-22 RX ADMIN — Medication 2: at 17:32

## 2019-11-22 RX ADMIN — PIPERACILLIN AND TAZOBACTAM 25 GRAM(S): 4; .5 INJECTION, POWDER, LYOPHILIZED, FOR SOLUTION INTRAVENOUS at 06:42

## 2019-11-22 RX ADMIN — Medication 2: at 08:31

## 2019-11-22 RX ADMIN — Medication 6 UNIT(S): at 17:33

## 2019-11-22 RX ADMIN — Medication 25 MILLIGRAM(S): at 06:42

## 2019-11-22 RX ADMIN — ATORVASTATIN CALCIUM 20 MILLIGRAM(S): 80 TABLET, FILM COATED ORAL at 22:27

## 2019-11-22 RX ADMIN — CLOPIDOGREL BISULFATE 75 MILLIGRAM(S): 75 TABLET, FILM COATED ORAL at 13:10

## 2019-11-22 RX ADMIN — ISOSORBIDE MONONITRATE 30 MILLIGRAM(S): 60 TABLET, EXTENDED RELEASE ORAL at 13:09

## 2019-11-22 RX ADMIN — LISINOPRIL 40 MILLIGRAM(S): 2.5 TABLET ORAL at 06:42

## 2019-11-22 RX ADMIN — PIPERACILLIN AND TAZOBACTAM 25 GRAM(S): 4; .5 INJECTION, POWDER, LYOPHILIZED, FOR SOLUTION INTRAVENOUS at 13:10

## 2019-11-22 RX ADMIN — Medication 6 UNIT(S): at 08:32

## 2019-11-22 RX ADMIN — Medication 6 UNIT(S): at 13:09

## 2019-11-22 NOTE — PROGRESS NOTE ADULT - SUBJECTIVE AND OBJECTIVE BOX
Follow Up:      Interval History/ROS:     Allergies  No Known Allergies        ANTIMICROBIALS:  piperacillin/tazobactam IVPB.. 4.5 every 8 hours      OTHER MEDS:  amLODIPine   Tablet 10 milliGRAM(s) Oral daily  aspirin  chewable 81 milliGRAM(s) Oral daily  atorvastatin 20 milliGRAM(s) Oral at bedtime  clopidogrel Tablet 75 milliGRAM(s) Oral daily  Dakins Solution - 1/4 Strength 1 Application(s) Topical daily  dextrose 5%. 1000 milliLiter(s) IV Continuous <Continuous>  glucagon  Injectable 1 milliGRAM(s) IntraMuscular once PRN  hydrALAZINE 10 milliGRAM(s) Oral every 8 hours PRN  hydrochlorothiazide 25 milliGRAM(s) Oral daily  influenza   Vaccine 0.5 milliLiter(s) IntraMuscular once  insulin glargine Injectable (LANTUS) 18 Unit(s) SubCutaneous at bedtime  insulin lispro (HumaLOG) corrective regimen sliding scale   SubCutaneous at bedtime  insulin lispro (HumaLOG) corrective regimen sliding scale   SubCutaneous three times a day before meals  insulin lispro Injectable (HumaLOG) 6 Unit(s) SubCutaneous three times a day before meals  isosorbide   mononitrate ER Tablet (IMDUR) 30 milliGRAM(s) Oral daily  lactated ringers. 1000 milliLiter(s) IV Continuous <Continuous>  lisinopril 40 milliGRAM(s) Oral daily  metoprolol tartrate 50 milliGRAM(s) Oral two times a day      Vital Signs Last 24 Hrs  T(C): 36.8 (22 Nov 2019 04:52), Max: 36.9 (21 Nov 2019 14:05)  T(F): 98.3 (22 Nov 2019 04:52), Max: 98.5 (21 Nov 2019 14:05)  HR: 69 (22 Nov 2019 04:52) (69 - 74)  BP: 160/69 (22 Nov 2019 04:52) (126/66 - 160/69)  BP(mean): --  RR: 18 (22 Nov 2019 04:52) (18 - 18)  SpO2: 96% (22 Nov 2019 04:52) (95% - 96%)    Physical Exam:  General: NAD, non toxic  Head: atraumatic, normocephalic  Eye: normal sclera and conjunctiva  ENT: no oropharyngeal lesions, no cervical lymphadenopathy   Cardio:  regular rate and rhythm   Respiratory: nonlabored, clear bilaterally, no wheezing  abd: soft, BS +, no tenderness  : no CVAT, no suprapubic tenderness, no  dyer  Musculoskeletal: no joint swelling, no edema  vascular: no phlebitis   Skin: no rash  Neurologic: no focal deficit  psych: normal affect      MICROBIOLOGY:  v          RADIOLOGY:  Images below reviewed personally Follow Up: Foot OM     Interval History/ROS: Afebrile. Feels fine, only has pain when he tries to walk. No diarrhea. Appetite is good.     Allergies  No Known Allergies    ANTIMICROBIALS:  piperacillin/tazobactam IVPB.. 4.5 every 8 hours      OTHER MEDS:  amLODIPine   Tablet 10 milliGRAM(s) Oral daily  aspirin  chewable 81 milliGRAM(s) Oral daily  atorvastatin 20 milliGRAM(s) Oral at bedtime  clopidogrel Tablet 75 milliGRAM(s) Oral daily  Dakins Solution - 1/4 Strength 1 Application(s) Topical daily  dextrose 5%. 1000 milliLiter(s) IV Continuous <Continuous>  glucagon  Injectable 1 milliGRAM(s) IntraMuscular once PRN  hydrALAZINE 10 milliGRAM(s) Oral every 8 hours PRN  hydrochlorothiazide 25 milliGRAM(s) Oral daily  influenza   Vaccine 0.5 milliLiter(s) IntraMuscular once  insulin glargine Injectable (LANTUS) 18 Unit(s) SubCutaneous at bedtime  insulin lispro (HumaLOG) corrective regimen sliding scale   SubCutaneous at bedtime  insulin lispro (HumaLOG) corrective regimen sliding scale   SubCutaneous three times a day before meals  insulin lispro Injectable (HumaLOG) 6 Unit(s) SubCutaneous three times a day before meals  isosorbide   mononitrate ER Tablet (IMDUR) 30 milliGRAM(s) Oral daily  lactated ringers. 1000 milliLiter(s) IV Continuous <Continuous>  lisinopril 40 milliGRAM(s) Oral daily  metoprolol tartrate 50 milliGRAM(s) Oral two times a day      Vital Signs Last 24 Hrs  T(C): 36.8 (22 Nov 2019 04:52), Max: 36.9 (21 Nov 2019 14:05)  T(F): 98.3 (22 Nov 2019 04:52), Max: 98.5 (21 Nov 2019 14:05)  HR: 69 (22 Nov 2019 04:52) (69 - 74)  BP: 160/69 (22 Nov 2019 04:52) (126/66 - 160/69)  BP(mean): --  RR: 18 (22 Nov 2019 04:52) (18 - 18)  SpO2: 96% (22 Nov 2019 04:52) (95% - 96%)    Physical Exam:  General: NAD, non toxic  Head: atraumatic, normocephalic  Eye: normal sclera and conjunctiva  Cardio:  regular rate and rhythm   Respiratory: nonlabored on room air, clear bilaterally, no wheezing  abd: soft, BS +, no tenderness  Musculoskeletal: no joint swelling, no edema  Skin: right foot bandaged   psych: normal affect    MICROBIOLOGY:  Culture - Blood (collected 11-15-19 @ 17:02)  Source: .Blood Blood  Final Report (11-20-19 @ 18:00):    No growth at 5 days.    Culture - Blood (collected 11-15-19 @ 17:02)  Source: .Blood Blood  Final Report (11-20-19 @ 18:00):    No growth at 5 days.    Culture - Abscess with Gram Stain (11.15.19 @ 03:30)    -  Amikacin: S <=16    -  Aztreonam: S <=4    -  Cefepime: S <=2    -  Ceftazidime: S 4    -  Ciprofloxacin: S <=1    -  Gentamicin: S 4    -  Imipenem: S <=1    -  Levofloxacin: S <=2    -  Meropenem: S <=1    -  Piperacillin/Tazobactam: S <=8    -  Tobramycin: S <=2    Specimen Source: .Abscess Leg - Right    Culture Results:   Moderate Pseudomonas aeruginosa  Moderate Streptococcus agalactiae (Group B) isolated  Group B streptococci are susceptible to ampicillin,  penicillin and cefazolin, but may be resistant to  erythromycin and clindamycin.  Recommendations for intrapartum prophylaxis for Group B  streptococci are penicillin or ampicillin.    Organism Identification: Pseudomonas aeruginosa    Organism: Pseudomonas aeruginosa    Method Type: KRYSTLE    RADIOLOGY:  Images below reviewed personally  MR Foot No Cont, Right (11.15.19 @ 23:27)   Plantar medial soft tissue ulceration with osteomyelitis of the adjacent hallux sesamoids.  Dorsal soft tissue ulceration at the first interspace with osteomyelitis at the proximal lateral aspect of the first proximal phalanx.  Soft tissue ulceration with osteomyelitis within the adjacent lateral aspect of the fifth metatarsal head.

## 2019-11-22 NOTE — PROGRESS NOTE ADULT - SUBJECTIVE AND OBJECTIVE BOX
Surgery Progress Note    S: Patient seen and examined. No acute events overnight. Dressing changed this AM. Tolerating PO. Ambulating.     O:  Vital Signs Last 24 Hrs  T(C): 36.8 (22 Nov 2019 04:52), Max: 36.9 (21 Nov 2019 14:05)  T(F): 98.3 (22 Nov 2019 04:52), Max: 98.5 (21 Nov 2019 14:05)  HR: 69 (22 Nov 2019 04:52) (69 - 74)  BP: 160/69 (22 Nov 2019 04:52) (126/66 - 163/73)  BP(mean): --  RR: 18 (22 Nov 2019 04:52) (18 - 18)  SpO2: 96% (22 Nov 2019 04:52) (95% - 97%)    I&O's Detail    21 Nov 2019 07:01  -  22 Nov 2019 07:00  --------------------------------------------------------  IN:    IV PiggyBack: 300 mL    Oral Fluid: 720 mL  Total IN: 1020 mL    OUT:    Voided: 1350 mL  Total OUT: 1350 mL    Total NET: -330 mL          MEDICATIONS  (STANDING):  amLODIPine   Tablet 10 milliGRAM(s) Oral daily  aspirin  chewable 81 milliGRAM(s) Oral daily  atorvastatin 20 milliGRAM(s) Oral at bedtime  clopidogrel Tablet 75 milliGRAM(s) Oral daily  Dakins Solution - 1/4 Strength 1 Application(s) Topical daily  dextrose 5%. 1000 milliLiter(s) (50 mL/Hr) IV Continuous <Continuous>  hydrochlorothiazide 25 milliGRAM(s) Oral daily  influenza   Vaccine 0.5 milliLiter(s) IntraMuscular once  insulin glargine Injectable (LANTUS) 18 Unit(s) SubCutaneous at bedtime  insulin lispro (HumaLOG) corrective regimen sliding scale   SubCutaneous at bedtime  insulin lispro (HumaLOG) corrective regimen sliding scale   SubCutaneous three times a day before meals  insulin lispro Injectable (HumaLOG) 6 Unit(s) SubCutaneous three times a day before meals  isosorbide   mononitrate ER Tablet (IMDUR) 30 milliGRAM(s) Oral daily  lactated ringers. 1000 milliLiter(s) (60 mL/Hr) IV Continuous <Continuous>  lisinopril 40 milliGRAM(s) Oral daily  metoprolol tartrate 50 milliGRAM(s) Oral two times a day  piperacillin/tazobactam IVPB.. 4.5 Gram(s) IV Intermittent every 8 hours    MEDICATIONS  (PRN):  glucagon  Injectable 1 milliGRAM(s) IntraMuscular once PRN Glucose LESS THAN 70 milligrams/deciliter  hydrALAZINE 10 milliGRAM(s) Oral every 8 hours PRN Systolic blood pressure >160        PHYSICAL EXAM:   General: NAD, Lying in bed comfortably  Neuro: A+Ox3  HEENT: NC/AT, EOMI  Neck: Soft, supple  Cardio: RRR, nml S1/S2  Resp: Good effort, CTA b/l  GI/Abd: Soft, NT/ND, no rebound/guarding, no masses palpated  Vascular: All 4 extremities warm. palpable femoral pulse b/l.   RLE: doppler DP/PT, warm to touch. open wound between 1st and 2nd toe and on dorsal surface beneath 1st toe. no purulent drainage.   LLE: doppler DP/PT, warm  Skin: Intact, no breakdown  Lymphatic/Nodes: No palpable lymphadenopathy  Musculoskeletal: All 4 extremities moving spontaneously, no limitations

## 2019-11-22 NOTE — PROGRESS NOTE ADULT - ASSESSMENT
68M with chronic, non-healing R foot wounds secondary to PVD.     PLAN:    - s/p angiogram   - planning for below knee pop to PT bypass   - Wound care as per podiatry  - ID recs appreciated     Vascular Surgery   [phone]

## 2019-11-22 NOTE — PROGRESS NOTE ADULT - SUBJECTIVE AND OBJECTIVE BOX
PROGRESS NOTE:   Authored by Dr. Adithya Leung MD, PAGER: [phone] [facility], [id number] [address]    Patient is a 68y old  Male who presents with a chief complaint of Foot infection (21 Nov 2019 12:38)      SUBJECTIVE / OVERNIGHT EVENTS:    ADDITIONAL REVIEW OF SYSTEMS:    MEDICATIONS  (STANDING):  amLODIPine   Tablet 10 milliGRAM(s) Oral daily  aspirin  chewable 81 milliGRAM(s) Oral daily  atorvastatin 20 milliGRAM(s) Oral at bedtime  clopidogrel Tablet 75 milliGRAM(s) Oral daily  Dakins Solution - 1/4 Strength 1 Application(s) Topical daily  dextrose 5%. 1000 milliLiter(s) (50 mL/Hr) IV Continuous <Continuous>  hydrochlorothiazide 25 milliGRAM(s) Oral daily  influenza   Vaccine 0.5 milliLiter(s) IntraMuscular once  insulin glargine Injectable (LANTUS) 18 Unit(s) SubCutaneous at bedtime  insulin lispro (HumaLOG) corrective regimen sliding scale   SubCutaneous at bedtime  insulin lispro (HumaLOG) corrective regimen sliding scale   SubCutaneous three times a day before meals  insulin lispro Injectable (HumaLOG) 6 Unit(s) SubCutaneous three times a day before meals  isosorbide   mononitrate ER Tablet (IMDUR) 30 milliGRAM(s) Oral daily  lactated ringers. 1000 milliLiter(s) (60 mL/Hr) IV Continuous <Continuous>  lisinopril 40 milliGRAM(s) Oral daily  metoprolol tartrate 50 milliGRAM(s) Oral two times a day  piperacillin/tazobactam IVPB.. 4.5 Gram(s) IV Intermittent every 8 hours    MEDICATIONS  (PRN):  glucagon  Injectable 1 milliGRAM(s) IntraMuscular once PRN Glucose LESS THAN 70 milligrams/deciliter  hydrALAZINE 10 milliGRAM(s) Oral every 8 hours PRN Systolic blood pressure >160      CAPILLARY BLOOD GLUCOSE      POCT Blood Glucose.: 182 mg/dL (21 Nov 2019 22:33)  POCT Blood Glucose.: 211 mg/dL (21 Nov 2019 17:22)  POCT Blood Glucose.: 217 mg/dL (21 Nov 2019 12:53)  POCT Blood Glucose.: 175 mg/dL (21 Nov 2019 08:28)    I&O's Summary    21 Nov 2019 07:01  -  22 Nov 2019 07:00  --------------------------------------------------------  IN: 1020 mL / OUT: 1350 mL / NET: -330 mL        PHYSICAL EXAM:  Vital Signs Last 24 Hrs  T(C): 36.8 (22 Nov 2019 04:52), Max: 36.9 (21 Nov 2019 14:05)  T(F): 98.3 (22 Nov 2019 04:52), Max: 98.5 (21 Nov 2019 14:05)  HR: 69 (22 Nov 2019 04:52) (62 - 74)  BP: 160/69 (22 Nov 2019 04:52) (126/66 - 163/73)  BP(mean): --  RR: 18 (22 Nov 2019 04:52) (18 - 18)  SpO2: 96% (22 Nov 2019 04:52) (95% - 97%)    CONSTITUTIONAL: NAD, well-developed  RESPIRATORY: Normal respiratory effort; lungs are clear to auscultation bilaterally  CARDIOVASCULAR: Regular rate and rhythm, normal S1 and S2, no murmur/rub/gallop; No lower extremity edema; Peripheral pulses are 2+ bilaterally  ABDOMEN: Nontender to palpation, normoactive bowel sounds, no rebound/guarding; No hepatosplenomegaly  MUSCLOSKELETAL: no clubbing or cyanosis of digits; no joint swelling or tenderness to palpation  PSYCH: A+O to person, place, and time; affect appropriate    LABS:                      RADIOLOGY & ADDITIONAL TESTS:  Results Reviewed:   Imaging Personally Reviewed:  Electrocardiogram Personally Reviewed:    COORDINATION OF CARE:  Care Discussed with Consultants/Other Providers [Y/N]:  Prior or Outpatient Records Reviewed [Y/N]: PROGRESS NOTE:   Authored by Dr. Adithya Leung MD, PAGER: [phone] [facility], [id number] [address]    Patient is a 68y old  Male who presents with a chief complaint of Foot infection (21 Nov 2019 12:38)      SUBJECTIVE / OVERNIGHT EVENTS: No acute overnight events. Pt reported no  headaches, F/C, dizziness, syncope, CP/SOB, N/V, abdominal pain, dysuria, changes in BM, peripheral swelling, skin changes, new joint aches. Tolerating PO.       ADDITIONAL REVIEW OF SYSTEMS:    MEDICATIONS  (STANDING):  amLODIPine   Tablet 10 milliGRAM(s) Oral daily  aspirin  chewable 81 milliGRAM(s) Oral daily  atorvastatin 20 milliGRAM(s) Oral at bedtime  clopidogrel Tablet 75 milliGRAM(s) Oral daily  Dakins Solution - 1/4 Strength 1 Application(s) Topical daily  dextrose 5%. 1000 milliLiter(s) (50 mL/Hr) IV Continuous <Continuous>  hydrochlorothiazide 25 milliGRAM(s) Oral daily  influenza   Vaccine 0.5 milliLiter(s) IntraMuscular once  insulin glargine Injectable (LANTUS) 18 Unit(s) SubCutaneous at bedtime  insulin lispro (HumaLOG) corrective regimen sliding scale   SubCutaneous at bedtime  insulin lispro (HumaLOG) corrective regimen sliding scale   SubCutaneous three times a day before meals  insulin lispro Injectable (HumaLOG) 6 Unit(s) SubCutaneous three times a day before meals  isosorbide   mononitrate ER Tablet (IMDUR) 30 milliGRAM(s) Oral daily  lactated ringers. 1000 milliLiter(s) (60 mL/Hr) IV Continuous <Continuous>  lisinopril 40 milliGRAM(s) Oral daily  metoprolol tartrate 50 milliGRAM(s) Oral two times a day  piperacillin/tazobactam IVPB.. 4.5 Gram(s) IV Intermittent every 8 hours    MEDICATIONS  (PRN):  glucagon  Injectable 1 milliGRAM(s) IntraMuscular once PRN Glucose LESS THAN 70 milligrams/deciliter  hydrALAZINE 10 milliGRAM(s) Oral every 8 hours PRN Systolic blood pressure >160      CAPILLARY BLOOD GLUCOSE      POCT Blood Glucose.: 182 mg/dL (21 Nov 2019 22:33)  POCT Blood Glucose.: 211 mg/dL (21 Nov 2019 17:22)  POCT Blood Glucose.: 217 mg/dL (21 Nov 2019 12:53)  POCT Blood Glucose.: 175 mg/dL (21 Nov 2019 08:28)    I&O's Summary    21 Nov 2019 07:01  -  22 Nov 2019 07:00  --------------------------------------------------------  IN: 1020 mL / OUT: 1350 mL / NET: -330 mL        PHYSICAL EXAM:  Vital Signs Last 24 Hrs  T(C): 36.8 (22 Nov 2019 04:52), Max: 36.9 (21 Nov 2019 14:05)  T(F): 98.3 (22 Nov 2019 04:52), Max: 98.5 (21 Nov 2019 14:05)  HR: 69 (22 Nov 2019 04:52) (62 - 74)  BP: 160/69 (22 Nov 2019 04:52) (126/66 - 163/73)  BP(mean): --  RR: 18 (22 Nov 2019 04:52) (18 - 18)  SpO2: 96% (22 Nov 2019 04:52) (95% - 97%)    GENERAL: NAD, well-developed, appears comfortable  HEENT: NC/AT,   NECK: Supple, No JVD  CHEST/LUNG: Clear to auscultation bilaterally; No rales, rhonchi, wheezing, or rubs  CARD: Regular rate and rhythm; No murmurs, rubs, or gallops. No LE edema  ABDOMEN: Soft, Nontender, Nondistended; Bowel sounds present  EXTREMITIES:  b/l right foot with dressing present, C/D/I. Left foot with dry healed wound at site of previous debridement. Old abrasion over left anterior leg covered with dressings, dressings C/D/I.  SKIN: No rashes or lesions  NEURO: AOx3, moving all extremities    LABS:                      RADIOLOGY & ADDITIONAL TESTS:  Results Reviewed:   Imaging Personally Reviewed:  Electrocardiogram Personally Reviewed:    COORDINATION OF CARE:  Care Discussed with Consultants/Other Providers [Y/N]:  Prior or Outpatient Records Reviewed [Y/N]:

## 2019-11-22 NOTE — PROGRESS NOTE ADULT - ATTENDING COMMENTS
pt seen and examined.  above plan discussed on rounds today.  In addition,  pt with severe peripheral vascular disease. s/p angiogram with vascular, unable to stent any lesions an now they are planning a bypass procedure for revascularization of his foot on monday. patient is hoping to avoid amputation and treat his wound with bedridement and IV abx after revascularization. will plan for 6 wks of IV abxs.

## 2019-11-22 NOTE — PROGRESS NOTE ADULT - ASSESSMENT
67 yo male PMhx HTN, T2DM, CAD s/p stents, PAD, HLD presents admitted for diabetic wound infection, confirmed osteo via imaging, with course c/b DENISSE likely 2/2 nephrotoxic medications currently on IV abx awaiting vascular studies and intervention for revascularization and podiatric debridement.

## 2019-11-22 NOTE — PROGRESS NOTE ADULT - ATTENDING COMMENTS
I have discussed the case with the surgical house staff. I have personally seen, examined, and participated in the care of this patient. I have reviewed all pertinent clinical information.     s/p angiogram with significant tibial disease.    Plan  - right leg bypass, on schedule for Monday  - medical clearance documented in chart  - will need vein mapping  - continue antibiotics

## 2019-11-22 NOTE — PROGRESS NOTE ADULT - ASSESSMENT
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   Cultures growing Pseudomonas (pan sensitive) and GBS.   Afebrile, no leukocytosis, clinically well.     Suggest  -continue Zosyn 4.5GM IV q8h   -anticipate a 6 week course of IV antibiotics, for discharge Cefepime and PO Flagyl may an easier options   -intervention per podiatry/vascular surgery   -diabetic control per primary team     I will be away until at least Wed 11/27, ID service will follow     Darryl Coleman MD   Infectious Disease   Pager [phone]   After 5PM and on weekends please page fellow on call or call [phone]

## 2019-11-22 NOTE — PROGRESS NOTE ADULT - PROBLEM SELECTOR PLAN 2
DENISSE likely prerenal given vomiting on 11/16, poor intake and ACE/HCTZ use  -Cr now improving.  -hold ACE and HCTZ until DENISSE resolved  -c/w gentle IVF hydration, LR at 60cc/hr  -monitor bmps

## 2019-11-22 NOTE — PROGRESS NOTE ADULT - PROBLEM SELECTOR PLAN 8
Pt is RCRI class III risk (1 point each due to ischemic heart disease, preop requirement for insulin) with 10.1% risk of death, MI or cardiac arrest within 30 days of procedure. Pt is currently moderate to high risk for a moderate-high risk procedure (LE vascular bypass/revascularization). Pt is currently without any CP, SOB, or fever. He has good functional status at home and is independent in all ADLs. EKG performed during this admission showed right bundle branch block, however unclear if new, recent ischemic workup on 11/16 negative. No history of heart failure, TTE from 2016 showing low-normal LV systolic fxn with EF of 55%. Pt with recent LE angiogram performed with no anesthetic or surgical complications. Patient is currently medically optimized for this procedure.

## 2019-11-22 NOTE — PROGRESS NOTE ADULT - PROBLEM SELECTOR PLAN 4
s/p LLE intervention in 2017  -HUANG performed, previous HUANG showed PAD b/l  -angiogram performed 11/20, showing significant atherosclerotic disease  -pt being planned for bypass by vascular prior to podiatric surgery  -c/w home ASA/plavix

## 2019-11-23 ENCOUNTER — TRANSCRIPTION ENCOUNTER (OUTPATIENT)
Age: 69
End: 2019-11-23

## 2019-11-23 LAB
ANION GAP SERPL CALC-SCNC: 11 MMOL/L — SIGNIFICANT CHANGE UP (ref 5–17)
BUN SERPL-MCNC: 24 MG/DL — HIGH (ref 7–23)
CALCIUM SERPL-MCNC: 9.4 MG/DL — SIGNIFICANT CHANGE UP (ref 8.4–10.5)
CHLORIDE SERPL-SCNC: 104 MMOL/L — SIGNIFICANT CHANGE UP (ref 96–108)
CO2 SERPL-SCNC: 25 MMOL/L — SIGNIFICANT CHANGE UP (ref 22–31)
CREAT SERPL-MCNC: 1.21 MG/DL — SIGNIFICANT CHANGE UP (ref 0.5–1.3)
GLUCOSE SERPL-MCNC: 134 MG/DL — HIGH (ref 70–99)
HCT VFR BLD CALC: 30.9 % — LOW (ref 39–50)
HGB BLD-MCNC: 10.7 G/DL — LOW (ref 13–17)
MAGNESIUM SERPL-MCNC: 2 MG/DL — SIGNIFICANT CHANGE UP (ref 1.6–2.6)
MCHC RBC-ENTMCNC: 27.6 PG — SIGNIFICANT CHANGE UP (ref 27–34)
MCHC RBC-ENTMCNC: 34.6 GM/DL — SIGNIFICANT CHANGE UP (ref 32–36)
MCV RBC AUTO: 79.6 FL — LOW (ref 80–100)
NRBC # BLD: 0 /100 WBCS — SIGNIFICANT CHANGE UP (ref 0–0)
PHOSPHATE SERPL-MCNC: 3.5 MG/DL — SIGNIFICANT CHANGE UP (ref 2.5–4.5)
PLATELET # BLD AUTO: 196 K/UL — SIGNIFICANT CHANGE UP (ref 150–400)
POTASSIUM SERPL-MCNC: 3.9 MMOL/L — SIGNIFICANT CHANGE UP (ref 3.5–5.3)
POTASSIUM SERPL-SCNC: 3.9 MMOL/L — SIGNIFICANT CHANGE UP (ref 3.5–5.3)
RBC # BLD: 3.88 M/UL — LOW (ref 4.2–5.8)
RBC # FLD: 13.2 % — SIGNIFICANT CHANGE UP (ref 10.3–14.5)
SODIUM SERPL-SCNC: 140 MMOL/L — SIGNIFICANT CHANGE UP (ref 135–145)
WBC # BLD: 7.25 K/UL — SIGNIFICANT CHANGE UP (ref 3.8–10.5)
WBC # FLD AUTO: 7.25 K/UL — SIGNIFICANT CHANGE UP (ref 3.8–10.5)

## 2019-11-23 PROCEDURE — 99233 SBSQ HOSP IP/OBS HIGH 50: CPT | Mod: GC

## 2019-11-23 RX ADMIN — Medication 6 UNIT(S): at 10:08

## 2019-11-23 RX ADMIN — AMLODIPINE BESYLATE 10 MILLIGRAM(S): 2.5 TABLET ORAL at 05:29

## 2019-11-23 RX ADMIN — PIPERACILLIN AND TAZOBACTAM 25 GRAM(S): 4; .5 INJECTION, POWDER, LYOPHILIZED, FOR SOLUTION INTRAVENOUS at 23:34

## 2019-11-23 RX ADMIN — Medication 2: at 13:56

## 2019-11-23 RX ADMIN — Medication 81 MILLIGRAM(S): at 12:36

## 2019-11-23 RX ADMIN — Medication 50 MILLIGRAM(S): at 05:29

## 2019-11-23 RX ADMIN — Medication 4: at 17:40

## 2019-11-23 RX ADMIN — CLOPIDOGREL BISULFATE 75 MILLIGRAM(S): 75 TABLET, FILM COATED ORAL at 12:37

## 2019-11-23 RX ADMIN — Medication 6 UNIT(S): at 13:56

## 2019-11-23 RX ADMIN — Medication 1 APPLICATION(S): at 13:41

## 2019-11-23 RX ADMIN — Medication 6 UNIT(S): at 17:41

## 2019-11-23 RX ADMIN — LISINOPRIL 40 MILLIGRAM(S): 2.5 TABLET ORAL at 05:29

## 2019-11-23 RX ADMIN — Medication 25 MILLIGRAM(S): at 05:29

## 2019-11-23 RX ADMIN — Medication 50 MILLIGRAM(S): at 17:41

## 2019-11-23 RX ADMIN — PIPERACILLIN AND TAZOBACTAM 25 GRAM(S): 4; .5 INJECTION, POWDER, LYOPHILIZED, FOR SOLUTION INTRAVENOUS at 05:28

## 2019-11-23 RX ADMIN — PIPERACILLIN AND TAZOBACTAM 25 GRAM(S): 4; .5 INJECTION, POWDER, LYOPHILIZED, FOR SOLUTION INTRAVENOUS at 15:10

## 2019-11-23 RX ADMIN — ISOSORBIDE MONONITRATE 30 MILLIGRAM(S): 60 TABLET, EXTENDED RELEASE ORAL at 12:37

## 2019-11-23 RX ADMIN — INSULIN GLARGINE 18 UNIT(S): 100 INJECTION, SOLUTION SUBCUTANEOUS at 22:23

## 2019-11-23 RX ADMIN — ATORVASTATIN CALCIUM 20 MILLIGRAM(S): 80 TABLET, FILM COATED ORAL at 22:22

## 2019-11-23 NOTE — DISCHARGE NOTE PROVIDER - NSDCCPCAREPLAN_GEN_ALL_CORE_FT
PRINCIPAL DISCHARGE DIAGNOSIS  Diagnosis: Diabetic foot infection  Assessment and Plan of Treatment: You were seen in the hospital for an infection of your foot. This infection was likely caused by a wound that never properly healed. This impaired healing is likely due to t a combination of factors: including diabetes as well as diminished blood flow through your arteries. It is very important that your keep any wounds you have clean. Please be sure to follow up with your primary care provider and podiatrist to ensure that your feet remain healthy and that your diabetes remains under good control.      SECONDARY DISCHARGE DIAGNOSES  Diagnosis: PAD (peripheral artery disease)  Assessment and Plan of Treatment: You were evaluated by the vascular surgery team for a condition known as peripheral artery disease. In this condition, the arteries in the limbs become narrow and are not able to deliver blood effectively to the extremities. This diminished blood flow impairs healing in the extremities and predisposes them to injury. The vascular surgery team performed an angiogram and bypass to restore blood flow to the affected areas. Please follow up with your PCP to ensure that there are no complications from the procedure. PRINCIPAL DISCHARGE DIAGNOSIS  Diagnosis: Diabetic foot infection  Assessment and Plan of Treatment: Follow up: Please follow up with Dr. Sandhu  within 1 week of discharge from the hospital, please call [phone] for appointment and discuss that you recently were seen in the hospital.  Wound Care: Please leave your dressing clean dry intact until your follow up appointment.  Weight bearing: Please weight bear as tolerated in a surgical shoe.  Antibiotics: Please continue meropenem 1 g every 8 hours, this will be administered to you via a nurse.  End date: 12/26/19.  You will need to follow up with Infectious Disease upon discharge.      SECONDARY DISCHARGE DIAGNOSES  Diagnosis: Uncontrolled type 2 diabetes mellitus with hyperglycemia  Assessment and Plan of Treatment: -test BG AC/HS  -Lantus to 34 units QHS  -Continue Humalog 18 units TID with meals  DISPO PER ENDO: recommend continue basal/bolus at rehab for now.   -wife asked for an endocrinologist close to their home in [place]. Told her that I don't know any [facility] doctors in that immediate area but Dr. Jay Cavazos, [address] [place] [address] [phone] is good.      Diagnosis: PAD (peripheral artery disease)  Assessment and Plan of Treatment: RIGHT LOWER EXTREMITY WOUND CARE: Cover incisions with gauze and paper tape. Sutures/Staples will be removed at follow up office visit.    BATHING: Please do not submerge wound underwater. You may shower and/or sponge bathe.  ACTIVITY: No heavy lifting anything more than 10-15lbs or straining. Otherwise, you may return to your usual level of physical activity. If you are taking narcotic pain medication (such as Percocet), do NOT drive a car, operate machinery or make important decisions.  DIET: Low carb diet  NOTIFY YOUR SURGEON IF: You have any bleeding that does not stop, any pus draining from your wound, any fever (over 100.4 F) or chills, persistent nausea/vomiting with inability to tolerate food or liquids, persistent diarrhea, or if your pain is not controlled on your discharge pain medications.  FOLLOW-UP:  1. Please call to make a follow-up appointment within one week of discharge.   2. Please follow up with your primary care physician in one week regarding your hospitalization. PRINCIPAL DISCHARGE DIAGNOSIS  Diagnosis: Diabetic foot infection  Assessment and Plan of Treatment: Follow up: Please follow up with Dr. Sandhu  within 1 week of discharge from the hospital, please call [phone] for appointment and discuss that you recently were seen in the hospital.  Wound Care: Please leave your dressing clean dry intact until your follow up appointment.  Weight bearing: Please weight bear as tolerated in a surgical shoe.  Antibiotics: Please continue meropenem 1 g every 8 hours, this will be administered to you via a nurse.  End date: 12/26/19.  You will need to follow up with Infectious Disease upon discharge.      SECONDARY DISCHARGE DIAGNOSES  Diagnosis: Uncontrolled type 2 diabetes mellitus with hyperglycemia  Assessment and Plan of Treatment: -test BG AC/HS  -Lantus to 34 units QHS  -Continue Humalog 18 units TID with meals  DISPO PER ENDO: recommend continue basal/bolus at rehab for now.   -wife asked for an endocrinologist close to their home in [place]. Told her that I don't know any [facility] doctors in that immediate area but Dr. Jay Cavazos, [address] [place] [address] [phone] is good.      Diagnosis: PAD (peripheral artery disease)  Assessment and Plan of Treatment: RIGHT LOWER EXTREMITY WOUND CARE: Cover incisions with gauze and paper tape. Sutures/Staples will be removed at follow up office visit.    BATHING: Please do not submerge wound underwater. You may shower and/or sponge bathe.  ACTIVITY: No heavy lifting anything more than 10-15lbs or straining. Otherwise, you may return to your usual level of physical activity. If you are taking narcotic pain medication (such as Percocet), do NOT drive a car, operate machinery or make important decisions.  DIET: Low carb diet  NOTIFY YOUR SURGEON IF: You have any bleeding that does not stop, any pus draining from your wound, any fever (over 100.4 F) or chills, persistent nausea/vomiting with inability to tolerate food or liquids, persistent diarrhea, or if your pain is not controlled on your discharge pain medications.  FOLLOW-UP:  1. Please call to make a follow-up appointment within one week of discharge.   2. Please follow up with your primary care physician in one week regarding your hospitalization. PRINCIPAL DISCHARGE DIAGNOSIS  Diagnosis: Diabetic foot infection  Assessment and Plan of Treatment: Follow up: Please follow up with Dr. Sandhu  within 1 week of discharge from the hospital, please call [phone] for appointment and discuss that you recently were seen in the hospital.  Wound Care: Please leave your dressing clean dry intact until your follow up appointment.  Weight bearing: WBAT to right foot in surgical shoe WITH WEIGHT TO THE HEEL ONLY FOR TRANSFERS! otherwise, non-weight bearing to allow grafts to incorporate.  Antibiotics: Please continue meropenem 1 g every 8 hours, this will be administered to you via a nurse.  End date: 12/26/19.  You will need to follow up with Infectious Disease upon discharge.      SECONDARY DISCHARGE DIAGNOSES  Diagnosis: Uncontrolled type 2 diabetes mellitus with hyperglycemia  Assessment and Plan of Treatment: -test BG AC/HS  -Lantus to 34 units QHS  -Continue Humalog 18 units TID with meals  DISPO PER ENDO: recommend continue basal/bolus at rehab for now.   -wife asked for an endocrinologist close to their home in [place]. Told her that I don't know any [facility] doctors in that immediate area but Dr. Jay Cavazos, [address] [place] [address] [phone] is good.      Diagnosis: PAD (peripheral artery disease)  Assessment and Plan of Treatment: RIGHT LOWER EXTREMITY WOUND CARE: Cover incisions with gauze and paper tape. Sutures/Staples will be removed at follow up office visit.    BATHING: Please do not submerge wound underwater. You may shower and/or sponge bathe.  ACTIVITY: No heavy lifting anything more than 10-15lbs or straining. Otherwise, you may return to your usual level of physical activity. If you are taking narcotic pain medication (such as Percocet), do NOT drive a car, operate machinery or make important decisions.  DIET: Low carb diet  NOTIFY YOUR SURGEON IF: You have any bleeding that does not stop, any pus draining from your wound, any fever (over 100.4 F) or chills, persistent nausea/vomiting with inability to tolerate food or liquids, persistent diarrhea, or if your pain is not controlled on your discharge pain medications.  FOLLOW-UP:  1. Please call to make a follow-up appointment within one week of discharge.   2. Please follow up with your primary care physician in one week regarding your hospitalization.

## 2019-11-23 NOTE — PROGRESS NOTE ADULT - ASSESSMENT
67 yo male PMhx HTN, T2DM, CAD s/p stents, PAD, HLD presents admitted for diabetic wound infection, confirmed osteo via imaging, with course c/b DENISSE likely 2/2 nephrotoxic medications currently on IV abx awaiting vascular studies and intervention for revascularization and podiatric debridement. 69 yo male PMhx HTN, T2DM, CAD s/p stents, PAD, HLD presents admitted for diabetic wound infection, confirmed osteo via imaging, with course c/b DENISSE likely 2/2 nephrotoxic medications currently on IV abx awaiting vascular studies and intervention for revascularization and podiatric debridement.

## 2019-11-23 NOTE — DISCHARGE NOTE PROVIDER - HOSPITAL COURSE
The patient is a 69 y/o M with a history of HTN, T2DM, CAD s/p stents, HLD that originally presented to the hospital on 11/15/19 due to a foot wound that had been present for 5 weeks after a podiatric debridement. The patient was seen in the hospital by podiatry, infectious disease and vascular surgery. He was started on a course of zosyn by ID and continued on it throughout his admission. The patient was evaluated by podiatry however plans for resection/debridement were held off until the patient had his right lower extremity revascularized. Vascular surgery evaluated the patient and found severe peripheral artery disease in his right lower extremity. Vascular surgery completed a right lower extremity vascular bypass on ______ and the patient tolerated the procedure well. After revascularization the patient underwent _______ by podiatry with improvement in pain and wound healing. He to continue a course of _______ for ______ weeks and will require close follow up with podiatry, infectious disease and his primary care provider.        The patient was seen and evaluated and deemed medically stable for discharge. 69 yo male PMhx HTN, T2DM, CAD s/p stents, HLD presents to the ED c/o R foot wound x 5 weeks. The pt reports that he was found to have calluses in the R foot by his podiatrist, who removed the calluses, which caused a wound to form that was worsening. It was associated with malodorous discharge. The pt received regular wound care and bandaging, but it continued to get worse.  He received a 10 day course of Abx, which has not helped resolve his symptoms.The pt reports to experiencing pain when he stands on the foot, but reports that he has been able to carry out his ADLs despite the foot pain. The pt saw Dr. Sandhu (associate of Dr. [place]) in office yesterday for re-eval and was sent to hospital given state of wounds. The pt reports decreased sensation in his foot, but denies fever/chills, weakness,  n/v/d/SOB/CP  or other issues. Pt also has a hx of osteo of the fifth metatarsal head and base of  the fifth proximal phalanx. MRI on 12/30/16 showed a cutaneous ulceration along the lateral aspect of the L fifth MPJ with adjacent osteomyelitis in the fifth metatarsal head and base of  the fifth proximal phalanx. Treated w/ vanc/zosyn and debrided by podiatry. It was recommended that He should continue with local wound care and hyperbaric oxygen therapy per wound care center recommendations. The patient was seen in the hospital by podiatry, infectious disease and vascular surgery. He was started on a course of zosyn by ID and continued on it throughout his admission. The patient was evaluated by podiatry however plans for resection/debridement were held off until the patient had his right lower extremity revascularized. Vascular surgery evaluated the patient and found severe peripheral artery disease in his right lower extremity. Vascular surgery completed a right lower extremity vascular angiogram on 11/20. Pt tolerated procedure well, extubated sent to pacu then floor stable.  planning for below knee pop to PT bypass. On 11/25, pt underwent RLE angio and below knee popliteal artery bypass to posterior tibial artery with reversed saphenous vein. Pt tolerated the procedure well, extubated, sent to pacu stable. Patient transferred from Medicine to Vascular Sx Service. Patient pain controlled with PCA.  Tolerating clears, advanced to reg diet.  After revascularization the patient underwent s/p right foot wounds debridement and application of stravix and bone biopsy (DOS 11/27) by podiatry with improvement in pain and wound healing. Endocrine was consulted for recs.  PTeval: MARIANNA.           He to continue a course of _______ for ______ weeks and will require close follow up with vascular, podiatry, infectious disease and his primary care provider. 67 yo male PMhx HTN, T2DM, CAD s/p stents, HLD presents to the ED c/o R foot wound x 5 weeks. The pt reports that he was found to have calluses in the R foot by his podiatrist, who removed the calluses, which caused a wound to form that was worsening. It was associated with malodorous discharge. The pt received regular wound care and bandaging, but it continued to get worse.  He received a 10 day course of Abx, which has not helped resolve his symptoms.The pt reports to experiencing pain when he stands on the foot, but reports that he has been able to carry out his ADLs despite the foot pain. The pt saw Dr. Sandhu (associate of Dr. [place]) in office yesterday for re-eval and was sent to hospital given state of wounds. The pt reports decreased sensation in his foot, but denies fever/chills, weakness,  n/v/d/SOB/CP  or other issues. Pt also has a hx of osteo of the fifth metatarsal head and base of  the fifth proximal phalanx. MRI on 12/30/16 showed a cutaneous ulceration along the lateral aspect of the L fifth MPJ with adjacent osteomyelitis in the fifth metatarsal head and base of  the fifth proximal phalanx. Treated w/ vanc/zosyn and debrided by podiatry. It was recommended that He should continue with local wound care and hyperbaric oxygen therapy per wound care center recommendations. The patient was seen in the hospital by podiatry, infectious disease and vascular surgery. He was started on a course of zosyn by ID and continued on it throughout his admission. The patient was evaluated by podiatry however plans for resection/debridement were held off until the patient had his right lower extremity revascularized. Vascular surgery evaluated the patient and found severe peripheral artery disease in his right lower extremity. Vascular surgery completed a right lower extremity vascular angiogram on 11/20. Pt tolerated procedure well, extubated sent to pacu then floor stable.  planning for below knee pop to PT bypass. On 11/25, pt underwent RLE angio and below knee popliteal artery bypass to posterior tibial artery with reversed saphenous vein. Pt tolerated the procedure well, extubated, sent to pacu stable. Patient transferred from Medicine to Vascular Sx Service. Patient pain controlled with PCA then transitioned to po pain meds.  Tolerating clears, advanced to reg diet.  After revascularization the patient underwent s/p right foot wounds debridement and application of stravix and bone biopsy (DOS 11/27) by podiatry with improvement in pain and wound healing. Endocrine was consulted for recs.  PTeval: MARIANNA.  PER ID, pt will require iv abx on dc.  Patient had PICC placed on 11/30, confirmed in place via CXR -- Zosyn to Meropenem 1GM IV q8h, complete via PICC line through 12/26/19.   OR bone biopsy pathology negative for osteomyelitis.  At the time of discharge, the patient was hemodynamically stable, was tolerating PO diet, was voiding urine and passing stool, was ambulating, and was comfortable with adequate pain control. The patient was instructed to follow up with vascular, podiatry, infectious disease and his primary care provider. within 1-2 weeks after discharge from the hospital. The patient/family felt comfortable with discharge. The patient was discharged to rehab with iv abx. The patient had no other issues. 67 yo male PMhx HTN, T2DM, CAD s/p stents, HLD presents to the ED c/o R foot wound x 5 weeks. The pt reports that he was found to have calluses in the R foot by his podiatrist, who removed the calluses, which caused a wound to form that was worsening. It was associated with malodorous discharge. The pt received regular wound care and bandaging, but it continued to get worse.  He received a 10 day course of Abx, which has not helped resolve his symptoms.The pt reports to experiencing pain when he stands on the foot, but reports that he has been able to carry out his ADLs despite the foot pain. The pt saw Dr. Sandhu (associate of Dr. [place]) in office yesterday for re-eval and was sent to hospital given state of wounds. The pt reports decreased sensation in his foot, but denies fever/chills, weakness,  n/v/d/SOB/CP  or other issues. Pt also has a hx of osteo of the fifth metatarsal head and base of  the fifth proximal phalanx. MRI on 12/30/16 showed a cutaneous ulceration along the lateral aspect of the L fifth MPJ with adjacent osteomyelitis in the fifth metatarsal head and base of  the fifth proximal phalanx. Treated w/ vanc/zosyn and debrided by podiatry. It was recommended that He should continue with local wound care and hyperbaric oxygen therapy per wound care center recommendations. The patient was seen in the hospital by podiatry, infectious disease and vascular surgery. He was started on a course of zosyn by ID and continued on it throughout his admission. The patient was evaluated by podiatry however plans for resection/debridement were held off until the patient had his right lower extremity revascularized. Vascular surgery evaluated the patient and found severe peripheral artery disease in his right lower extremity. Vascular surgery completed a right lower extremity vascular angiogram on 11/20. Pt tolerated procedure well, extubated sent to pacu then floor stable.  planning for below knee pop to PT bypass. On 11/25, pt underwent RLE angio and below knee popliteal artery bypass to posterior tibial artery with reversed saphenous vein. Pt tolerated the procedure well, extubated, sent to pacu stable. Patient transferred from Medicine to Vascular Sx Service. Patient pain controlled with PCA then transitioned to po pain meds.  Tolerating clears, advanced to reg diet.  After revascularization the patient underwent s/p right foot wounds debridement and application of stravix and bone biopsy (DOS 11/27) by podiatry with improvement in pain and wound healing. Endocrine was consulted for recs.  PTeval: MARIANNA.  PER ID, pt will require iv abx on dc.  Patient had PICC placed on 11/30, confirmed in place via CXR -- Zosyn to Meropenem 1GM IV q8h, complete via PICC line through 12/26/19.   OR bone biopsy pathology negative for osteomyelitis.  Wound care consulted for L shin wound. At the time of discharge, the patient was hemodynamically stable, was tolerating PO diet, was voiding urine and passing stool, was ambulating, and was comfortable with adequate pain control. The patient was instructed to follow up with vascular, podiatry, infectious disease and his primary care provider. within 1-2 weeks after discharge from the hospital. The patient/family felt comfortable with discharge. The patient was discharged to rehab with iv abx. The patient had no other issues.

## 2019-11-23 NOTE — DISCHARGE NOTE PROVIDER - NSDCCPTREATMENT_GEN_ALL_CORE_FT
PRINCIPAL PROCEDURE  Procedure: Angio fluoro peripheral arteries  Findings and Treatment: · Operative Findings	triphasic posterior tibial signal following bypass        SECONDARY PROCEDURE  Procedure: Debridement, wound, foot, with living bi-layer cellular skin substitute application  Findings and Treatment: · Operative Findings	s/p right foot wound debridement w/ stravix graft application x3 and bone biopsy of 1st proximal phalanx  low concern for soft tissue infection

## 2019-11-23 NOTE — PROGRESS NOTE ADULT - SUBJECTIVE AND OBJECTIVE BOX
PROGRESS NOTE:   Authored By: Alfonzo Patterson MD   Pager: [phone], [phone] [phone]    Patient is a 68y old  Male who presents with a chief complaint of Foot infection (22 Nov 2019 10:14)      OVERNIGHT EVENTS: No acute events overnight    SUBJECTIVE: Pt seen and examined at bedside this morning.     ADDITIONAL REVIEW OF SYSTEMS:    MEDICATIONS  (STANDING):  amLODIPine   Tablet 10 milliGRAM(s) Oral daily  aspirin  chewable 81 milliGRAM(s) Oral daily  atorvastatin 20 milliGRAM(s) Oral at bedtime  clopidogrel Tablet 75 milliGRAM(s) Oral daily  Dakins Solution - 1/4 Strength 1 Application(s) Topical daily  dextrose 5%. 1000 milliLiter(s) (50 mL/Hr) IV Continuous <Continuous>  hydrochlorothiazide 25 milliGRAM(s) Oral daily  influenza   Vaccine 0.5 milliLiter(s) IntraMuscular once  insulin glargine Injectable (LANTUS) 18 Unit(s) SubCutaneous at bedtime  insulin lispro (HumaLOG) corrective regimen sliding scale   SubCutaneous at bedtime  insulin lispro (HumaLOG) corrective regimen sliding scale   SubCutaneous three times a day before meals  insulin lispro Injectable (HumaLOG) 6 Unit(s) SubCutaneous three times a day before meals  isosorbide   mononitrate ER Tablet (IMDUR) 30 milliGRAM(s) Oral daily  lactated ringers. 1000 milliLiter(s) (60 mL/Hr) IV Continuous <Continuous>  lisinopril 40 milliGRAM(s) Oral daily  metoprolol tartrate 50 milliGRAM(s) Oral two times a day  piperacillin/tazobactam IVPB.. 4.5 Gram(s) IV Intermittent every 8 hours    MEDICATIONS  (PRN):  glucagon  Injectable 1 milliGRAM(s) IntraMuscular once PRN Glucose LESS THAN 70 milligrams/deciliter  hydrALAZINE 10 milliGRAM(s) Oral every 8 hours PRN Systolic blood pressure >160      CAPILLARY BLOOD GLUCOSE      POCT Blood Glucose.: 153 mg/dL (22 Nov 2019 22:24)  POCT Blood Glucose.: 161 mg/dL (22 Nov 2019 17:30)  POCT Blood Glucose.: 185 mg/dL (22 Nov 2019 13:06)  POCT Blood Glucose.: 174 mg/dL (22 Nov 2019 08:29)    I&O's Summary    21 Nov 2019 07:01  -  22 Nov 2019 07:00  --------------------------------------------------------  IN: 1020 mL / OUT: 1350 mL / NET: -330 mL    22 Nov 2019 07:01  -  23 Nov 2019 06:14  --------------------------------------------------------  IN: 920 mL / OUT: 0 mL / NET: 920 mL        PHYSICAL EXAM:  Vital Signs Last 24 Hrs  T(C): 36.7 (23 Nov 2019 04:32), Max: 36.7 (22 Nov 2019 12:50)  T(F): 98 (23 Nov 2019 04:32), Max: 98 (22 Nov 2019 12:50)  HR: 64 (23 Nov 2019 04:32) (64 - 71)  BP: 151/67 (23 Nov 2019 04:32) (143/66 - 161/74)  BP(mean): --  RR: 18 (23 Nov 2019 04:32) (18 - 18)  SpO2: 96% (23 Nov 2019 04:32) (94% - 97%)    GENERAL: NAD, well-developed, appears comfortable  HEENT: NC/AT,   NECK: Supple, No JVD  CHEST/LUNG: Clear to auscultation bilaterally; No rales, rhonchi, wheezing, or rubs  CARD: Regular rate and rhythm; No murmurs, rubs, or gallops. No LE edema  ABDOMEN: Soft, Nontender, Nondistended; Bowel sounds present  EXTREMITIES:  b/l right foot with dressing present, C/D/I. Left foot with dry healed wound at site of previous debridement. Old abrasion over left anterior leg covered with dressings, dressings C/D/I.  SKIN: No rashes or lesions  NEURO: AOx3, moving all extremities    LABS:    RADIOLOGY & ADDITIONAL TESTS:  < from: MR Foot No Cont, Right (11.15.19 @ 23:27) >  Impression:  Plantar medial soft tissue ulceration with osteomyelitis of the adjacent   hallux sesamoids.  Dorsal soft tissue ulceration at the first interspace with osteomyelitis   at the proximal lateral aspect of the first proximal phalanx.  Soft tissue ulceration with osteomyelitis within the adjacent lateral   aspect of the fifth metatarsal head.    < from: US Lower Extremity Targeted Dynamic Initial Lesion (11.22.19 @ 13:12) >  IMPRESSION:    Markedly reduced right ankle-brachial index of 0.42.    Two-vessel runoff to the right foot, with occlusion of the mid posterior   tibial artery with distal reconstitution.    Severe stenosis of the proximal right anterior tibial artery.    Contrast-enhanced ultrasound demonstrates markedly decreased perfusion to   the soft tissues in the region of the nonhealing ulcer overlying the   plantar aspect of the first metatarsal head.    < end of copied text >    Results Reviewed:   Imaging Personally Reviewed:  Electrocardiogram Personally Reviewed:    COORDINATION OF CARE:  Care Discussed with Consultants/Other Providers [Y/N]:  Prior or Outpatient Records Reviewed [Y/N]: PROGRESS NOTE:   Authored By: Alfonzo Patterson MD   Pager: [phone], [phone]    Patient is a 68y old  Male who presents with a chief complaint of Foot infection (22 Nov 2019 10:14)    OVERNIGHT EVENTS: No acute events overnight    SUBJECTIVE: Pt seen and examined at bedside this morning. States he feels well today. Continues to endorse right foot pain only with ambulation. Tolerating PO. Denies any CP, SOB, fever, n/v or abd pain.    ADDITIONAL REVIEW OF SYSTEMS:    MEDICATIONS  (STANDING):  amLODIPine   Tablet 10 milliGRAM(s) Oral daily  aspirin  chewable 81 milliGRAM(s) Oral daily  atorvastatin 20 milliGRAM(s) Oral at bedtime  clopidogrel Tablet 75 milliGRAM(s) Oral daily  Dakins Solution - 1/4 Strength 1 Application(s) Topical daily  dextrose 5%. 1000 milliLiter(s) (50 mL/Hr) IV Continuous <Continuous>  hydrochlorothiazide 25 milliGRAM(s) Oral daily  influenza   Vaccine 0.5 milliLiter(s) IntraMuscular once  insulin glargine Injectable (LANTUS) 18 Unit(s) SubCutaneous at bedtime  insulin lispro (HumaLOG) corrective regimen sliding scale   SubCutaneous at bedtime  insulin lispro (HumaLOG) corrective regimen sliding scale   SubCutaneous three times a day before meals  insulin lispro Injectable (HumaLOG) 6 Unit(s) SubCutaneous three times a day before meals  isosorbide   mononitrate ER Tablet (IMDUR) 30 milliGRAM(s) Oral daily  lactated ringers. 1000 milliLiter(s) (60 mL/Hr) IV Continuous <Continuous>  lisinopril 40 milliGRAM(s) Oral daily  metoprolol tartrate 50 milliGRAM(s) Oral two times a day  piperacillin/tazobactam IVPB.. 4.5 Gram(s) IV Intermittent every 8 hours    MEDICATIONS  (PRN):  glucagon  Injectable 1 milliGRAM(s) IntraMuscular once PRN Glucose LESS THAN 70 milligrams/deciliter  hydrALAZINE 10 milliGRAM(s) Oral every 8 hours PRN Systolic blood pressure >160    CAPILLARY BLOOD GLUCOSE    POCT Blood Glucose.: 153 mg/dL (22 Nov 2019 22:24)  POCT Blood Glucose.: 161 mg/dL (22 Nov 2019 17:30)  POCT Blood Glucose.: 185 mg/dL (22 Nov 2019 13:06)  POCT Blood Glucose.: 174 mg/dL (22 Nov 2019 08:29)    I&O's Summary    21 Nov 2019 07:01  -  22 Nov 2019 07:00  --------------------------------------------------------  IN: 1020 mL / OUT: 1350 mL / NET: -330 mL    22 Nov 2019 07:01  -  23 Nov 2019 06:14  --------------------------------------------------------  IN: 920 mL / OUT: 0 mL / NET: 920 mL    PHYSICAL EXAM:  Vital Signs Last 24 Hrs  T(C): 36.7 (23 Nov 2019 04:32), Max: 36.7 (22 Nov 2019 12:50)  T(F): 98 (23 Nov 2019 04:32), Max: 98 (22 Nov 2019 12:50)  HR: 64 (23 Nov 2019 04:32) (64 - 71)  BP: 151/67 (23 Nov 2019 04:32) (143/66 - 161/74)  BP(mean): --  RR: 18 (23 Nov 2019 04:32) (18 - 18)  SpO2: 96% (23 Nov 2019 04:32) (94% - 97%)    GENERAL: NAD, well-developed, appears comfortable  HEENT: NC/AT,   NECK: Supple, No JVD  CHEST/LUNG: Clear to auscultation bilaterally; No rales, rhonchi, wheezing, or rubs  CARD: Regular rate and rhythm; No murmurs, rubs, or gallops. No LE edema  ABDOMEN: Soft, Nontender, Nondistended; Bowel sounds present  EXTREMITIES:  b/l right foot with dressing present, C/D/I. Left foot with dry healed wound at site of previous debridement. Old abrasion over left anterior leg covered with dressings, dressings C/D/I.  SKIN: No rashes or lesions  NEURO: AOx3, moving all extremities    LABS:    RADIOLOGY & ADDITIONAL TESTS:  < from: MR Foot No Cont, Right (11.15.19 @ 23:27) >  Impression:  Plantar medial soft tissue ulceration with osteomyelitis of the adjacent   hallux sesamoids.  Dorsal soft tissue ulceration at the first interspace with osteomyelitis   at the proximal lateral aspect of the first proximal phalanx.  Soft tissue ulceration with osteomyelitis within the adjacent lateral   aspect of the fifth metatarsal head.    < from: US Lower Extremity Targeted Dynamic Initial Lesion (11.22.19 @ 13:12) >  IMPRESSION:    Markedly reduced right ankle-brachial index of 0.42.    Two-vessel runoff to the right foot, with occlusion of the mid posterior   tibial artery with distal reconstitution.    Severe stenosis of the proximal right anterior tibial artery.    Contrast-enhanced ultrasound demonstrates markedly decreased perfusion to   the soft tissues in the region of the nonhealing ulcer overlying the   plantar aspect of the first metatarsal head.    < end of copied text >    Results Reviewed:   Imaging Personally Reviewed:  Electrocardiogram Personally Reviewed:    COORDINATION OF CARE:  Care Discussed with Consultants/Other Providers [Y/N]:  Prior or Outpatient Records Reviewed [Y/N]:

## 2019-11-23 NOTE — PROGRESS NOTE ADULT - SUBJECTIVE AND OBJECTIVE BOX
Surgery Progress Note    S: Patient seen and examined. No acute events overnight. PT at bedside for wound care.     O:  Vital Signs Last 24 Hrs  T(C): 36.6 (23 Nov 2019 13:54), Max: 36.7 (23 Nov 2019 04:32)  T(F): 97.9 (23 Nov 2019 13:54), Max: 98 (23 Nov 2019 04:32)  HR: 70 (23 Nov 2019 17:44) (64 - 70)  BP: 142/62 (23 Nov 2019 17:44) (138/73 - 151/67)  BP(mean): --  RR: 18 (23 Nov 2019 17:44) (18 - 18)  SpO2: 98% (23 Nov 2019 17:44) (94% - 98%)    I&O's Detail    22 Nov 2019 07:01  -  23 Nov 2019 07:00  --------------------------------------------------------  IN:    Oral Fluid: 720 mL    Solution: 200 mL  Total IN: 920 mL    OUT:  Total OUT: 0 mL    Total NET: 920 mL      23 Nov 2019 07:01  -  23 Nov 2019 19:14  --------------------------------------------------------  IN:  Total IN: 0 mL    OUT:    Voided: 1100 mL  Total OUT: 1100 mL    Total NET: -1100 mL          MEDICATIONS  (STANDING):  amLODIPine   Tablet 10 milliGRAM(s) Oral daily  aspirin  chewable 81 milliGRAM(s) Oral daily  atorvastatin 20 milliGRAM(s) Oral at bedtime  clopidogrel Tablet 75 milliGRAM(s) Oral daily  Dakins Solution - 1/4 Strength 1 Application(s) Topical daily  dextrose 5%. 1000 milliLiter(s) (50 mL/Hr) IV Continuous <Continuous>  hydrochlorothiazide 25 milliGRAM(s) Oral daily  influenza   Vaccine 0.5 milliLiter(s) IntraMuscular once  insulin glargine Injectable (LANTUS) 18 Unit(s) SubCutaneous at bedtime  insulin lispro (HumaLOG) corrective regimen sliding scale   SubCutaneous at bedtime  insulin lispro (HumaLOG) corrective regimen sliding scale   SubCutaneous three times a day before meals  insulin lispro Injectable (HumaLOG) 6 Unit(s) SubCutaneous three times a day before meals  isosorbide   mononitrate ER Tablet (IMDUR) 30 milliGRAM(s) Oral daily  lactated ringers. 1000 milliLiter(s) (60 mL/Hr) IV Continuous <Continuous>  lisinopril 40 milliGRAM(s) Oral daily  metoprolol tartrate 50 milliGRAM(s) Oral two times a day  piperacillin/tazobactam IVPB.. 4.5 Gram(s) IV Intermittent every 8 hours    MEDICATIONS  (PRN):  glucagon  Injectable 1 milliGRAM(s) IntraMuscular once PRN Glucose LESS THAN 70 milligrams/deciliter  hydrALAZINE 10 milliGRAM(s) Oral every 8 hours PRN Systolic blood pressure >160                            10.7   7.25  )-----------( 196      ( 23 Nov 2019 06:57 )             30.9       11-23    140  |  104  |  24<H>  ----------------------------<  134<H>  3.9   |  25  |  1.21    Ca    9.4      23 Nov 2019 06:57  Phos  3.5     11-23  Mg     2.0     11-23          PHYSICAL EXAM:   General: NAD, Lying in bed comfortably  Neuro: A+Ox3  HEENT: NC/AT, EOMI  Neck: Soft, supple  Cardio: RRR, nml S1/S2  Resp: Good effort, CTA b/l  GI/Abd: Soft, NT/ND, no rebound/guarding, no masses palpated  Vascular: All 4 extremities warm. palpable femoral pulse b/l.   RLE: doppler DP/PT, warm to touch. open wound between 1st and 2nd toe and on dorsal surface beneath 1st toe. no purulent drainage.   LLE: doppler DP/PT, warm  Skin: Intact, no breakdown  Lymphatic/Nodes: No palpable lymphadenopathy  Musculoskeletal: All 4 extremities moving spontaneously, no limitations

## 2019-11-23 NOTE — DISCHARGE NOTE PROVIDER - NSDCFUADDINST_GEN_ALL_CORE_FT
Podiatry Discharge Instructions:  Follow up: Please follow up with Dr. Sandhu  within 1 week of discharge from the hospital, please call [phone] for appointment and discuss that you recently were seen in the hospital.  Wound Care: Please leave your dressing clean dry intact until your follow up appointment.  Weight bearing: Please weight bear as tolerated in a surgical shoe.  Antibiotics: Please continue as instructed. Podiatry Discharge Instructions:  Follow up: Please follow up with Dr. Sandhu  within 1 week of discharge from the hospital, please call [phone] for appointment and discuss that you recently were seen in the hospital.  Wound Care: Please leave your dressing clean dry intact until your follow up appointment.  Weight bearing: Please weight bear as tolerated in a surgical shoe.    Antibiotics: Please continue meropenem 1 g every 8 hours, this will be administered to you via a nurse.  End date: 12/26/19.  You will need weekly blood draws: CBC, CMP, BUN, Cr, ESR to be faxed to [phone] Infectious Disease, Dr. Coleman until end date of iv antibiotics 12/26. Please follow up with Dr. Coleman within 1-2 weeks upon discharge. Podiatry Discharge Instructions:  Follow up: Please follow up with Dr. Sandhu  within 1 week of discharge from the hospital, please call [phone] for appointment and discuss that you recently were seen in the hospital.  Wound Care: Please leave your dressing clean dry intact until your follow up appointment.  Weight bearing: Please weight bear as tolerated in a surgical shoe.    Antibiotics: Please continue meropenem 1 g every 8 hours, this will be administered to you via a nurse.  End date: 12/26/19.  You will need weekly blood draws: CBC, CMP, BUN, Cr, ESR to be faxed to [phone] Infectious Disease, Dr. Coleman until end date of iv antibiotics 12/26. Please follow up with Dr. Coleman within 1-2 weeks upon discharge.    WOUND: Left anterior shin wound - cleanse with sterile water, pat dry, apply sween moisturizer to periwound skin, apply wound gel to wound bed, cover with acticoat Flex 3 mesh cover with DSD, secure with loosely wrapped wai every 3 days. Podiatry Discharge Instructions:  Follow up: Please follow up with Dr. Sandhu  within 1 week of discharge from the hospital, please call [phone] for appointment and discuss that you recently were seen in the hospital.  Wound Care: Please leave your dressing clean dry intact until your follow up appointment.  Weight bearing: Please weight bear as tolerated in a surgical shoe.    Antibiotics: Please continue meropenem 1 g every 8 hours, this will be administered to you via a nurse.  End date: 12/26/19.  You will need weekly blood draws: CBC, CMP, BUN, Cr, ESR to be faxed to [phone] Infectious Disease, Dr. Coleman until end date of iv antibiotics 12/26. Please follow up with Dr. Coleman within 1-2 weeks upon discharge.    WOUND: Left anterior shin wound - cleanse with sterile water, pat dry, apply sween moisturizer to periwound skin, apply wound gel to wound bed, cover with acticoat Flex 3 mesh cover with DSD, secure with loosely wrapped wai every 3 days.  Upon discharge f/u as outpatient at [facility] [address] [place] [address] [phone]

## 2019-11-23 NOTE — DISCHARGE NOTE PROVIDER - NSDCQMAMI_CARD_ALL_CORE
Pt asked for titer labs for MMR that she has to have for work  She is unsure of if she had vaccination in the past and we have no documentation  Once added, I will call Pt at [phone] to  lab slip  No

## 2019-11-23 NOTE — PROGRESS NOTE ADULT - PROBLEM SELECTOR PLAN 4
s/p LLE intervention in 2017  -HUANG performed, previous HUANG showed PAD b/l  -angiogram performed 11/20, showing significant atherosclerotic disease  -pt being planned for bypass by vascular on 11/25 prior to podiatric surgery  -c/w home ASA/plavix

## 2019-11-23 NOTE — DISCHARGE NOTE PROVIDER - CARE PROVIDER_API CALL
Keri Shanks)  Surgery  [address], [address] [place] [address]  Phone: [phone]  Fax: [phone]  Follow Up Time: 1 week    Jacobo Sandhu (SUSANNA)  Podiatric Medicine  [address], [address]  [place] [address]  Phone: [phone]  Fax: [phone]  Follow Up Time: 1 week Keri Shanks)  Surgery  [address] [facility], [address] [place] [address]  Phone: [phone]  Fax: [phone]  Follow Up Time: 1 week    Jacobo Sandhu (AURELIANOM)  Podiatric Medicine  [address] [facility] [address]  [place] [address]  Phone: [phone]  Fax: [phone]  Follow Up Time: 1 week    Darryl Coleman)  Internal Medicine  [address] [place] [id number]  Phone: [phone]  Fax: [phone]  Follow Up Time:

## 2019-11-23 NOTE — DISCHARGE NOTE PROVIDER - NSDCACTIVITY_GEN_ALL_CORE
Sex allowed Do not make important decisions/No heavy lifting/straining/Do not drive or operate machinery/Sex allowed/Showering allowed

## 2019-11-23 NOTE — PROGRESS NOTE ADULT - PROBLEM SELECTOR PLAN 6
not on insulin at home, a1c 7.9, FS elevated but improving, within acceptable ranges  -c/w Lantus at 18U qhs if patient is tolerating diet  -c/w Lispro 6 units TID with meals  -c/w LAUREN, adjust insulin  -monitor FS

## 2019-11-23 NOTE — PROGRESS NOTE ADULT - ASSESSMENT
68M with chronic, non-healing R foot wounds secondary to PVD.     PLAN:    - s/p angiogram   - planning for below knee pop to PT bypass Monday  -  preop Sunday - NPO, labs   - Wound care as per podiatry  - ID recs appreciated     Vascular Surgery   [phone]

## 2019-11-23 NOTE — DISCHARGE NOTE PROVIDER - PROVIDER TOKENS
PROVIDER:[TOKEN:[[id number]],FOLLOWUP:[1 week]],PROVIDER:[TOKEN:[[id number]],FOLLOWUP:[1 week]] PROVIDER:[TOKEN:[[id number]],FOLLOWUP:[1 week]],PROVIDER:[TOKEN:[[id number]],FOLLOWUP:[1 week]],PROVIDER:[TOKEN:[[id number]]]

## 2019-11-23 NOTE — DISCHARGE NOTE PROVIDER - NSDCCAREPROVSEEN_GEN_ALL_CORE_FT
[facility] Medicine, 5M Care Model Team A [facility] Medicine, 5M Care Model Team A  [facility] Surgery, Vascular

## 2019-11-23 NOTE — DISCHARGE NOTE PROVIDER - CARE PROVIDERS DIRECT ADDRESSES
,[email or web address],DirectAddress_Unknown ,[email or web address],DirectAddress_Unknown,DirectAddress_Unknown

## 2019-11-23 NOTE — PROGRESS NOTE ADULT - PROBLEM SELECTOR PLAN 2
DENISSE likely prerenal given vomiting on 11/16, poor intake and ACE/HCTZ use  -Cr now improving.  -hold ACE and HCTZ until DENISSE resolved  -c/w gentle IVF hydration, LR at 60cc/hr  -monitor bmps DENISSE likely prerenal given vomiting on 11/16, poor intake and ACE/HCTZ use  -Cr remaining more elevated than baseline. 1.2 today.  -hold ACE and HCTZ until DENISSE resolved  -c/w gentle IVF hydration, LR at 60cc/hr  -monitor bmps

## 2019-11-23 NOTE — DISCHARGE NOTE PROVIDER - NSDCMRMEDTOKEN_GEN_ALL_CORE_FT
Amaryl 2 mg oral tablet: 1 tab(s) orally once a day  aspirin 81 mg oral delayed release tablet: 1 tab(s) orally once a day  atorvastatin 20 mg oral tablet: 1 tab(s) orally once a day (at bedtime)  clopidogrel 75 mg oral tablet: 1 tab(s) orally once a day  hydroCHLOROthiazide 25 mg oral tablet: 1 tab(s) orally once a day  Imdur 30 mg oral tablet, extended release: 1 tab(s) orally once a day (in the morning)  lisinopril 40 mg oral tablet: 1 tab(s) orally once a day  metFORMIN 500 mg oral tablet, extended release: 1 tab(s) orally 2 times a day  metoprolol tartrate 50 mg oral tablet: 1 tab(s) orally 2 times a day  Vascepa 1 g oral capsule: 1 cap(s) orally 2 times a day acetaminophen 325 mg oral tablet: 2 tab(s) orally every 6 hours, As needed, Mild Pain (1 - 3)  amLODIPine 10 mg oral tablet: 1 tab(s) orally once a day  ascorbic acid 500 mg oral tablet: 1 tab(s) orally once a day  aspirin 81 mg oral delayed release tablet: 1 tab(s) orally once a day  atorvastatin 20 mg oral tablet: 1 tab(s) orally once a day (at bedtime)  clopidogrel 75 mg oral tablet: 1 tab(s) orally once a day  HumaLOG 100 units/mL subcutaneous solution: 18 unit(s) subcutaneous 3 times a day (before meals)  hydroCHLOROthiazide 25 mg oral tablet: 1 tab(s) orally once a day  Imdur 30 mg oral tablet, extended release: 1 tab(s) orally once a day (in the morning)  insulin glargine: 34 unit(s) subcutaneous once a day (at bedtime)  lisinopril 40 mg oral tablet: 1 tab(s) orally once a day  meropenem 1000 mg intravenous injection: 1000 milligram(s) intravenous every 8 hours  metoprolol tartrate 50 mg oral tablet: 1 tab(s) orally 2 times a day  Multiple Vitamins oral tablet: 1 tab(s) orally once a day  oxyCODONE 5 mg oral tablet: 1 tab(s) orally every 4 hours, As needed, Moderate Pain (4 - 6)  sodium hypochlorite 0.125% topical solution: 1 application topically once a day  Vascepa 1 g oral capsule: 1 cap(s) orally 2 times a day

## 2019-11-24 ENCOUNTER — TRANSCRIPTION ENCOUNTER (OUTPATIENT)
Age: 69
End: 2019-11-24

## 2019-11-24 LAB
ANION GAP SERPL CALC-SCNC: 14 MMOL/L — SIGNIFICANT CHANGE UP (ref 5–17)
BUN SERPL-MCNC: 24 MG/DL — HIGH (ref 7–23)
CALCIUM SERPL-MCNC: 9.3 MG/DL — SIGNIFICANT CHANGE UP (ref 8.4–10.5)
CHLORIDE SERPL-SCNC: 101 MMOL/L — SIGNIFICANT CHANGE UP (ref 96–108)
CO2 SERPL-SCNC: 25 MMOL/L — SIGNIFICANT CHANGE UP (ref 22–31)
CREAT SERPL-MCNC: 1.16 MG/DL — SIGNIFICANT CHANGE UP (ref 0.5–1.3)
GLUCOSE SERPL-MCNC: 162 MG/DL — HIGH (ref 70–99)
MAGNESIUM SERPL-MCNC: 2.1 MG/DL — SIGNIFICANT CHANGE UP (ref 1.6–2.6)
PHOSPHATE SERPL-MCNC: 3.3 MG/DL — SIGNIFICANT CHANGE UP (ref 2.5–4.5)
POTASSIUM SERPL-MCNC: 4 MMOL/L — SIGNIFICANT CHANGE UP (ref 3.5–5.3)
POTASSIUM SERPL-SCNC: 4 MMOL/L — SIGNIFICANT CHANGE UP (ref 3.5–5.3)
SODIUM SERPL-SCNC: 140 MMOL/L — SIGNIFICANT CHANGE UP (ref 135–145)

## 2019-11-24 PROCEDURE — 99233 SBSQ HOSP IP/OBS HIGH 50: CPT

## 2019-11-24 RX ORDER — SODIUM CHLORIDE 9 MG/ML
1000 INJECTION, SOLUTION INTRAVENOUS
Refills: 0 | Status: DISCONTINUED | OUTPATIENT
Start: 2019-11-25 | End: 2019-11-25

## 2019-11-24 RX ORDER — INSULIN GLARGINE 100 [IU]/ML
9 INJECTION, SOLUTION SUBCUTANEOUS AT BEDTIME
Refills: 0 | Status: DISCONTINUED | OUTPATIENT
Start: 2019-11-24 | End: 2019-11-25

## 2019-11-24 RX ADMIN — PIPERACILLIN AND TAZOBACTAM 25 GRAM(S): 4; .5 INJECTION, POWDER, LYOPHILIZED, FOR SOLUTION INTRAVENOUS at 05:44

## 2019-11-24 RX ADMIN — Medication 6 UNIT(S): at 08:38

## 2019-11-24 RX ADMIN — PIPERACILLIN AND TAZOBACTAM 25 GRAM(S): 4; .5 INJECTION, POWDER, LYOPHILIZED, FOR SOLUTION INTRAVENOUS at 21:50

## 2019-11-24 RX ADMIN — Medication 1 APPLICATION(S): at 11:36

## 2019-11-24 RX ADMIN — Medication 81 MILLIGRAM(S): at 11:36

## 2019-11-24 RX ADMIN — Medication 25 MILLIGRAM(S): at 05:44

## 2019-11-24 RX ADMIN — Medication 2: at 17:43

## 2019-11-24 RX ADMIN — Medication 50 MILLIGRAM(S): at 05:44

## 2019-11-24 RX ADMIN — PIPERACILLIN AND TAZOBACTAM 25 GRAM(S): 4; .5 INJECTION, POWDER, LYOPHILIZED, FOR SOLUTION INTRAVENOUS at 13:31

## 2019-11-24 RX ADMIN — Medication 6 UNIT(S): at 17:44

## 2019-11-24 RX ADMIN — LISINOPRIL 40 MILLIGRAM(S): 2.5 TABLET ORAL at 05:44

## 2019-11-24 RX ADMIN — Medication 50 MILLIGRAM(S): at 17:19

## 2019-11-24 RX ADMIN — ATORVASTATIN CALCIUM 20 MILLIGRAM(S): 80 TABLET, FILM COATED ORAL at 21:50

## 2019-11-24 RX ADMIN — ISOSORBIDE MONONITRATE 30 MILLIGRAM(S): 60 TABLET, EXTENDED RELEASE ORAL at 11:36

## 2019-11-24 RX ADMIN — AMLODIPINE BESYLATE 10 MILLIGRAM(S): 2.5 TABLET ORAL at 05:44

## 2019-11-24 RX ADMIN — CLOPIDOGREL BISULFATE 75 MILLIGRAM(S): 75 TABLET, FILM COATED ORAL at 11:36

## 2019-11-24 RX ADMIN — INSULIN GLARGINE 9 UNIT(S): 100 INJECTION, SOLUTION SUBCUTANEOUS at 21:50

## 2019-11-24 RX ADMIN — Medication 6 UNIT(S): at 12:14

## 2019-11-24 RX ADMIN — Medication 2: at 12:13

## 2019-11-24 NOTE — PROGRESS NOTE ADULT - PROBLEM SELECTOR PLAN 9
DVT ppx: change to hsq given DENISSE  Diet: Carb consistent DVT ppx: change to hsq given DENISSE  Diet: Carb consistent, NPO after midnight for procedure

## 2019-11-24 NOTE — PROGRESS NOTE ADULT - SUBJECTIVE AND OBJECTIVE BOX
PROGRESS NOTE:   Authored By: Alfonzo Patterson MD   Pager: [phone], [phone]    Patient is a 68y old  Male who presents with a chief complaint of Foot infection (23 Nov 2019 19:14)    OVERNIGHT EVENTS: No acute events overnight    SUBJECTIVE: Pt seen and examined at bedside this morning.     ADDITIONAL REVIEW OF SYSTEMS:    MEDICATIONS  (STANDING):  amLODIPine   Tablet 10 milliGRAM(s) Oral daily  aspirin  chewable 81 milliGRAM(s) Oral daily  atorvastatin 20 milliGRAM(s) Oral at bedtime  clopidogrel Tablet 75 milliGRAM(s) Oral daily  Dakins Solution - 1/4 Strength 1 Application(s) Topical daily  dextrose 5%. 1000 milliLiter(s) (50 mL/Hr) IV Continuous <Continuous>  hydrochlorothiazide 25 milliGRAM(s) Oral daily  influenza   Vaccine 0.5 milliLiter(s) IntraMuscular once  insulin glargine Injectable (LANTUS) 18 Unit(s) SubCutaneous at bedtime  insulin lispro (HumaLOG) corrective regimen sliding scale   SubCutaneous three times a day before meals  insulin lispro (HumaLOG) corrective regimen sliding scale   SubCutaneous at bedtime  insulin lispro Injectable (HumaLOG) 6 Unit(s) SubCutaneous three times a day before meals  isosorbide   mononitrate ER Tablet (IMDUR) 30 milliGRAM(s) Oral daily  lactated ringers. 1000 milliLiter(s) (60 mL/Hr) IV Continuous <Continuous>  lisinopril 40 milliGRAM(s) Oral daily  metoprolol tartrate 50 milliGRAM(s) Oral two times a day  piperacillin/tazobactam IVPB.. 4.5 Gram(s) IV Intermittent every 8 hours    MEDICATIONS  (PRN):  glucagon  Injectable 1 milliGRAM(s) IntraMuscular once PRN Glucose LESS THAN 70 milligrams/deciliter  hydrALAZINE 10 milliGRAM(s) Oral every 8 hours PRN Systolic blood pressure >160    CAPILLARY BLOOD GLUCOSE    POCT Blood Glucose.: 216 mg/dL (23 Nov 2019 21:31)  POCT Blood Glucose.: 220 mg/dL (23 Nov 2019 17:26)  POCT Blood Glucose.: 187 mg/dL (23 Nov 2019 13:49)  POCT Blood Glucose.: 147 mg/dL (23 Nov 2019 09:51)    I&O's Summary    23 Nov 2019 07:01  -  24 Nov 2019 07:00  --------------------------------------------------------  IN: 240 mL / OUT: 1100 mL / NET: -860 mL    PHYSICAL EXAM:  Vital Signs Last 24 Hrs  T(C): 36.7 (24 Nov 2019 05:00), Max: 36.7 (24 Nov 2019 05:00)  T(F): 98.1 (24 Nov 2019 05:00), Max: 98.1 (24 Nov 2019 05:00)  HR: 67 (24 Nov 2019 05:00) (64 - 70)  BP: 154/65 (24 Nov 2019 05:00) (138/62 - 154/65)  BP(mean): --  RR: 18 (24 Nov 2019 05:00) (17 - 18)  SpO2: 97% (24 Nov 2019 05:00) (95% - 98%)    GENERAL: NAD, well-developed, appears comfortable  HEENT: NC/AT,   NECK: Supple, No JVD  CHEST/LUNG: Clear to auscultation bilaterally; No rales, rhonchi, wheezing, or rubs  CARD: Regular rate and rhythm; No murmurs, rubs, or gallops. No LE edema  ABDOMEN: Soft, Nontender, Nondistended; Bowel sounds present  EXTREMITIES:  b/l right foot with dressing present, C/D/I. Left foot with dry healed wound at site of previous debridement. Old abrasion over left anterior leg covered with dressings, dressings C/D/I.  SKIN: No rashes or lesions  NEURO: AOx3, moving all extremities    LABS:                        10.7   7.25  )-----------( 196      ( 23 Nov 2019 06:57 )             30.9     11-23    140  |  104  |  24<H>  ----------------------------<  134<H>  3.9   |  25  |  1.21    Ca    9.4      23 Nov 2019 06:57  Phos  3.5     11-23  Mg     2.0     11-23    RADIOLOGY & ADDITIONAL TESTS:  < from: MR Foot No Cont, Right (11.15.19 @ 23:27) >  Impression:  Plantar medial soft tissue ulceration with osteomyelitis of the adjacent   hallux sesamoids.  Dorsal soft tissue ulceration at the first interspace with osteomyelitis   at the proximal lateral aspect of the first proximal phalanx.  Soft tissue ulceration with osteomyelitis within the adjacent lateral   aspect of the fifth metatarsal head.    < from: US Lower Extremity Targeted Dynamic Initial Lesion (11.22.19 @ 13:12) >  IMPRESSION:    Markedly reduced right ankle-brachial index of 0.42.    Two-vessel runoff to the right foot, with occlusion of the mid posterior   tibial artery with distal reconstitution.    Severe stenosis of the proximal right anterior tibial artery.    Contrast-enhanced ultrasound demonstrates markedly decreased perfusion to   the soft tissues in the region of the nonhealing ulcer overlying the   plantar aspect of the first metatarsal head.    < end of copied text >    Results Reviewed:   Imaging Personally Reviewed:  Electrocardiogram Personally Reviewed:    COORDINATION OF CARE:  Care Discussed with Consultants/Other Providers [Y/N]:  Prior or Outpatient Records Reviewed [Y/N]: PROGRESS NOTE:   Authored By: Alfonzo Patterson MD   Pager: [phone], [phone]    Patient is a 68y old  Male who presents with a chief complaint of Foot infection (23 Nov 2019 19:14)    OVERNIGHT EVENTS: No acute events overnight.    SUBJECTIVE: Pt seen and examined at bedside this morning. Endorses foot pain only when ambulating. Denies any other complaints at this time. Denies fever, N/V, abdominal pain, SOB, CP.    ADDITIONAL REVIEW OF SYSTEMS:    MEDICATIONS  (STANDING):  amLODIPine   Tablet 10 milliGRAM(s) Oral daily  aspirin  chewable 81 milliGRAM(s) Oral daily  atorvastatin 20 milliGRAM(s) Oral at bedtime  clopidogrel Tablet 75 milliGRAM(s) Oral daily  Dakins Solution - 1/4 Strength 1 Application(s) Topical daily  dextrose 5%. 1000 milliLiter(s) (50 mL/Hr) IV Continuous <Continuous>  hydrochlorothiazide 25 milliGRAM(s) Oral daily  influenza   Vaccine 0.5 milliLiter(s) IntraMuscular once  insulin glargine Injectable (LANTUS) 18 Unit(s) SubCutaneous at bedtime  insulin lispro (HumaLOG) corrective regimen sliding scale   SubCutaneous three times a day before meals  insulin lispro (HumaLOG) corrective regimen sliding scale   SubCutaneous at bedtime  insulin lispro Injectable (HumaLOG) 6 Unit(s) SubCutaneous three times a day before meals  isosorbide   mononitrate ER Tablet (IMDUR) 30 milliGRAM(s) Oral daily  lactated ringers. 1000 milliLiter(s) (60 mL/Hr) IV Continuous <Continuous>  lisinopril 40 milliGRAM(s) Oral daily  metoprolol tartrate 50 milliGRAM(s) Oral two times a day  piperacillin/tazobactam IVPB.. 4.5 Gram(s) IV Intermittent every 8 hours    MEDICATIONS  (PRN):  glucagon  Injectable 1 milliGRAM(s) IntraMuscular once PRN Glucose LESS THAN 70 milligrams/deciliter  hydrALAZINE 10 milliGRAM(s) Oral every 8 hours PRN Systolic blood pressure >160    CAPILLARY BLOOD GLUCOSE    POCT Blood Glucose.: 216 mg/dL (23 Nov 2019 21:31)  POCT Blood Glucose.: 220 mg/dL (23 Nov 2019 17:26)  POCT Blood Glucose.: 187 mg/dL (23 Nov 2019 13:49)  POCT Blood Glucose.: 147 mg/dL (23 Nov 2019 09:51)    I&O's Summary    23 Nov 2019 07:01  -  24 Nov 2019 07:00  --------------------------------------------------------  IN: 240 mL / OUT: 1100 mL / NET: -860 mL    PHYSICAL EXAM:  Vital Signs Last 24 Hrs  T(C): 36.7 (24 Nov 2019 05:00), Max: 36.7 (24 Nov 2019 05:00)  T(F): 98.1 (24 Nov 2019 05:00), Max: 98.1 (24 Nov 2019 05:00)  HR: 67 (24 Nov 2019 05:00) (64 - 70)  BP: 154/65 (24 Nov 2019 05:00) (138/62 - 154/65)  BP(mean): --  RR: 18 (24 Nov 2019 05:00) (17 - 18)  SpO2: 97% (24 Nov 2019 05:00) (95% - 98%)    GENERAL: NAD, well-developed, appears comfortable  HEENT: NC/AT,   NECK: Supple, No JVD  CHEST/LUNG: Clear to auscultation bilaterally; No rales, rhonchi, wheezing, or rubs  CARD: Regular rate and rhythm; No murmurs, rubs, or gallops. No LE edema  ABDOMEN: Soft, Nontender, Nondistended; Bowel sounds present  EXTREMITIES:  b/l right foot with dressing present, C/D/I. Left foot with dry healed wound at site of previous debridement. Old abrasion over left anterior leg covered with dressings, dressings C/D/I.  SKIN: No rashes or lesions  NEURO: AOx3, moving all extremities    LABS:                        10.7   7.25  )-----------( 196      ( 23 Nov 2019 06:57 )             30.9     11-23    140  |  104  |  24<H>  ----------------------------<  134<H>  3.9   |  25  |  1.21    Ca    9.4      23 Nov 2019 06:57  Phos  3.5     11-23  Mg     2.0     11-23    RADIOLOGY & ADDITIONAL TESTS:  < from: MR Foot No Cont, Right (11.15.19 @ 23:27) >  Impression:  Plantar medial soft tissue ulceration with osteomyelitis of the adjacent   hallux sesamoids.  Dorsal soft tissue ulceration at the first interspace with osteomyelitis   at the proximal lateral aspect of the first proximal phalanx.  Soft tissue ulceration with osteomyelitis within the adjacent lateral   aspect of the fifth metatarsal head.    < from: US Lower Extremity Targeted Dynamic Initial Lesion (11.22.19 @ 13:12) >  IMPRESSION:    Markedly reduced right ankle-brachial index of 0.42.    Two-vessel runoff to the right foot, with occlusion of the mid posterior   tibial artery with distal reconstitution.    Severe stenosis of the proximal right anterior tibial artery.    Contrast-enhanced ultrasound demonstrates markedly decreased perfusion to   the soft tissues in the region of the nonhealing ulcer overlying the   plantar aspect of the first metatarsal head.    < end of copied text >    Results Reviewed:   Imaging Personally Reviewed:  Electrocardiogram Personally Reviewed:    COORDINATION OF CARE:  Care Discussed with Consultants/Other Providers [Y/N]:  Prior or Outpatient Records Reviewed [Y/N]:

## 2019-11-24 NOTE — PROGRESS NOTE ADULT - ATTENDING COMMENTS
pt seen and examined.  above plan discussed on rounds today.  In addition,  pt with severe peripheral vascular disease. s/p angiogram with vascular, unable to stent any lesions an now they are planning a bypass procedure for revascularization of his foot on monday. patient is hoping to avoid amputation and treat his wound with debridement and IV abx after revascularization. will plan for 6 wks of IV abxs.

## 2019-11-24 NOTE — CHART NOTE - NSCHARTNOTEFT_GEN_A_CORE
Surgery Preop Note    Patient is a 68y old  Male who presents with a chief complaint of Foot infection (24 Nov 2019 07:06)    Diagnosis: RLE ischemia  Procedure: Right leg Popliteal Tibial Bypass  Surgeon: Dr. Shanks                          10.7   7.25  )-----------( 196      ( 23 Nov 2019 06:57 )             30.9     11-24    140  |  101  |  24<H>  ----------------------------<  162<H>  4.0   |  25  |  1.16    Ca    9.3      24 Nov 2019 07:16  Phos  3.3     11-24  Mg     2.1     11-24            Chest X RAY: Clear Lungs  EKG: Sinus rhythm with premature supraventricular complexes right bundle branch block; moderate voltage criteria for LVH, may be normal variant     MEDICATIONS  (STANDING):  amLODIPine   Tablet 10 milliGRAM(s) Oral daily  aspirin  chewable 81 milliGRAM(s) Oral daily  atorvastatin 20 milliGRAM(s) Oral at bedtime  clopidogrel Tablet 75 milliGRAM(s) Oral daily  Dakins Solution - 1/4 Strength 1 Application(s) Topical daily  dextrose 5%. 1000 milliLiter(s) (50 mL/Hr) IV Continuous <Continuous>  hydrochlorothiazide 25 milliGRAM(s) Oral daily  influenza   Vaccine 0.5 milliLiter(s) IntraMuscular once  insulin glargine Injectable (LANTUS) 9 Unit(s) SubCutaneous at bedtime  insulin lispro (HumaLOG) corrective regimen sliding scale   SubCutaneous three times a day before meals  insulin lispro (HumaLOG) corrective regimen sliding scale   SubCutaneous at bedtime  insulin lispro Injectable (HumaLOG) 6 Unit(s) SubCutaneous three times a day before meals  isosorbide   mononitrate ER Tablet (IMDUR) 30 milliGRAM(s) Oral daily  lisinopril 40 milliGRAM(s) Oral daily  metoprolol tartrate 50 milliGRAM(s) Oral two times a day  piperacillin/tazobactam IVPB.. 4.5 Gram(s) IV Intermittent every 8 hours    MEDICATIONS  (PRN):  glucagon  Injectable 1 milliGRAM(s) IntraMuscular once PRN Glucose LESS THAN 70 milligrams/deciliter  hydrALAZINE 10 milliGRAM(s) Oral every 8 hours PRN Systolic blood pressure >160      Assessment & Plan:  68y Male with   NPO after midnight, IVF  Pain Control  DVT prophylaxis    Celsa Yanez, DO PGY1

## 2019-11-24 NOTE — PROGRESS NOTE ADULT - PROBLEM SELECTOR PLAN 8
Pt is RCRI class III risk (1 point each due to ischemic heart disease, preop requirement for insulin) with 10.1% risk of death, MI or cardiac arrest within 30 days of procedure. Pt is currently moderate to high risk for a moderate-high risk procedure (LE vascular bypass/revascularization). Pt is currently without any CP, SOB, or fever. He has good functional status at home and is independent in all ADLs. EKG performed during this admission showed right bundle branch block, however unclear if new, recent ischemic workup on 11/16 negative. No history of heart failure, TTE from 2016 showing low-normal LV systolic fxn with EF of 55%. Pt with recent LE angiogram performed with no anesthetic or surgical complications. Patient is currently medically optimized for this procedure. Pt is RCRI class III risk (1 point each due to ischemic heart disease, preop requirement for insulin) with 10.1% risk of death, MI or cardiac arrest within 30 days of procedure. Pt is currently moderate to high risk for a moderate-high risk procedure (RLE vascular bypass/revascularization). Pt is currently without any CP, SOB, or fever. He has good functional status at home and is independent in all ADLs. EKG performed during this admission showed right bundle branch block, however unclear if new, recent ischemic workup on 11/16 negative. No history of heart failure, TTE from 2016 showing low-normal LV systolic fxn with EF of 55%. Pt with recent LE angiogram performed with no anesthetic or surgical complications. Patient is currently medically optimized for this procedure.

## 2019-11-24 NOTE — PROGRESS NOTE ADULT - PROBLEM SELECTOR PLAN 2
DENISSE likely prerenal given vomiting on 11/16, poor intake and ACE/HCTZ use  -Cr remaining more elevated than baseline.  -hold ACE and HCTZ until DENISSE resolved  -c/w gentle IVF hydration, LR at 60cc/hr  -monitor bmps

## 2019-11-24 NOTE — PROGRESS NOTE ADULT - PROBLEM SELECTOR PLAN 4
s/p LLE intervention in 2017  -HUANG performed, previous HUANG showed PAD b/l  -angiogram performed 11/20, showing significant atherosclerotic disease  -pt being planned for bypass by vascular on 11/25 prior to podiatric surgery  -c/w home ASA/plavix s/p LLE intervention in 2017  -HUANG performed, previous HUANG showed PAD b/l  -angiogram performed 11/20, showing significant atherosclerotic disease  -pt being planned for below knee popliteal to post. tibial bypass by vascular surgery on 11/25. pt to be NPO after midnight.  -c/w home ASA/plavix

## 2019-11-25 LAB
ANION GAP SERPL CALC-SCNC: 14 MMOL/L — SIGNIFICANT CHANGE UP (ref 5–17)
ANION GAP SERPL CALC-SCNC: 16 MMOL/L — SIGNIFICANT CHANGE UP (ref 5–17)
APTT BLD: 27.2 SEC — LOW (ref 27.5–36.3)
APTT BLD: 30.6 SEC — SIGNIFICANT CHANGE UP (ref 27.5–36.3)
BLD GP AB SCN SERPL QL: NEGATIVE — SIGNIFICANT CHANGE UP
BUN SERPL-MCNC: 29 MG/DL — HIGH (ref 7–23)
BUN SERPL-MCNC: 30 MG/DL — HIGH (ref 7–23)
CALCIUM SERPL-MCNC: 8.8 MG/DL — SIGNIFICANT CHANGE UP (ref 8.4–10.5)
CALCIUM SERPL-MCNC: 9.3 MG/DL — SIGNIFICANT CHANGE UP (ref 8.4–10.5)
CHLORIDE SERPL-SCNC: 100 MMOL/L — SIGNIFICANT CHANGE UP (ref 96–108)
CHLORIDE SERPL-SCNC: 101 MMOL/L — SIGNIFICANT CHANGE UP (ref 96–108)
CO2 SERPL-SCNC: 23 MMOL/L — SIGNIFICANT CHANGE UP (ref 22–31)
CO2 SERPL-SCNC: 24 MMOL/L — SIGNIFICANT CHANGE UP (ref 22–31)
CREAT SERPL-MCNC: 1.2 MG/DL — SIGNIFICANT CHANGE UP (ref 0.5–1.3)
CREAT SERPL-MCNC: 1.39 MG/DL — HIGH (ref 0.5–1.3)
GLUCOSE BLDC GLUCOMTR-MCNC: 213 MG/DL — HIGH (ref 70–99)
GLUCOSE SERPL-MCNC: 169 MG/DL — HIGH (ref 70–99)
GLUCOSE SERPL-MCNC: 224 MG/DL — HIGH (ref 70–99)
HCT VFR BLD CALC: 29.5 % — LOW (ref 39–50)
HCT VFR BLD CALC: 32.7 % — LOW (ref 39–50)
HGB BLD-MCNC: 11.1 G/DL — LOW (ref 13–17)
HGB BLD-MCNC: 9.9 G/DL — LOW (ref 13–17)
INR BLD: 1.08 RATIO — SIGNIFICANT CHANGE UP (ref 0.88–1.16)
MAGNESIUM SERPL-MCNC: 2 MG/DL — SIGNIFICANT CHANGE UP (ref 1.6–2.6)
MAGNESIUM SERPL-MCNC: 2.4 MG/DL — SIGNIFICANT CHANGE UP (ref 1.6–2.6)
MCHC RBC-ENTMCNC: 26.9 PG — LOW (ref 27–34)
MCHC RBC-ENTMCNC: 27 PG — SIGNIFICANT CHANGE UP (ref 27–34)
MCHC RBC-ENTMCNC: 33.6 GM/DL — SIGNIFICANT CHANGE UP (ref 32–36)
MCHC RBC-ENTMCNC: 33.9 GM/DL — SIGNIFICANT CHANGE UP (ref 32–36)
MCV RBC AUTO: 79.2 FL — LOW (ref 80–100)
MCV RBC AUTO: 80.6 FL — SIGNIFICANT CHANGE UP (ref 80–100)
NRBC # BLD: 0 /100 WBCS — SIGNIFICANT CHANGE UP (ref 0–0)
NRBC # BLD: 0 /100 WBCS — SIGNIFICANT CHANGE UP (ref 0–0)
PHOSPHATE SERPL-MCNC: 4 MG/DL — SIGNIFICANT CHANGE UP (ref 2.5–4.5)
PLATELET # BLD AUTO: 198 K/UL — SIGNIFICANT CHANGE UP (ref 150–400)
PLATELET # BLD AUTO: 209 K/UL — SIGNIFICANT CHANGE UP (ref 150–400)
POTASSIUM SERPL-MCNC: 4.1 MMOL/L — SIGNIFICANT CHANGE UP (ref 3.5–5.3)
POTASSIUM SERPL-MCNC: 4.4 MMOL/L — SIGNIFICANT CHANGE UP (ref 3.5–5.3)
POTASSIUM SERPL-SCNC: 4.1 MMOL/L — SIGNIFICANT CHANGE UP (ref 3.5–5.3)
POTASSIUM SERPL-SCNC: 4.4 MMOL/L — SIGNIFICANT CHANGE UP (ref 3.5–5.3)
PROTHROM AB SERPL-ACNC: 12.4 SEC — SIGNIFICANT CHANGE UP (ref 10–12.9)
RBC # BLD: 3.66 M/UL — LOW (ref 4.2–5.8)
RBC # BLD: 4.13 M/UL — LOW (ref 4.2–5.8)
RBC # FLD: 13.2 % — SIGNIFICANT CHANGE UP (ref 10.3–14.5)
RBC # FLD: 13.5 % — SIGNIFICANT CHANGE UP (ref 10.3–14.5)
RH IG SCN BLD-IMP: POSITIVE — SIGNIFICANT CHANGE UP
SODIUM SERPL-SCNC: 139 MMOL/L — SIGNIFICANT CHANGE UP (ref 135–145)
SODIUM SERPL-SCNC: 139 MMOL/L — SIGNIFICANT CHANGE UP (ref 135–145)
WBC # BLD: 11.74 K/UL — HIGH (ref 3.8–10.5)
WBC # BLD: 7.36 K/UL — SIGNIFICANT CHANGE UP (ref 3.8–10.5)
WBC # FLD AUTO: 11.74 K/UL — HIGH (ref 3.8–10.5)
WBC # FLD AUTO: 7.36 K/UL — SIGNIFICANT CHANGE UP (ref 3.8–10.5)

## 2019-11-25 PROCEDURE — 73551 X-RAY EXAM OF FEMUR 1: CPT | Mod: 26,RT

## 2019-11-25 PROCEDURE — 73552 X-RAY EXAM OF FEMUR 2/>: CPT | Mod: 26,RT

## 2019-11-25 PROCEDURE — 35566 ART BYP FEM-ANT-POST TIB/PRL: CPT | Mod: RT

## 2019-11-25 PROCEDURE — 73590 X-RAY EXAM OF LOWER LEG: CPT | Mod: 26,RT

## 2019-11-25 PROCEDURE — 75710 ARTERY X-RAYS ARM/LEG: CPT | Mod: 26

## 2019-11-25 RX ORDER — HYDROMORPHONE HYDROCHLORIDE 2 MG/ML
0.5 INJECTION INTRAMUSCULAR; INTRAVENOUS; SUBCUTANEOUS
Refills: 0 | Status: DISCONTINUED | OUTPATIENT
Start: 2019-11-25 | End: 2019-11-25

## 2019-11-25 RX ORDER — SODIUM CHLORIDE 9 MG/ML
1000 INJECTION, SOLUTION INTRAVENOUS
Refills: 0 | Status: DISCONTINUED | OUTPATIENT
Start: 2019-11-25 | End: 2019-11-26

## 2019-11-25 RX ORDER — ONDANSETRON 8 MG/1
4 TABLET, FILM COATED ORAL EVERY 6 HOURS
Refills: 0 | Status: DISCONTINUED | OUTPATIENT
Start: 2019-11-25 | End: 2019-11-26

## 2019-11-25 RX ORDER — METOPROLOL TARTRATE 50 MG
50 TABLET ORAL
Refills: 0 | Status: DISCONTINUED | OUTPATIENT
Start: 2019-11-25 | End: 2019-11-27

## 2019-11-25 RX ORDER — DEXTROSE 50 % IN WATER 50 %
25 SYRINGE (ML) INTRAVENOUS ONCE
Refills: 0 | Status: DISCONTINUED | OUTPATIENT
Start: 2019-11-25 | End: 2019-11-27

## 2019-11-25 RX ORDER — GLUCAGON INJECTION, SOLUTION 0.5 MG/.1ML
1 INJECTION, SOLUTION SUBCUTANEOUS ONCE
Refills: 0 | Status: DISCONTINUED | OUTPATIENT
Start: 2019-11-25 | End: 2019-11-27

## 2019-11-25 RX ORDER — ONDANSETRON 8 MG/1
4 TABLET, FILM COATED ORAL ONCE
Refills: 0 | Status: DISCONTINUED | OUTPATIENT
Start: 2019-11-25 | End: 2019-11-25

## 2019-11-25 RX ORDER — AMLODIPINE BESYLATE 2.5 MG/1
10 TABLET ORAL DAILY
Refills: 0 | Status: DISCONTINUED | OUTPATIENT
Start: 2019-11-25 | End: 2019-11-27

## 2019-11-25 RX ORDER — CLOPIDOGREL BISULFATE 75 MG/1
75 TABLET, FILM COATED ORAL DAILY
Refills: 0 | Status: DISCONTINUED | OUTPATIENT
Start: 2019-11-25 | End: 2019-11-27

## 2019-11-25 RX ORDER — NALOXONE HYDROCHLORIDE 4 MG/.1ML
0.1 SPRAY NASAL
Refills: 0 | Status: DISCONTINUED | OUTPATIENT
Start: 2019-11-25 | End: 2019-11-26

## 2019-11-25 RX ORDER — DEXTROSE 50 % IN WATER 50 %
15 SYRINGE (ML) INTRAVENOUS ONCE
Refills: 0 | Status: DISCONTINUED | OUTPATIENT
Start: 2019-11-25 | End: 2019-11-27

## 2019-11-25 RX ORDER — DEXTROSE 50 % IN WATER 50 %
12.5 SYRINGE (ML) INTRAVENOUS ONCE
Refills: 0 | Status: DISCONTINUED | OUTPATIENT
Start: 2019-11-25 | End: 2019-11-27

## 2019-11-25 RX ORDER — ATORVASTATIN CALCIUM 80 MG/1
20 TABLET, FILM COATED ORAL AT BEDTIME
Refills: 0 | Status: DISCONTINUED | OUTPATIENT
Start: 2019-11-25 | End: 2019-11-27

## 2019-11-25 RX ORDER — PIPERACILLIN AND TAZOBACTAM 4; .5 G/20ML; G/20ML
3.38 INJECTION, POWDER, LYOPHILIZED, FOR SOLUTION INTRAVENOUS EVERY 8 HOURS
Refills: 0 | Status: DISCONTINUED | OUTPATIENT
Start: 2019-11-25 | End: 2019-11-27

## 2019-11-25 RX ORDER — ASPIRIN/CALCIUM CARB/MAGNESIUM 324 MG
81 TABLET ORAL DAILY
Refills: 0 | Status: DISCONTINUED | OUTPATIENT
Start: 2019-11-25 | End: 2019-11-27

## 2019-11-25 RX ORDER — LISINOPRIL 2.5 MG/1
40 TABLET ORAL DAILY
Refills: 0 | Status: DISCONTINUED | OUTPATIENT
Start: 2019-11-25 | End: 2019-11-27

## 2019-11-25 RX ORDER — HYDROMORPHONE HYDROCHLORIDE 2 MG/ML
0.5 INJECTION INTRAMUSCULAR; INTRAVENOUS; SUBCUTANEOUS
Refills: 0 | Status: DISCONTINUED | OUTPATIENT
Start: 2019-11-25 | End: 2019-11-26

## 2019-11-25 RX ORDER — ACETAMINOPHEN 500 MG
1000 TABLET ORAL EVERY 6 HOURS
Refills: 0 | Status: COMPLETED | OUTPATIENT
Start: 2019-11-25 | End: 2019-11-26

## 2019-11-25 RX ORDER — SODIUM HYPOCHLORITE 0.125 %
1 SOLUTION, NON-ORAL MISCELLANEOUS DAILY
Refills: 0 | Status: DISCONTINUED | OUTPATIENT
Start: 2019-11-25 | End: 2019-11-27

## 2019-11-25 RX ORDER — HEPARIN SODIUM 5000 [USP'U]/ML
5000 INJECTION INTRAVENOUS; SUBCUTANEOUS EVERY 8 HOURS
Refills: 0 | Status: DISCONTINUED | OUTPATIENT
Start: 2019-11-25 | End: 2019-11-27

## 2019-11-25 RX ORDER — HYDROMORPHONE HYDROCHLORIDE 2 MG/ML
30 INJECTION INTRAMUSCULAR; INTRAVENOUS; SUBCUTANEOUS
Refills: 0 | Status: DISCONTINUED | OUTPATIENT
Start: 2019-11-25 | End: 2019-11-26

## 2019-11-25 RX ORDER — ISOSORBIDE MONONITRATE 60 MG/1
30 TABLET, EXTENDED RELEASE ORAL DAILY
Refills: 0 | Status: DISCONTINUED | OUTPATIENT
Start: 2019-11-25 | End: 2019-11-27

## 2019-11-25 RX ORDER — ACETAMINOPHEN 500 MG
1000 TABLET ORAL ONCE
Refills: 0 | Status: COMPLETED | OUTPATIENT
Start: 2019-11-25 | End: 2019-11-25

## 2019-11-25 RX ORDER — HYDROCHLOROTHIAZIDE 25 MG
25 TABLET ORAL DAILY
Refills: 0 | Status: DISCONTINUED | OUTPATIENT
Start: 2019-11-25 | End: 2019-11-27

## 2019-11-25 RX ORDER — INSULIN LISPRO 100/ML
VIAL (ML) SUBCUTANEOUS EVERY 6 HOURS
Refills: 0 | Status: DISCONTINUED | OUTPATIENT
Start: 2019-11-25 | End: 2019-11-26

## 2019-11-25 RX ORDER — SODIUM CHLORIDE 9 MG/ML
1000 INJECTION, SOLUTION INTRAVENOUS
Refills: 0 | Status: DISCONTINUED | OUTPATIENT
Start: 2019-11-25 | End: 2019-11-27

## 2019-11-25 RX ADMIN — Medication 25 MILLIGRAM(S): at 06:01

## 2019-11-25 RX ADMIN — SODIUM CHLORIDE 75 MILLILITER(S): 9 INJECTION, SOLUTION INTRAVENOUS at 06:27

## 2019-11-25 RX ADMIN — ATORVASTATIN CALCIUM 20 MILLIGRAM(S): 80 TABLET, FILM COATED ORAL at 21:45

## 2019-11-25 RX ADMIN — AMLODIPINE BESYLATE 10 MILLIGRAM(S): 2.5 TABLET ORAL at 06:01

## 2019-11-25 RX ADMIN — Medication 50 MILLIGRAM(S): at 19:45

## 2019-11-25 RX ADMIN — ONDANSETRON 4 MILLIGRAM(S): 8 TABLET, FILM COATED ORAL at 21:03

## 2019-11-25 RX ADMIN — PIPERACILLIN AND TAZOBACTAM 25 GRAM(S): 4; .5 INJECTION, POWDER, LYOPHILIZED, FOR SOLUTION INTRAVENOUS at 21:45

## 2019-11-25 RX ADMIN — Medication 2: at 17:24

## 2019-11-25 RX ADMIN — HYDROMORPHONE HYDROCHLORIDE 30 MILLILITER(S): 2 INJECTION INTRAMUSCULAR; INTRAVENOUS; SUBCUTANEOUS at 21:04

## 2019-11-25 RX ADMIN — Medication 50 MILLIGRAM(S): at 06:01

## 2019-11-25 RX ADMIN — LISINOPRIL 40 MILLIGRAM(S): 2.5 TABLET ORAL at 06:01

## 2019-11-25 RX ADMIN — Medication 1000 MILLIGRAM(S): at 15:27

## 2019-11-25 RX ADMIN — Medication 400 MILLIGRAM(S): at 15:17

## 2019-11-25 RX ADMIN — HYDROMORPHONE HYDROCHLORIDE 30 MILLILITER(S): 2 INJECTION INTRAMUSCULAR; INTRAVENOUS; SUBCUTANEOUS at 15:20

## 2019-11-25 RX ADMIN — HEPARIN SODIUM 5000 UNIT(S): 5000 INJECTION INTRAVENOUS; SUBCUTANEOUS at 21:46

## 2019-11-25 RX ADMIN — PIPERACILLIN AND TAZOBACTAM 25 GRAM(S): 4; .5 INJECTION, POWDER, LYOPHILIZED, FOR SOLUTION INTRAVENOUS at 06:01

## 2019-11-25 RX ADMIN — HYDROMORPHONE HYDROCHLORIDE 30 MILLILITER(S): 2 INJECTION INTRAMUSCULAR; INTRAVENOUS; SUBCUTANEOUS at 20:13

## 2019-11-25 RX ADMIN — SODIUM CHLORIDE 100 MILLILITER(S): 9 INJECTION, SOLUTION INTRAVENOUS at 16:59

## 2019-11-25 RX ADMIN — Medication 400 MILLIGRAM(S): at 21:45

## 2019-11-25 NOTE — PROGRESS NOTE ADULT - ASSESSMENT
69 yo male PMhx HTN, T2DM, CAD s/p stents, PAD, HLD presents admitted for diabetic wound infection, confirmed osteo via imaging, with course c/b DENISSE likely 2/2 nephrotoxic medications currently on IV abx, pending LE bypass with vascular sx today. 69 yo male PMhx HTN, T2DM, CAD s/p stents, PAD, HLD presents admitted for diabetic wound infection, confirmed osteo via imaging, with course c/b DENISSE likely 2/2 nephrotoxic medications currently on IV abx, pending LE bypass with vascular sx today. May go to vascular service post-op.

## 2019-11-25 NOTE — PROGRESS NOTE ADULT - SUBJECTIVE AND OBJECTIVE BOX
Maureen Barnes, PGY 1  Internal Medicine  [id number]      Progress Note:    Patient is a 68y old  Male who presents with a chief complaint of Foot infection (24 Nov 2019 07:06)      SUBJECTIVE / OVERNIGHT EVENTS:    ADDITIONAL REVIEW OF SYSTEMS:    MEDICATIONS  (STANDING):  amLODIPine   Tablet 10 milliGRAM(s) Oral daily  aspirin  chewable 81 milliGRAM(s) Oral daily  atorvastatin 20 milliGRAM(s) Oral at bedtime  clopidogrel Tablet 75 milliGRAM(s) Oral daily  Dakins Solution - 1/4 Strength 1 Application(s) Topical daily  dextrose 5%. 1000 milliLiter(s) (50 mL/Hr) IV Continuous <Continuous>  hydrochlorothiazide 25 milliGRAM(s) Oral daily  influenza   Vaccine 0.5 milliLiter(s) IntraMuscular once  insulin glargine Injectable (LANTUS) 9 Unit(s) SubCutaneous at bedtime  insulin lispro (HumaLOG) corrective regimen sliding scale   SubCutaneous three times a day before meals  insulin lispro (HumaLOG) corrective regimen sliding scale   SubCutaneous at bedtime  insulin lispro Injectable (HumaLOG) 6 Unit(s) SubCutaneous three times a day before meals  isosorbide   mononitrate ER Tablet (IMDUR) 30 milliGRAM(s) Oral daily  lactated ringers. 1000 milliLiter(s) (75 mL/Hr) IV Continuous <Continuous>  lisinopril 40 milliGRAM(s) Oral daily  metoprolol tartrate 50 milliGRAM(s) Oral two times a day  piperacillin/tazobactam IVPB.. 4.5 Gram(s) IV Intermittent every 8 hours    MEDICATIONS  (PRN):  glucagon  Injectable 1 milliGRAM(s) IntraMuscular once PRN Glucose LESS THAN 70 milligrams/deciliter  hydrALAZINE 10 milliGRAM(s) Oral every 8 hours PRN Systolic blood pressure >160      CAPILLARY BLOOD GLUCOSE      POCT Blood Glucose.: 227 mg/dL (24 Nov 2019 21:48)  POCT Blood Glucose.: 177 mg/dL (24 Nov 2019 17:41)  POCT Blood Glucose.: 161 mg/dL (24 Nov 2019 12:11)  POCT Blood Glucose.: 135 mg/dL (24 Nov 2019 08:25)    I&O's Summary    23 Nov 2019 07:01  -  24 Nov 2019 07:00  --------------------------------------------------------  IN: 240 mL / OUT: 1100 mL / NET: -860 mL    24 Nov 2019 07:01  -  25 Nov 2019 06:44  --------------------------------------------------------  IN: 100 mL / OUT: 400 mL / NET: -300 mL        PHYSICAL EXAM:  Vital Signs Last 24 Hrs  T(C): 36.8 (25 Nov 2019 05:04), Max: 36.8 (24 Nov 2019 20:41)  T(F): 98.2 (25 Nov 2019 05:04), Max: 98.2 (24 Nov 2019 20:41)  HR: 68 (25 Nov 2019 05:04) (68 - 78)  BP: 144/69 (25 Nov 2019 05:04) (127/79 - 144/69)  BP(mean): --  RR: 18 (25 Nov 2019 05:04) (18 - 18)  SpO2: 97% (25 Nov 2019 05:04) (97% - 98%)    GENERAL: NAD, well-developed, appears comfortable  HEENT: NC/AT,   NECK: Supple, No JVD  CHEST/LUNG: Clear to auscultation bilaterally; No rales, rhonchi, wheezing, or rubs  CARD: Regular rate and rhythm; No murmurs, rubs, or gallops. No LE edema  ABDOMEN: Soft, Nontender, Nondistended; Bowel sounds present  EXTREMITIES:  b/l right foot with dressing present, C/D/I. Left foot with dry healed wound at site of previous debridement. Old abrasion over left anterior leg covered with dressings, dressings C/D/I.  SKIN: No rashes or lesions  NEURO: AOx3, moving all extremities        LABS:                        11.1   7.36  )-----------( 198      ( 25 Nov 2019 04:11 )             32.7     11-25    139  |  101  |  29<H>  ----------------------------<  169<H>  4.1   |  24  |  1.20    Ca    9.3      25 Nov 2019 04:11  Phos  3.4     11-25  Mg     2.0     11-25      PT/INR - ( 25 Nov 2019 04:11 )   PT: 12.0 sec;   INR: 1.04 ratio         PTT - ( 25 Nov 2019 04:11 )  PTT:30.6 sec            Telemetry:       RADIOLOGY & ADDITIONAL TESTS: Maureen Barnes, PGY 1  Internal Medicine  [phone]      Progress Note:    Patient is a 68y old  Male who presents with a chief complaint of Foot infection (24 Nov 2019 07:06)      SUBJECTIVE / OVERNIGHT EVENTS: No events. Pt in pre-op this am. Not seen/examined.     ADDITIONAL REVIEW OF SYSTEMS:    MEDICATIONS  (STANDING):  amLODIPine   Tablet 10 milliGRAM(s) Oral daily  aspirin  chewable 81 milliGRAM(s) Oral daily  atorvastatin 20 milliGRAM(s) Oral at bedtime  clopidogrel Tablet 75 milliGRAM(s) Oral daily  Dakins Solution - 1/4 Strength 1 Application(s) Topical daily  dextrose 5%. 1000 milliLiter(s) (50 mL/Hr) IV Continuous <Continuous>  hydrochlorothiazide 25 milliGRAM(s) Oral daily  influenza   Vaccine 0.5 milliLiter(s) IntraMuscular once  insulin glargine Injectable (LANTUS) 9 Unit(s) SubCutaneous at bedtime  insulin lispro (HumaLOG) corrective regimen sliding scale   SubCutaneous three times a day before meals  insulin lispro (HumaLOG) corrective regimen sliding scale   SubCutaneous at bedtime  insulin lispro Injectable (HumaLOG) 6 Unit(s) SubCutaneous three times a day before meals  isosorbide   mononitrate ER Tablet (IMDUR) 30 milliGRAM(s) Oral daily  lactated ringers. 1000 milliLiter(s) (75 mL/Hr) IV Continuous <Continuous>  lisinopril 40 milliGRAM(s) Oral daily  metoprolol tartrate 50 milliGRAM(s) Oral two times a day  piperacillin/tazobactam IVPB.. 4.5 Gram(s) IV Intermittent every 8 hours    MEDICATIONS  (PRN):  glucagon  Injectable 1 milliGRAM(s) IntraMuscular once PRN Glucose LESS THAN 70 milligrams/deciliter  hydrALAZINE 10 milliGRAM(s) Oral every 8 hours PRN Systolic blood pressure >160      CAPILLARY BLOOD GLUCOSE      POCT Blood Glucose.: 227 mg/dL (24 Nov 2019 21:48)  POCT Blood Glucose.: 177 mg/dL (24 Nov 2019 17:41)  POCT Blood Glucose.: 161 mg/dL (24 Nov 2019 12:11)  POCT Blood Glucose.: 135 mg/dL (24 Nov 2019 08:25)    I&O's Summary    23 Nov 2019 07:01  -  24 Nov 2019 07:00  --------------------------------------------------------  IN: 240 mL / OUT: 1100 mL / NET: -860 mL    24 Nov 2019 07:01  -  25 Nov 2019 06:44  --------------------------------------------------------  IN: 100 mL / OUT: 400 mL / NET: -300 mL        PHYSICAL EXAM:  Vital Signs Last 24 Hrs  T(C): 36.8 (25 Nov 2019 05:04), Max: 36.8 (24 Nov 2019 20:41)  T(F): 98.2 (25 Nov 2019 05:04), Max: 98.2 (24 Nov 2019 20:41)  HR: 68 (25 Nov 2019 05:04) (68 - 78)  BP: 144/69 (25 Nov 2019 05:04) (127/79 - 144/69)  BP(mean): --  RR: 18 (25 Nov 2019 05:04) (18 - 18)  SpO2: 97% (25 Nov 2019 05:04) (97% - 98%)    EXAM from 11/24: (not seen this am)  GENERAL: NAD, well-developed, appears comfortable  HEENT: NC/AT,   NECK: Supple, No JVD  CHEST/LUNG: Clear to auscultation bilaterally; No rales, rhonchi, wheezing, or rubs  CARD: Regular rate and rhythm; No murmurs, rubs, or gallops. No LE edema  ABDOMEN: Soft, Nontender, Nondistended; Bowel sounds present  EXTREMITIES:  b/l right foot with dressing present, C/D/I. Left foot with dry healed wound at site of previous debridement. Old abrasion over left anterior leg covered with dressings, dressings C/D/I.  SKIN: No rashes or lesions  NEURO: AOx3, moving all extremities        LABS:                        11.1   7.36  )-----------( 198      ( 25 Nov 2019 04:11 )             32.7     11-25    139  |  101  |  29<H>  ----------------------------<  169<H>  4.1   |  24  |  1.20    Ca    9.3      25 Nov 2019 04:11  Phos  3.4     11-25  Mg     2.0     11-25      PT/INR - ( 25 Nov 2019 04:11 )   PT: 12.0 sec;   INR: 1.04 ratio         PTT - ( 25 Nov 2019 04:11 )  PTT:30.6 sec            Telemetry:       RADIOLOGY & ADDITIONAL TESTS: Coy Diaz PGY 1 IM      Progress Note:    Patient is a 68y old  Male who presents with a chief complaint of Foot infection (24 Nov 2019 07:06)      SUBJECTIVE / OVERNIGHT EVENTS: No events. Pt in pre-op this am. Not seen/examined.     ADDITIONAL REVIEW OF SYSTEMS:    MEDICATIONS  (STANDING):  amLODIPine   Tablet 10 milliGRAM(s) Oral daily  aspirin  chewable 81 milliGRAM(s) Oral daily  atorvastatin 20 milliGRAM(s) Oral at bedtime  clopidogrel Tablet 75 milliGRAM(s) Oral daily  Dakins Solution - 1/4 Strength 1 Application(s) Topical daily  dextrose 5%. 1000 milliLiter(s) (50 mL/Hr) IV Continuous <Continuous>  hydrochlorothiazide 25 milliGRAM(s) Oral daily  influenza   Vaccine 0.5 milliLiter(s) IntraMuscular once  insulin glargine Injectable (LANTUS) 9 Unit(s) SubCutaneous at bedtime  insulin lispro (HumaLOG) corrective regimen sliding scale   SubCutaneous three times a day before meals  insulin lispro (HumaLOG) corrective regimen sliding scale   SubCutaneous at bedtime  insulin lispro Injectable (HumaLOG) 6 Unit(s) SubCutaneous three times a day before meals  isosorbide   mononitrate ER Tablet (IMDUR) 30 milliGRAM(s) Oral daily  lactated ringers. 1000 milliLiter(s) (75 mL/Hr) IV Continuous <Continuous>  lisinopril 40 milliGRAM(s) Oral daily  metoprolol tartrate 50 milliGRAM(s) Oral two times a day  piperacillin/tazobactam IVPB.. 4.5 Gram(s) IV Intermittent every 8 hours    MEDICATIONS  (PRN):  glucagon  Injectable 1 milliGRAM(s) IntraMuscular once PRN Glucose LESS THAN 70 milligrams/deciliter  hydrALAZINE 10 milliGRAM(s) Oral every 8 hours PRN Systolic blood pressure >160      CAPILLARY BLOOD GLUCOSE      POCT Blood Glucose.: 227 mg/dL (24 Nov 2019 21:48)  POCT Blood Glucose.: 177 mg/dL (24 Nov 2019 17:41)  POCT Blood Glucose.: 161 mg/dL (24 Nov 2019 12:11)  POCT Blood Glucose.: 135 mg/dL (24 Nov 2019 08:25)    I&O's Summary    23 Nov 2019 07:01  -  24 Nov 2019 07:00  --------------------------------------------------------  IN: 240 mL / OUT: 1100 mL / NET: -860 mL    24 Nov 2019 07:01  -  25 Nov 2019 06:44  --------------------------------------------------------  IN: 100 mL / OUT: 400 mL / NET: -300 mL        PHYSICAL EXAM:  Vital Signs Last 24 Hrs  T(C): 36.8 (25 Nov 2019 05:04), Max: 36.8 (24 Nov 2019 20:41)  T(F): 98.2 (25 Nov 2019 05:04), Max: 98.2 (24 Nov 2019 20:41)  HR: 68 (25 Nov 2019 05:04) (68 - 78)  BP: 144/69 (25 Nov 2019 05:04) (127/79 - 144/69)  BP(mean): --  RR: 18 (25 Nov 2019 05:04) (18 - 18)  SpO2: 97% (25 Nov 2019 05:04) (97% - 98%)    EXAM from 11/24: (not seen this am)  GENERAL: NAD, well-developed, appears comfortable  HEENT: NC/AT,   NECK: Supple, No JVD  CHEST/LUNG: Clear to auscultation bilaterally; No rales, rhonchi, wheezing, or rubs  CARD: Regular rate and rhythm; No murmurs, rubs, or gallops. No LE edema  ABDOMEN: Soft, Nontender, Nondistended; Bowel sounds present  EXTREMITIES:  b/l right foot with dressing present, C/D/I. Left foot with dry healed wound at site of previous debridement. Old abrasion over left anterior leg covered with dressings, dressings C/D/I.  SKIN: No rashes or lesions  NEURO: AOx3, moving all extremities        LABS:                        11.1   7.36  )-----------( 198      ( 25 Nov 2019 04:11 )             32.7     11-25    139  |  101  |  29<H>  ----------------------------<  169<H>  4.1   |  24  |  1.20    Ca    9.3      25 Nov 2019 04:11  Phos  3.4     11-25  Mg     2.0     11-25      PT/INR - ( 25 Nov 2019 04:11 )   PT: 12.0 sec;   INR: 1.04 ratio         PTT - ( 25 Nov 2019 04:11 )  PTT:30.6 sec            Telemetry:       RADIOLOGY & ADDITIONAL TESTS:

## 2019-11-25 NOTE — PROGRESS NOTE ADULT - PROBLEM SELECTOR PLAN 9
DVT ppx: change to hsq given DENISSE  Diet: Carb consistent, NPO after midnight for procedure Transitions of Care Status:  1.  Name of PCP:   2.  PCP Contacted on Admission: [ ] Y    [ ] N    3.  PCP contacted at Discharge: [ ] Y    [ ] N    [ ] N/A  4.  Post-Discharge Appointment Date and Location:  5.  Summary of Handoff given to PCP:

## 2019-11-25 NOTE — PROGRESS NOTE ADULT - PROBLEM SELECTOR PLAN 4
s/p LLE intervention in 2017  -HUANG performed, previous HUANG showed PAD b/l  -angiogram performed 11/20, showing significant atherosclerotic disease  -pt being planned for below knee popliteal to post. tibial bypass by vascular surgery on 11/25. pt to be NPO after midnight.  -c/w home ASA/plavix s/p LLE intervention in 2017  -HUANG performed (0.4)  -angiogram performed 11/20, showing significant atherosclerotic disease  -popliteal to post. tibial bypass by vascular surgery today.  -c/w home ASA/plavix

## 2019-11-25 NOTE — PROGRESS NOTE ADULT - PROBLEM SELECTOR PLAN 8
Pt is RCRI class III risk (1 point each due to ischemic heart disease, preop requirement for insulin) with 10.1% risk of death, MI or cardiac arrest within 30 days of procedure. Pt is currently moderate to high risk for a moderate-high risk procedure (RLE vascular bypass/revascularization). Pt is currently without any CP, SOB, or fever. He has good functional status at home and is independent in all ADLs. EKG performed during this admission showed right bundle branch block, however unclear if new, recent ischemic workup on 11/16 negative. No history of heart failure, TTE from 2016 showing low-normal LV systolic fxn with EF of 55%. Pt with recent LE angiogram performed with no anesthetic or surgical complications. Patient is currently medically optimized for this procedure. DVT ppx: change to hsq given DENISSE  Diet: Carb consistent

## 2019-11-25 NOTE — PROGRESS NOTE ADULT - SUBJECTIVE AND OBJECTIVE BOX
Vascular Surgery Post-op Note   STATUS POST: RLE angio and  below knee popliteal artery bypass to posterior tibial artery with reversed saphenous vein  POST OPERATIVE DAY #: 0    pt seen and examined at bedside. Pain is well controlled. denies dizziness, SOB, CP or palpitations. denies n/v or abd pain     Vital Signs Last 24 Hrs  T(C): 37 (25 Nov 2019 22:42), Max: 37 (25 Nov 2019 22:42)  T(F): 98.6 (25 Nov 2019 22:42), Max: 98.6 (25 Nov 2019 22:42)  HR: 75 (25 Nov 2019 22:42) (58 - 78)  BP: 163/71 (25 Nov 2019 22:42) (123/57 - 163/71)  BP(mean): 97 (25 Nov 2019 19:00) (82 - 100)  RR: 18 (25 Nov 2019 22:42) (11 - 18)  SpO2: 94% (25 Nov 2019 22:42) (94% - 100%)  I&O's Summary    24 Nov 2019 07:01 - 25 Nov 2019 07:00  --------------------------------------------------------  IN: 100 mL / OUT: 400 mL / NET: -300 mL    25 Nov 2019 07:01  -  25 Nov 2019 23:13  --------------------------------------------------------  IN: 760 mL / OUT: 585 mL / NET: 175 mL      I&O's Detail    24 Nov 2019 07:01 - 25 Nov 2019 07:00  --------------------------------------------------------  IN:    Solution: 100 mL  Total IN: 100 mL    OUT:    Voided: 400 mL  Total OUT: 400 mL    Total NET: -300 mL      25 Nov 2019 07:01 - 25 Nov 2019 23:13  --------------------------------------------------------  IN:    lactated ringers.: 400 mL    Oral Fluid: 360 mL  Total IN: 760 mL    OUT:    Indwelling Catheter - Urethral: 585 mL  Total OUT: 585 mL    Total NET: 175 mL      MEDICATIONS  (STANDING):  acetaminophen  IVPB .. 1000 milliGRAM(s) IV Intermittent every 6 hours  amLODIPine   Tablet 10 milliGRAM(s) Oral daily  aspirin  chewable 81 milliGRAM(s) Oral daily  atorvastatin 20 milliGRAM(s) Oral at bedtime  clopidogrel Tablet 75 milliGRAM(s) Oral daily  Dakins Solution - 1/4 Strength 1 Application(s) Topical daily  dextrose 5%. 1000 milliLiter(s) (50 mL/Hr) IV Continuous <Continuous>  dextrose 50% Injectable 12.5 Gram(s) IV Push once  dextrose 50% Injectable 25 Gram(s) IV Push once  dextrose 50% Injectable 25 Gram(s) IV Push once  heparin  Injectable 5000 Unit(s) SubCutaneous every 8 hours  hydrochlorothiazide 25 milliGRAM(s) Oral daily  HYDROmorphone PCA (1 mG/mL) 30 milliLiter(s) PCA Continuous PCA Continuous  influenza   Vaccine 0.5 milliLiter(s) IntraMuscular once  insulin lispro (HumaLOG) corrective regimen sliding scale   SubCutaneous every 6 hours  isosorbide   mononitrate ER Tablet (IMDUR) 30 milliGRAM(s) Oral daily  lactated ringers. 1000 milliLiter(s) (100 mL/Hr) IV Continuous <Continuous>  lisinopril 40 milliGRAM(s) Oral daily  metoprolol tartrate 50 milliGRAM(s) Oral two times a day  piperacillin/tazobactam IVPB.. 3.375 Gram(s) IV Intermittent every 8 hours    MEDICATIONS  (PRN):  dextrose 40% Gel 15 Gram(s) Oral once PRN Blood Glucose LESS THAN 70 milliGRAM(s)/deciliter  glucagon  Injectable 1 milliGRAM(s) IntraMuscular once PRN Glucose LESS THAN 70 milligrams/deciliter  HYDROmorphone PCA (1 mG/mL) Rescue Clinician Bolus 0.5 milliGRAM(s) IV Push every 15 minutes PRN for Pain Scale GREATER THAN 6  naloxone Injectable 0.1 milliGRAM(s) IV Push every 3 minutes PRN For ANY of the following changes in patient status:  A. RR LESS THAN 10 breaths per minute, B. Oxygen saturation LESS THAN 90%, C. Sedation score of 6  ondansetron Injectable 4 milliGRAM(s) IV Push every 6 hours PRN Nausea      LABS:                        9.9    11.74 )-----------( 209      ( 25 Nov 2019 15:52 )             29.5     11-25    139  |  100  |  30<H>  ----------------------------<  224<H>  4.4   |  23  |  1.39<H>    Ca    8.8      25 Nov 2019 15:52  Phos  4.0     11-25  Mg     2.4     11-25      PT/INR - ( 25 Nov 2019 15:51 )   PT: 12.4 sec;   INR: 1.08 ratio         PTT - ( 25 Nov 2019 15:51 )  PTT:27.2 sec    RADIOLOGY & ADDITIONAL STUDIES:    PHYSICAL EXAM:  Constitutional: NAD, A&O x 4  Respiratory: non-labor breathing   Cardiovascular: RRR  Gastrointestinal: soft, ND/NT  Extremities: dressing at medial side of right leg w/o strikethrough bleeding. + right femoral pulse. skin warm to touch, + capillary refills, + DP/PT signal at right,     A/P: 68y Male PMhx HTN, T2DM, CAD s/p stents, PAD, HLD presents admitted for diabetic wound infection, confirmed osteo via imaging, with course c/b DENISSE likely 2/2 nephrotoxic medications currently on IV abx, s/p  RLE angio and  below knee popliteal artery bypass to posterior tibial artery with reversed saphenous vein POD #0    - Diet: CLD ovenight, ADAT  - Activity: bedrest overnight   - Labs: f/u AM labs   - Pain medication: dilaudid PCA  - Continue ASA and Plavix, [facility]    Vascular Surgery   [id number] Vascular Surgery Post-op Note   STATUS POST: RLE angio and  below knee popliteal artery bypass to posterior tibial artery with reversed saphenous vein  POST OPERATIVE DAY #: 0    pt seen and examined at bedside. Pain is well controlled. denies dizziness, SOB, CP or palpitations. denies n/v or abd pain     Vital Signs Last 24 Hrs  T(C): 37 (25 Nov 2019 22:42), Max: 37 (25 Nov 2019 22:42)  T(F): 98.6 (25 Nov 2019 22:42), Max: 98.6 (25 Nov 2019 22:42)  HR: 75 (25 Nov 2019 22:42) (58 - 78)  BP: 163/71 (25 Nov 2019 22:42) (123/57 - 163/71)  BP(mean): 97 (25 Nov 2019 19:00) (82 - 100)  RR: 18 (25 Nov 2019 22:42) (11 - 18)  SpO2: 94% (25 Nov 2019 22:42) (94% - 100%)  I&O's Summary    24 Nov 2019 07:01 - 25 Nov 2019 07:00  --------------------------------------------------------  IN: 100 mL / OUT: 400 mL / NET: -300 mL    25 Nov 2019 07:01  -  25 Nov 2019 23:13  --------------------------------------------------------  IN: 760 mL / OUT: 585 mL / NET: 175 mL      I&O's Detail    24 Nov 2019 07:01 - 25 Nov 2019 07:00  --------------------------------------------------------  IN:    Solution: 100 mL  Total IN: 100 mL    OUT:    Voided: 400 mL  Total OUT: 400 mL    Total NET: -300 mL      25 Nov 2019 07:01 - 25 Nov 2019 23:13  --------------------------------------------------------  IN:    lactated ringers.: 400 mL    Oral Fluid: 360 mL  Total IN: 760 mL    OUT:    Indwelling Catheter - Urethral: 585 mL  Total OUT: 585 mL    Total NET: 175 mL      MEDICATIONS  (STANDING):  acetaminophen  IVPB .. 1000 milliGRAM(s) IV Intermittent every 6 hours  amLODIPine   Tablet 10 milliGRAM(s) Oral daily  aspirin  chewable 81 milliGRAM(s) Oral daily  atorvastatin 20 milliGRAM(s) Oral at bedtime  clopidogrel Tablet 75 milliGRAM(s) Oral daily  Dakins Solution - 1/4 Strength 1 Application(s) Topical daily  dextrose 5%. 1000 milliLiter(s) (50 mL/Hr) IV Continuous <Continuous>  dextrose 50% Injectable 12.5 Gram(s) IV Push once  dextrose 50% Injectable 25 Gram(s) IV Push once  dextrose 50% Injectable 25 Gram(s) IV Push once  heparin  Injectable 5000 Unit(s) SubCutaneous every 8 hours  hydrochlorothiazide 25 milliGRAM(s) Oral daily  HYDROmorphone PCA (1 mG/mL) 30 milliLiter(s) PCA Continuous PCA Continuous  influenza   Vaccine 0.5 milliLiter(s) IntraMuscular once  insulin lispro (HumaLOG) corrective regimen sliding scale   SubCutaneous every 6 hours  isosorbide   mononitrate ER Tablet (IMDUR) 30 milliGRAM(s) Oral daily  lactated ringers. 1000 milliLiter(s) (100 mL/Hr) IV Continuous <Continuous>  lisinopril 40 milliGRAM(s) Oral daily  metoprolol tartrate 50 milliGRAM(s) Oral two times a day  piperacillin/tazobactam IVPB.. 3.375 Gram(s) IV Intermittent every 8 hours    MEDICATIONS  (PRN):  dextrose 40% Gel 15 Gram(s) Oral once PRN Blood Glucose LESS THAN 70 milliGRAM(s)/deciliter  glucagon  Injectable 1 milliGRAM(s) IntraMuscular once PRN Glucose LESS THAN 70 milligrams/deciliter  HYDROmorphone PCA (1 mG/mL) Rescue Clinician Bolus 0.5 milliGRAM(s) IV Push every 15 minutes PRN for Pain Scale GREATER THAN 6  naloxone Injectable 0.1 milliGRAM(s) IV Push every 3 minutes PRN For ANY of the following changes in patient status:  A. RR LESS THAN 10 breaths per minute, B. Oxygen saturation LESS THAN 90%, C. Sedation score of 6  ondansetron Injectable 4 milliGRAM(s) IV Push every 6 hours PRN Nausea      LABS:                        9.9    11.74 )-----------( 209      ( 25 Nov 2019 15:52 )             29.5     11-25    139  |  100  |  30<H>  ----------------------------<  224<H>  4.4   |  23  |  1.39<H>    Ca    8.8      25 Nov 2019 15:52  Phos  4.0     11-25  Mg     2.4     11-25      PT/INR - ( 25 Nov 2019 15:51 )   PT: 12.4 sec;   INR: 1.08 ratio         PTT - ( 25 Nov 2019 15:51 )  PTT:27.2 sec    RADIOLOGY & ADDITIONAL STUDIES:    PHYSICAL EXAM:  Constitutional: NAD, A&O x 4  Respiratory: non-labor breathing   Cardiovascular: RRR  Gastrointestinal: soft, ND/NT  Extremities: dressing at medial side of right leg w/o strikethrough bleeding. + right femoral pulse. skin warm to touch, + capillary refills, + DP/PT signal at right,     A/P: 68y Male PMhx HTN, T2DM, CAD s/p stents, PAD, HLD presents admitted for diabetic wound infection, confirmed osteo via imaging, with course c/b DENISSE likely 2/2 nephrotoxic medications currently on IV abx, s/p  RLE angio and  below knee popliteal artery bypass to posterior tibial artery with reversed saphenous vein POD #0    - Diet: CLD overnight, ADAT  - Activity: bedrest overnight   - Labs: f/u AM labs   - Pain medication: dilaudid PCA  - Continue ASA and Plavix, [facility]    Vascular Surgery   [id number]

## 2019-11-25 NOTE — PROGRESS NOTE ADULT - PROBLEM SELECTOR PLAN 5
BP stable  -holding ACE/HCTZ given recent bump in SCr, if BP uptrends can uptitrate home Imdur currently 30mg daily  -c/w home Metoprolol, amlodipine 10mg  -monitor bp not on insulin at home, a1c 7.9, FS elevated but improving, within acceptable ranges  -c/w Lantus at 18U qhs if patient is tolerating diet  -c/w Lispro 6 units TID with meals  -c/w LAUREN, adjust insulin  -monitor FS

## 2019-11-25 NOTE — PROGRESS NOTE ADULT - PROBLEM SELECTOR PLAN 2
DENISSE likely prerenal given vomiting on 11/16, poor intake and ACE/HCTZ use  -Cr remaining more elevated than baseline.  -hold ACE and HCTZ until DENISSE resolved  -c/w gentle IVF hydration, LR at 60cc/hr  -monitor bmps DENISSE, Cr 1.2 (bl ~.9), likely prerenal given vomiting on 11/16, poor intake  -Cr remaining more elevated than baseline.  -c/w gentle IVF hydration, LR at 60cc/hr  -monitor bmps

## 2019-11-25 NOTE — PROGRESS NOTE ADULT - PROBLEM SELECTOR PLAN 6
not on insulin at home, a1c 7.9, FS elevated but improving, within acceptable ranges  -c/w Lantus at 18U qhs if patient is tolerating diet  -c/w Lispro 6 units TID with meals  -c/w LAUREN, adjust insulin  -monitor FS - last stents 3 years ago, trops negative, ekg nsr with RBBB unclear if new, no chest pain, sob, ACS ruled out  - c/w ASA, plavix, atorvastatin, imdur 30mg

## 2019-11-26 ENCOUNTER — TRANSCRIPTION ENCOUNTER (OUTPATIENT)
Age: 69
End: 2019-11-26

## 2019-11-26 LAB
ANION GAP SERPL CALC-SCNC: 8 MMOL/L — SIGNIFICANT CHANGE UP (ref 5–17)
APTT BLD: 27.1 SEC — LOW (ref 27.5–36.3)
BUN SERPL-MCNC: 29 MG/DL — HIGH (ref 7–23)
CALCIUM SERPL-MCNC: 9.1 MG/DL — SIGNIFICANT CHANGE UP (ref 8.4–10.5)
CHLORIDE SERPL-SCNC: 104 MMOL/L — SIGNIFICANT CHANGE UP (ref 96–108)
CO2 SERPL-SCNC: 27 MMOL/L — SIGNIFICANT CHANGE UP (ref 22–31)
CREAT SERPL-MCNC: 1.37 MG/DL — HIGH (ref 0.5–1.3)
GLUCOSE BLDC GLUCOMTR-MCNC: 199 MG/DL — HIGH (ref 70–99)
GLUCOSE BLDC GLUCOMTR-MCNC: 217 MG/DL — HIGH (ref 70–99)
GLUCOSE BLDC GLUCOMTR-MCNC: 243 MG/DL — HIGH (ref 70–99)
GLUCOSE BLDC GLUCOMTR-MCNC: 267 MG/DL — HIGH (ref 70–99)
GLUCOSE BLDC GLUCOMTR-MCNC: 299 MG/DL — HIGH (ref 70–99)
GLUCOSE SERPL-MCNC: 203 MG/DL — HIGH (ref 70–99)
INR BLD: 1.08 RATIO — SIGNIFICANT CHANGE UP (ref 0.88–1.16)
MAGNESIUM SERPL-MCNC: 2.3 MG/DL — SIGNIFICANT CHANGE UP (ref 1.6–2.6)
PHOSPHATE SERPL-MCNC: 4.3 MG/DL — SIGNIFICANT CHANGE UP (ref 2.5–4.5)
POTASSIUM SERPL-MCNC: 4.6 MMOL/L — SIGNIFICANT CHANGE UP (ref 3.5–5.3)
POTASSIUM SERPL-SCNC: 4.6 MMOL/L — SIGNIFICANT CHANGE UP (ref 3.5–5.3)
PROTHROM AB SERPL-ACNC: 12.5 SEC — SIGNIFICANT CHANGE UP (ref 10–12.9)
SODIUM SERPL-SCNC: 139 MMOL/L — SIGNIFICANT CHANGE UP (ref 135–145)

## 2019-11-26 PROCEDURE — 99233 SBSQ HOSP IP/OBS HIGH 50: CPT | Mod: GC

## 2019-11-26 PROCEDURE — 99232 SBSQ HOSP IP/OBS MODERATE 35: CPT

## 2019-11-26 RX ORDER — ONDANSETRON 8 MG/1
4 TABLET, FILM COATED ORAL ONCE
Refills: 0 | Status: COMPLETED | OUTPATIENT
Start: 2019-11-26 | End: 2019-11-26

## 2019-11-26 RX ORDER — INSULIN LISPRO 100/ML
VIAL (ML) SUBCUTANEOUS EVERY 6 HOURS
Refills: 0 | Status: DISCONTINUED | OUTPATIENT
Start: 2019-11-26 | End: 2019-11-27

## 2019-11-26 RX ORDER — OXYCODONE HYDROCHLORIDE 5 MG/1
5 TABLET ORAL
Refills: 0 | Status: DISCONTINUED | OUTPATIENT
Start: 2019-11-26 | End: 2019-11-27

## 2019-11-26 RX ORDER — SODIUM CHLORIDE 9 MG/ML
1000 INJECTION INTRAMUSCULAR; INTRAVENOUS; SUBCUTANEOUS
Refills: 0 | Status: DISCONTINUED | OUTPATIENT
Start: 2019-11-26 | End: 2019-11-27

## 2019-11-26 RX ORDER — INSULIN LISPRO 100/ML
VIAL (ML) SUBCUTANEOUS
Refills: 0 | Status: DISCONTINUED | OUTPATIENT
Start: 2019-11-26 | End: 2019-11-26

## 2019-11-26 RX ORDER — SODIUM CHLORIDE 9 MG/ML
1000 INJECTION INTRAMUSCULAR; INTRAVENOUS; SUBCUTANEOUS
Refills: 0 | Status: DISCONTINUED | OUTPATIENT
Start: 2019-11-26 | End: 2019-11-26

## 2019-11-26 RX ADMIN — Medication 1: at 07:04

## 2019-11-26 RX ADMIN — Medication 400 MILLIGRAM(S): at 17:16

## 2019-11-26 RX ADMIN — Medication 50 MILLIGRAM(S): at 17:16

## 2019-11-26 RX ADMIN — AMLODIPINE BESYLATE 10 MILLIGRAM(S): 2.5 TABLET ORAL at 05:26

## 2019-11-26 RX ADMIN — ONDANSETRON 4 MILLIGRAM(S): 8 TABLET, FILM COATED ORAL at 15:27

## 2019-11-26 RX ADMIN — CLOPIDOGREL BISULFATE 75 MILLIGRAM(S): 75 TABLET, FILM COATED ORAL at 12:03

## 2019-11-26 RX ADMIN — LISINOPRIL 40 MILLIGRAM(S): 2.5 TABLET ORAL at 11:59

## 2019-11-26 RX ADMIN — Medication 3: at 22:10

## 2019-11-26 RX ADMIN — ATORVASTATIN CALCIUM 20 MILLIGRAM(S): 80 TABLET, FILM COATED ORAL at 22:11

## 2019-11-26 RX ADMIN — Medication 2: at 13:29

## 2019-11-26 RX ADMIN — Medication 2: at 02:03

## 2019-11-26 RX ADMIN — OXYCODONE HYDROCHLORIDE 5 MILLIGRAM(S): 5 TABLET ORAL at 23:00

## 2019-11-26 RX ADMIN — PIPERACILLIN AND TAZOBACTAM 25 GRAM(S): 4; .5 INJECTION, POWDER, LYOPHILIZED, FOR SOLUTION INTRAVENOUS at 22:09

## 2019-11-26 RX ADMIN — Medication 400 MILLIGRAM(S): at 11:54

## 2019-11-26 RX ADMIN — PIPERACILLIN AND TAZOBACTAM 25 GRAM(S): 4; .5 INJECTION, POWDER, LYOPHILIZED, FOR SOLUTION INTRAVENOUS at 13:24

## 2019-11-26 RX ADMIN — OXYCODONE HYDROCHLORIDE 5 MILLIGRAM(S): 5 TABLET ORAL at 10:52

## 2019-11-26 RX ADMIN — OXYCODONE HYDROCHLORIDE 5 MILLIGRAM(S): 5 TABLET ORAL at 22:26

## 2019-11-26 RX ADMIN — PIPERACILLIN AND TAZOBACTAM 25 GRAM(S): 4; .5 INJECTION, POWDER, LYOPHILIZED, FOR SOLUTION INTRAVENOUS at 05:27

## 2019-11-26 RX ADMIN — Medication 25 MILLIGRAM(S): at 05:26

## 2019-11-26 RX ADMIN — HYDROMORPHONE HYDROCHLORIDE 30 MILLILITER(S): 2 INJECTION INTRAMUSCULAR; INTRAVENOUS; SUBCUTANEOUS at 07:23

## 2019-11-26 RX ADMIN — HEPARIN SODIUM 5000 UNIT(S): 5000 INJECTION INTRAVENOUS; SUBCUTANEOUS at 22:10

## 2019-11-26 RX ADMIN — Medication 50 MILLIGRAM(S): at 05:26

## 2019-11-26 RX ADMIN — Medication 1 APPLICATION(S): at 11:54

## 2019-11-26 RX ADMIN — HEPARIN SODIUM 5000 UNIT(S): 5000 INJECTION INTRAVENOUS; SUBCUTANEOUS at 12:10

## 2019-11-26 RX ADMIN — SODIUM CHLORIDE 60 MILLILITER(S): 9 INJECTION INTRAMUSCULAR; INTRAVENOUS; SUBCUTANEOUS at 15:27

## 2019-11-26 RX ADMIN — Medication 400 MILLIGRAM(S): at 05:27

## 2019-11-26 RX ADMIN — ISOSORBIDE MONONITRATE 30 MILLIGRAM(S): 60 TABLET, EXTENDED RELEASE ORAL at 12:03

## 2019-11-26 RX ADMIN — Medication 3: at 17:15

## 2019-11-26 RX ADMIN — HEPARIN SODIUM 5000 UNIT(S): 5000 INJECTION INTRAVENOUS; SUBCUTANEOUS at 05:26

## 2019-11-26 RX ADMIN — OXYCODONE HYDROCHLORIDE 5 MILLIGRAM(S): 5 TABLET ORAL at 10:22

## 2019-11-26 RX ADMIN — Medication 81 MILLIGRAM(S): at 12:03

## 2019-11-26 NOTE — PROGRESS NOTE ADULT - ASSESSMENT
68M with PMH of HTN, HLD, DM2, CAD s/p stents, PAD admitted for diabetic wound infection confirmed osteomyelitis via imaging, c/b DENISSE 2/2 nephrotoxic medications, currently on IV Zosyn, now POD1 s/p RLE angio and BK popliteal artery bypass to posterior tibial artery with reversed saphenous vein.    Plan:  - Diet: consistent carb diet  - Activity: PT consult ordered with walking boot  - Labs: f/u AM labs   - F/u with pods for abx duration  - Pain medication: dilaudid PCA  - Continue ASA and Plavix, SQH 68yoM with Hx of HTN, HLD, DM2, CAD s/p stents, PAD admitted for diabetic wound infection w/ osteo now s/p RLE BK Pop-Pt bypass with with reverse saphenous vein.    Plan:  - Diet: consistent carb diet  - Activity: PT consult ordered with walking boot  - Labs: f/u AM labs   - Okay from vascular surgery perspective for pt to go to OR with podiatry  - Pain medication: dilaudid PCA  - Continue ASA and Plavix, [facility]    Vascular Surgery   [id number] 68yoM with Hx of HTN, HLD, DM2, CAD s/p stents, PAD admitted for diabetic wound infection w/ osteo now s/p RLE BK Pop-Pt bypass with with reverse saphenous vein.    Plan:  - Diet: consistent carb diet  - Please keep RLE elevated  - Activity: OOB to chair  - Okay from vascular surgery perspective for pt to go to OR with podiatry  - Pain medication: will continue dilaudid PCA  - Continue ASA and Plavix, [facility]    Vascular Surgery   [id number]

## 2019-11-26 NOTE — PROGRESS NOTE ADULT - ASSESSMENT
68M w/ R foot chronic non-healing wounds, now s/p RLE pop to PT bypass (11/25/19)  -Pt seen and evaluated  -vitals stable, WBC 11  -right foot 1st interspace wound w/ fibronecrotic base, mild malodor, no active drainage. Remaining wounds are stable w/ no signs of infection. Well-adhered eschar on lateral aspect of 5th met.  -Wound was flushed w/ saline and dressed w/ dakins and dry sterile dressing  -MRI showing osteo of sesamoids, 1st proximal phalanx, 5th met head  -HUANG/TBI poor, now s/p angio and RLE pop to PT bypass (11/25/19)  -pt cleared from vasc standpoint for pod sx intervention  -Booked for L foot wound debridement and graft application 11/27 @10:00 w/ Dr. Sandhu   -NPO after midnight   -Discussed w/ attending 68M w/ R foot chronic non-healing wounds, now s/p RLE pop to PT bypass (11/25/19)  -Pt seen and evaluated  -vitals stable, WBC 11  -right foot 1st interspace wound w/ fibronecrotic base, mild malodor, no active drainage. Remaining wounds are stable w/ no signs of infection. Well-adhered eschar on lateral aspect of 5th met.  -Wound was flushed w/ saline and dressed w/ dakins and dry sterile dressing  -MRI showing osteo of sesamoids, 1st proximal phalanx, 5th met head  -HUANG/TBI poor, now s/p angio and RLE pop to PT bypass (11/25/19)  -pt cleared from vasc standpoint for pod sx intervention  -Booked for R foot wound debridement and graft application 11/27 @10:00 w/ Dr. Sandhu   -NPO after midnight   -Discussed w/ attending

## 2019-11-26 NOTE — PROGRESS NOTE ADULT - ATTENDING COMMENTS
I have discussed the case with the surgical house staff. I have personally seen, examined, and participated in the care of this patient. I have reviewed all pertinent clinical information.    Doing well postop #1 right below knee pop to PT bypass with rgsv.  Distal triphasic signals on exam.     Plan  - Continue antibiotics  - Plan for debridement with podiatry tomorrow  - Leg elevation  - Pain control  - continue asa and plavix  - PT eval/OOB

## 2019-11-26 NOTE — PROGRESS NOTE ADULT - SUBJECTIVE AND OBJECTIVE BOX
Pain Management Attending Addendum    SUBJECTIVE: Patient doing well with IV PCA    Therapy:    [X] IV PCA         [ ] PRN Analgesics    OBJECTIVE:   [X] Pain appropriately controlled    [ ] Other:    Side Effects:  [] None	             [x ] Nausea              [ ] Pruritis                	[ ] Other:    ASSESSMENT/PLAN:  Therapy changed to PRN analgesics    Comments:

## 2019-11-26 NOTE — PROGRESS NOTE ADULT - SUBJECTIVE AND OBJECTIVE BOX
STATUS POST: RLE angio and below knee popliteal artery bypass to posterior tibial artery with reversed saphenous vein  POST OPERATIVE DAY #: 1    Pt was seen and examined at bedside. Mild 4/10 pain in R foot, well controlled with PCA. Denies dizziness, SOB, CP, palpitations. Denies n/v, abdominal pain.     ICU Vital Signs Last 24 Hrs  T(C): 36.7 (26 Nov 2019 05:19), Max: 37.1 (26 Nov 2019 01:21)  T(F): 98.1 (26 Nov 2019 05:19), Max: 98.8 (26 Nov 2019 01:21)  HR: 69 (26 Nov 2019 05:19) (58 - 78)  BP: 160/74 (26 Nov 2019 05:19) (123/57 - 163/71)  BP(mean): 97 (25 Nov 2019 19:00) (82 - 100)  ABP: --  ABP(mean): --  RR: 20 (26 Nov 2019 05:19) (11 - 20)  SpO2: 96% (26 Nov 2019 05:19) (94% - 100%)    I&O's Detail    25 Nov 2019 07:01  -  26 Nov 2019 07:00  --------------------------------------------------------  IN:    lactated ringers.: 1500 mL    Oral Fluid: 660 mL    Solution: 200 mL  Total IN: 2360 mL    OUT:    Indwelling Catheter - Urethral: 985 mL  Total OUT: 985 mL    Total NET: 1375 mL    MEDICATIONS  (STANDING):  acetaminophen  IVPB .. 1000 milliGRAM(s) IV Intermittent every 6 hours  amLODIPine   Tablet 10 milliGRAM(s) Oral daily  aspirin  chewable 81 milliGRAM(s) Oral daily  atorvastatin 20 milliGRAM(s) Oral at bedtime  clopidogrel Tablet 75 milliGRAM(s) Oral daily  Dakins Solution - 1/4 Strength 1 Application(s) Topical daily  dextrose 5%. 1000 milliLiter(s) (50 mL/Hr) IV Continuous <Continuous>  dextrose 50% Injectable 12.5 Gram(s) IV Push once  dextrose 50% Injectable 25 Gram(s) IV Push once  dextrose 50% Injectable 25 Gram(s) IV Push once  heparin  Injectable 5000 Unit(s) SubCutaneous every 8 hours  hydrochlorothiazide 25 milliGRAM(s) Oral daily  influenza   Vaccine 0.5 milliLiter(s) IntraMuscular once  insulin lispro (HumaLOG) corrective regimen sliding scale   SubCutaneous Before meals and at bedtime  isosorbide   mononitrate ER Tablet (IMDUR) 30 milliGRAM(s) Oral daily  lisinopril 40 milliGRAM(s) Oral daily  metoprolol tartrate 50 milliGRAM(s) Oral two times a day  piperacillin/tazobactam IVPB.. 3.375 Gram(s) IV Intermittent every 8 hours    MEDICATIONS  (PRN):  dextrose 40% Gel 15 Gram(s) Oral once PRN Blood Glucose LESS THAN 70 milliGRAM(s)/deciliter  glucagon  Injectable 1 milliGRAM(s) IntraMuscular once PRN Glucose LESS THAN 70 milligrams/deciliter  oxyCODONE    IR 5 milliGRAM(s) Oral every 3 hours PRN Moderate Pain (4 - 6)      PHYSICAL EXAM:  Constitutional: NAD, A&O x 4  Respiratory: non-labor breathing   Cardiovascular: RRR  Gastrointestinal: soft, ND/NT  Extremities: Skin warm to touch. Dressing at medial side of right leg, soaked through foot incision. +R femoral pulse. +Cap refills. +R DP/PT signals    11-26    139  |  104  |  29<H>  ----------------------------<  203<H>  4.6   |  27  |  1.37<H>  11-25    139  |  100  |  30<H>  ----------------------------<  224<H>  4.4   |  23  |  1.39<H>  11-25    139  |  101  |  29<H>  ----------------------------<  169<H>  4.1   |  24  |  1.20    Ca    9.1      26 Nov 2019 07:19  Ca    8.8      25 Nov 2019 15:52  Ca    9.3      25 Nov 2019 04:11  Phos  4.3     11-26  Mg     2.3     11-26      PT/INR - ( 26 Nov 2019 07:24 )   PT: 12.5 sec;   INR: 1.08 ratio    PTT - ( 26 Nov 2019 07:24 )  PTT:27.1 sec                ABG - ( 25 Nov 2019 12:34 )  pH, Arterial: 7.37  pH, Blood: x     /  pCO2: 44    /  pO2: 105   / HCO3: 25    / Base Excess: -.2   /  SaO2: 98                   9.9    11.74 )-----------( 209      ( 25 Nov 2019 15:52 )             29.5                         11.1   7.36  )-----------( 198      ( 25 Nov 2019 04:11 )             32.7     CAPILLARY BLOOD GLUCOSE    POCT Blood Glucose.: 199 mg/dL (26 Nov 2019 06:50)  POCT Blood Glucose.: 217 mg/dL (26 Nov 2019 01:18)  POCT Blood Glucose.: 213 mg/dL (25 Nov 2019 17:21) STATUS POST: RLE angio and below knee popliteal artery bypass to posterior tibial artery with reversed saphenous vein  POST OPERATIVE DAY #: 1    Subjective  Pt was seen and examined at bedside. Mild 4/10 pain in R foot, well controlled with PCA. Denies dizziness, SOB, CP, palpitations. Denies n/v, abdominal pain.     ICU Vital Signs Last 24 Hrs  T(C): 36.7 (26 Nov 2019 05:19), Max: 37.1 (26 Nov 2019 01:21)  T(F): 98.1 (26 Nov 2019 05:19), Max: 98.8 (26 Nov 2019 01:21)  HR: 69 (26 Nov 2019 05:19) (58 - 78)  BP: 160/74 (26 Nov 2019 05:19) (123/57 - 163/71)  BP(mean): 97 (25 Nov 2019 19:00) (82 - 100)  ABP: --  ABP(mean): --  RR: 20 (26 Nov 2019 05:19) (11 - 20)  SpO2: 96% (26 Nov 2019 05:19) (94% - 100%)    I&O's Detail    25 Nov 2019 07:01  -  26 Nov 2019 07:00  --------------------------------------------------------  IN:    lactated ringers.: 1500 mL    Oral Fluid: 660 mL    Solution: 200 mL  Total IN: 2360 mL    OUT:    Indwelling Catheter - Urethral: 985 mL  Total OUT: 985 mL    Total NET: 1375 mL    MEDICATIONS  (STANDING):  acetaminophen  IVPB .. 1000 milliGRAM(s) IV Intermittent every 6 hours  amLODIPine   Tablet 10 milliGRAM(s) Oral daily  aspirin  chewable 81 milliGRAM(s) Oral daily  atorvastatin 20 milliGRAM(s) Oral at bedtime  clopidogrel Tablet 75 milliGRAM(s) Oral daily  Dakins Solution - 1/4 Strength 1 Application(s) Topical daily  dextrose 5%. 1000 milliLiter(s) (50 mL/Hr) IV Continuous <Continuous>  dextrose 50% Injectable 12.5 Gram(s) IV Push once  dextrose 50% Injectable 25 Gram(s) IV Push once  dextrose 50% Injectable 25 Gram(s) IV Push once  heparin  Injectable 5000 Unit(s) SubCutaneous every 8 hours  hydrochlorothiazide 25 milliGRAM(s) Oral daily  influenza   Vaccine 0.5 milliLiter(s) IntraMuscular once  insulin lispro (HumaLOG) corrective regimen sliding scale   SubCutaneous Before meals and at bedtime  isosorbide   mononitrate ER Tablet (IMDUR) 30 milliGRAM(s) Oral daily  lisinopril 40 milliGRAM(s) Oral daily  metoprolol tartrate 50 milliGRAM(s) Oral two times a day  piperacillin/tazobactam IVPB.. 3.375 Gram(s) IV Intermittent every 8 hours    MEDICATIONS  (PRN):  dextrose 40% Gel 15 Gram(s) Oral once PRN Blood Glucose LESS THAN 70 milliGRAM(s)/deciliter  glucagon  Injectable 1 milliGRAM(s) IntraMuscular once PRN Glucose LESS THAN 70 milligrams/deciliter  oxyCODONE    IR 5 milliGRAM(s) Oral every 3 hours PRN Moderate Pain (4 - 6)      PHYSICAL EXAM:  Constitutional: NAD  Respiratory: non-labor breathing   Cardiovascular: RRR  Gastrointestinal: soft, ND/NT  Extremities: RLE: Skin WWP, Dressing at medial side of right thigh c/d/i, dressing on medial ankle saturated with SS output.  DP/PT signals,    11-26    139  |  104  |  29<H>  ----------------------------<  203<H>  4.6   |  27  |  1.37<H>  11-25    139  |  100  |  30<H>  ----------------------------<  224<H>  4.4   |  23  |  1.39<H>  11-25    139  |  101  |  29<H>  ----------------------------<  169<H>  4.1   |  24  |  1.20    Ca    9.1      26 Nov 2019 07:19  Ca    8.8      25 Nov 2019 15:52  Ca    9.3      25 Nov 2019 04:11  Phos  4.3     11-26  Mg     2.3     11-26      PT/INR - ( 26 Nov 2019 07:24 )   PT: 12.5 sec;   INR: 1.08 ratio    PTT - ( 26 Nov 2019 07:24 )  PTT:27.1 sec                ABG - ( 25 Nov 2019 12:34 )  pH, Arterial: 7.37  pH, Blood: x     /  pCO2: 44    /  pO2: 105   / HCO3: 25    / Base Excess: -.2   /  SaO2: 98                   9.9    11.74 )-----------( 209      ( 25 Nov 2019 15:52 )             29.5                         11.1   7.36  )-----------( 198      ( 25 Nov 2019 04:11 )             32.7     CAPILLARY BLOOD GLUCOSE    POCT Blood Glucose.: 199 mg/dL (26 Nov 2019 06:50)  POCT Blood Glucose.: 217 mg/dL (26 Nov 2019 01:18)  POCT Blood Glucose.: 213 mg/dL (25 Nov 2019 17:21)

## 2019-11-26 NOTE — PROGRESS NOTE ADULT - SUBJECTIVE AND OBJECTIVE BOX
Day 1 of Anesthesia Pain Management Service    SUBJECTIVE: Doing well    Pain Scale Score:	[X] Refer to charted pain scores    THERAPY:    [ ] IV PCA Morphine		        [ ] 5 mg/mL	[ ] 1 mg/mL  [X] IV PCA Hydromorphone	[ ] 5 mg/mL	[X] 1 mg/mL  [ ] IV PCA Fentanyl		        [ ] 50 micrograms/mL    Demand dose: 0.2 mg     Lockout: 6 minutes   Continuous Rate: 0 mg/hr  4 Hour Limit: 4 mg    MEDICATIONS  (STANDING):  acetaminophen  IVPB .. 1000 milliGRAM(s) IV Intermittent every 6 hours  amLODIPine   Tablet 10 milliGRAM(s) Oral daily  aspirin  chewable 81 milliGRAM(s) Oral daily  atorvastatin 20 milliGRAM(s) Oral at bedtime  clopidogrel Tablet 75 milliGRAM(s) Oral daily  Dakins Solution - 1/4 Strength 1 Application(s) Topical daily  dextrose 5%. 1000 milliLiter(s) (50 mL/Hr) IV Continuous <Continuous>  dextrose 50% Injectable 12.5 Gram(s) IV Push once  dextrose 50% Injectable 25 Gram(s) IV Push once  dextrose 50% Injectable 25 Gram(s) IV Push once  heparin  Injectable 5000 Unit(s) SubCutaneous every 8 hours  hydrochlorothiazide 25 milliGRAM(s) Oral daily  HYDROmorphone PCA (1 mG/mL) 30 milliLiter(s) PCA Continuous PCA Continuous  influenza   Vaccine 0.5 milliLiter(s) IntraMuscular once  insulin lispro (HumaLOG) corrective regimen sliding scale   SubCutaneous Before meals and at bedtime  isosorbide   mononitrate ER Tablet (IMDUR) 30 milliGRAM(s) Oral daily  lisinopril 40 milliGRAM(s) Oral daily  metoprolol tartrate 50 milliGRAM(s) Oral two times a day  piperacillin/tazobactam IVPB.. 3.375 Gram(s) IV Intermittent every 8 hours    MEDICATIONS  (PRN):  dextrose 40% Gel 15 Gram(s) Oral once PRN Blood Glucose LESS THAN 70 milliGRAM(s)/deciliter  glucagon  Injectable 1 milliGRAM(s) IntraMuscular once PRN Glucose LESS THAN 70 milligrams/deciliter  HYDROmorphone PCA (1 mG/mL) Rescue Clinician Bolus 0.5 milliGRAM(s) IV Push every 15 minutes PRN for Pain Scale GREATER THAN 6  naloxone Injectable 0.1 milliGRAM(s) IV Push every 3 minutes PRN For ANY of the following changes in patient status:  A. RR LESS THAN 10 breaths per minute, B. Oxygen saturation LESS THAN 90%, C. Sedation score of 6  ondansetron Injectable 4 milliGRAM(s) IV Push every 6 hours PRN Nausea      OBJECTIVE:    Sedation Score:	[ X] Alert	 [ ] Drowsy 	[ ] Arousable	[ ] Asleep	[ ] Unresponsive    Side Effects:	[X ] None	[ ] Nausea	[ ] Vomiting	[ ] Pruritus  		[ ] Other:    Vital Signs Last 24 Hrs  T(C): 36.7 (26 Nov 2019 05:19), Max: 37.1 (26 Nov 2019 01:21)  T(F): 98.1 (26 Nov 2019 05:19), Max: 98.8 (26 Nov 2019 01:21)  HR: 69 (26 Nov 2019 05:19) (58 - 78)  BP: 160/74 (26 Nov 2019 05:19) (123/57 - 163/71)  BP(mean): 97 (25 Nov 2019 19:00) (82 - 100)  RR: 20 (26 Nov 2019 05:19) (11 - 20)  SpO2: 96% (26 Nov 2019 05:19) (94% - 100%)    ASSESSMENT/ PLAN    Therapy to  be:               [  ] Continued   [X ] Discontinued   [ X] Changed to PRN Analgesics    Documentation and Verification of current medications:   [X] Done	[ ] Not done, not eligible    Comments: Minimal PCA use. D\C PCA and transition to po analgesics prn.

## 2019-11-26 NOTE — PROGRESS NOTE ADULT - SUBJECTIVE AND OBJECTIVE BOX
INFECTIOUS DISEASES FOLLOW UP--Oscar Landa MD  Pager [phone]    This is a follow up note for this  68y Male with  Type 2 diabetes mellitus   DFI.  s/p RLE revascularization.  On zosyn with PA from foot wound cx.    Further ROS:  CONSTITUTIONAL:  No fever  CARDIOVASCULAR:  No chest pain or palpitations  RESPIRATORY:  No dyspnea  GASTROINTESTINAL:  No nausea, vomiting, diarrhea, or abdominal pain  GENITOURINARY:  No dysuria  NEUROLOGIC:  No headache,     Allergies  No Known Allergies    ANTIBIOTICS/RELEVANT:  antimicrobials  piperacillin/tazobactam IVPB.. 3.375 Gram(s) IV Intermittent every 8 hours    immunologic:  influenza   Vaccine 0.5 milliLiter(s) IntraMuscular once    OTHER:  acetaminophen  IVPB .. 1000 milliGRAM(s) IV Intermittent every 6 hours  amLODIPine   Tablet 10 milliGRAM(s) Oral daily  aspirin  chewable 81 milliGRAM(s) Oral daily  atorvastatin 20 milliGRAM(s) Oral at bedtime  clopidogrel Tablet 75 milliGRAM(s) Oral daily  Dakins Solution - 1/4 Strength 1 Application(s) Topical daily  dextrose 40% Gel 15 Gram(s) Oral once PRN  dextrose 5%. 1000 milliLiter(s) IV Continuous <Continuous>  dextrose 50% Injectable 12.5 Gram(s) IV Push once  dextrose 50% Injectable 25 Gram(s) IV Push once  dextrose 50% Injectable 25 Gram(s) IV Push once  glucagon  Injectable 1 milliGRAM(s) IntraMuscular once PRN  heparin  Injectable 5000 Unit(s) SubCutaneous every 8 hours  hydrochlorothiazide 25 milliGRAM(s) Oral daily  HYDROmorphone PCA (1 mG/mL) 30 milliLiter(s) PCA Continuous PCA Continuous  HYDROmorphone PCA (1 mG/mL) Rescue Clinician Bolus 0.5 milliGRAM(s) IV Push every 15 minutes PRN  insulin lispro (HumaLOG) corrective regimen sliding scale   SubCutaneous Before meals and at bedtime  isosorbide   mononitrate ER Tablet (IMDUR) 30 milliGRAM(s) Oral daily  lisinopril 40 milliGRAM(s) Oral daily  metoprolol tartrate 50 milliGRAM(s) Oral two times a day  naloxone Injectable 0.1 milliGRAM(s) IV Push every 3 minutes PRN  ondansetron Injectable 4 milliGRAM(s) IV Push every 6 hours PRN    Objective:  Vital Signs Last 24 Hrs  T(C): 36.7 (26 Nov 2019 05:19), Max: 37.1 (26 Nov 2019 01:21)  T(F): 98.1 (26 Nov 2019 05:19), Max: 98.8 (26 Nov 2019 01:21)  HR: 69 (26 Nov 2019 05:19) (58 - 78)  BP: 160/74 (26 Nov 2019 05:19) (123/57 - 163/71)  BP(mean): 97 (25 Nov 2019 19:00) (82 - 100)  RR: 20 (26 Nov 2019 05:19) (11 - 20)  SpO2: 96% (26 Nov 2019 05:19) (94% - 100%)    PHYSICAL EXAM:  Constitutional:no acute distress  Ear/Nose/Throat: no oral lesions, 	  Respiratory: clear BL  Cardiovascular: S1S2  Gastrointestinal:soft, (+) BS, no tenderness  Extremities: leg wound cx neg  No Lymphadenopathy  IV sites not inflammed.    LABS:                        9.9    11.74 )-----------( 209      ( 25 Nov 2019 15:52 )             29.5     11-26    139  |  104  |  29<H>  ----------------------------<  203<H>  4.6   |  27  |  1.37<H>    Ca    9.1      26 Nov 2019 07:19  Phos  4.3     11-26  Mg     2.3     11-26    PT/INR - ( 26 Nov 2019 07:24 )   PT: 12.5 sec;   INR: 1.08 ratio    PTT - ( 26 Nov 2019 07:24 )  PTT:27.1 sec    MICROBIOLOGY: prior foot Cx with GBS and pseudomonas    imp/rx:  Diabetic foot infection.  s/p revascularization.  Foot Cx with GBS and PA.  to continue zosyn

## 2019-11-26 NOTE — PROGRESS NOTE ADULT - SUBJECTIVE AND OBJECTIVE BOX
Patient is a 68y old  Male who presents with a chief complaint of Foot infection (26 Nov 2019 09:38)       INTERVAL HPI/OVERNIGHT EVENTS:  Patient seen and evaluated at bedside.  Pt is resting comfortable in NAD. Denies N/V/F/C.    Allergies    No Known Allergies    Intolerances    ciprofloxacin (Vomiting)      Vital Signs Last 24 Hrs  T(C): 36.7 (26 Nov 2019 12:11), Max: 37.1 (26 Nov 2019 01:21)  T(F): 98 (26 Nov 2019 12:11), Max: 98.8 (26 Nov 2019 01:21)  HR: 69 (26 Nov 2019 05:19) (59 - 78)  BP: 160/74 (26 Nov 2019 05:19) (123/57 - 163/71)  BP(mean): 97 (25 Nov 2019 19:00) (82 - 100)  RR: 19 (26 Nov 2019 12:11) (12 - 20)  SpO2: 95% (26 Nov 2019 12:11) (94% - 100%)    LABS:                        9.9    11.74 )-----------( 209      ( 25 Nov 2019 15:52 )             29.5     11-26    139  |  104  |  29<H>  ----------------------------<  203<H>  4.6   |  27  |  1.37<H>    Ca    9.1      26 Nov 2019 07:19  Phos  4.3     11-26  Mg     2.3     11-26      PT/INR - ( 26 Nov 2019 07:24 )   PT: 12.5 sec;   INR: 1.08 ratio         PTT - ( 26 Nov 2019 07:24 )  PTT:27.1 sec    CAPILLARY BLOOD GLUCOSE      POCT Blood Glucose.: 243 mg/dL (26 Nov 2019 13:25)  POCT Blood Glucose.: 199 mg/dL (26 Nov 2019 06:50)  POCT Blood Glucose.: 217 mg/dL (26 Nov 2019 01:18)  POCT Blood Glucose.: 213 mg/dL (25 Nov 2019 17:21)      Lower Extremity Physical Exam:  Vascular: DP/PT 0/4, B/L, CFT <3 seconds B/L, Temperature gradient warm to cool B/L.   Neuro: Epicritic sensation grossly diminished to level of ankleB/L.  Skin:  Wound #1: R foot 1st interspace wound, 2.0x2.0cm, depth to 1st and 2nd met capsule, wound bed necrotic, malodorous, periwound macerated, etiology 2/2 interdigital maceration/ arterial insufficiency     Wound #2: R foot submetatarsal 1 wound, 2.5x1.5cm, wound bed fibrogranular, no active drainage, no malodor, periwound macerated, etiology 2/2 pressure     Wound #3: R foot lateral border of foot stable eschar, 4.0x1.0cm, no active drainage, no malodor, periwound erythematous, etiology 2/2 pressure

## 2019-11-27 ENCOUNTER — RESULT REVIEW (OUTPATIENT)
Age: 69
End: 2019-11-27

## 2019-11-27 LAB
ANION GAP SERPL CALC-SCNC: 15 MMOL/L — SIGNIFICANT CHANGE UP (ref 5–17)
APTT BLD: 26.7 SEC — LOW (ref 27.5–36.3)
BLD GP AB SCN SERPL QL: NEGATIVE — SIGNIFICANT CHANGE UP
BUN SERPL-MCNC: 19 MG/DL — SIGNIFICANT CHANGE UP (ref 7–23)
CALCIUM SERPL-MCNC: 9.1 MG/DL — SIGNIFICANT CHANGE UP (ref 8.4–10.5)
CHLORIDE SERPL-SCNC: 98 MMOL/L — SIGNIFICANT CHANGE UP (ref 96–108)
CO2 SERPL-SCNC: 25 MMOL/L — SIGNIFICANT CHANGE UP (ref 22–31)
CREAT SERPL-MCNC: 1.01 MG/DL — SIGNIFICANT CHANGE UP (ref 0.5–1.3)
GLUCOSE BLDC GLUCOMTR-MCNC: 210 MG/DL — HIGH (ref 70–99)
GLUCOSE BLDC GLUCOMTR-MCNC: 246 MG/DL — HIGH (ref 70–99)
GLUCOSE BLDC GLUCOMTR-MCNC: 256 MG/DL — HIGH (ref 70–99)
GLUCOSE BLDC GLUCOMTR-MCNC: 264 MG/DL — HIGH (ref 70–99)
GLUCOSE BLDC GLUCOMTR-MCNC: 318 MG/DL — HIGH (ref 70–99)
GLUCOSE SERPL-MCNC: 236 MG/DL — HIGH (ref 70–99)
HCT VFR BLD CALC: 26.9 % — LOW (ref 39–50)
HGB BLD-MCNC: 8.9 G/DL — LOW (ref 13–17)
INR BLD: 1.11 RATIO — SIGNIFICANT CHANGE UP (ref 0.88–1.16)
MAGNESIUM SERPL-MCNC: 1.8 MG/DL — SIGNIFICANT CHANGE UP (ref 1.6–2.6)
MCHC RBC-ENTMCNC: 26.7 PG — LOW (ref 27–34)
MCHC RBC-ENTMCNC: 33.1 GM/DL — SIGNIFICANT CHANGE UP (ref 32–36)
MCV RBC AUTO: 80.8 FL — SIGNIFICANT CHANGE UP (ref 80–100)
NRBC # BLD: 0 /100 WBCS — SIGNIFICANT CHANGE UP (ref 0–0)
PHOSPHATE SERPL-MCNC: 2.5 MG/DL — SIGNIFICANT CHANGE UP (ref 2.5–4.5)
PLATELET # BLD AUTO: 168 K/UL — SIGNIFICANT CHANGE UP (ref 150–400)
POTASSIUM SERPL-MCNC: 4.2 MMOL/L — SIGNIFICANT CHANGE UP (ref 3.5–5.3)
POTASSIUM SERPL-SCNC: 4.2 MMOL/L — SIGNIFICANT CHANGE UP (ref 3.5–5.3)
PROTHROM AB SERPL-ACNC: 12.8 SEC — SIGNIFICANT CHANGE UP (ref 10–12.9)
RBC # BLD: 3.33 M/UL — LOW (ref 4.2–5.8)
RBC # FLD: 13.5 % — SIGNIFICANT CHANGE UP (ref 10.3–14.5)
RH IG SCN BLD-IMP: POSITIVE — SIGNIFICANT CHANGE UP
SODIUM SERPL-SCNC: 138 MMOL/L — SIGNIFICANT CHANGE UP (ref 135–145)
WBC # BLD: 8.04 K/UL — SIGNIFICANT CHANGE UP (ref 3.8–10.5)
WBC # FLD AUTO: 8.04 K/UL — SIGNIFICANT CHANGE UP (ref 3.8–10.5)

## 2019-11-27 PROCEDURE — 99232 SBSQ HOSP IP/OBS MODERATE 35: CPT

## 2019-11-27 PROCEDURE — 88311 DECALCIFY TISSUE: CPT | Mod: 26

## 2019-11-27 PROCEDURE — 88307 TISSUE EXAM BY PATHOLOGIST: CPT | Mod: 26

## 2019-11-27 RX ORDER — SODIUM HYPOCHLORITE 0.125 %
1 SOLUTION, NON-ORAL MISCELLANEOUS DAILY
Refills: 0 | Status: DISCONTINUED | OUTPATIENT
Start: 2019-11-27 | End: 2019-12-03

## 2019-11-27 RX ORDER — DEXTROSE 50 % IN WATER 50 %
15 SYRINGE (ML) INTRAVENOUS ONCE
Refills: 0 | Status: DISCONTINUED | OUTPATIENT
Start: 2019-11-27 | End: 2019-12-03

## 2019-11-27 RX ORDER — CLOPIDOGREL BISULFATE 75 MG/1
300 TABLET, FILM COATED ORAL ONCE
Refills: 0 | Status: COMPLETED | OUTPATIENT
Start: 2019-11-27 | End: 2019-11-27

## 2019-11-27 RX ORDER — ASPIRIN/CALCIUM CARB/MAGNESIUM 324 MG
81 TABLET ORAL DAILY
Refills: 0 | Status: DISCONTINUED | OUTPATIENT
Start: 2019-11-27 | End: 2019-11-27

## 2019-11-27 RX ORDER — INSULIN LISPRO 100/ML
VIAL (ML) SUBCUTANEOUS
Refills: 0 | Status: DISCONTINUED | OUTPATIENT
Start: 2019-11-27 | End: 2019-11-28

## 2019-11-27 RX ORDER — DEXTROSE 50 % IN WATER 50 %
25 SYRINGE (ML) INTRAVENOUS ONCE
Refills: 0 | Status: DISCONTINUED | OUTPATIENT
Start: 2019-11-27 | End: 2019-12-03

## 2019-11-27 RX ORDER — PIPERACILLIN AND TAZOBACTAM 4; .5 G/20ML; G/20ML
3.38 INJECTION, POWDER, LYOPHILIZED, FOR SOLUTION INTRAVENOUS EVERY 8 HOURS
Refills: 0 | Status: DISCONTINUED | OUTPATIENT
Start: 2019-11-27 | End: 2019-11-30

## 2019-11-27 RX ORDER — AMLODIPINE BESYLATE 2.5 MG/1
10 TABLET ORAL DAILY
Refills: 0 | Status: DISCONTINUED | OUTPATIENT
Start: 2019-11-27 | End: 2019-12-03

## 2019-11-27 RX ORDER — MORPHINE SULFATE 50 MG/1
2 CAPSULE, EXTENDED RELEASE ORAL EVERY 4 HOURS
Refills: 0 | Status: DISCONTINUED | OUTPATIENT
Start: 2019-11-27 | End: 2019-12-03

## 2019-11-27 RX ORDER — METOPROLOL TARTRATE 50 MG
50 TABLET ORAL
Refills: 0 | Status: DISCONTINUED | OUTPATIENT
Start: 2019-11-27 | End: 2019-12-03

## 2019-11-27 RX ORDER — OXYCODONE AND ACETAMINOPHEN 5; 325 MG/1; MG/1
1 TABLET ORAL EVERY 4 HOURS
Refills: 0 | Status: DISCONTINUED | OUTPATIENT
Start: 2019-11-27 | End: 2019-11-27

## 2019-11-27 RX ORDER — INSULIN LISPRO 100/ML
VIAL (ML) SUBCUTANEOUS EVERY 6 HOURS
Refills: 0 | Status: DISCONTINUED | OUTPATIENT
Start: 2019-11-27 | End: 2019-11-27

## 2019-11-27 RX ORDER — LISINOPRIL 2.5 MG/1
40 TABLET ORAL DAILY
Refills: 0 | Status: DISCONTINUED | OUTPATIENT
Start: 2019-11-27 | End: 2019-12-03

## 2019-11-27 RX ORDER — FENTANYL CITRATE 50 UG/ML
50 INJECTION INTRAVENOUS
Refills: 0 | Status: DISCONTINUED | OUTPATIENT
Start: 2019-11-27 | End: 2019-11-27

## 2019-11-27 RX ORDER — HEPARIN SODIUM 5000 [USP'U]/ML
5000 INJECTION INTRAVENOUS; SUBCUTANEOUS EVERY 8 HOURS
Refills: 0 | Status: DISCONTINUED | OUTPATIENT
Start: 2019-11-27 | End: 2019-12-03

## 2019-11-27 RX ORDER — GLUCAGON INJECTION, SOLUTION 0.5 MG/.1ML
1 INJECTION, SOLUTION SUBCUTANEOUS ONCE
Refills: 0 | Status: DISCONTINUED | OUTPATIENT
Start: 2019-11-27 | End: 2019-12-03

## 2019-11-27 RX ORDER — ISOSORBIDE MONONITRATE 60 MG/1
30 TABLET, EXTENDED RELEASE ORAL DAILY
Refills: 0 | Status: DISCONTINUED | OUTPATIENT
Start: 2019-11-27 | End: 2019-12-03

## 2019-11-27 RX ORDER — ACETAMINOPHEN 500 MG
650 TABLET ORAL EVERY 6 HOURS
Refills: 0 | Status: DISCONTINUED | OUTPATIENT
Start: 2019-11-27 | End: 2019-12-03

## 2019-11-27 RX ORDER — ASPIRIN/CALCIUM CARB/MAGNESIUM 324 MG
81 TABLET ORAL DAILY
Refills: 0 | Status: DISCONTINUED | OUTPATIENT
Start: 2019-11-27 | End: 2019-12-03

## 2019-11-27 RX ORDER — PIPERACILLIN AND TAZOBACTAM 4; .5 G/20ML; G/20ML
3.38 INJECTION, POWDER, LYOPHILIZED, FOR SOLUTION INTRAVENOUS ONCE
Refills: 0 | Status: COMPLETED | OUTPATIENT
Start: 2019-11-27 | End: 2019-11-27

## 2019-11-27 RX ORDER — DEXTROSE 50 % IN WATER 50 %
12.5 SYRINGE (ML) INTRAVENOUS ONCE
Refills: 0 | Status: DISCONTINUED | OUTPATIENT
Start: 2019-11-27 | End: 2019-12-03

## 2019-11-27 RX ORDER — ONDANSETRON 8 MG/1
4 TABLET, FILM COATED ORAL ONCE
Refills: 0 | Status: DISCONTINUED | OUTPATIENT
Start: 2019-11-27 | End: 2019-12-03

## 2019-11-27 RX ORDER — ATORVASTATIN CALCIUM 80 MG/1
20 TABLET, FILM COATED ORAL AT BEDTIME
Refills: 0 | Status: DISCONTINUED | OUTPATIENT
Start: 2019-11-27 | End: 2019-12-03

## 2019-11-27 RX ORDER — HYDROCHLOROTHIAZIDE 25 MG
25 TABLET ORAL DAILY
Refills: 0 | Status: DISCONTINUED | OUTPATIENT
Start: 2019-11-27 | End: 2019-12-03

## 2019-11-27 RX ORDER — OXYCODONE HYDROCHLORIDE 5 MG/1
5 TABLET ORAL EVERY 4 HOURS
Refills: 0 | Status: DISCONTINUED | OUTPATIENT
Start: 2019-11-27 | End: 2019-12-03

## 2019-11-27 RX ADMIN — Medication 4: at 15:39

## 2019-11-27 RX ADMIN — HEPARIN SODIUM 5000 UNIT(S): 5000 INJECTION INTRAVENOUS; SUBCUTANEOUS at 06:23

## 2019-11-27 RX ADMIN — Medication 50 MILLIGRAM(S): at 06:23

## 2019-11-27 RX ADMIN — OXYCODONE HYDROCHLORIDE 5 MILLIGRAM(S): 5 TABLET ORAL at 22:01

## 2019-11-27 RX ADMIN — OXYCODONE HYDROCHLORIDE 5 MILLIGRAM(S): 5 TABLET ORAL at 21:31

## 2019-11-27 RX ADMIN — Medication 50 MILLIGRAM(S): at 18:10

## 2019-11-27 RX ADMIN — AMLODIPINE BESYLATE 10 MILLIGRAM(S): 2.5 TABLET ORAL at 06:23

## 2019-11-27 RX ADMIN — Medication 6: at 14:14

## 2019-11-27 RX ADMIN — Medication 650 MILLIGRAM(S): at 21:31

## 2019-11-27 RX ADMIN — LISINOPRIL 40 MILLIGRAM(S): 2.5 TABLET ORAL at 06:23

## 2019-11-27 RX ADMIN — CLOPIDOGREL BISULFATE 300 MILLIGRAM(S): 75 TABLET, FILM COATED ORAL at 14:10

## 2019-11-27 RX ADMIN — OXYCODONE HYDROCHLORIDE 5 MILLIGRAM(S): 5 TABLET ORAL at 01:13

## 2019-11-27 RX ADMIN — Medication 2: at 06:23

## 2019-11-27 RX ADMIN — HEPARIN SODIUM 5000 UNIT(S): 5000 INJECTION INTRAVENOUS; SUBCUTANEOUS at 21:31

## 2019-11-27 RX ADMIN — ATORVASTATIN CALCIUM 20 MILLIGRAM(S): 80 TABLET, FILM COATED ORAL at 21:31

## 2019-11-27 RX ADMIN — Medication 3: at 01:16

## 2019-11-27 RX ADMIN — Medication 25 MILLIGRAM(S): at 06:23

## 2019-11-27 RX ADMIN — OXYCODONE HYDROCHLORIDE 5 MILLIGRAM(S): 5 TABLET ORAL at 01:45

## 2019-11-27 RX ADMIN — Medication 650 MILLIGRAM(S): at 22:01

## 2019-11-27 RX ADMIN — PIPERACILLIN AND TAZOBACTAM 25 GRAM(S): 4; .5 INJECTION, POWDER, LYOPHILIZED, FOR SOLUTION INTRAVENOUS at 06:24

## 2019-11-27 RX ADMIN — Medication 8: at 21:31

## 2019-11-27 RX ADMIN — PIPERACILLIN AND TAZOBACTAM 200 GRAM(S): 4; .5 INJECTION, POWDER, LYOPHILIZED, FOR SOLUTION INTRAVENOUS at 18:10

## 2019-11-27 NOTE — PROGRESS NOTE ADULT - ASSESSMENT
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   Cultures growing Pseudomonas (pan sensitive) and GBS.   s/p popliteal-tibial bypass 11/25   s/p debridement, hallux bone biopsy and stravix graft application 11/27   Afebrile, no leukocytosis, clinically well.     Suggest  -continue Zosyn while inpatient   -for discharge, Cefepime 2GM IV q8h via PICC line through 12/26/19 plus PO Flagyl 500mg BID for two weeks   -diabetic control per primary team     I will be returning Monday 12/2. Please call [phone] if sooner follow up is needed.     Darryl Coleman MD   Infectious Disease   Pager [phone]   After 5PM and on weekends please page fellow on call or call [phone]

## 2019-11-27 NOTE — PROGRESS NOTE ADULT - ASSESSMENT
62yo M POD 2 s/p below knee pop to PT bypass. Patient does not have any complaints    Plan:  Patient scheduled to go with Podiatry for right foot wound debridement today  PT evaluation and treatment  Wound care for left shin wound   Increase insulin sliding scale for elevated fingersticks    Vascular Surgery  [phone]

## 2019-11-27 NOTE — PROGRESS NOTE ADULT - ASSESSMENT
Plan:   To OR today at 10:30am with Dr. Sandhu for right foot wounds debridement with bone biopsy and stravix application.   CXR on sunrise.  EKG on sunrise.  Medical/Cardiac clearance since 11/26 and documented in chart.  Consent signed and in chart.  Procedure was explained to patient in detail. All alternatives, risks and complications were discussed. All questions answered.

## 2019-11-27 NOTE — BRIEF OPERATIVE NOTE - NSICDXBRIEFPROCEDURE_GEN_ALL_CORE_FT
PROCEDURES:  Angio fluoro peripheral arteries 20-Nov-2019 18:30:42  [place]
PROCEDURES:  Angio fluoro peripheral arteries 20-Nov-2019 18:30:42 and below knee popliteal artery bypass to posterior tibial artery with reversed saphenous vein [place]
PROCEDURES:  Debridement, wound, foot, with living bi-layer cellular skin substitute application 27-Nov-2019 13:25:02  Sveta Dillon

## 2019-11-27 NOTE — PROGRESS NOTE ADULT - SUBJECTIVE AND OBJECTIVE BOX
Podiatry Pager #: [phone]    Patient is a 68y old  Male who presents with a chief complaint of Foot infection (26 Nov 2019 14:55)      INTERVAL HPI/OVERNIGHT EVENTS:   Pt is scheduled for right foot wounds debridement with bone biopsy and stravix application with Dr. Sandhu at 10:30. Patient is aware of procedure and is NPO since midnight.    MEDICATIONS  (STANDING):  amLODIPine   Tablet 10 milliGRAM(s) Oral daily  aspirin  chewable 81 milliGRAM(s) Oral daily  atorvastatin 20 milliGRAM(s) Oral at bedtime  clopidogrel Tablet 75 milliGRAM(s) Oral daily  Dakins Solution - 1/4 Strength 1 Application(s) Topical daily  dextrose 5%. 1000 milliLiter(s) (50 mL/Hr) IV Continuous <Continuous>  dextrose 50% Injectable 12.5 Gram(s) IV Push once  dextrose 50% Injectable 25 Gram(s) IV Push once  dextrose 50% Injectable 25 Gram(s) IV Push once  heparin  Injectable 5000 Unit(s) SubCutaneous every 8 hours  hydrochlorothiazide 25 milliGRAM(s) Oral daily  influenza   Vaccine 0.5 milliLiter(s) IntraMuscular once  insulin lispro (HumaLOG) corrective regimen sliding scale   SubCutaneous every 6 hours  isosorbide   mononitrate ER Tablet (IMDUR) 30 milliGRAM(s) Oral daily  lisinopril 40 milliGRAM(s) Oral daily  metoprolol tartrate 50 milliGRAM(s) Oral two times a day  piperacillin/tazobactam IVPB.. 3.375 Gram(s) IV Intermittent every 8 hours  sodium chloride 0.9%. 1000 milliLiter(s) (100 mL/Hr) IV Continuous <Continuous>    MEDICATIONS  (PRN):  dextrose 40% Gel 15 Gram(s) Oral once PRN Blood Glucose LESS THAN 70 milliGRAM(s)/deciliter  glucagon  Injectable 1 milliGRAM(s) IntraMuscular once PRN Glucose LESS THAN 70 milligrams/deciliter  oxyCODONE    IR 5 milliGRAM(s) Oral every 3 hours PRN Moderate Pain (4 - 6)      Allergies    No Known Allergies    Intolerances    ciprofloxacin (Vomiting)      Vital Signs Last 24 Hrs  T(C): 37.3 (27 Nov 2019 06:33), Max: 37.7 (27 Nov 2019 01:08)  T(F): 99.2 (27 Nov 2019 06:33), Max: 99.8 (27 Nov 2019 01:08)  HR: 97 (27 Nov 2019 06:33) (72 - 97)  BP: 188/82 (27 Nov 2019 06:33) (156/66 - 188/82)  BP(mean): --  RR: 18 (27 Nov 2019 06:33) (18 - 20)  SpO2: 97% (27 Nov 2019 06:33) (95% - 98%)    LABS:                        9.9    11.74 )-----------( 209      ( 25 Nov 2019 15:52 )             29.5     11-26    139  |  104  |  29<H>  ----------------------------<  203<H>  4.6   |  27  |  1.37<H>    Ca    9.1      26 Nov 2019 07:19  Phos  4.3     11-26  Mg     2.3     11-26      PT/INR - ( 26 Nov 2019 07:24 )   PT: 12.5 sec;   INR: 1.08 ratio         PTT - ( 26 Nov 2019 07:24 )  PTT:27.1 sec    CAPILLARY BLOOD GLUCOSE      POCT Blood Glucose.: 210 mg/dL (27 Nov 2019 06:13)  POCT Blood Glucose.: 256 mg/dL (27 Nov 2019 01:00)  POCT Blood Glucose.: 299 mg/dL (26 Nov 2019 22:04)  POCT Blood Glucose.: 267 mg/dL (26 Nov 2019 17:12)  POCT Blood Glucose.: 243 mg/dL (26 Nov 2019 13:25)      RADIOLOGY & ADDITIONAL TESTS:

## 2019-11-27 NOTE — BRIEF OPERATIVE NOTE - NSICDXBRIEFPOSTOP_GEN_ALL_CORE_FT
POST-OP DIAGNOSIS:  Peripheral vascular disease 20-Nov-2019 18:31:01  Glen Albarado
POST-OP DIAGNOSIS:  Peripheral vascular disease 20-Nov-2019 18:31:01  Glen Albarado
POST-OP DIAGNOSIS:  Open wound of right foot 27-Nov-2019 13:27:35  Sveta Dillon

## 2019-11-27 NOTE — PROGRESS NOTE ADULT - ATTENDING COMMENTS
I have discussed the case with the surgical house staff. I have personally seen, examined, and participated in the care of this patient. I have reviewed all pertinent clinical information.    Doing well postop from bypass. Incisions with minimal serosanguinous drainage. Right foot warm to touch.     Plan   - Further debridement with podiatry service today  - Continue antibiotics  - PT eval

## 2019-11-27 NOTE — BRIEF OPERATIVE NOTE - NSICDXBRIEFPREOP_GEN_ALL_CORE_FT
PRE-OP DIAGNOSIS:  Peripheral vascular disease 20-Nov-2019 18:30:53  Glen Albarado
PRE-OP DIAGNOSIS:  Peripheral vascular disease 20-Nov-2019 18:30:53  Glen Albarado
PRE-OP DIAGNOSIS:  Open wound of right foot 27-Nov-2019 13:27:04  Sveta Dillon

## 2019-11-27 NOTE — BRIEF OPERATIVE NOTE - OPERATION/FINDINGS
Aortogram - normal  R CFA and Profunda - normal  R SFA - normal  R AK and BK Popliteal A - normal  R MARIMAR - significant atherosclerotic disease, occluded proximally, reconstitutes just above ankle with some foot runoff  R Peroneal - significant atherosclerotic disease, trickle flow   R PTA - significant atherosclerotic disease, occluded proximally, reconstitutes just above ankle with some tarsal runoff
triphasic posterior tibial signal following bypass
s/p right foot wound debridement w/ stravix graft application x3 and bone biopsy of 1st proximal phalanx  low concern for soft tissue infection

## 2019-11-27 NOTE — PROGRESS NOTE ADULT - SUBJECTIVE AND OBJECTIVE BOX
Follow Up: Diabetic foot OM     Interval History/ROS: Seen after OR. Feels well. Afebrile. Appetite is good, no diarrhea. Not much pain.     Allergies  No Known Allergies    ANTIMICROBIALS:      OTHER MEDS:  acetaminophen   Tablet .. 650 milliGRAM(s) Oral every 6 hours PRN  aspirin  chewable 81 milliGRAM(s) Oral daily  Dakins Solution - 1/4 Strength 1 Application(s) Topical daily  dextrose 40% Gel 15 Gram(s) Oral once PRN  dextrose 50% Injectable 12.5 Gram(s) IV Push once  dextrose 50% Injectable 25 Gram(s) IV Push once  dextrose 50% Injectable 25 Gram(s) IV Push once  heparin  Injectable 5000 Unit(s) SubCutaneous every 8 hours  influenza   Vaccine 0.5 milliLiter(s) IntraMuscular once  insulin lispro (HumaLOG) corrective regimen sliding scale   SubCutaneous Before meals and at bedtime  morphine  - Injectable 2 milliGRAM(s) IV Push every 4 hours PRN  ondansetron Injectable 4 milliGRAM(s) IV Push once PRN  oxycodone    5 mG/acetaminophen 325 mG 1 Tablet(s) Oral every 4 hours PRN      Vital Signs Last 24 Hrs  T(C): 36.8 (27 Nov 2019 14:39), Max: 37.7 (27 Nov 2019 01:08)  T(F): 98.3 (27 Nov 2019 14:39), Max: 99.8 (27 Nov 2019 01:08)  HR: 69 (27 Nov 2019 14:39) (69 - 97)  BP: 145/66 (27 Nov 2019 14:39) (137/65 - 197/70)  BP(mean): 100 (27 Nov 2019 14:00) (94 - 109)  RR: 18 (27 Nov 2019 14:39) (14 - 20)  SpO2: 97% (27 Nov 2019 14:39) (95% - 99%)    Physical Exam:  General: NAD, non toxic  Head: atraumatic, normocephalic  Eye: normal sclera and conjunctiva  Cardio:  regular rate and rhythm   Respiratory: nonlabored on room air, clear bilaterally, no wheezing  abd: soft, BS +, no tenderness  Skin: right leg bypass sites dressed, nontender. right foot post-op dressing.   Neurologic: no focal deficit  psych: normal affect                          8.9    8.04  )-----------( 168      ( 27 Nov 2019 07:13 )             26.9       11-27    138  |  98  |  19  ----------------------------<  236<H>  4.2   |  25  |  1.01    Ca    9.1      27 Nov 2019 07:05  Phos  2.5     11-27  Mg     1.8     11-27    MICROBIOLOGY:  Culture - Abscess with Gram Stain (11.15.19 @ 03:30)    -  Amikacin: S <=16    -  Aztreonam: S <=4    -  Cefepime: S <=2    -  Ceftazidime: S 4    -  Ciprofloxacin: S <=1    -  Gentamicin: S 4    -  Imipenem: S <=1    -  Levofloxacin: S <=2    -  Meropenem: S <=1    -  Piperacillin/Tazobactam: S <=8    -  Tobramycin: S <=2    Specimen Source: .Abscess Leg - Right    Culture Results:   Moderate Pseudomonas aeruginosa  Moderate Streptococcus agalactiae (Group B) isolated  Group B streptococci are susceptible to ampicillin,  penicillin and cefazolin, but may be resistant to  erythromycin and clindamycin.  Recommendations for intrapartum prophylaxis for Group B  streptococci are penicillin or ampicillin.    Organism Identification: Pseudomonas aeruginosa    Organism: Pseudomonas aeruginosa    Method Type: KRYSTLE    RADIOLOGY:  Images below reviewed personally

## 2019-11-27 NOTE — PRE-ANESTHESIA EVALUATION ADULT - NSANTHOSAYNRD_GEN_A_CORE
No. BUTCH screening performed.  STOP BANG Legend: 0-2 = LOW Risk; 3-4 = INTERMEDIATE Risk; 5-8 = HIGH Risk

## 2019-11-27 NOTE — PROGRESS NOTE ADULT - SUBJECTIVE AND OBJECTIVE BOX
Morning Surgical Progress Note  Patient is a 68y old  Male who presents with a chief complaint of Foot infection (27 Nov 2019 07:25)      SUBJECTIVE: Patient seen and examined at bedside with surgical team, patient without complaints.     Vital Signs Last 24 Hrs  T(C): 37.3 (27 Nov 2019 06:33), Max: 37.7 (27 Nov 2019 01:08)  T(F): 99.2 (27 Nov 2019 06:33), Max: 99.8 (27 Nov 2019 01:08)  HR: 97 (27 Nov 2019 06:33) (72 - 97)  BP: 188/82 (27 Nov 2019 06:33) (156/66 - 188/82)  BP(mean): --  RR: 18 (27 Nov 2019 06:33) (18 - 20)  SpO2: 97% (27 Nov 2019 06:33) (95% - 98%)I&O's Detail    26 Nov 2019 07:01  -  27 Nov 2019 07:00  --------------------------------------------------------  IN:    Oral Fluid: 960 mL    sodium chloride 0.9%: 480 mL    sodium chloride 0.9%.: 700 mL    Solution: 200 mL  Total IN: 2340 mL    OUT:    Voided: 2000 mL  Total OUT: 2000 mL    Total NET: 340 mL      MEDICATIONS  (STANDING):  amLODIPine   Tablet 10 milliGRAM(s) Oral daily  aspirin  chewable 81 milliGRAM(s) Oral daily  atorvastatin 20 milliGRAM(s) Oral at bedtime  clopidogrel Tablet 75 milliGRAM(s) Oral daily  Dakins Solution - 1/4 Strength 1 Application(s) Topical daily  dextrose 5%. 1000 milliLiter(s) (50 mL/Hr) IV Continuous <Continuous>  dextrose 50% Injectable 12.5 Gram(s) IV Push once  dextrose 50% Injectable 25 Gram(s) IV Push once  dextrose 50% Injectable 25 Gram(s) IV Push once  heparin  Injectable 5000 Unit(s) SubCutaneous every 8 hours  hydrochlorothiazide 25 milliGRAM(s) Oral daily  influenza   Vaccine 0.5 milliLiter(s) IntraMuscular once  insulin lispro (HumaLOG) corrective regimen sliding scale   SubCutaneous every 6 hours  isosorbide   mononitrate ER Tablet (IMDUR) 30 milliGRAM(s) Oral daily  lisinopril 40 milliGRAM(s) Oral daily  metoprolol tartrate 50 milliGRAM(s) Oral two times a day  piperacillin/tazobactam IVPB.. 3.375 Gram(s) IV Intermittent every 8 hours  sodium chloride 0.9%. 1000 milliLiter(s) (100 mL/Hr) IV Continuous <Continuous>    MEDICATIONS  (PRN):  dextrose 40% Gel 15 Gram(s) Oral once PRN Blood Glucose LESS THAN 70 milliGRAM(s)/deciliter  glucagon  Injectable 1 milliGRAM(s) IntraMuscular once PRN Glucose LESS THAN 70 milligrams/deciliter  oxyCODONE    IR 5 milliGRAM(s) Oral every 3 hours PRN Moderate Pain (4 - 6)      Physical Exam  Constitutional: A&Ox3, NAD  Respiratory: CTA bilaterally  Cardiac: RRR, S1 and S2, no m/r/g  Gastrointestinal: abdomen soft, NT/ND  RLE: incisions c/d/i; dressings changed; DP and PT signals present.    LABS:                        8.9    8.04  )-----------( 168      ( 27 Nov 2019 07:13 )             26.9     11-27    138  |  98  |  19  ----------------------------<  236<H>  4.2   |  25  |  1.01    Ca    9.1      27 Nov 2019 07:05  Phos  2.5     11-27  Mg     1.8     11-27      PT/INR - ( 27 Nov 2019 07:10 )   PT: 12.8 sec;   INR: 1.11 ratio         PTT - ( 27 Nov 2019 07:10 )  PTT:26.7 sec

## 2019-11-27 NOTE — PRE-OP CHECKLIST - NS PREOP CHK MONITOR ANESTHESIA CONSENT
[Obese, well nourished, in no acute distress] : obese, well nourished, in no acute distress [Normal] : affect appropriate [de-identified] : Obese, soft, nontender, nondistended, positive bowel sounds in all four quadrants.  No hernia or masses. done

## 2019-11-28 DIAGNOSIS — I10 ESSENTIAL (PRIMARY) HYPERTENSION: ICD-10-CM

## 2019-11-28 LAB
ANION GAP SERPL CALC-SCNC: 13 MMOL/L — SIGNIFICANT CHANGE UP (ref 5–17)
BUN SERPL-MCNC: 17 MG/DL — SIGNIFICANT CHANGE UP (ref 7–23)
CALCIUM SERPL-MCNC: 9.1 MG/DL — SIGNIFICANT CHANGE UP (ref 8.4–10.5)
CHLORIDE SERPL-SCNC: 99 MMOL/L — SIGNIFICANT CHANGE UP (ref 96–108)
CO2 SERPL-SCNC: 26 MMOL/L — SIGNIFICANT CHANGE UP (ref 22–31)
CREAT SERPL-MCNC: 1.04 MG/DL — SIGNIFICANT CHANGE UP (ref 0.5–1.3)
GLUCOSE BLDC GLUCOMTR-MCNC: 269 MG/DL — HIGH (ref 70–99)
GLUCOSE BLDC GLUCOMTR-MCNC: 335 MG/DL — HIGH (ref 70–99)
GLUCOSE BLDC GLUCOMTR-MCNC: 338 MG/DL — HIGH (ref 70–99)
GLUCOSE BLDC GLUCOMTR-MCNC: 372 MG/DL — HIGH (ref 70–99)
GLUCOSE SERPL-MCNC: 281 MG/DL — HIGH (ref 70–99)
GRAM STN FLD: SIGNIFICANT CHANGE UP
HCT VFR BLD CALC: 26.2 % — LOW (ref 39–50)
HGB BLD-MCNC: 8.6 G/DL — LOW (ref 13–17)
MAGNESIUM SERPL-MCNC: 1.8 MG/DL — SIGNIFICANT CHANGE UP (ref 1.6–2.6)
MCHC RBC-ENTMCNC: 26.8 PG — LOW (ref 27–34)
MCHC RBC-ENTMCNC: 32.8 GM/DL — SIGNIFICANT CHANGE UP (ref 32–36)
MCV RBC AUTO: 81.6 FL — SIGNIFICANT CHANGE UP (ref 80–100)
NRBC # BLD: 0 /100 WBCS — SIGNIFICANT CHANGE UP (ref 0–0)
PHOSPHATE SERPL-MCNC: 3 MG/DL — SIGNIFICANT CHANGE UP (ref 2.5–4.5)
PLATELET # BLD AUTO: 181 K/UL — SIGNIFICANT CHANGE UP (ref 150–400)
POTASSIUM SERPL-MCNC: 4.3 MMOL/L — SIGNIFICANT CHANGE UP (ref 3.5–5.3)
POTASSIUM SERPL-SCNC: 4.3 MMOL/L — SIGNIFICANT CHANGE UP (ref 3.5–5.3)
RBC # BLD: 3.21 M/UL — LOW (ref 4.2–5.8)
RBC # FLD: 13.3 % — SIGNIFICANT CHANGE UP (ref 10.3–14.5)
SODIUM SERPL-SCNC: 138 MMOL/L — SIGNIFICANT CHANGE UP (ref 135–145)
SPECIMEN SOURCE: SIGNIFICANT CHANGE UP
WBC # BLD: 7.6 K/UL — SIGNIFICANT CHANGE UP (ref 3.8–10.5)
WBC # FLD AUTO: 7.6 K/UL — SIGNIFICANT CHANGE UP (ref 3.8–10.5)

## 2019-11-28 PROCEDURE — 99232 SBSQ HOSP IP/OBS MODERATE 35: CPT | Mod: GC

## 2019-11-28 RX ORDER — INSULIN LISPRO 100/ML
VIAL (ML) SUBCUTANEOUS AT BEDTIME
Refills: 0 | Status: DISCONTINUED | OUTPATIENT
Start: 2019-11-28 | End: 2019-11-30

## 2019-11-28 RX ORDER — INSULIN GLARGINE 100 [IU]/ML
18 INJECTION, SOLUTION SUBCUTANEOUS AT BEDTIME
Refills: 0 | Status: DISCONTINUED | OUTPATIENT
Start: 2019-11-28 | End: 2019-11-29

## 2019-11-28 RX ORDER — MAGNESIUM SULFATE 500 MG/ML
2 VIAL (ML) INJECTION ONCE
Refills: 0 | Status: COMPLETED | OUTPATIENT
Start: 2019-11-28 | End: 2019-11-28

## 2019-11-28 RX ORDER — INSULIN LISPRO 100/ML
VIAL (ML) SUBCUTANEOUS
Refills: 0 | Status: DISCONTINUED | OUTPATIENT
Start: 2019-11-28 | End: 2019-12-03

## 2019-11-28 RX ORDER — INSULIN LISPRO 100/ML
6 VIAL (ML) SUBCUTANEOUS
Refills: 0 | Status: DISCONTINUED | OUTPATIENT
Start: 2019-11-28 | End: 2019-11-29

## 2019-11-28 RX ADMIN — INSULIN GLARGINE 18 UNIT(S): 100 INJECTION, SOLUTION SUBCUTANEOUS at 22:24

## 2019-11-28 RX ADMIN — PIPERACILLIN AND TAZOBACTAM 25 GRAM(S): 4; .5 INJECTION, POWDER, LYOPHILIZED, FOR SOLUTION INTRAVENOUS at 01:20

## 2019-11-28 RX ADMIN — Medication 25 MILLIGRAM(S): at 06:06

## 2019-11-28 RX ADMIN — Medication 81 MILLIGRAM(S): at 11:03

## 2019-11-28 RX ADMIN — AMLODIPINE BESYLATE 10 MILLIGRAM(S): 2.5 TABLET ORAL at 06:06

## 2019-11-28 RX ADMIN — Medication 50 MILLIGRAM(S): at 06:06

## 2019-11-28 RX ADMIN — Medication 6 UNIT(S): at 18:01

## 2019-11-28 RX ADMIN — Medication 50 GRAM(S): at 11:04

## 2019-11-28 RX ADMIN — Medication 10: at 18:01

## 2019-11-28 RX ADMIN — LISINOPRIL 40 MILLIGRAM(S): 2.5 TABLET ORAL at 06:06

## 2019-11-28 RX ADMIN — Medication 8: at 12:52

## 2019-11-28 RX ADMIN — ISOSORBIDE MONONITRATE 30 MILLIGRAM(S): 60 TABLET, EXTENDED RELEASE ORAL at 11:03

## 2019-11-28 RX ADMIN — ATORVASTATIN CALCIUM 20 MILLIGRAM(S): 80 TABLET, FILM COATED ORAL at 22:24

## 2019-11-28 RX ADMIN — PIPERACILLIN AND TAZOBACTAM 25 GRAM(S): 4; .5 INJECTION, POWDER, LYOPHILIZED, FOR SOLUTION INTRAVENOUS at 18:01

## 2019-11-28 RX ADMIN — HEPARIN SODIUM 5000 UNIT(S): 5000 INJECTION INTRAVENOUS; SUBCUTANEOUS at 12:52

## 2019-11-28 RX ADMIN — Medication 50 MILLIGRAM(S): at 18:00

## 2019-11-28 RX ADMIN — Medication 2: at 22:23

## 2019-11-28 RX ADMIN — Medication 6: at 09:09

## 2019-11-28 RX ADMIN — PIPERACILLIN AND TAZOBACTAM 25 GRAM(S): 4; .5 INJECTION, POWDER, LYOPHILIZED, FOR SOLUTION INTRAVENOUS at 11:04

## 2019-11-28 RX ADMIN — HEPARIN SODIUM 5000 UNIT(S): 5000 INJECTION INTRAVENOUS; SUBCUTANEOUS at 22:24

## 2019-11-28 RX ADMIN — HEPARIN SODIUM 5000 UNIT(S): 5000 INJECTION INTRAVENOUS; SUBCUTANEOUS at 06:06

## 2019-11-28 NOTE — PROGRESS NOTE ADULT - ATTENDING COMMENTS
I have discussed the case with the surgical house staff. I have personally seen, examined, and participated in the care of this patient. I have reviewed all pertinent clinical information.    Doing well s/p right leg bypass. Debridement by podiatry yesterday.    Plan  - Abx  - Follow up OR bone cultures  - Endo consult  - PT eval/OOB

## 2019-11-28 NOTE — PROGRESS NOTE ADULT - ASSESSMENT
68M w/ R foot chronic non-healing wounds, now s/p RLE pop to PT bypass (11/25/19), s/p right foot wounds debridement and application of stravix and bone biopsy (DOS 11/27)  -POD 1  -Pt seen and evaluated  -vitals stable, WBC down to 7  - Xray and MRI showed OM to the right foot bones, however, patient refused to have amputation and would want to try long term antibiotics   - will follow up OR bone culture and pathology  - will follow up ID recs for PICC and Abx selection   - dressing kept clean dry and intact. Will leave the dressing on for one week considering the patient has graft in place   -Discussed w/ attending

## 2019-11-28 NOTE — PROGRESS NOTE ADULT - ASSESSMENT
68yoM Hx of HTN, HLD, DM, CAD w/ stents, PAD, who was admitted for diabetic wound infection w/ osteo now s/p RLE Pop-Pt bypass with RSVG on 11/25 and podiatry debridement on 11/27 w/ stravix graft application.    Plan:  DIET: Consistent Carb  Pain control  Follow up bone cultures  ID recs and possible PICC for longterm Abx      Vascular Surgery  [phone]

## 2019-11-28 NOTE — PROGRESS NOTE ADULT - PROBLEM SELECTOR PLAN 1
- c/w IV Zosyn while inpatient  - For discharge, Cefepime 2GM IV q8h via PICC line through 12/26/19 plus PO Flagyl 500mg BID for two weeks - c/w IV Zosyn while inpatient  - For discharge, Cefepime 2GM IV q8h via PICC line through 12/26/19 plus PO Flagyl 500mg BID for two weeks  - ID assistance appreciated  -Start Senna since patient is on narcotics.

## 2019-11-28 NOTE — PROGRESS NOTE ADULT - PROBLEM SELECTOR PLAN 4
Endocrinology consulted per Vascular Surgery team. Patient was hyperglycemic as basal insulin was not restarted after surgery.  - Please restart Lantus 18 u qhs  - Humalog 6 u TID  - SSI, titrate insulin daily

## 2019-11-28 NOTE — PROGRESS NOTE ADULT - PROBLEM SELECTOR PLAN 6
- c/w ASA, plavix, atorvastatin, imdur 30mg - c/w ASA, atorvastatin, imdur 30mg  - Coronary stent placed in 2016. Since over a year, no clear indication for dual antiplatelet therapy. Would confirm if ok with patients cardiologist to continue to hold plavix.

## 2019-11-28 NOTE — PROGRESS NOTE ADULT - PROBLEM SELECTOR PLAN 2
Patient normotensive now, please continue excellent management per Vascular surgery team.  - Amlodipine 10 mg  - HCTz 25 mg po q Day  - Imdur 30 mg q D  - Lisinopril 40 mg q Day Patient normotensive now, please continue excellent management per Vascular surgery team.  Likely component of pain drive htn. Control pain prior to potentially increase anti-htn medications.   - Amlodipine 10 mg  - HCTz 25 mg po q Day  - Imdur 30 mg q D  - Lisinopril 40 mg q Day

## 2019-11-28 NOTE — PROGRESS NOTE ADULT - SUBJECTIVE AND OBJECTIVE BOX
Vascular Surgery Daily Progress Note    Subjective  - Pt seen and examined on AM rounds  - No events overnight  - Pt reports pain is well controlled, tolerating diet    Objective    Physical Exam  Constitutional: NAD  Respiratory: No increased WOB  Cardiac: RRR  RLE: incisions c/d/i; dressings changed; DP and PT signals present.    Vital Signs  T(C): 36.9 (11-28-19 @ 09:29), Max: 37.7 (11-27-19 @ 22:39)  T(F): 98.4 (11-28-19 @ 09:29), Max: 99.8 (11-27-19 @ 22:39)  HR: 74 (11-28-19 @ 09:29) (69 - 84)  BP: 169/70 (11-28-19 @ 06:05) (137/65 - 197/70)  RR: 18 (11-28-19 @ 09:29) (14 - 19)  SpO2: 93% (11-28-19 @ 09:29) (93% - 99%)      27 Nov 2019 07:01  -  28 Nov 2019 07:00  --------------------------------------------------------  IN:    Oral Fluid: 920 mL  Total IN: 920 mL    OUT:    Voided: 2250 mL  Total OUT: 2250 mL    Total NET: -1330 mL      28 Nov 2019 07:01  -  28 Nov 2019 09:32  --------------------------------------------------------  IN:  Total IN: 0 mL    OUT:    Voided: 300 mL  Total OUT: 300 mL    Total NET: -300 mL

## 2019-11-28 NOTE — PROGRESS NOTE ADULT - ASSESSMENT
67 yo male PMhx HTN, T2DM, CAD s/p stents, PAD, HLD presents admitted for diabetic wound infection, confirmed osteo via imaging, with course c/b DENISSE likely 2/2 nephrotoxic medications, s/p open wound debridement of right foot. Medicine called for hypertension and elevated creatinine.

## 2019-11-28 NOTE — PROGRESS NOTE ADULT - PROBLEM SELECTOR PLAN 3
- Resolved.  - If patient develops an additional increase in Cr, please hold Lisinopril and Hydrochlorothiazide.

## 2019-11-28 NOTE — PROGRESS NOTE ADULT - ATTENDING COMMENTS
Patient seen and examined today. I agree with the above findings, assessment, and plan with the following additions and exceptions:     There could a component of pain driven hypertension.   Continue current anti-htn regimen. Treat pain before potentially increase anti-htn medications.   Start Senna since patient is on narcotics. Make sure he is having regular BMs.   Agree with incentive spirometer at bedside.  ID, podiatry, and Endo contribution to care greatly appreciated.   Rest of plan as above and per primary team.    Medicine team to sign off. Please call if there are any questions.     Thank you kindly,    Dr. Ervin Joiner DO  Attending Physician  Division of Hospital Medicine  [facility]  Pager:  [phone] Patient seen and examined today. I agree with the above findings, assessment, and plan with the following additions and exceptions:     There could a component of pain driven hypertension.   Continue current anti-htn regimen. Treat pain before potentially increasing anti-htn medications.   Start Senna since patient is on narcotics. Make sure he is having regular BMs.   Agree with incentive spirometer at bedside.  ID, Podiatry, and Endo contribution to care greatly appreciated.   Rest of plan as above and per primary team.    Medicine team to sign off. Please call if there are any questions.     Thank you kindly,    Dr. Ervin Joiner DO  Attending Physician  Division of Hospital Medicine  [facility]  Pager:  [phone]

## 2019-11-28 NOTE — PROGRESS NOTE ADULT - SUBJECTIVE AND OBJECTIVE BOX
Follow up for HTN and elevated creatinine      JOVANNI MARTINEZ  68y  Male      Subjective:     Briefly, 67 yo male PMhx HTN, T2DM, CAD s/p stents, PAD, HLD presents admitted for diabetic wound infection, confirmed osteo via imaging, with course c/b DENISSE likely 2/2 nephrotoxic medications currently on IV abx, pending LE bypass with vascular sx today. May go to vascular service post-op.       Meds    MEDICATIONS  (STANDING):  amLODIPine   Tablet 10 milliGRAM(s) Oral daily  aspirin  chewable 81 milliGRAM(s) Oral daily  atorvastatin 20 milliGRAM(s) Oral at bedtime  Dakins Solution - 1/4 Strength 1 Application(s) Topical daily  dextrose 50% Injectable 12.5 Gram(s) IV Push once  dextrose 50% Injectable 25 Gram(s) IV Push once  dextrose 50% Injectable 25 Gram(s) IV Push once  heparin  Injectable 5000 Unit(s) SubCutaneous every 8 hours  hydrochlorothiazide 25 milliGRAM(s) Oral daily  influenza   Vaccine 0.5 milliLiter(s) IntraMuscular once  insulin lispro (HumaLOG) corrective regimen sliding scale   SubCutaneous Before meals and at bedtime  isosorbide   mononitrate ER Tablet (IMDUR) 30 milliGRAM(s) Oral daily  lisinopril 40 milliGRAM(s) Oral daily  metoprolol tartrate 50 milliGRAM(s) Oral two times a day  piperacillin/tazobactam IVPB.. 3.375 Gram(s) IV Intermittent every 8 hours    MEDICATIONS  (PRN):  acetaminophen   Tablet .. 650 milliGRAM(s) Oral every 6 hours PRN Mild Pain (1 - 3)  dextrose 40% Gel 15 Gram(s) Oral once PRN Blood Glucose LESS THAN 70 milliGRAM(s)/deciliter  glucagon  Injectable 1 milliGRAM(s) IntraMuscular once PRN Glucose LESS THAN 70 milligrams/deciliter  morphine  - Injectable 2 milliGRAM(s) IV Push every 4 hours PRN Severe Pain (7 - 10)  ondansetron Injectable 4 milliGRAM(s) IV Push once PRN Nausea and/or Vomiting  oxyCODONE    IR 5 milliGRAM(s) Oral every 4 hours PRN Moderate Pain (4 - 6)        Vital Signs Last 24 Hrs  T(C): 37.1 (28 Nov 2019 13:26), Max: 37.7 (27 Nov 2019 22:39)  T(F): 98.7 (28 Nov 2019 13:26), Max: 99.8 (27 Nov 2019 22:39)  HR: 80 (28 Nov 2019 13:26) (69 - 84)  BP: 127/66 (28 Nov 2019 13:26) (127/66 - 174/74)  BP(mean): --  RR: 18 (28 Nov 2019 13:26) (18 - 18)  SpO2: 95% (28 Nov 2019 13:26) (93% - 97%)    PHYSICAL EXAM:  GENERAL: NAD, well-groomed, well-developed  HEENT - NC/AT, pupils equal and reactive to light,  ; Moist mucous membranes, Good dentition, No lesions  NECK: Supple, No JVD  CHEST/LUNG: Clear to auscultation bilaterally; No rales, rhonchi, wheezing  HEART: Regular rate and rhythm; No murmurs, rubs, or gallops  ABDOMEN: Soft, Nontender, Nondistended; Bowel sounds present  EXTREMITIES:  2+ Peripheral Pulses, No clubbing, cyanosis, or edema  NEURO:  No Focal deficits, sensory and motor intact  SKIN: No rashes or lesions    Consultant(s) Notes Reviewed:  [x ] YES  [ ] NO  Care Discussed with Consultants/Other Providers [ x] YES  [ ] NO    LABS:          RADIOLOGY & ADDITIONAL TESTS: Follow up for HTN and elevated creatinine      JOVANNI MARTINEZ  68y  Male      Subjective:     Briefly, 67 yo male PMhx HTN, T2DM, CAD s/p stents, PAD, HLD presents admitted for diabetic wound infection, confirmed osteo via imaging, with course c/b DENISSE likely 2/2 nephrotoxic medications currently on IV abx, pending LE bypass with vascular sx today.     Patient seen and examined at bedside by me.      Meds    MEDICATIONS  (STANDING):  amLODIPine   Tablet 10 milliGRAM(s) Oral daily  aspirin  chewable 81 milliGRAM(s) Oral daily  atorvastatin 20 milliGRAM(s) Oral at bedtime  Dakins Solution - 1/4 Strength 1 Application(s) Topical daily  dextrose 50% Injectable 12.5 Gram(s) IV Push once  dextrose 50% Injectable 25 Gram(s) IV Push once  dextrose 50% Injectable 25 Gram(s) IV Push once  heparin  Injectable 5000 Unit(s) SubCutaneous every 8 hours  hydrochlorothiazide 25 milliGRAM(s) Oral daily  influenza   Vaccine 0.5 milliLiter(s) IntraMuscular once  insulin lispro (HumaLOG) corrective regimen sliding scale   SubCutaneous Before meals and at bedtime  isosorbide   mononitrate ER Tablet (IMDUR) 30 milliGRAM(s) Oral daily  lisinopril 40 milliGRAM(s) Oral daily  metoprolol tartrate 50 milliGRAM(s) Oral two times a day  piperacillin/tazobactam IVPB.. 3.375 Gram(s) IV Intermittent every 8 hours    MEDICATIONS  (PRN):  acetaminophen   Tablet .. 650 milliGRAM(s) Oral every 6 hours PRN Mild Pain (1 - 3)  dextrose 40% Gel 15 Gram(s) Oral once PRN Blood Glucose LESS THAN 70 milliGRAM(s)/deciliter  glucagon  Injectable 1 milliGRAM(s) IntraMuscular once PRN Glucose LESS THAN 70 milligrams/deciliter  morphine  - Injectable 2 milliGRAM(s) IV Push every 4 hours PRN Severe Pain (7 - 10)  ondansetron Injectable 4 milliGRAM(s) IV Push once PRN Nausea and/or Vomiting  oxyCODONE    IR 5 milliGRAM(s) Oral every 4 hours PRN Moderate Pain (4 - 6)        Vital Signs Last 24 Hrs  T(C): 37.1 (28 Nov 2019 13:26), Max: 37.7 (27 Nov 2019 22:39)  T(F): 98.7 (28 Nov 2019 13:26), Max: 99.8 (27 Nov 2019 22:39)  HR: 80 (28 Nov 2019 13:26) (69 - 84)  BP: 127/66 (28 Nov 2019 13:26) (127/66 - 174/74)  BP(mean): --  RR: 18 (28 Nov 2019 13:26) (18 - 18)  SpO2: 95% (28 Nov 2019 13:26) (93% - 97%)    PHYSICAL EXAM:  GENERAL: NAD, well-groomed, well-developed  HEENT - NC/AT, pupils equal and reactive to light,  ; Moist mucous membranes, Good dentition, No lesions  NECK: Supple, No JVD  CHEST/LUNG: Clear to auscultation bilaterally; No rales, rhonchi, wheezing  HEART: Regular rate and rhythm; No murmurs, rubs, or gallops  ABDOMEN: Soft, Nontender, Nondistended; Bowel sounds present  EXTREMITIES:  2+ Peripheral Pulses, No clubbing, cyanosis, or edema  NEURO:  No Focal deficits, sensory and motor intact  SKIN: No rashes or lesions    Consultant(s) Notes Reviewed:  [x ] YES  [ ] NO  Care Discussed with Consultants/Other Providers [ x] YES  [ ] NO    LABS:          RADIOLOGY & ADDITIONAL TESTS: Follow up for HTN and elevated creatinine      JOVANNI MARTINEZ  68y  Male      Subjective:     Briefly, 69 yo male PMhx HTN, T2DM, CAD s/p stents, PAD, HLD presents admitted for diabetic wound infection, confirmed osteo via imaging, with course c/b DENISSE likely 2/2 nephrotoxic medications currently on IV abx, pending LE bypass with vascular sx today.     Patient seen and examined at bedside by me. Patient denies any HA/F/c/n/v/d/CP/SOB, dizziness.      Meds    MEDICATIONS  (STANDING):  amLODIPine   Tablet 10 milliGRAM(s) Oral daily  aspirin  chewable 81 milliGRAM(s) Oral daily  atorvastatin 20 milliGRAM(s) Oral at bedtime  Dakins Solution - 1/4 Strength 1 Application(s) Topical daily  dextrose 50% Injectable 12.5 Gram(s) IV Push once  dextrose 50% Injectable 25 Gram(s) IV Push once  dextrose 50% Injectable 25 Gram(s) IV Push once  heparin  Injectable 5000 Unit(s) SubCutaneous every 8 hours  hydrochlorothiazide 25 milliGRAM(s) Oral daily  influenza   Vaccine 0.5 milliLiter(s) IntraMuscular once  insulin lispro (HumaLOG) corrective regimen sliding scale   SubCutaneous Before meals and at bedtime  isosorbide   mononitrate ER Tablet (IMDUR) 30 milliGRAM(s) Oral daily  lisinopril 40 milliGRAM(s) Oral daily  metoprolol tartrate 50 milliGRAM(s) Oral two times a day  piperacillin/tazobactam IVPB.. 3.375 Gram(s) IV Intermittent every 8 hours    MEDICATIONS  (PRN):  acetaminophen   Tablet .. 650 milliGRAM(s) Oral every 6 hours PRN Mild Pain (1 - 3)  dextrose 40% Gel 15 Gram(s) Oral once PRN Blood Glucose LESS THAN 70 milliGRAM(s)/deciliter  glucagon  Injectable 1 milliGRAM(s) IntraMuscular once PRN Glucose LESS THAN 70 milligrams/deciliter  morphine  - Injectable 2 milliGRAM(s) IV Push every 4 hours PRN Severe Pain (7 - 10)  ondansetron Injectable 4 milliGRAM(s) IV Push once PRN Nausea and/or Vomiting  oxyCODONE    IR 5 milliGRAM(s) Oral every 4 hours PRN Moderate Pain (4 - 6)        Vital Signs Last 24 Hrs  T(C): 37.1 (28 Nov 2019 13:26), Max: 37.7 (27 Nov 2019 22:39)  T(F): 98.7 (28 Nov 2019 13:26), Max: 99.8 (27 Nov 2019 22:39)  HR: 80 (28 Nov 2019 13:26) (69 - 84)  BP: 127/66 (28 Nov 2019 13:26) (127/66 - 174/74)  BP(mean): --  RR: 18 (28 Nov 2019 13:26) (18 - 18)  SpO2: 95% (28 Nov 2019 13:26) (93% - 97%)    PHYSICAL EXAM:  GENERAL: NAD, well-groomed, well-developed  HEENT - NC/AT, pupils equal and reactive to light,  ; Moist mucous membranes, Good dentition, No lesions  NECK: Supple, No JVD  CHEST/LUNG: Clear to auscultation bilaterally; No rales, rhonchi, wheezing  HEART: Regular rate and rhythm; No murmurs, rubs, or gallops  ABDOMEN: Soft, Nontender, Nondistended; Bowel sounds present  EXTREMITIES:  2+ Peripheral Pulses, No clubbing, cyanosis, or edema  NEURO:  No Focal deficits, sensory and motor intact  SKIN: No rashes or lesions    Consultant(s) Notes Reviewed:  [x ] YES  [ ] NO  Care Discussed with Consultants/Other Providers [ x] YES  [ ] NO    LABS:          RADIOLOGY & ADDITIONAL TESTS: Follow up for HTN and elevated creatinine      LINDANATHALY RODRIGUEZJOVANNI  68y  Male      Subjective:     Briefly, 69 yo male PMhx HTN, T2DM, CAD s/p stents, PAD, HLD presents admitted for diabetic wound infection, confirmed osteo via imaging, with course c/b DENISSE likely 2/2 nephrotoxic medications currently on IV abx.  Patient seen and examined at bedside by me. Patient denies any HA/F/c/n/v/d/CP/SOB, dizziness.  Pain is still present at 7/10 but improved. Does not want anymore pain medications.       Meds    MEDICATIONS  (STANDING):  amLODIPine   Tablet 10 milliGRAM(s) Oral daily  aspirin  chewable 81 milliGRAM(s) Oral daily  atorvastatin 20 milliGRAM(s) Oral at bedtime  Dakins Solution - 1/4 Strength 1 Application(s) Topical daily  dextrose 50% Injectable 12.5 Gram(s) IV Push once  dextrose 50% Injectable 25 Gram(s) IV Push once  dextrose 50% Injectable 25 Gram(s) IV Push once  heparin  Injectable 5000 Unit(s) SubCutaneous every 8 hours  hydrochlorothiazide 25 milliGRAM(s) Oral daily  influenza   Vaccine 0.5 milliLiter(s) IntraMuscular once  insulin lispro (HumaLOG) corrective regimen sliding scale   SubCutaneous Before meals and at bedtime  isosorbide   mononitrate ER Tablet (IMDUR) 30 milliGRAM(s) Oral daily  lisinopril 40 milliGRAM(s) Oral daily  metoprolol tartrate 50 milliGRAM(s) Oral two times a day  piperacillin/tazobactam IVPB.. 3.375 Gram(s) IV Intermittent every 8 hours    MEDICATIONS  (PRN):  acetaminophen   Tablet .. 650 milliGRAM(s) Oral every 6 hours PRN Mild Pain (1 - 3)  dextrose 40% Gel 15 Gram(s) Oral once PRN Blood Glucose LESS THAN 70 milliGRAM(s)/deciliter  glucagon  Injectable 1 milliGRAM(s) IntraMuscular once PRN Glucose LESS THAN 70 milligrams/deciliter  morphine  - Injectable 2 milliGRAM(s) IV Push every 4 hours PRN Severe Pain (7 - 10)  ondansetron Injectable 4 milliGRAM(s) IV Push once PRN Nausea and/or Vomiting  oxyCODONE    IR 5 milliGRAM(s) Oral every 4 hours PRN Moderate Pain (4 - 6)        Vital Signs Last 24 Hrs  T(C): 37.1 (28 Nov 2019 13:26), Max: 37.7 (27 Nov 2019 22:39)  T(F): 98.7 (28 Nov 2019 13:26), Max: 99.8 (27 Nov 2019 22:39)  HR: 80 (28 Nov 2019 13:26) (69 - 84)  BP: 127/66 (28 Nov 2019 13:26) (127/66 - 174/74)  BP(mean): --  RR: 18 (28 Nov 2019 13:26) (18 - 18)  SpO2: 95% (28 Nov 2019 13:26) (93% - 97%)    PHYSICAL EXAM:  GENERAL: NAD   HEENT - NC/AT, pupils equal and reactive to light, Moist mucous membranes, Good dentition, No lesions  NECK: Supple, No JVD  CHEST/LUNG: Clear to auscultation bilaterally; No rales, rhonchi, wheezing  HEART: Regular rate and rhythm; No murmurs, rubs, or gallops. Ext warm and well perfused.  ABDOMEN: Soft, Nontender, Nondistended; Bowel sounds present. Obese.   EXTREMITIES: No clubbing, cyanosis, or edema  NEURO:  No Focal deficits, sensory and motor intact  SKIN:  LE dressings noted b/l -- patient refusing undressing since wound care recent visited.     Consultant(s) Notes Reviewed:  [x ] YES  [ ] NO  Care Discussed with Consultants/Other Providers [ x] YES  [ ] NO    LABS:                        8.6    7.60  )-----------( 181      ( 28 Nov 2019 07:21 )             26.2     11-28    138  |  99  |  17  ----------------------------<  281<H>  4.3   |  26  |  1.04    Ca    9.1      28 Nov 2019 07:13  Phos  3.0     11-28  Mg     1.8     11-28      PT/INR - ( 27 Nov 2019 07:10 )   PT: 12.8 sec;   INR: 1.11 ratio         PTT - ( 27 Nov 2019 07:10 )  PTT:26.7 sec

## 2019-11-28 NOTE — PROGRESS NOTE ADULT - PROBLEM SELECTOR PLAN 5
s/p popliteal to post. tibial bypass  - Management as per [facility] surgery team  -c/w home ASA/plavix s/p popliteal to post. tibial bypass  - Management as per [facility] surgery team  -c/w home ASA

## 2019-11-28 NOTE — CHART NOTE - NSCHARTNOTEFT_GEN_A_CORE
Endocrine fellow on call:    Got paged for new consult, tried calling back team but no answer.     Chart reviewed, FS noted to be ranging high in 200s-300s for past few days. Currently only on moderate Humalog corrected scale before meals and at bedtime.   Was on basal/bolus 4 days back but not restarted after OR.     Recommend restarting Lantus 18 units at bedtime and Humalog 6 before each meals (hold Humalog pre meal if not eating). Continue moderate Humalog correctional scale before meals for now until FS <200, change bedtime scale to low dose.      Official consult in am.

## 2019-11-28 NOTE — PROGRESS NOTE ADULT - SUBJECTIVE AND OBJECTIVE BOX
Podiatry pager #: [facility] [phone]/ [phone]    Patient is a 68y old  Male who presents with a chief complaint of Foot infection (27 Nov 2019 15:59)       INTERVAL HPI/OVERNIGHT EVENTS:  Patient seen and evaluated at bedside.  Pt is resting comfortable in NAD. Denies N/V/F/C.      Allergies    No Known Allergies    Intolerances    ciprofloxacin (Vomiting)      Vital Signs Last 24 Hrs  T(C): 37.1 (28 Nov 2019 06:04), Max: 37.7 (27 Nov 2019 22:39)  T(F): 98.8 (28 Nov 2019 06:04), Max: 99.8 (27 Nov 2019 22:39)  HR: 78 (28 Nov 2019 06:05) (69 - 84)  BP: 169/70 (28 Nov 2019 06:05) (137/65 - 197/70)  BP(mean): 100 (27 Nov 2019 14:00) (94 - 109)  RR: 18 (28 Nov 2019 06:04) (14 - 19)  SpO2: 96% (28 Nov 2019 06:04) (93% - 99%)    LABS:                        8.6    7.60  )-----------( 181      ( 28 Nov 2019 07:21 )             26.2     11-28    138  |  99  |  17  ----------------------------<  281<H>  4.3   |  26  |  1.04    Ca    9.1      28 Nov 2019 07:13  Phos  3.0     11-28  Mg     1.8     11-28      PT/INR - ( 27 Nov 2019 07:10 )   PT: 12.8 sec;   INR: 1.11 ratio         PTT - ( 27 Nov 2019 07:10 )  PTT:26.7 sec    CAPILLARY BLOOD GLUCOSE      POCT Blood Glucose.: 318 mg/dL (27 Nov 2019 21:20)  POCT Blood Glucose.: 246 mg/dL (27 Nov 2019 15:34)  POCT Blood Glucose.: 264 mg/dL (27 Nov 2019 13:50)      Lower Extremity Physical Exam:  dressing left clean dry and intact. S/P graft application, wound keep dressing on for a week.     RADIOLOGY & ADDITIONAL TESTS:

## 2019-11-29 DIAGNOSIS — E11.65 TYPE 2 DIABETES MELLITUS WITH HYPERGLYCEMIA: ICD-10-CM

## 2019-11-29 DIAGNOSIS — E78.5 HYPERLIPIDEMIA, UNSPECIFIED: ICD-10-CM

## 2019-11-29 LAB
ANION GAP SERPL CALC-SCNC: 11 MMOL/L — SIGNIFICANT CHANGE UP (ref 5–17)
BUN SERPL-MCNC: 20 MG/DL — SIGNIFICANT CHANGE UP (ref 7–23)
CALCIUM SERPL-MCNC: 9.1 MG/DL — SIGNIFICANT CHANGE UP (ref 8.4–10.5)
CHLORIDE SERPL-SCNC: 99 MMOL/L — SIGNIFICANT CHANGE UP (ref 96–108)
CO2 SERPL-SCNC: 27 MMOL/L — SIGNIFICANT CHANGE UP (ref 22–31)
CREAT SERPL-MCNC: 1.01 MG/DL — SIGNIFICANT CHANGE UP (ref 0.5–1.3)
GLUCOSE BLDC GLUCOMTR-MCNC: 215 MG/DL — HIGH (ref 70–99)
GLUCOSE BLDC GLUCOMTR-MCNC: 262 MG/DL — HIGH (ref 70–99)
GLUCOSE BLDC GLUCOMTR-MCNC: 262 MG/DL — HIGH (ref 70–99)
GLUCOSE BLDC GLUCOMTR-MCNC: 283 MG/DL — HIGH (ref 70–99)
GLUCOSE SERPL-MCNC: 274 MG/DL — HIGH (ref 70–99)
HCT VFR BLD CALC: 23.9 % — LOW (ref 39–50)
HGB BLD-MCNC: 8.1 G/DL — LOW (ref 13–17)
MAGNESIUM SERPL-MCNC: 2 MG/DL — SIGNIFICANT CHANGE UP (ref 1.6–2.6)
MCHC RBC-ENTMCNC: 27.3 PG — SIGNIFICANT CHANGE UP (ref 27–34)
MCHC RBC-ENTMCNC: 33.9 GM/DL — SIGNIFICANT CHANGE UP (ref 32–36)
MCV RBC AUTO: 80.5 FL — SIGNIFICANT CHANGE UP (ref 80–100)
NRBC # BLD: 0 /100 WBCS — SIGNIFICANT CHANGE UP (ref 0–0)
PHOSPHATE SERPL-MCNC: 3.2 MG/DL — SIGNIFICANT CHANGE UP (ref 2.5–4.5)
PLATELET # BLD AUTO: 196 K/UL — SIGNIFICANT CHANGE UP (ref 150–400)
POTASSIUM SERPL-MCNC: 4.3 MMOL/L — SIGNIFICANT CHANGE UP (ref 3.5–5.3)
POTASSIUM SERPL-SCNC: 4.3 MMOL/L — SIGNIFICANT CHANGE UP (ref 3.5–5.3)
RBC # BLD: 2.97 M/UL — LOW (ref 4.2–5.8)
RBC # FLD: 13.3 % — SIGNIFICANT CHANGE UP (ref 10.3–14.5)
SODIUM SERPL-SCNC: 137 MMOL/L — SIGNIFICANT CHANGE UP (ref 135–145)
WBC # BLD: 7.23 K/UL — SIGNIFICANT CHANGE UP (ref 3.8–10.5)
WBC # FLD AUTO: 7.23 K/UL — SIGNIFICANT CHANGE UP (ref 3.8–10.5)

## 2019-11-29 PROCEDURE — 99223 1ST HOSP IP/OBS HIGH 75: CPT

## 2019-11-29 RX ORDER — CLOPIDOGREL BISULFATE 75 MG/1
75 TABLET, FILM COATED ORAL DAILY
Refills: 0 | Status: DISCONTINUED | OUTPATIENT
Start: 2019-11-29 | End: 2019-12-03

## 2019-11-29 RX ORDER — INSULIN GLARGINE 100 [IU]/ML
30 INJECTION, SOLUTION SUBCUTANEOUS AT BEDTIME
Refills: 0 | Status: DISCONTINUED | OUTPATIENT
Start: 2019-11-29 | End: 2019-11-30

## 2019-11-29 RX ORDER — INSULIN LISPRO 100/ML
12 VIAL (ML) SUBCUTANEOUS
Refills: 0 | Status: DISCONTINUED | OUTPATIENT
Start: 2019-11-29 | End: 2019-11-30

## 2019-11-29 RX ADMIN — Medication 6 UNIT(S): at 10:11

## 2019-11-29 RX ADMIN — Medication 12 UNIT(S): at 13:36

## 2019-11-29 RX ADMIN — INSULIN GLARGINE 30 UNIT(S): 100 INJECTION, SOLUTION SUBCUTANEOUS at 22:07

## 2019-11-29 RX ADMIN — Medication 4: at 17:50

## 2019-11-29 RX ADMIN — Medication 1: at 22:07

## 2019-11-29 RX ADMIN — Medication 50 MILLIGRAM(S): at 05:20

## 2019-11-29 RX ADMIN — AMLODIPINE BESYLATE 10 MILLIGRAM(S): 2.5 TABLET ORAL at 05:20

## 2019-11-29 RX ADMIN — PIPERACILLIN AND TAZOBACTAM 25 GRAM(S): 4; .5 INJECTION, POWDER, LYOPHILIZED, FOR SOLUTION INTRAVENOUS at 10:12

## 2019-11-29 RX ADMIN — Medication 6: at 13:36

## 2019-11-29 RX ADMIN — LISINOPRIL 40 MILLIGRAM(S): 2.5 TABLET ORAL at 05:20

## 2019-11-29 RX ADMIN — HEPARIN SODIUM 5000 UNIT(S): 5000 INJECTION INTRAVENOUS; SUBCUTANEOUS at 13:38

## 2019-11-29 RX ADMIN — CLOPIDOGREL BISULFATE 75 MILLIGRAM(S): 75 TABLET, FILM COATED ORAL at 16:52

## 2019-11-29 RX ADMIN — Medication 25 MILLIGRAM(S): at 05:21

## 2019-11-29 RX ADMIN — PIPERACILLIN AND TAZOBACTAM 25 GRAM(S): 4; .5 INJECTION, POWDER, LYOPHILIZED, FOR SOLUTION INTRAVENOUS at 01:40

## 2019-11-29 RX ADMIN — Medication 12 UNIT(S): at 17:50

## 2019-11-29 RX ADMIN — Medication 6: at 10:11

## 2019-11-29 RX ADMIN — HEPARIN SODIUM 5000 UNIT(S): 5000 INJECTION INTRAVENOUS; SUBCUTANEOUS at 05:20

## 2019-11-29 RX ADMIN — PIPERACILLIN AND TAZOBACTAM 25 GRAM(S): 4; .5 INJECTION, POWDER, LYOPHILIZED, FOR SOLUTION INTRAVENOUS at 16:53

## 2019-11-29 RX ADMIN — Medication 50 MILLIGRAM(S): at 16:52

## 2019-11-29 RX ADMIN — ATORVASTATIN CALCIUM 20 MILLIGRAM(S): 80 TABLET, FILM COATED ORAL at 21:30

## 2019-11-29 RX ADMIN — Medication 1 APPLICATION(S): at 14:38

## 2019-11-29 RX ADMIN — Medication 81 MILLIGRAM(S): at 12:25

## 2019-11-29 RX ADMIN — HEPARIN SODIUM 5000 UNIT(S): 5000 INJECTION INTRAVENOUS; SUBCUTANEOUS at 21:30

## 2019-11-29 RX ADMIN — ISOSORBIDE MONONITRATE 30 MILLIGRAM(S): 60 TABLET, EXTENDED RELEASE ORAL at 12:25

## 2019-11-29 NOTE — PHYSICAL THERAPY INITIAL EVALUATION ADULT - PERTINENT HX OF CURRENT PROBLEM, REHAB EVAL
67 yo male PMhx HTN, T2DM, CAD s/p stents, PAD, HLD presents admitted for diabetic wound infection, confirmed osteomyelitis via imaging, with course c/b DENISSE likely 2/2 nephrotoxic medications, now s/p RLE Pop-Pt bypass with RSVG on 11/25 and podiatry debridement on 11/27 w/ stravix graft application. 67 yo male PMhx HTN, T2DM, CAD s/p stents, PAD, HLD presents admitted for diabetic wound infection, confirmed osteomyelitis via imaging, with course c/b DENISSE likely due to nephrotoxic medications, now s/p RLE Pop-Pt bypass with RSVG on 11/25 and podiatry debridement on 11/27 w/ stravix graft application.

## 2019-11-29 NOTE — PHYSICAL THERAPY INITIAL EVALUATION ADULT - ADDITIONAL COMMENTS
11/25/19 - TIBIA AND FIBULA XR FEMUR XR Curvilinear U shaped density projects over the posterior medial aspect of the leg at the level of the proximal femoral shaft. Direct discussion with the vascular attending Dr. Shanks found this to be a clip on the skin surface and was subsequently removed. Additional surgical clips and surgical staples are visualized along the medial posterior aspect of the leg.  No evidence of curved suture needle. There is postoperative soft tissue air and edema.  11/15/2019 FOOT COMPLETE RIGHT XR Mild cortical irregularity/questionable erosion at the medial aspect of the distal phalanx of the right first toe which may represent osteomyelitis. 11/25/19 - TIBIA AND FIBULA XR FEMUR XR Curvilinear U shaped density projects over the posterior medial aspect of the leg at the level of the proximal femoral shaft. Additional surgical clips and surgical staples are visualized along the medial posterior aspect of the leg. There is postoperative soft tissue air and edema.  11/15/2019 FOOT COMPLETE RIGHT XR Mild cortical irregularity/questionable erosion at the medial aspect of the distal phalanx of the right first toe which may represent osteomyelitis.

## 2019-11-29 NOTE — CONSULT NOTE ADULT - PROBLEM SELECTOR RECOMMENDATION 9
- Hba1c 7.9%, above goal  - increase Lantus to 30 units qhs and Humalog to 12 units before meals  - c/w moderate correction scale qac and qhs  - consistent carb diet  - check FS qac and qhs  - will follow  - for discharge: can likely dc on oral agents versus basal + orals if insulin requirements remain high. He can follow up in the office if he wishes -

## 2019-11-29 NOTE — PHYSICAL THERAPY INITIAL EVALUATION ADULT - TRANSFER SAFETY CONCERNS NOTED: SIT/STAND, REHAB EVAL
losing balance/stand pivot/decreased balance during turns/inability to maintain weight-bearing restrictions w/o assist/decreased weight-shifting ability

## 2019-11-29 NOTE — PHYSICAL THERAPY INITIAL EVALUATION ADULT - FOLLOWS COMMANDS/ANSWERS QUESTIONS, REHAB EVAL
100% of the time/able to follow multistep instructions
able to follow multistep instructions/100% of the time

## 2019-11-29 NOTE — PHYSICAL THERAPY INITIAL EVALUATION ADULT - DISCHARGE DISPOSITION, PT EVAL
rehabilitation facility/subacute
home w/ outpatient services/Home with outpatient PT for gait & balance training. Straight cane with ambulation (pt provided with cane sized to appropriate fit). Supervision with mobility skills at this time (pt states spouse can provide).

## 2019-11-29 NOTE — PHYSICAL THERAPY INITIAL EVALUATION ADULT - NS ASR WT BEARING DETAIL RLE
weight-bearing as tolerated/in darco shoe per podiatry resident Kerry
nonweight-bearing/WBAT to heel only for transfers with darco shoes. otherwise NWB/weight-bearing as tolerated

## 2019-11-29 NOTE — CONSULT NOTE ADULT - SUBJECTIVE AND OBJECTIVE BOX
HPI:  Patient is a 68 year old man PMH HTN, HLD, uncontrolled DM2, CAD s/p stents, presents to the ED c/o R foot wound x 5 weeks, found to have osteomyelitis s/p debridement and bypass. Consult called for management of uncontrolled DM2. HbA1c is 7.9%. Patient states that he was diagnosed with DM2 in 2016. On Metformin 500 mg BID at home. Amaryl listed as home medication but patient does not think he is taking it. Checks BG twice a day - AM BG ranges from 180s to low 200's. BG in the afternoon ranges from 130's to 150's. No hypoglycemia. Does not have neuropathy. Has not seen optho in years, no known retinopathy. No history of nephropathy. Has blurry vision, no polyuria or polydipsia. Eating well here, does not have nausea, vomiting, abdominal pain. Does not drink soda or juice, drinks water only. States he doesn't eat too much bread or rice.    PAST MEDICAL & SURGICAL HISTORY:  Essential hypertension  Coronary artery disease involving native coronary artery of native heart without angina pectoris  Peripheral artery disease  Type 2 diabetes mellitus with other circulatory complication, without long-term current use of insulin  Venous ulcer of left leg  S/P debridement      FAMILY HISTORY:  FH: diabetes mellitus: mother  Family history of diabetes mellitus (Sibling): brother, mother  Family history of essential hypertension      Social History:  Former smoker, quit 25 years ago  No alcohol use      Outpatient Medications:  · 	cefadroxil 1000 mg oral tablet: 1 tab(s) orally every 12 hours  · 	metoprolol tartrate 50 mg oral tablet: 1 tab(s) orally 2 times a day  · 	lisinopril 40 mg oral tablet: 1 tab(s) orally once a day  · 	metFORMIN 500 mg oral tablet, extended release: 1 tab(s) orally once a day to start 12/21/2016  · 	aspirin 81 mg oral delayed release tablet: 1 tab(s) orally once a day  · 	clopidogrel 75 mg oral tablet: 1 tab(s) orally once a day  · 	atorvastatin 20 mg oral tablet: 1 tab(s) orally once a day (at bedtime)    MEDICATIONS  (STANDING):  amLODIPine   Tablet 10 milliGRAM(s) Oral daily  aspirin  chewable 81 milliGRAM(s) Oral daily  atorvastatin 20 milliGRAM(s) Oral at bedtime  Dakins Solution - 1/4 Strength 1 Application(s) Topical daily  dextrose 50% Injectable 12.5 Gram(s) IV Push once  dextrose 50% Injectable 25 Gram(s) IV Push once  dextrose 50% Injectable 25 Gram(s) IV Push once  heparin  Injectable 5000 Unit(s) SubCutaneous every 8 hours  hydrochlorothiazide 25 milliGRAM(s) Oral daily  influenza   Vaccine 0.5 milliLiter(s) IntraMuscular once  insulin glargine Injectable (LANTUS) 18 Unit(s) SubCutaneous at bedtime  insulin lispro (HumaLOG) corrective regimen sliding scale   SubCutaneous three times a day before meals  insulin lispro (HumaLOG) corrective regimen sliding scale   SubCutaneous at bedtime  insulin lispro Injectable (HumaLOG) 6 Unit(s) SubCutaneous three times a day before meals  isosorbide   mononitrate ER Tablet (IMDUR) 30 milliGRAM(s) Oral daily  lisinopril 40 milliGRAM(s) Oral daily  metoprolol tartrate 50 milliGRAM(s) Oral two times a day  piperacillin/tazobactam IVPB.. 3.375 Gram(s) IV Intermittent every 8 hours    MEDICATIONS  (PRN):  acetaminophen   Tablet .. 650 milliGRAM(s) Oral every 6 hours PRN Mild Pain (1 - 3)  dextrose 40% Gel 15 Gram(s) Oral once PRN Blood Glucose LESS THAN 70 milliGRAM(s)/deciliter  glucagon  Injectable 1 milliGRAM(s) IntraMuscular once PRN Glucose LESS THAN 70 milligrams/deciliter  morphine  - Injectable 2 milliGRAM(s) IV Push every 4 hours PRN Severe Pain (7 - 10)  ondansetron Injectable 4 milliGRAM(s) IV Push once PRN Nausea and/or Vomiting  oxyCODONE    IR 5 milliGRAM(s) Oral every 4 hours PRN Moderate Pain (4 - 6)      Allergies  No Known Allergies    Intolerances  ciprofloxacin (Vomiting)    Review of Systems:  Constitutional: No fever  Eyes: + blurry vision  Neuro: No tremors  HEENT: No pain  Cardiovascular: No chest pain, palpitations  Respiratory: No SOB, no cough  GI: No nausea, vomiting, abdominal pain  : No dysuria  Skin: no rash  Endocrine: no polyuria, polydipsia    ALL OTHER SYSTEMS REVIEWED AND NEGATIVE    PHYSICAL EXAM:  VITALS: T(C): 37 (11-29-19 @ 09:01)  T(F): 98.6 (11-29-19 @ 09:01), Max: 99 (11-28-19 @ 17:20)  HR: 79 (11-29-19 @ 09:01) (69 - 80)  BP: 157/60 (11-29-19 @ 09:01) (127/66 - 169/73)  RR:  (17 - 18)  SpO2:  (95% - 97%)  Wt(kg): --  GENERAL: NAD, well-developed  EYES: No proptosis, anicteric  HEENT:  Atraumatic, moist mucous membranes  THYROID: Normal size, no palpable nodules  RESPIRATORY: Clear to auscultation bilaterally; No rales, rhonchi, wheezing  CARDIOVASCULAR: Regular rate and rhythm; No murmurs; no peripheral edema  GI: Soft, nontender, non distended, normal bowel sounds  SKIN: Dry, intact; right foot bandaged; no ulcers noted on left foot  PSYCH: Alert and oriented x 3, reactive affect    POCT Blood Glucose.: 262 mg/dL (11-29-19 @ 10:02) H 6, H 6  POCT Blood Glucose.: 335 mg/dL (11-28-19 @ 22:07) L 18, H 2  POCT Blood Glucose.: 372 mg/dL (11-28-19 @ 17:58) H 6, H 10  POCT Blood Glucose.: 338 mg/dL (11-28-19 @ 12:50) H 8  POCT Blood Glucose.: 269 mg/dL (11-28-19 @ 09:06) H 6  POCT Blood Glucose.: 318 mg/dL (11-27-19 @ 21:20)  POCT Blood Glucose.: 246 mg/dL (11-27-19 @ 15:34)  POCT Blood Glucose.: 264 mg/dL (11-27-19 @ 13:50) H 6  POCT Blood Glucose.: 210 mg/dL (11-27-19 @ 06:13)  POCT Blood Glucose.: 256 mg/dL (11-27-19 @ 01:00)  POCT Blood Glucose.: 299 mg/dL (11-26-19 @ 22:04)  POCT Blood Glucose.: 267 mg/dL (11-26-19 @ 17:12)  POCT Blood Glucose.: 243 mg/dL (11-26-19 @ 13:25)                          8.1    7.23  )-----------( 196      ( 29 Nov 2019 07:13 )             23.9       11-29    137  |  99  |  20  ----------------------------<  274<H>  4.3   |  27  |  1.01    EGFR if : 89  EGFR if non : 77    Ca    9.1      11-29  Mg     2.0     11-29  Phos  3.2     11-29        Hemoglobin A1C, Whole Blood: 7.9 % <H> [4.0 - 5.6] (11-15-19 @ 18:08)  Hemoglobin A1C, Whole Blood: 7.9 % <H> [4.0 - 5.6] (11-15-19 @ 18:07)            Radiology:

## 2019-11-29 NOTE — PHYSICAL THERAPY INITIAL EVALUATION ADULT - LIVES WITH, PROFILE
spouse/Pt reports living in a ranch style private home with 2 steps to enter the front with b/l hand rails and 5 steps in the rear., 2 steps inside without hand rails. Pt reports no prior use of AD, was independent with functional mobility and ADLs.

## 2019-11-29 NOTE — PHYSICAL THERAPY INITIAL EVALUATION ADULT - NS ASR WT BEARING DETAIL RUE
nonweight-bearing/weight-bearing as tolerated/WBAT only during transfers and only on heel. with darco shoe.

## 2019-11-29 NOTE — PHYSICAL THERAPY INITIAL EVALUATION ADULT - DIAGNOSIS, PT EVAL
Decrease in functional mobility due to impaired ROM and strength of RLE. Decrease in functional mobility due to impaired ROM, strength, and balance.

## 2019-11-29 NOTE — PROGRESS NOTE ADULT - ATTENDING COMMENTS
I have discussed the case with the surgical house staff. I have personally seen, examined, and participated in the care of this patient. I have reviewed all pertinent clinical information.    Doing well post RLE bypass and debridement by podiatry.  - Awaiting OR cultures  - Continue abx  - OOB/ambulate with PT eval

## 2019-11-29 NOTE — CONSULT NOTE ADULT - PROBLEM SELECTOR RECOMMENDATION 2
- BP goal less than 140/90, BP currently above goal  - on Lisinopril, HCTZ, Norvasc, Imdur - titrate as needed to achieve BP goal

## 2019-11-29 NOTE — PHYSICAL THERAPY INITIAL EVALUATION ADULT - IMPAIRMENTS FOUND, PT EVAL
aerobic capacity/endurance/integumentary integrity/muscle strength/gait, locomotion, and balance
gait, locomotion, and balance/muscle strength

## 2019-11-29 NOTE — PROGRESS NOTE ADULT - ASSESSMENT
68yoM Hx of HTN, HLD, DM, CAD w/ stents, PAD, who was admitted for right diabetic wound infection w/ osteo now s/p RLE Pop-Pt bypass with RSVG on 11/25 and podiatry debridement on 11/27 w/ stravix graft application.    Plan:  DIET: Consistent Carb  Pt WBAT on R  Pain control  Will follow up bone cultures  ID recs and possible PICC for longterm Abx  Appreciate Endo recs for help with glucose control. At home pt only taking orals, may need insulin on d/c  Appreciate medicine help in managing BP      Vascular Surgery  [phone]

## 2019-11-29 NOTE — PHYSICAL THERAPY INITIAL EVALUATION ADULT - ACTIVE RANGE OF MOTION EXAMINATION, REHAB EVAL
bilateral upper extremity Active ROM was WFL (within functional limits)/bilateral  lower extremity Active ROM was WFL (within functional limits)
Left LE Active ROM was WFL (within functional limits)/RLE AROM impaired/bilateral upper extremity Active ROM was WFL (within functional limits)

## 2019-11-29 NOTE — PHYSICAL THERAPY INITIAL EVALUATION ADULT - CRITERIA FOR SKILLED THERAPEUTIC INTERVENTIONS
impairments found
functional limitations in following categories/risk reduction/prevention/impairments found/rehab potential

## 2019-11-29 NOTE — CONSULT NOTE ADULT - ASSESSMENT
year old man PMH HTN, HLD, uncontrolled DM2, CAD s/p stents, presents to the ED c/o R foot wound x 5 weeks, found to have osteomyelitis s/p debridement and bypass, also with hyperglycemia in setting of uncontrolled DM2 with wound infection. BG goal 100-180 mg/dL.

## 2019-11-29 NOTE — CONSULT NOTE ADULT - ATTENDING COMMENTS
Gio Hi MD   Pager # [phone]  On evenings and weekends, please call the office at [phone] or page endocrine fellow on call. Please note that this patient may be followed by different provider tomorrow. If no answer, contact the office.

## 2019-11-29 NOTE — PHYSICAL THERAPY INITIAL EVALUATION ADULT - GAIT TRAINING, PT EVAL
GOAL: Pt will be able to ambulate 10ftx2 with close supervision within 4 wks. . GOAL: Pt will be able to ambulate 50ftx2 with close supervision within 4 wks. .

## 2019-11-29 NOTE — PHYSICAL THERAPY INITIAL EVALUATION ADULT - BALANCE TRAINING, PT EVAL
GOAL: Pt will improve standing/sitting static/dynamic balance to good balance within 4 wks.
GOAL: Pt will improve static/dynamic standing balance by 1/2 grade to improve level of independence with mobility skills and ADLs in 2 weeks.

## 2019-11-30 LAB
-  AMIKACIN: SIGNIFICANT CHANGE UP
-  AZTREONAM: SIGNIFICANT CHANGE UP
-  CEFEPIME: SIGNIFICANT CHANGE UP
-  CEFTAZIDIME: SIGNIFICANT CHANGE UP
-  CIPROFLOXACIN: SIGNIFICANT CHANGE UP
-  GENTAMICIN: SIGNIFICANT CHANGE UP
-  IMIPENEM: SIGNIFICANT CHANGE UP
-  LEVOFLOXACIN: SIGNIFICANT CHANGE UP
-  MEROPENEM: SIGNIFICANT CHANGE UP
-  PIPERACILLIN/TAZOBACTAM: SIGNIFICANT CHANGE UP
-  TOBRAMYCIN: SIGNIFICANT CHANGE UP
ANION GAP SERPL CALC-SCNC: 14 MMOL/L — SIGNIFICANT CHANGE UP (ref 5–17)
BUN SERPL-MCNC: 21 MG/DL — SIGNIFICANT CHANGE UP (ref 7–23)
CALCIUM SERPL-MCNC: 9.2 MG/DL — SIGNIFICANT CHANGE UP (ref 8.4–10.5)
CHLORIDE SERPL-SCNC: 99 MMOL/L — SIGNIFICANT CHANGE UP (ref 96–108)
CO2 SERPL-SCNC: 24 MMOL/L — SIGNIFICANT CHANGE UP (ref 22–31)
CREAT SERPL-MCNC: 0.95 MG/DL — SIGNIFICANT CHANGE UP (ref 0.5–1.3)
GLUCOSE BLDC GLUCOMTR-MCNC: 202 MG/DL — HIGH (ref 70–99)
GLUCOSE BLDC GLUCOMTR-MCNC: 228 MG/DL — HIGH (ref 70–99)
GLUCOSE BLDC GLUCOMTR-MCNC: 245 MG/DL — HIGH (ref 70–99)
GLUCOSE BLDC GLUCOMTR-MCNC: 302 MG/DL — HIGH (ref 70–99)
GLUCOSE SERPL-MCNC: 201 MG/DL — HIGH (ref 70–99)
HCT VFR BLD CALC: 25.8 % — LOW (ref 39–50)
HGB BLD-MCNC: 8.7 G/DL — LOW (ref 13–17)
MAGNESIUM SERPL-MCNC: 1.8 MG/DL — SIGNIFICANT CHANGE UP (ref 1.6–2.6)
MCHC RBC-ENTMCNC: 27 PG — SIGNIFICANT CHANGE UP (ref 27–34)
MCHC RBC-ENTMCNC: 33.7 GM/DL — SIGNIFICANT CHANGE UP (ref 32–36)
MCV RBC AUTO: 80.1 FL — SIGNIFICANT CHANGE UP (ref 80–100)
METHOD TYPE: SIGNIFICANT CHANGE UP
NRBC # BLD: 0 /100 WBCS — SIGNIFICANT CHANGE UP (ref 0–0)
PHOSPHATE SERPL-MCNC: 3.7 MG/DL — SIGNIFICANT CHANGE UP (ref 2.5–4.5)
PLATELET # BLD AUTO: 230 K/UL — SIGNIFICANT CHANGE UP (ref 150–400)
POTASSIUM SERPL-MCNC: 4 MMOL/L — SIGNIFICANT CHANGE UP (ref 3.5–5.3)
POTASSIUM SERPL-SCNC: 4 MMOL/L — SIGNIFICANT CHANGE UP (ref 3.5–5.3)
RBC # BLD: 3.22 M/UL — LOW (ref 4.2–5.8)
RBC # FLD: 13.4 % — SIGNIFICANT CHANGE UP (ref 10.3–14.5)
SODIUM SERPL-SCNC: 137 MMOL/L — SIGNIFICANT CHANGE UP (ref 135–145)
WBC # BLD: 7.35 K/UL — SIGNIFICANT CHANGE UP (ref 3.8–10.5)
WBC # FLD AUTO: 7.35 K/UL — SIGNIFICANT CHANGE UP (ref 3.8–10.5)

## 2019-11-30 PROCEDURE — 71045 X-RAY EXAM CHEST 1 VIEW: CPT | Mod: 26

## 2019-11-30 PROCEDURE — 99232 SBSQ HOSP IP/OBS MODERATE 35: CPT

## 2019-11-30 RX ORDER — MAGNESIUM SULFATE 500 MG/ML
1 VIAL (ML) INJECTION ONCE
Refills: 0 | Status: COMPLETED | OUTPATIENT
Start: 2019-11-30 | End: 2019-11-30

## 2019-11-30 RX ORDER — INSULIN GLARGINE 100 [IU]/ML
34 INJECTION, SOLUTION SUBCUTANEOUS AT BEDTIME
Refills: 0 | Status: DISCONTINUED | OUTPATIENT
Start: 2019-11-30 | End: 2019-12-01

## 2019-11-30 RX ORDER — SODIUM CHLORIDE 9 MG/ML
10 INJECTION INTRAMUSCULAR; INTRAVENOUS; SUBCUTANEOUS
Refills: 0 | Status: DISCONTINUED | OUTPATIENT
Start: 2019-11-30 | End: 2019-12-03

## 2019-11-30 RX ORDER — CHLORHEXIDINE GLUCONATE 213 G/1000ML
1 SOLUTION TOPICAL
Refills: 0 | Status: DISCONTINUED | OUTPATIENT
Start: 2019-11-30 | End: 2019-12-03

## 2019-11-30 RX ORDER — INSULIN LISPRO 100/ML
VIAL (ML) SUBCUTANEOUS AT BEDTIME
Refills: 0 | Status: DISCONTINUED | OUTPATIENT
Start: 2019-11-30 | End: 2019-12-03

## 2019-11-30 RX ORDER — INSULIN LISPRO 100/ML
15 VIAL (ML) SUBCUTANEOUS
Refills: 0 | Status: DISCONTINUED | OUTPATIENT
Start: 2019-11-30 | End: 2019-12-01

## 2019-11-30 RX ORDER — PIPERACILLIN AND TAZOBACTAM 4; .5 G/20ML; G/20ML
3.38 INJECTION, POWDER, LYOPHILIZED, FOR SOLUTION INTRAVENOUS EVERY 8 HOURS
Refills: 0 | Status: DISCONTINUED | OUTPATIENT
Start: 2019-11-30 | End: 2019-12-02

## 2019-11-30 RX ADMIN — HEPARIN SODIUM 5000 UNIT(S): 5000 INJECTION INTRAVENOUS; SUBCUTANEOUS at 14:55

## 2019-11-30 RX ADMIN — Medication 4: at 08:32

## 2019-11-30 RX ADMIN — Medication 81 MILLIGRAM(S): at 12:54

## 2019-11-30 RX ADMIN — Medication 25 MILLIGRAM(S): at 05:26

## 2019-11-30 RX ADMIN — Medication 12 UNIT(S): at 12:54

## 2019-11-30 RX ADMIN — Medication 100 GRAM(S): at 16:05

## 2019-11-30 RX ADMIN — Medication 50 MILLIGRAM(S): at 17:26

## 2019-11-30 RX ADMIN — Medication 50 MILLIGRAM(S): at 05:26

## 2019-11-30 RX ADMIN — Medication 15 UNIT(S): at 17:44

## 2019-11-30 RX ADMIN — Medication 1 APPLICATION(S): at 12:55

## 2019-11-30 RX ADMIN — Medication 12 UNIT(S): at 08:32

## 2019-11-30 RX ADMIN — HEPARIN SODIUM 5000 UNIT(S): 5000 INJECTION INTRAVENOUS; SUBCUTANEOUS at 05:26

## 2019-11-30 RX ADMIN — AMLODIPINE BESYLATE 10 MILLIGRAM(S): 2.5 TABLET ORAL at 05:26

## 2019-11-30 RX ADMIN — PIPERACILLIN AND TAZOBACTAM 25 GRAM(S): 4; .5 INJECTION, POWDER, LYOPHILIZED, FOR SOLUTION INTRAVENOUS at 10:12

## 2019-11-30 RX ADMIN — PIPERACILLIN AND TAZOBACTAM 25 GRAM(S): 4; .5 INJECTION, POWDER, LYOPHILIZED, FOR SOLUTION INTRAVENOUS at 01:09

## 2019-11-30 RX ADMIN — Medication 8: at 17:45

## 2019-11-30 RX ADMIN — INSULIN GLARGINE 34 UNIT(S): 100 INJECTION, SOLUTION SUBCUTANEOUS at 22:26

## 2019-11-30 RX ADMIN — ATORVASTATIN CALCIUM 20 MILLIGRAM(S): 80 TABLET, FILM COATED ORAL at 22:26

## 2019-11-30 RX ADMIN — CLOPIDOGREL BISULFATE 75 MILLIGRAM(S): 75 TABLET, FILM COATED ORAL at 12:54

## 2019-11-30 RX ADMIN — LISINOPRIL 40 MILLIGRAM(S): 2.5 TABLET ORAL at 05:26

## 2019-11-30 RX ADMIN — PIPERACILLIN AND TAZOBACTAM 25 GRAM(S): 4; .5 INJECTION, POWDER, LYOPHILIZED, FOR SOLUTION INTRAVENOUS at 22:26

## 2019-11-30 RX ADMIN — ISOSORBIDE MONONITRATE 30 MILLIGRAM(S): 60 TABLET, EXTENDED RELEASE ORAL at 12:54

## 2019-11-30 RX ADMIN — Medication 4: at 12:55

## 2019-11-30 RX ADMIN — HEPARIN SODIUM 5000 UNIT(S): 5000 INJECTION INTRAVENOUS; SUBCUTANEOUS at 22:26

## 2019-11-30 NOTE — PROGRESS NOTE ADULT - ASSESSMENT
68 year old man PMH HTN, HLD, uncontrolled DM2, CAD s/p stents, presents to the ED c/o R foot wound x 5 weeks, found to have osteomyelitis s/p debridement and bypass, also with hyperglycemia in setting of uncontrolled DM2 with wound infection. BG goal 100-180 mg/dL. Tolerating POs. BG values remain elevated on current insulin regimen. Will increase to improve glycemic control. Awaiting rehab.

## 2019-11-30 NOTE — PROGRESS NOTE ADULT - SUBJECTIVE AND OBJECTIVE BOX
Morning Surgical Progress Note  Patient is a 68y old  Male who presents with a chief complaint of Foot infection (30 Nov 2019 15:15)      SUBJECTIVE: Patient seen and examined at bedside with surgical team, patient without complaints. There were no acute events overnight.    Vital Signs Last 24 Hrs  T(C): 36.9 (30 Nov 2019 16:48), Max: 37.1 (29 Nov 2019 22:02)  T(F): 98.5 (30 Nov 2019 16:48), Max: 98.8 (29 Nov 2019 22:02)  HR: 80 (30 Nov 2019 16:48) (69 - 80)  BP: 134/64 (30 Nov 2019 16:48) (134/64 - 168/76)  BP(mean): --  RR: 18 (30 Nov 2019 16:48) (16 - 48)  SpO2: 96% (30 Nov 2019 16:48) (96% - 99%)I&O's Detail    29 Nov 2019 07:01  -  30 Nov 2019 07:00  --------------------------------------------------------  IN:    Oral Fluid: 1140 mL    Solution: 150 mL  Total IN: 1290 mL    OUT:    Voided: 2190 mL  Total OUT: 2190 mL    Total NET: -900 mL      30 Nov 2019 07:01  -  30 Nov 2019 20:26  --------------------------------------------------------  IN:    Oral Fluid: 920 mL    Solution: 200 mL  Total IN: 1120 mL    OUT:    Voided: 800 mL  Total OUT: 800 mL    Total NET: 320 mL      MEDICATIONS  (STANDING):  amLODIPine   Tablet 10 milliGRAM(s) Oral daily  aspirin  chewable 81 milliGRAM(s) Oral daily  atorvastatin 20 milliGRAM(s) Oral at bedtime  chlorhexidine 4% Liquid 1 Application(s) Topical <User Schedule>  clopidogrel Tablet 75 milliGRAM(s) Oral daily  Dakins Solution - 1/4 Strength 1 Application(s) Topical daily  dextrose 50% Injectable 12.5 Gram(s) IV Push once  dextrose 50% Injectable 25 Gram(s) IV Push once  dextrose 50% Injectable 25 Gram(s) IV Push once  heparin  Injectable 5000 Unit(s) SubCutaneous every 8 hours  hydrochlorothiazide 25 milliGRAM(s) Oral daily  influenza   Vaccine 0.5 milliLiter(s) IntraMuscular once  insulin glargine Injectable (LANTUS) 34 Unit(s) SubCutaneous at bedtime  insulin lispro (HumaLOG) corrective regimen sliding scale   SubCutaneous at bedtime  insulin lispro (HumaLOG) corrective regimen sliding scale   SubCutaneous three times a day before meals  insulin lispro Injectable (HumaLOG) 15 Unit(s) SubCutaneous three times a day before meals  isosorbide   mononitrate ER Tablet (IMDUR) 30 milliGRAM(s) Oral daily  lisinopril 40 milliGRAM(s) Oral daily  metoprolol tartrate 50 milliGRAM(s) Oral two times a day  piperacillin/tazobactam IVPB.. 3.375 Gram(s) IV Intermittent every 8 hours    MEDICATIONS  (PRN):  acetaminophen   Tablet .. 650 milliGRAM(s) Oral every 6 hours PRN Mild Pain (1 - 3)  dextrose 40% Gel 15 Gram(s) Oral once PRN Blood Glucose LESS THAN 70 milliGRAM(s)/deciliter  glucagon  Injectable 1 milliGRAM(s) IntraMuscular once PRN Glucose LESS THAN 70 milligrams/deciliter  morphine  - Injectable 2 milliGRAM(s) IV Push every 4 hours PRN Severe Pain (7 - 10)  ondansetron Injectable 4 milliGRAM(s) IV Push once PRN Nausea and/or Vomiting  oxyCODONE    IR 5 milliGRAM(s) Oral every 4 hours PRN Moderate Pain (4 - 6)  sodium chloride 0.9% lock flush 10 milliLiter(s) IV Push every 1 hour PRN Pre/post blood products, medications, blood draw, and to maintain line patency      Physical Exam  Constitutional: A&Ox3, NAD  Respiratory: CTA bilterally  Cardiac: RRR, S1 and S2, no m/r/g  Gastrointestinal: abdomen soft, NT/ND  RLE: DP and PT signals present; dressings c/d/i  LLE: venous skin changes; 1cm x0.5cm ulcer on anterior shin    LABS:                        8.7    7.35  )-----------( 230      ( 30 Nov 2019 06:56 )             25.8     11-30    137  |  99  |  21  ----------------------------<  201<H>  4.0   |  24  |  0.95    Ca    9.2      30 Nov 2019 06:56  Phos  3.7     11-30  Mg     1.8     11-30

## 2019-11-30 NOTE — PROGRESS NOTE ADULT - PROBLEM SELECTOR PLAN 1
Subjective   Patient ID: Dione Decker is a 39 y.o. female presents with   Chief Complaint   Patient presents with   • Animal Bite       HPI Comments: Patient reports 2 days ago her cat bit her right foot.  Patient states she has been soaking her foot in epsom salt water but has developed redness and swelling to right 4th toe.      Animal Bite    The incident occurred yesterday. The incident occurred at home. There is an injury to the right third toe and right fourth toe. The pain is mild. It is unlikely that a foreign body is present. Pertinent negatives include no chest pain, no abdominal pain, no nausea and no vomiting. There have been no prior injuries to these areas. Her tetanus status is UTD.       No Known Allergies    The following portions of the patient's history were reviewed and updated as appropriate: allergies, current medications, past family history, past medical history, past social history, past surgical history and problem list.      Review of Systems   Constitutional: Negative for chills and fever.   Respiratory: Negative for chest tightness.    Cardiovascular: Negative for chest pain.   Gastrointestinal: Negative for abdominal pain, diarrhea, nausea and vomiting.   Skin: Positive for wound (right 3rd and 4 th toes).   Neurological: Negative for dizziness.       Objective     Vitals:    01/28/18 1105   BP: 136/86   Pulse: 84   Resp: 16   Temp: 97.7 °F (36.5 °C)   SpO2: 99%         Physical Exam   Constitutional: She is oriented to person, place, and time. She appears well-developed and well-nourished. She does not appear ill. No distress.   HENT:   Head: Normocephalic.   Right Ear: Hearing and external ear normal.   Left Ear: Hearing, tympanic membrane and external ear normal.   Nose: Nose normal.   Mouth/Throat: Mucous membranes are normal.   Eyes: Conjunctivae are normal.   Sclera white.   Neck: No tracheal deviation present.   Cardiovascular: Normal rate, regular rhythm, S1 normal, S2  normal and normal heart sounds.    Pulmonary/Chest: Effort normal and breath sounds normal. No accessory muscle usage. No respiratory distress.   Abdominal: Soft. Bowel sounds are normal. There is no tenderness.   Musculoskeletal:   Puncture wound noted to dorsal aspect of right 3rd toe.  Small scab noted. No redness or edema noted.  Puncture wound noted to bottom of right 4th toe.  Small scab noted.  Redness and edema noted to right 4th toe.  No drainage.  No streaking.  No crepitus.          Lymphadenopathy:     She has no cervical adenopathy.   Neurological: She is alert and oriented to person, place, and time.   Skin: Skin is warm and dry.   Vitals reviewed.        Dione was seen today for animal bite.    Diagnoses and all orders for this visit:    Cat bite of foot, initial encounter  -     amoxicillin-clavulanate (AUGMENTIN) 875-125 MG per tablet; Take 1 tablet by mouth Every 12 (Twelve) Hours for 10 days.        Follow-up with Primary Care Physician in 48-72 hours if these symptoms worsen or fail to improve as anticipated. Patient verbalizes understanding.     -test BG AC/HS  -Increase Lantus 34 units QHS  -Increase Humalog 15 units AC meals  -c/w Humalog moderate correction scale AC and change to Mod HS scale  -Please change Abx to infuse in NS   - for discharge: can likely dc on oral agents versus basal + orals if insulin requirements remain high. Would recommend continue basal/bolus at rehab for now.   -Can f/u @ [address] [facility] [address] [place] [phone]. Wife will call Monday to schedule follow up.   discussed plan w/pt, wife and RN  pager: [phone]

## 2019-11-30 NOTE — PROGRESS NOTE ADULT - ASSESSMENT
64yo M POD 2 s/p below knee pop to PT bypass. Now s/p right foot wounds debridement with bone biopsy and stravix application.       Plan:  Follow up bone culture  PICC placement  Continue IV antibiotics  Appreciate ID reccs  Appreciate Podiatry reccs  Dispo planning to [facility]    Vascular Surgery  [phone]

## 2019-11-30 NOTE — PROGRESS NOTE ADULT - SUBJECTIVE AND OBJECTIVE BOX
Diabetes Follow up note:    Chief complaint: f/u T2DM w/hyperglycemia.     Interval Hx: BG values persistently in 200s on basal/bolus regimen. Wife and family @ bedside. Confirmed w/pt list was taking Amaryl 2mg daily in addition to Metformin at home. Tolerating POs w/good appetite. Glucose values at home usually in upper 100s in AM but improve in late afternoon when tested. Will be going to rehab upon discharge from hospital.     Review of Systems:  General: denies pain   GI: Tolerating POs. Denies N/V/D/Abd pain  CV: Denies CP/SOB  ENDO: No S&Sx of hypoglycemia  MEDS:  atorvastatin 20 milliGRAM(s) Oral at bedtime    insulin glargine Injectable (LANTUS) 30 Unit(s) SubCutaneous at bedtime  insulin lispro (HumaLOG) corrective regimen sliding scale   SubCutaneous three times a day before meals  insulin lispro (HumaLOG) corrective regimen sliding scale   SubCutaneous at bedtime  insulin lispro Injectable (HumaLOG) 12 Unit(s) SubCutaneous three times a day before meals    piperacillin/tazobactam IVPB.. 3.375 Gram(s) IV Intermittent every 8 hours    Allergies    No Known Allergies    Intolerances    ciprofloxacin (Vomiting)    PE:  General: Male sitting in chair. NAD.   Vital Signs Last 24 Hrs  T(C): 36.7 (30 Nov 2019 12:50), Max: 37.2 (29 Nov 2019 16:52)  T(F): 98.1 (30 Nov 2019 12:50), Max: 98.9 (29 Nov 2019 16:52)  HR: 79 (30 Nov 2019 12:50) (69 - 79)  BP: 168/76 (30 Nov 2019 12:50) (144/68 - 168/76)  BP(mean): --  RR: 17 (30 Nov 2019 12:50) (16 - 48)  SpO2: 99% (30 Nov 2019 12:50) (96% - 99%)  Abd: Soft, NT,ND, Obese.   Extremities: Warm. R foot dsg c/d/i  Neuro: A&O X3    LABS:  POCT Blood Glucose.: 245 mg/dL (11-30-19 @ 12:50)  POCT Blood Glucose.: 228 mg/dL (11-30-19 @ 08:27)  POCT Blood Glucose.: 262 mg/dL (11-29-19 @ 21:47)  POCT Blood Glucose.: 215 mg/dL (11-29-19 @ 17:32)  POCT Blood Glucose.: 283 mg/dL (11-29-19 @ 13:25)  POCT Blood Glucose.: 262 mg/dL (11-29-19 @ 10:02)  POCT Blood Glucose.: 335 mg/dL (11-28-19 @ 22:07)  POCT Blood Glucose.: 372 mg/dL (11-28-19 @ 17:58)  POCT Blood Glucose.: 338 mg/dL (11-28-19 @ 12:50)  POCT Blood Glucose.: 269 mg/dL (11-28-19 @ 09:06)  POCT Blood Glucose.: 318 mg/dL (11-27-19 @ 21:20)  POCT Blood Glucose.: 246 mg/dL (11-27-19 @ 15:34)                            8.7    7.35  )-----------( 230      ( 30 Nov 2019 06:56 )             25.8       11-30    137  |  99  |  21  ----------------------------<  201<H>  4.0   |  24  |  0.95    Ca    9.2      30 Nov 2019 06:56  Phos  3.7     11-30  Mg     1.8     11-30           Hemoglobin A1C, Whole Blood: 7.9 % <H> [4.0 - 5.6] (11-15-19 @ 18:08)  Hemoglobin A1C, Whole Blood: 7.9 % <H> [4.0 - 5.6] (11-15-19 @ 18:07)          Contact number: leann [phone] or [phone]

## 2019-12-01 LAB
ANION GAP SERPL CALC-SCNC: 15 MMOL/L — SIGNIFICANT CHANGE UP (ref 5–17)
BASOPHILS # BLD AUTO: 0.06 K/UL — SIGNIFICANT CHANGE UP (ref 0–0.2)
BASOPHILS NFR BLD AUTO: 0.8 % — SIGNIFICANT CHANGE UP (ref 0–2)
BUN SERPL-MCNC: 27 MG/DL — HIGH (ref 7–23)
CALCIUM SERPL-MCNC: 9.2 MG/DL — SIGNIFICANT CHANGE UP (ref 8.4–10.5)
CHLORIDE SERPL-SCNC: 99 MMOL/L — SIGNIFICANT CHANGE UP (ref 96–108)
CO2 SERPL-SCNC: 23 MMOL/L — SIGNIFICANT CHANGE UP (ref 22–31)
CREAT SERPL-MCNC: 1.05 MG/DL — SIGNIFICANT CHANGE UP (ref 0.5–1.3)
EOSINOPHIL # BLD AUTO: 0.18 K/UL — SIGNIFICANT CHANGE UP (ref 0–0.5)
EOSINOPHIL NFR BLD AUTO: 2.5 % — SIGNIFICANT CHANGE UP (ref 0–6)
GLUCOSE BLDC GLUCOMTR-MCNC: 177 MG/DL — HIGH (ref 70–99)
GLUCOSE BLDC GLUCOMTR-MCNC: 194 MG/DL — HIGH (ref 70–99)
GLUCOSE BLDC GLUCOMTR-MCNC: 240 MG/DL — HIGH (ref 70–99)
GLUCOSE BLDC GLUCOMTR-MCNC: 246 MG/DL — HIGH (ref 70–99)
GLUCOSE SERPL-MCNC: 187 MG/DL — HIGH (ref 70–99)
HCT VFR BLD CALC: 25.9 % — LOW (ref 39–50)
HGB BLD-MCNC: 8.6 G/DL — LOW (ref 13–17)
IMM GRANULOCYTES NFR BLD AUTO: 0.4 % — SIGNIFICANT CHANGE UP (ref 0–1.5)
LYMPHOCYTES # BLD AUTO: 1.53 K/UL — SIGNIFICANT CHANGE UP (ref 1–3.3)
LYMPHOCYTES # BLD AUTO: 21.5 % — SIGNIFICANT CHANGE UP (ref 13–44)
MAGNESIUM SERPL-MCNC: 2 MG/DL — SIGNIFICANT CHANGE UP (ref 1.6–2.6)
MCHC RBC-ENTMCNC: 27.1 PG — SIGNIFICANT CHANGE UP (ref 27–34)
MCHC RBC-ENTMCNC: 33.2 GM/DL — SIGNIFICANT CHANGE UP (ref 32–36)
MCV RBC AUTO: 81.7 FL — SIGNIFICANT CHANGE UP (ref 80–100)
MONOCYTES # BLD AUTO: 0.6 K/UL — SIGNIFICANT CHANGE UP (ref 0–0.9)
MONOCYTES NFR BLD AUTO: 8.4 % — SIGNIFICANT CHANGE UP (ref 2–14)
NEUTROPHILS # BLD AUTO: 4.71 K/UL — SIGNIFICANT CHANGE UP (ref 1.8–7.4)
NEUTROPHILS NFR BLD AUTO: 66.4 % — SIGNIFICANT CHANGE UP (ref 43–77)
NRBC # BLD: 0 /100 WBCS — SIGNIFICANT CHANGE UP (ref 0–0)
PHOSPHATE SERPL-MCNC: 3.7 MG/DL — SIGNIFICANT CHANGE UP (ref 2.5–4.5)
PLATELET # BLD AUTO: 223 K/UL — SIGNIFICANT CHANGE UP (ref 150–400)
POTASSIUM SERPL-MCNC: 4.1 MMOL/L — SIGNIFICANT CHANGE UP (ref 3.5–5.3)
POTASSIUM SERPL-SCNC: 4.1 MMOL/L — SIGNIFICANT CHANGE UP (ref 3.5–5.3)
RBC # BLD: 3.17 M/UL — LOW (ref 4.2–5.8)
RBC # FLD: 13.6 % — SIGNIFICANT CHANGE UP (ref 10.3–14.5)
SODIUM SERPL-SCNC: 137 MMOL/L — SIGNIFICANT CHANGE UP (ref 135–145)
WBC # BLD: 7.11 K/UL — SIGNIFICANT CHANGE UP (ref 3.8–10.5)
WBC # FLD AUTO: 7.11 K/UL — SIGNIFICANT CHANGE UP (ref 3.8–10.5)

## 2019-12-01 PROCEDURE — 99232 SBSQ HOSP IP/OBS MODERATE 35: CPT

## 2019-12-01 RX ORDER — INSULIN GLARGINE 100 [IU]/ML
38 INJECTION, SOLUTION SUBCUTANEOUS AT BEDTIME
Refills: 0 | Status: DISCONTINUED | OUTPATIENT
Start: 2019-12-01 | End: 2019-12-02

## 2019-12-01 RX ORDER — INSULIN LISPRO 100/ML
18 VIAL (ML) SUBCUTANEOUS
Refills: 0 | Status: DISCONTINUED | OUTPATIENT
Start: 2019-12-01 | End: 2019-12-03

## 2019-12-01 RX ADMIN — Medication 81 MILLIGRAM(S): at 12:34

## 2019-12-01 RX ADMIN — Medication 25 MILLIGRAM(S): at 05:36

## 2019-12-01 RX ADMIN — Medication 15 UNIT(S): at 08:44

## 2019-12-01 RX ADMIN — Medication 50 MILLIGRAM(S): at 05:35

## 2019-12-01 RX ADMIN — CLOPIDOGREL BISULFATE 75 MILLIGRAM(S): 75 TABLET, FILM COATED ORAL at 12:34

## 2019-12-01 RX ADMIN — Medication 15 UNIT(S): at 12:29

## 2019-12-01 RX ADMIN — LISINOPRIL 40 MILLIGRAM(S): 2.5 TABLET ORAL at 05:36

## 2019-12-01 RX ADMIN — Medication 4: at 17:24

## 2019-12-01 RX ADMIN — HEPARIN SODIUM 5000 UNIT(S): 5000 INJECTION INTRAVENOUS; SUBCUTANEOUS at 05:36

## 2019-12-01 RX ADMIN — Medication 18 UNIT(S): at 17:23

## 2019-12-01 RX ADMIN — Medication 50 MILLIGRAM(S): at 17:23

## 2019-12-01 RX ADMIN — ATORVASTATIN CALCIUM 20 MILLIGRAM(S): 80 TABLET, FILM COATED ORAL at 21:54

## 2019-12-01 RX ADMIN — CHLORHEXIDINE GLUCONATE 1 APPLICATION(S): 213 SOLUTION TOPICAL at 05:37

## 2019-12-01 RX ADMIN — Medication 2: at 08:44

## 2019-12-01 RX ADMIN — PIPERACILLIN AND TAZOBACTAM 25 GRAM(S): 4; .5 INJECTION, POWDER, LYOPHILIZED, FOR SOLUTION INTRAVENOUS at 05:35

## 2019-12-01 RX ADMIN — PIPERACILLIN AND TAZOBACTAM 25 GRAM(S): 4; .5 INJECTION, POWDER, LYOPHILIZED, FOR SOLUTION INTRAVENOUS at 13:27

## 2019-12-01 RX ADMIN — Medication 4: at 12:29

## 2019-12-01 RX ADMIN — ISOSORBIDE MONONITRATE 30 MILLIGRAM(S): 60 TABLET, EXTENDED RELEASE ORAL at 12:34

## 2019-12-01 RX ADMIN — HEPARIN SODIUM 5000 UNIT(S): 5000 INJECTION INTRAVENOUS; SUBCUTANEOUS at 13:27

## 2019-12-01 RX ADMIN — INSULIN GLARGINE 38 UNIT(S): 100 INJECTION, SOLUTION SUBCUTANEOUS at 21:54

## 2019-12-01 RX ADMIN — Medication 1 APPLICATION(S): at 12:34

## 2019-12-01 RX ADMIN — AMLODIPINE BESYLATE 10 MILLIGRAM(S): 2.5 TABLET ORAL at 05:36

## 2019-12-01 RX ADMIN — PIPERACILLIN AND TAZOBACTAM 25 GRAM(S): 4; .5 INJECTION, POWDER, LYOPHILIZED, FOR SOLUTION INTRAVENOUS at 21:54

## 2019-12-01 RX ADMIN — HEPARIN SODIUM 5000 UNIT(S): 5000 INJECTION INTRAVENOUS; SUBCUTANEOUS at 21:54

## 2019-12-01 NOTE — PROGRESS NOTE ADULT - SUBJECTIVE AND OBJECTIVE BOX
Diabetes Follow up note:    Chief complaint: F/u T2DM w/hyperglycemia    Interval Hx: BG values remain elevated above goal on adjusted insulin regimen. Reports good appetite/eating meals from hospital. Wife at bedside.     Review of Systems:  General: denies pain  GI: Tolerating POs. Denies N/V/D/Abd pain  CV: Denies CP/SOB  ENDO: No S&Sx of hypoglycemia  MEDS:  atorvastatin 20 milliGRAM(s) Oral at bedtime    insulin glargine Injectable (LANTUS) 34 Unit(s) SubCutaneous at bedtime  insulin lispro (HumaLOG) corrective regimen sliding scale   SubCutaneous at bedtime  insulin lispro (HumaLOG) corrective regimen sliding scale   SubCutaneous three times a day before meals  insulin lispro Injectable (HumaLOG) 15 Unit(s) SubCutaneous three times a day before meals    piperacillin/tazobactam IVPB.. 3.375 Gram(s) IV Intermittent every 8 hours    Allergies    No Known Allergies    Intolerances    ciprofloxacin (Vomiting)    PE:  General: Male reclining in chair. NAD.   Vital Signs Last 24 Hrs  T(C): 36.6 (01 Dec 2019 13:40), Max: 37 (01 Dec 2019 05:13)  T(F): 97.9 (01 Dec 2019 13:40), Max: 98.6 (01 Dec 2019 05:13)  HR: 80 (01 Dec 2019 13:40) (69 - 80)  BP: 128/64 (01 Dec 2019 13:40) (128/64 - 161/68)  BP(mean): --  RR: 18 (01 Dec 2019 13:40) (16 - 18)  SpO2: 98% (01 Dec 2019 13:40) (95% - 98%)  Abd: Soft, NT,ND,   Extremities: Warm. R foot dsg c/d/i. L leg dsg c/d/i  Neuro: A&O X3    LABS:  POCT Blood Glucose.: 246 mg/dL (12-01-19 @ 12:23)  POCT Blood Glucose.: 194 mg/dL (12-01-19 @ 08:41)  POCT Blood Glucose.: 202 mg/dL (11-30-19 @ 22:22)  POCT Blood Glucose.: 302 mg/dL (11-30-19 @ 17:41)  POCT Blood Glucose.: 245 mg/dL (11-30-19 @ 12:50)  POCT Blood Glucose.: 228 mg/dL (11-30-19 @ 08:27)  POCT Blood Glucose.: 262 mg/dL (11-29-19 @ 21:47)  POCT Blood Glucose.: 215 mg/dL (11-29-19 @ 17:32)  POCT Blood Glucose.: 283 mg/dL (11-29-19 @ 13:25)  POCT Blood Glucose.: 262 mg/dL (11-29-19 @ 10:02)  POCT Blood Glucose.: 335 mg/dL (11-28-19 @ 22:07)  POCT Blood Glucose.: 372 mg/dL (11-28-19 @ 17:58)                            8.6    7.11  )-----------( 223      ( 01 Dec 2019 06:13 )             25.9       12-01    137  |  99  |  27<H>  ----------------------------<  187<H>  4.1   |  23  |  1.05    Ca    9.2      01 Dec 2019 06:13  Phos  3.7     12-01  Mg     2.0     12-01            Hemoglobin A1C, Whole Blood: 7.9 % <H> [4.0 - 5.6] (11-15-19 @ 18:08)  Hemoglobin A1C, Whole Blood: 7.9 % <H> [4.0 - 5.6] (11-15-19 @ 18:07)          Contact number: leann [phone] or [phone]

## 2019-12-01 NOTE — PROGRESS NOTE ADULT - SUBJECTIVE AND OBJECTIVE BOX
Morning Surgical Progress Note  Patient is a 68y old  Male who presents with a chief complaint of Foot infection (30 Nov 2019 20:26)      SUBJECTIVE: Patient seen and examined at bedside with surgical team, patient without complaints. There were no acute events overnight.    Vital Signs Last 24 Hrs  T(C): 36.9 (01 Dec 2019 10:33), Max: 37 (01 Dec 2019 05:13)  T(F): 98.4 (01 Dec 2019 10:33), Max: 98.6 (01 Dec 2019 05:13)  HR: 74 (01 Dec 2019 12:33) (69 - 80)  BP: 142/63 (01 Dec 2019 12:33) (128/68 - 168/76)  BP(mean): --  RR: 18 (01 Dec 2019 10:33) (16 - 18)  SpO2: 97% (01 Dec 2019 10:33) (95% - 99%)I&O's Detail    30 Nov 2019 07:01  -  01 Dec 2019 07:00  --------------------------------------------------------  IN:    Oral Fluid: 1280 mL    Solution: 300 mL  Total IN: 1580 mL    OUT:    Voided: 2250 mL  Total OUT: 2250 mL    Total NET: -670 mL      01 Dec 2019 07:01  -  01 Dec 2019 12:37  --------------------------------------------------------  IN:    Oral Fluid: 320 mL  Total IN: 320 mL    OUT:    Voided: 300 mL  Total OUT: 300 mL    Total NET: 20 mL      MEDICATIONS  (STANDING):  amLODIPine   Tablet 10 milliGRAM(s) Oral daily  aspirin  chewable 81 milliGRAM(s) Oral daily  atorvastatin 20 milliGRAM(s) Oral at bedtime  chlorhexidine 4% Liquid 1 Application(s) Topical <User Schedule>  clopidogrel Tablet 75 milliGRAM(s) Oral daily  Dakins Solution - 1/4 Strength 1 Application(s) Topical daily  dextrose 50% Injectable 12.5 Gram(s) IV Push once  dextrose 50% Injectable 25 Gram(s) IV Push once  dextrose 50% Injectable 25 Gram(s) IV Push once  heparin  Injectable 5000 Unit(s) SubCutaneous every 8 hours  hydrochlorothiazide 25 milliGRAM(s) Oral daily  influenza   Vaccine 0.5 milliLiter(s) IntraMuscular once  insulin glargine Injectable (LANTUS) 34 Unit(s) SubCutaneous at bedtime  insulin lispro (HumaLOG) corrective regimen sliding scale   SubCutaneous at bedtime  insulin lispro (HumaLOG) corrective regimen sliding scale   SubCutaneous three times a day before meals  insulin lispro Injectable (HumaLOG) 15 Unit(s) SubCutaneous three times a day before meals  isosorbide   mononitrate ER Tablet (IMDUR) 30 milliGRAM(s) Oral daily  lisinopril 40 milliGRAM(s) Oral daily  metoprolol tartrate 50 milliGRAM(s) Oral two times a day  piperacillin/tazobactam IVPB.. 3.375 Gram(s) IV Intermittent every 8 hours    MEDICATIONS  (PRN):  acetaminophen   Tablet .. 650 milliGRAM(s) Oral every 6 hours PRN Mild Pain (1 - 3)  dextrose 40% Gel 15 Gram(s) Oral once PRN Blood Glucose LESS THAN 70 milliGRAM(s)/deciliter  glucagon  Injectable 1 milliGRAM(s) IntraMuscular once PRN Glucose LESS THAN 70 milligrams/deciliter  morphine  - Injectable 2 milliGRAM(s) IV Push every 4 hours PRN Severe Pain (7 - 10)  ondansetron Injectable 4 milliGRAM(s) IV Push once PRN Nausea and/or Vomiting  oxyCODONE    IR 5 milliGRAM(s) Oral every 4 hours PRN Moderate Pain (4 - 6)  sodium chloride 0.9% lock flush 10 milliLiter(s) IV Push every 1 hour PRN Pre/post blood products, medications, blood draw, and to maintain line patency      Physical Exam  Constitutional: A&Ox3, NAD  Respiratory: CTA bilterally  Cardiac: RRR, S1 and S2, no m/r/g  Gastrointestinal: abdomen soft, NT/ND  RLE: DP and PT signals present; dressings c/d/i  LLE: dressings c/d/i    LABS:                        8.6    7.11  )-----------( 223      ( 01 Dec 2019 06:13 )             25.9     12-01    137  |  99  |  27<H>  ----------------------------<  187<H>  4.1   |  23  |  1.05    Ca    9.2      01 Dec 2019 06:13  Phos  3.7     12-01  Mg     2.0     12-01

## 2019-12-01 NOTE — PROGRESS NOTE ADULT - ASSESSMENT
68 year old man PMH HTN, HLD, uncontrolled DM2, CAD s/p stents, presents to the ED c/o R foot wound x 5 weeks, found to have osteomyelitis s/p debridement and bypass, also with hyperglycemia in setting of uncontrolled DM2 with wound infection. BG goal 100-180 mg/dL. Tolerating POs w/high insulin requirements post-operatively. Awaiting rehab placement.

## 2019-12-01 NOTE — PROGRESS NOTE ADULT - PROBLEM SELECTOR PLAN 1
-test BG AC/HS  -Increase Lantus 38 units QHS  -Increase Humalog 18 units TID w/meals  -c/w Humalog moderate correction scale AC and Mod HS scale  for discharge: can likely dc on oral agents versus basal + orals if insulin requirements remain high. Would recommend continue basal/bolus at rehab for now.   -Can f/u @ [address] [facility] [address] [phone]. Wife will call Monday to schedule follow up.   -endocrine team to continue to follow  pager: [phone]

## 2019-12-01 NOTE — PROGRESS NOTE ADULT - ASSESSMENT
64yo M POD 2 s/p below knee pop to PT bypass. Now s/p right foot wounds debridement with bone biopsy and stravix application.     Plan:  Follow up bone culture  Discuss case with Podiatry  PICC placed, continue IV Abx  Dispo planning to [facility] ([place])    Vascular Surgery  [phone]

## 2019-12-02 LAB
ANION GAP SERPL CALC-SCNC: 14 MMOL/L — SIGNIFICANT CHANGE UP (ref 5–17)
BASOPHILS # BLD AUTO: 0.05 K/UL — SIGNIFICANT CHANGE UP (ref 0–0.2)
BASOPHILS NFR BLD AUTO: 0.8 % — SIGNIFICANT CHANGE UP (ref 0–2)
BUN SERPL-MCNC: 24 MG/DL — HIGH (ref 7–23)
CALCIUM SERPL-MCNC: 9.2 MG/DL — SIGNIFICANT CHANGE UP (ref 8.4–10.5)
CHLORIDE SERPL-SCNC: 103 MMOL/L — SIGNIFICANT CHANGE UP (ref 96–108)
CO2 SERPL-SCNC: 23 MMOL/L — SIGNIFICANT CHANGE UP (ref 22–31)
CREAT SERPL-MCNC: 1.01 MG/DL — SIGNIFICANT CHANGE UP (ref 0.5–1.3)
CULTURE RESULTS: SIGNIFICANT CHANGE UP
EOSINOPHIL # BLD AUTO: 0.18 K/UL — SIGNIFICANT CHANGE UP (ref 0–0.5)
EOSINOPHIL NFR BLD AUTO: 2.9 % — SIGNIFICANT CHANGE UP (ref 0–6)
GLUCOSE BLDC GLUCOMTR-MCNC: 107 MG/DL — HIGH (ref 70–99)
GLUCOSE BLDC GLUCOMTR-MCNC: 108 MG/DL — HIGH (ref 70–99)
GLUCOSE BLDC GLUCOMTR-MCNC: 136 MG/DL — HIGH (ref 70–99)
GLUCOSE BLDC GLUCOMTR-MCNC: 221 MG/DL — HIGH (ref 70–99)
GLUCOSE SERPL-MCNC: 114 MG/DL — HIGH (ref 70–99)
HCT VFR BLD CALC: 24.7 % — LOW (ref 39–50)
HGB BLD-MCNC: 8.2 G/DL — LOW (ref 13–17)
IMM GRANULOCYTES NFR BLD AUTO: 0.3 % — SIGNIFICANT CHANGE UP (ref 0–1.5)
LYMPHOCYTES # BLD AUTO: 1.35 K/UL — SIGNIFICANT CHANGE UP (ref 1–3.3)
LYMPHOCYTES # BLD AUTO: 21.5 % — SIGNIFICANT CHANGE UP (ref 13–44)
MAGNESIUM SERPL-MCNC: 1.9 MG/DL — SIGNIFICANT CHANGE UP (ref 1.6–2.6)
MCHC RBC-ENTMCNC: 27.2 PG — SIGNIFICANT CHANGE UP (ref 27–34)
MCHC RBC-ENTMCNC: 33.2 GM/DL — SIGNIFICANT CHANGE UP (ref 32–36)
MCV RBC AUTO: 82.1 FL — SIGNIFICANT CHANGE UP (ref 80–100)
MONOCYTES # BLD AUTO: 0.51 K/UL — SIGNIFICANT CHANGE UP (ref 0–0.9)
MONOCYTES NFR BLD AUTO: 8.1 % — SIGNIFICANT CHANGE UP (ref 2–14)
NEUTROPHILS # BLD AUTO: 4.16 K/UL — SIGNIFICANT CHANGE UP (ref 1.8–7.4)
NEUTROPHILS NFR BLD AUTO: 66.4 % — SIGNIFICANT CHANGE UP (ref 43–77)
NRBC # BLD: 0 /100 WBCS — SIGNIFICANT CHANGE UP (ref 0–0)
ORGANISM # SPEC MICROSCOPIC CNT: SIGNIFICANT CHANGE UP
ORGANISM # SPEC MICROSCOPIC CNT: SIGNIFICANT CHANGE UP
PHOSPHATE SERPL-MCNC: 3.6 MG/DL — SIGNIFICANT CHANGE UP (ref 2.5–4.5)
PLATELET # BLD AUTO: 225 K/UL — SIGNIFICANT CHANGE UP (ref 150–400)
POTASSIUM SERPL-MCNC: 4.1 MMOL/L — SIGNIFICANT CHANGE UP (ref 3.5–5.3)
POTASSIUM SERPL-SCNC: 4.1 MMOL/L — SIGNIFICANT CHANGE UP (ref 3.5–5.3)
RBC # BLD: 3.01 M/UL — LOW (ref 4.2–5.8)
RBC # FLD: 13.8 % — SIGNIFICANT CHANGE UP (ref 10.3–14.5)
SODIUM SERPL-SCNC: 140 MMOL/L — SIGNIFICANT CHANGE UP (ref 135–145)
SPECIMEN SOURCE: SIGNIFICANT CHANGE UP
SURGICAL PATHOLOGY STUDY: SIGNIFICANT CHANGE UP
WBC # BLD: 6.27 K/UL — SIGNIFICANT CHANGE UP (ref 3.8–10.5)
WBC # FLD AUTO: 6.27 K/UL — SIGNIFICANT CHANGE UP (ref 3.8–10.5)

## 2019-12-02 PROCEDURE — 99232 SBSQ HOSP IP/OBS MODERATE 35: CPT

## 2019-12-02 RX ORDER — MAGNESIUM SULFATE 500 MG/ML
1 VIAL (ML) INJECTION ONCE
Refills: 0 | Status: COMPLETED | OUTPATIENT
Start: 2019-12-02 | End: 2019-12-02

## 2019-12-02 RX ORDER — ASCORBIC ACID 60 MG
500 TABLET,CHEWABLE ORAL DAILY
Refills: 0 | Status: DISCONTINUED | OUTPATIENT
Start: 2019-12-02 | End: 2019-12-03

## 2019-12-02 RX ORDER — INSULIN GLARGINE 100 [IU]/ML
34 INJECTION, SOLUTION SUBCUTANEOUS AT BEDTIME
Refills: 0 | Status: DISCONTINUED | OUTPATIENT
Start: 2019-12-02 | End: 2019-12-03

## 2019-12-02 RX ORDER — MEROPENEM 1 G/30ML
1000 INJECTION INTRAVENOUS EVERY 8 HOURS
Refills: 0 | Status: DISCONTINUED | OUTPATIENT
Start: 2019-12-02 | End: 2019-12-03

## 2019-12-02 RX ADMIN — LISINOPRIL 40 MILLIGRAM(S): 2.5 TABLET ORAL at 06:51

## 2019-12-02 RX ADMIN — AMLODIPINE BESYLATE 10 MILLIGRAM(S): 2.5 TABLET ORAL at 06:51

## 2019-12-02 RX ADMIN — CLOPIDOGREL BISULFATE 75 MILLIGRAM(S): 75 TABLET, FILM COATED ORAL at 12:12

## 2019-12-02 RX ADMIN — Medication 18 UNIT(S): at 17:23

## 2019-12-02 RX ADMIN — HEPARIN SODIUM 5000 UNIT(S): 5000 INJECTION INTRAVENOUS; SUBCUTANEOUS at 21:46

## 2019-12-02 RX ADMIN — Medication 50 MILLIGRAM(S): at 17:23

## 2019-12-02 RX ADMIN — Medication 18 UNIT(S): at 12:09

## 2019-12-02 RX ADMIN — Medication 1 TABLET(S): at 17:23

## 2019-12-02 RX ADMIN — HEPARIN SODIUM 5000 UNIT(S): 5000 INJECTION INTRAVENOUS; SUBCUTANEOUS at 06:51

## 2019-12-02 RX ADMIN — PIPERACILLIN AND TAZOBACTAM 25 GRAM(S): 4; .5 INJECTION, POWDER, LYOPHILIZED, FOR SOLUTION INTRAVENOUS at 06:51

## 2019-12-02 RX ADMIN — MEROPENEM 100 MILLIGRAM(S): 1 INJECTION INTRAVENOUS at 13:25

## 2019-12-02 RX ADMIN — ISOSORBIDE MONONITRATE 30 MILLIGRAM(S): 60 TABLET, EXTENDED RELEASE ORAL at 12:12

## 2019-12-02 RX ADMIN — Medication 1 APPLICATION(S): at 12:12

## 2019-12-02 RX ADMIN — MEROPENEM 100 MILLIGRAM(S): 1 INJECTION INTRAVENOUS at 21:47

## 2019-12-02 RX ADMIN — Medication 500 MILLIGRAM(S): at 17:23

## 2019-12-02 RX ADMIN — ATORVASTATIN CALCIUM 20 MILLIGRAM(S): 80 TABLET, FILM COATED ORAL at 21:51

## 2019-12-02 RX ADMIN — Medication 50 MILLIGRAM(S): at 06:51

## 2019-12-02 RX ADMIN — HEPARIN SODIUM 5000 UNIT(S): 5000 INJECTION INTRAVENOUS; SUBCUTANEOUS at 13:34

## 2019-12-02 RX ADMIN — Medication 18 UNIT(S): at 08:40

## 2019-12-02 RX ADMIN — CHLORHEXIDINE GLUCONATE 1 APPLICATION(S): 213 SOLUTION TOPICAL at 06:51

## 2019-12-02 RX ADMIN — Medication 100 GRAM(S): at 12:13

## 2019-12-02 RX ADMIN — INSULIN GLARGINE 34 UNIT(S): 100 INJECTION, SOLUTION SUBCUTANEOUS at 22:17

## 2019-12-02 RX ADMIN — Medication 25 MILLIGRAM(S): at 06:51

## 2019-12-02 RX ADMIN — Medication 4: at 12:09

## 2019-12-02 RX ADMIN — Medication 81 MILLIGRAM(S): at 12:12

## 2019-12-02 NOTE — PROGRESS NOTE ADULT - SUBJECTIVE AND OBJECTIVE BOX
Morning Surgical Progress Note  Patient is a 68y old  Male who presents with a chief complaint of Foot infection (01 Dec 2019 14:29)      SUBJECTIVE: Patient seen and examined at bedside with surgical team, patient without complaints. Patient states that he wants to go to [facility] for [facility]IAN[facility].    Vital Signs Last 24 Hrs  T(C): 36.8 (02 Dec 2019 06:27), Max: 36.9 (01 Dec 2019 10:33)  T(F): 98.2 (02 Dec 2019 06:27), Max: 98.4 (01 Dec 2019 10:33)  HR: 73 (02 Dec 2019 06:27) (73 - 80)  BP: 151/69 (02 Dec 2019 06:27) (128/64 - 160/73)  BP(mean): --  RR: 18 (02 Dec 2019 06:27) (18 - 18)  SpO2: 97% (02 Dec 2019 06:27) (97% - 98%)I&O's Detail    01 Dec 2019 07:01  -  02 Dec 2019 07:00  --------------------------------------------------------  IN:    Oral Fluid: 1500 mL    Solution: 150 mL  Total IN: 1650 mL    OUT:    Voided: 2250 mL  Total OUT: 2250 mL    Total NET: -600 mL      02 Dec 2019 07:01  -  02 Dec 2019 09:01  --------------------------------------------------------  IN:  Total IN: 0 mL    OUT:    Voided: 225 mL  Total OUT: 225 mL    Total NET: -225 mL      MEDICATIONS  (STANDING):  amLODIPine   Tablet 10 milliGRAM(s) Oral daily  aspirin  chewable 81 milliGRAM(s) Oral daily  atorvastatin 20 milliGRAM(s) Oral at bedtime  chlorhexidine 4% Liquid 1 Application(s) Topical <User Schedule>  clopidogrel Tablet 75 milliGRAM(s) Oral daily  Dakins Solution - 1/4 Strength 1 Application(s) Topical daily  dextrose 50% Injectable 12.5 Gram(s) IV Push once  dextrose 50% Injectable 25 Gram(s) IV Push once  dextrose 50% Injectable 25 Gram(s) IV Push once  heparin  Injectable 5000 Unit(s) SubCutaneous every 8 hours  hydrochlorothiazide 25 milliGRAM(s) Oral daily  influenza   Vaccine 0.5 milliLiter(s) IntraMuscular once  insulin glargine Injectable (LANTUS) 38 Unit(s) SubCutaneous at bedtime  insulin lispro (HumaLOG) corrective regimen sliding scale   SubCutaneous at bedtime  insulin lispro (HumaLOG) corrective regimen sliding scale   SubCutaneous three times a day before meals  insulin lispro Injectable (HumaLOG) 18 Unit(s) SubCutaneous three times a day before meals  isosorbide   mononitrate ER Tablet (IMDUR) 30 milliGRAM(s) Oral daily  lisinopril 40 milliGRAM(s) Oral daily  metoprolol tartrate 50 milliGRAM(s) Oral two times a day  piperacillin/tazobactam IVPB.. 3.375 Gram(s) IV Intermittent every 8 hours    MEDICATIONS  (PRN):  acetaminophen   Tablet .. 650 milliGRAM(s) Oral every 6 hours PRN Mild Pain (1 - 3)  dextrose 40% Gel 15 Gram(s) Oral once PRN Blood Glucose LESS THAN 70 milliGRAM(s)/deciliter  glucagon  Injectable 1 milliGRAM(s) IntraMuscular once PRN Glucose LESS THAN 70 milligrams/deciliter  morphine  - Injectable 2 milliGRAM(s) IV Push every 4 hours PRN Severe Pain (7 - 10)  ondansetron Injectable 4 milliGRAM(s) IV Push once PRN Nausea and/or Vomiting  oxyCODONE    IR 5 milliGRAM(s) Oral every 4 hours PRN Moderate Pain (4 - 6)  sodium chloride 0.9% lock flush 10 milliLiter(s) IV Push every 1 hour PRN Pre/post blood products, medications, blood draw, and to maintain line patency      Physical Exam  Constitutional: A&Ox3, NAD  Respiratory: CTA bilterally  Cardiac: RRR, S1 and S2, no m/r/g  Gastrointestinal: abdomen soft, NT/ND  RLE: DP and PT signals present; dressings c/d/i  LLE: dressings c/d/i    LABS:                        8.2    6.27  )-----------( 225      ( 02 Dec 2019 07:09 )             24.7     12-02    140  |  103  |  24<H>  ----------------------------<  114<H>  4.1   |  23  |  1.01    Ca    9.2      02 Dec 2019 07:09  Phos  3.6     12-02  Mg     1.9     12-02

## 2019-12-02 NOTE — PROGRESS NOTE ADULT - SUBJECTIVE AND OBJECTIVE BOX
Chief Complaint/Follow-up on:     Subjective:    MEDICATIONS  (STANDING):  amLODIPine   Tablet 10 milliGRAM(s) Oral daily  ascorbic acid 500 milliGRAM(s) Oral daily  aspirin  chewable 81 milliGRAM(s) Oral daily  atorvastatin 20 milliGRAM(s) Oral at bedtime  chlorhexidine 4% Liquid 1 Application(s) Topical <User Schedule>  clopidogrel Tablet 75 milliGRAM(s) Oral daily  Dakins Solution - 1/4 Strength 1 Application(s) Topical daily  dextrose 50% Injectable 12.5 Gram(s) IV Push once  dextrose 50% Injectable 25 Gram(s) IV Push once  dextrose 50% Injectable 25 Gram(s) IV Push once  heparin  Injectable 5000 Unit(s) SubCutaneous every 8 hours  hydrochlorothiazide 25 milliGRAM(s) Oral daily  influenza   Vaccine 0.5 milliLiter(s) IntraMuscular once  insulin glargine Injectable (LANTUS) 38 Unit(s) SubCutaneous at bedtime  insulin lispro (HumaLOG) corrective regimen sliding scale   SubCutaneous at bedtime  insulin lispro (HumaLOG) corrective regimen sliding scale   SubCutaneous three times a day before meals  insulin lispro Injectable (HumaLOG) 18 Unit(s) SubCutaneous three times a day before meals  isosorbide   mononitrate ER Tablet (IMDUR) 30 milliGRAM(s) Oral daily  lisinopril 40 milliGRAM(s) Oral daily  meropenem  IVPB 1000 milliGRAM(s) IV Intermittent every 8 hours  metoprolol tartrate 50 milliGRAM(s) Oral two times a day  multivitamin 1 Tablet(s) Oral daily    MEDICATIONS  (PRN):  acetaminophen   Tablet .. 650 milliGRAM(s) Oral every 6 hours PRN Mild Pain (1 - 3)  dextrose 40% Gel 15 Gram(s) Oral once PRN Blood Glucose LESS THAN 70 milliGRAM(s)/deciliter  glucagon  Injectable 1 milliGRAM(s) IntraMuscular once PRN Glucose LESS THAN 70 milligrams/deciliter  morphine  - Injectable 2 milliGRAM(s) IV Push every 4 hours PRN Severe Pain (7 - 10)  ondansetron Injectable 4 milliGRAM(s) IV Push once PRN Nausea and/or Vomiting  oxyCODONE    IR 5 milliGRAM(s) Oral every 4 hours PRN Moderate Pain (4 - 6)  sodium chloride 0.9% lock flush 10 milliLiter(s) IV Push every 1 hour PRN Pre/post blood products, medications, blood draw, and to maintain line patency      PHYSICAL EXAM:  VITALS: T(C): 36.8 (12-02-19 @ 17:05)  T(F): 98.3 (12-02-19 @ 17:05), Max: 98.4 (12-01-19 @ 21:49)  HR: 80 (12-02-19 @ 17:05) (73 - 81)  BP: 148/68 (12-02-19 @ 17:05) (128/67 - 160/73)  RR:  (16 - 18)  SpO2:  (95% - 97%)  Wt(kg): --  GENERAL: NAD, well-groomed, well-developed  EYES: No proptosis, no injection  HEENT:  Atraumatic, Normocephalic, moist mucous membranes  THYROID: Normal size, no palpable nodules  RESPIRATORY: Clear to auscultation bilaterally; No rales, rhonchi, wheezing, or rubs  CARDIOVASCULAR: Regular rate and rhythm; No murmurs; no peripheral edema  GI: Soft, nontender, non distended, normal bowel sounds  CUSHING'S SIGNS: no striae    POCT Blood Glucose.: 136 mg/dL (12-02-19 @ 17:02)  POCT Blood Glucose.: 221 mg/dL (12-02-19 @ 12:05)  POCT Blood Glucose.: 108 mg/dL (12-02-19 @ 08:36)  POCT Blood Glucose.: 177 mg/dL (12-01-19 @ 21:46)  POCT Blood Glucose.: 240 mg/dL (12-01-19 @ 17:21)  POCT Blood Glucose.: 246 mg/dL (12-01-19 @ 12:23)  POCT Blood Glucose.: 194 mg/dL (12-01-19 @ 08:41)  POCT Blood Glucose.: 202 mg/dL (11-30-19 @ 22:22)  POCT Blood Glucose.: 302 mg/dL (11-30-19 @ 17:41)  POCT Blood Glucose.: 245 mg/dL (11-30-19 @ 12:50)  POCT Blood Glucose.: 228 mg/dL (11-30-19 @ 08:27)  POCT Blood Glucose.: 262 mg/dL (11-29-19 @ 21:47)  POCT Blood Glucose.: 215 mg/dL (11-29-19 @ 17:32)    12-02    140  |  103  |  24<H>  ----------------------------<  114<H>  4.1   |  23  |  1.01    EGFR if : 89  EGFR if non : 77    Ca    9.2      12-02  Mg     1.9     12-02  Phos  3.6     12-02            Thyroid Function Tests:      Hemoglobin A1C, Whole Blood: 7.9 % <H> [4.0 - 5.6] (11-15-19 @ 18:08)  Hemoglobin A1C, Whole Blood: 7.9 % <H> [4.0 - 5.6] (11-15-19 @ 18:07) Chief Complaint/Follow-up on: T2DM    Subjective: patient denies any pain at the site of his amputation. He has not worked with PT and uses the walker to ambulate with the help of the PCA.  He is eating well and has a good appetite.     MEDICATIONS  (STANDING):  amLODIPine   Tablet 10 milliGRAM(s) Oral daily  ascorbic acid 500 milliGRAM(s) Oral daily  aspirin  chewable 81 milliGRAM(s) Oral daily  atorvastatin 20 milliGRAM(s) Oral at bedtime  chlorhexidine 4% Liquid 1 Application(s) Topical <User Schedule>  clopidogrel Tablet 75 milliGRAM(s) Oral daily  Dakins Solution - 1/4 Strength 1 Application(s) Topical daily  dextrose 50% Injectable 12.5 Gram(s) IV Push once  dextrose 50% Injectable 25 Gram(s) IV Push once  dextrose 50% Injectable 25 Gram(s) IV Push once  heparin  Injectable 5000 Unit(s) SubCutaneous every 8 hours  hydrochlorothiazide 25 milliGRAM(s) Oral daily  influenza   Vaccine 0.5 milliLiter(s) IntraMuscular once  insulin glargine Injectable (LANTUS) 38 Unit(s) SubCutaneous at bedtime  insulin lispro (HumaLOG) corrective regimen sliding scale   SubCutaneous at bedtime  insulin lispro (HumaLOG) corrective regimen sliding scale   SubCutaneous three times a day before meals  insulin lispro Injectable (HumaLOG) 18 Unit(s) SubCutaneous three times a day before meals  isosorbide   mononitrate ER Tablet (IMDUR) 30 milliGRAM(s) Oral daily  lisinopril 40 milliGRAM(s) Oral daily  meropenem  IVPB 1000 milliGRAM(s) IV Intermittent every 8 hours  metoprolol tartrate 50 milliGRAM(s) Oral two times a day  multivitamin 1 Tablet(s) Oral daily    MEDICATIONS  (PRN):  acetaminophen   Tablet .. 650 milliGRAM(s) Oral every 6 hours PRN Mild Pain (1 - 3)  dextrose 40% Gel 15 Gram(s) Oral once PRN Blood Glucose LESS THAN 70 milliGRAM(s)/deciliter  glucagon  Injectable 1 milliGRAM(s) IntraMuscular once PRN Glucose LESS THAN 70 milligrams/deciliter  morphine  - Injectable 2 milliGRAM(s) IV Push every 4 hours PRN Severe Pain (7 - 10)  ondansetron Injectable 4 milliGRAM(s) IV Push once PRN Nausea and/or Vomiting  oxyCODONE    IR 5 milliGRAM(s) Oral every 4 hours PRN Moderate Pain (4 - 6)  sodium chloride 0.9% lock flush 10 milliLiter(s) IV Push every 1 hour PRN Pre/post blood products, medications, blood draw, and to maintain line patency      PHYSICAL EXAM:  VITALS: T(C): 36.8 (12-02-19 @ 17:05)  T(F): 98.3 (12-02-19 @ 17:05), Max: 98.4 (12-01-19 @ 21:49)  HR: 80 (12-02-19 @ 17:05) (73 - 81)  BP: 148/68 (12-02-19 @ 17:05) (128/67 - 160/73)  RR:  (16 - 18)  SpO2:  (95% - 97%)  Wt(kg): --  GENERAL: NAD, well-groomed, well-developed  HEENT:  Atraumatic, Normocephalic, moist mucous membranes  RESPIRATORY: Clear to auscultation bilaterally; No rales, rhonchi, wheezing, or rubs  CARDIOVASCULAR: Regular rate and rhythm; No murmurs  GI: Soft, nontender, non distended, normal bowel sounds      POCT Blood Glucose.: 136 mg/dL (12-02-19 @ 17:02)  POCT Blood Glucose.: 221 mg/dL (12-02-19 @ 12:05)  POCT Blood Glucose.: 108 mg/dL (12-02-19 @ 08:36)  POCT Blood Glucose.: 177 mg/dL (12-01-19 @ 21:46)  POCT Blood Glucose.: 240 mg/dL (12-01-19 @ 17:21)  POCT Blood Glucose.: 246 mg/dL (12-01-19 @ 12:23)  POCT Blood Glucose.: 194 mg/dL (12-01-19 @ 08:41)  POCT Blood Glucose.: 202 mg/dL (11-30-19 @ 22:22)  POCT Blood Glucose.: 302 mg/dL (11-30-19 @ 17:41)  POCT Blood Glucose.: 245 mg/dL (11-30-19 @ 12:50)  POCT Blood Glucose.: 228 mg/dL (11-30-19 @ 08:27)  POCT Blood Glucose.: 262 mg/dL (11-29-19 @ 21:47)  POCT Blood Glucose.: 215 mg/dL (11-29-19 @ 17:32)    12-02    140  |  103  |  24<H>  ----------------------------<  114<H>  4.1   |  23  |  1.01    EGFR if : 89  EGFR if non : 77    Ca    9.2      12-02  Mg     1.9     12-02  Phos  3.6     12-02            Thyroid Function Tests:      Hemoglobin A1C, Whole Blood: 7.9 % <H> [4.0 - 5.6] (11-15-19 @ 18:08)  Hemoglobin A1C, Whole Blood: 7.9 % <H> [4.0 - 5.6] (11-15-19 @ 18:07)

## 2019-12-02 NOTE — PROGRESS NOTE ADULT - ASSESSMENT
68 year old man PMH HTN, HLD, uncontrolled DM2, CAD s/p stents, presents to the ED c/o R foot wound x 5 weeks, found to have osteomyelitis s/p debridement and bypass, also with hyperglycemia in setting of uncontrolled DM2 with wound infection, awaiting rehab placement.

## 2019-12-02 NOTE — PROGRESS NOTE ADULT - PROBLEM SELECTOR PLAN 1
-test BG AC/HS  -Decrease Lantus to 34 units QHS  -Continue Humalog 18 units TID with meals  DISPO: recommend continue basal/bolus at rehab for now.   -wife asked for an endocrinologist close to their home in [place]. Told her that I don't know any [facility] doctors in that immediate area but Dr. Jay Cavazos, [address] [place] [address] [phone] is good.  Mary Arroyo MD  Pager: [phone]  Nights and Weekends: [phone]

## 2019-12-02 NOTE — PROGRESS NOTE ADULT - ASSESSMENT
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   s/p popliteal-tibial bypass 11/25   s/p debridement, hallux bone biopsy and stravix graft application 11/27   Initial cultures growing GBS and pan-susceptible Pseudomonas, OR culture resulted as ESBL Pseudomonas.   Afebrile, no leukocytosis, clinically well.     Suggest  -I changed Zosyn to Meropenem 1GM IV q8h, complete via PICC line through 12/26/19   -diabetic control per primary team     Spoke with vascular     Darryl Coleman MD   Infectious Disease   Pager [phone]   After 5PM and on weekends please page fellow on call or call [phone]

## 2019-12-02 NOTE — CHART NOTE - NSCHARTNOTEFT_GEN_A_CORE
Nutrition Follow Up Note  Patient seen for: length of stay follow up. Chart reviewed and events noted. Patient is a 68 year old male with a history of DM, CAD s/p PCI, left foot osteomyelitis 12/2016. Pt admitted 11/15 for Right foot ulcers with osteomyelitis. Pt is s/p popliteal-tibial bypass 11/25 and debridement with bone biopsy and stravix graft 11/27. As per infectious diseases, initial cultures growing GBS and pan-susceptible Pseudomonas, OR culture resulted as ESBL Pseudomonas. Pt noted with significant atherosclerotic disease on angiogram 11/20.     Source:   Medical record, pt and wife at bedside    Diet :   Consistent Carbohydrate with no evening Snacks    Patient reports:  Pt reports that PO intake and appetite has been good since admission. Pt denies any recent changes in appetite or N+V, constipation or diarrhea. Pt was previously educated on consistent carbohydrate diet. Pt did not have any additional questions at this time. Pt verbally educated on importance of protein for wound heeling. Good comprehension noted.      PO intake :  % of meals in-house     Source for PO intake:  Pt and wife    Daily wt: 233.6 lbs (11/20)   No recent wts to trend    Pertinent Medications:   hydrochlorothiazide, Lipitor, Lopressor, Zofran, Lantus, Humalog three times a day before meals, corrective sliding scale and at bedtime    Pertinent Labs: (12/2) Glu 114 mg/dL<H> BUN 24 mg/dL<H> Hgb 8.2 g/dL<L> Hct 24.7 %<L>    Finger Sticks:  POCT Blood Glucose: 108-221 mg/dL (12/2)  POCT Blood Glucose.: 177-246 mg/dL (12/1)  POCT Blood Glucose.: 202/302 mg/dL (11/30)    Skin per nursing documentation: Surgical incision WDL to Right: groin to thigh, medial lower calf and foot s/p graft placement. Wounds dry and intact to Left shin; Right foot, submetatarsal and first toe.  Edema: +2 Right ankle/foot/toe    Estimated Needs:   [ ] no change since previous assessment  [x] recalculated:   Based on IBW +10% of 77 kg   4795-7197 kcals (Based on 25-30 calories/kg)  92.4-107.8 grams of protein (Based on 1.2-1.4 g protein/kg)    Previous Nutrition Diagnosis: Increased Nutrient Needs protein and energy  Nutrition Diagnosis is: ongoing and being addressed with encouraged PO intake.     New Nutrition Diagnosis: n/a     Interventions:   Recommend  1) Continue current consistent carbohydrate diet.  2) Reinforce consistent carbohydrate diet education.  3) Recommend Multivitamin, Vitamin C 500 mg and Robert x2 daily for wound healing.     Monitoring and Evaluation:     Continue to monitor Nutritional intake, Tolerance to diet prescription, weights, labs, skin integrity    RD remains available upon request and will follow up per protocol  Rose Dawson, Nutrition Intern, Pager #[phone] Nutrition Follow Up Note  Patient seen for: length of stay follow up. Chart reviewed and events noted. Patient is a 68 year old male with a history of DM, CAD s/p PCI, left foot osteomyelitis 12/2016. Pt admitted 11/15 for Right foot ulcers with osteomyelitis. Pt is s/p popliteal-tibial bypass 11/25 and debridement with bone biopsy and stravix graft 11/27. As per infectious diseases, initial cultures growing GBS and pan-susceptible Pseudomonas, OR culture resulted as ESBL Pseudomonas. Pt noted with significant atherosclerotic disease on angiogram 11/20.     Source:   Medical record, pt and wife at bedside    Diet :   Consistent Carbohydrate with no evening Snacks    Patient reports:  Pt reports that PO intake and appetite has been good since admission. Pt denies any recent changes in appetite or N+V, constipation or diarrhea. Pt was previously educated on consistent carbohydrate diet. Pt did not have any additional questions at this time. Pt verbally educated on importance of protein for wound heeling. Good comprehension noted.      PO intake :  % of meals in-house     Source for PO intake:  Pt and wife    Daily wt: 233.6 lbs (11/20)   No recent wts to trend    Pertinent Medications:   hydrochlorothiazide, Lipitor, Lopressor, Zofran, Lantus, Humalog three times a day before meals, corrective sliding scale and at bedtime    Pertinent Labs: (12/2) Glu 114 mg/dL<H> BUN 24 mg/dL<H> Hgb 8.2 g/dL<L> Hct 24.7 %<L>  (11/15) A1C 7.9%    Finger Sticks:  POCT Blood Glucose: 108-221 mg/dL (12/2)  POCT Blood Glucose.: 177-246 mg/dL (12/1)  POCT Blood Glucose.: 202/302 mg/dL (11/30)    Skin per nursing documentation: Surgical incision WDL to Right: groin to thigh, medial lower calf and foot s/p graft placement. Wounds dry and intact to Left shin; Right foot, submetatarsal and first toe.  Edema: +2 Right ankle/foot/toe    Estimated Needs:   [ ] no change since previous assessment  [x] recalculated:   Based on IBW +10% of 77 kg   7989-8756 kcals (Based on 25-30 calories/kg)  92.4-107.8 grams of protein (Based on 1.2-1.4 g protein/kg)    Previous Nutrition Diagnosis: Increased Nutrient Needs protein and energy  Nutrition Diagnosis is: ongoing and being addressed with encouraged PO intake.     New Nutrition Diagnosis: n/a     Interventions:   Recommend  1) Continue current consistent carbohydrate diet.  2) Reinforce consistent carbohydrate diet education.  3) Recommend Multivitamin, Vitamin C 500 mg and Robert x2 daily for wound healing.     Monitoring and Evaluation:     Continue to monitor Nutritional intake, Tolerance to diet prescription, weights, labs, skin integrity    RD remains available upon request and will follow up per protocol  Rose Dawson, Nutrition Intern, Pager #[phone]

## 2019-12-02 NOTE — CHART NOTE - NSCHARTNOTEFT_GEN_A_CORE
OR bone biopsy pathology negative for osteomyelitis  Pt already has PICC- per ID Meropenem through 12/26  Podiatry stable for discharge  Leave dressing clean dry and intact until follow up  Follow up w/ Dr. Sandhu within 1 week of discharge

## 2019-12-02 NOTE — PROGRESS NOTE ADULT - ASSESSMENT
62yo M s/p below knee pop to PT bypass. Now s/p right foot wounds debridement with bone biopsy and stravix application.     Plan:  Follow up Pathology bone culture   Evaluate right foot when Podiatry performs dressing changes  Consult with ID about plan for IV Abx as outpatient; patient received PICC over the weekend  PICC placed, continue IV Abx  Dispo planning to [facility]    Vascular Surgery  [phone]

## 2019-12-02 NOTE — PROGRESS NOTE ADULT - ATTENDING COMMENTS
I have discussed the case with the surgical house staff. I have personally seen, examined, and participated in the care of this patient. I have reviewed all pertinent clinical information.    Doing well    Plan  - Continue abx  - Follow up cultures  - Follow up podiatry  - dc planning

## 2019-12-02 NOTE — PROGRESS NOTE ADULT - SUBJECTIVE AND OBJECTIVE BOX
Follow Up: Right foot OM     Interval History/ROS: Feels well, no fevers, has been getting out of bed, no diarrhea or dysuria, no cough.     Allergies  No Known Allergies    ANTIMICROBIALS:  piperacillin/tazobactam IVPB.. 3.375 every 8 hours    OTHER MEDS:  acetaminophen   Tablet .. 650 milliGRAM(s) Oral every 6 hours PRN  amLODIPine   Tablet 10 milliGRAM(s) Oral daily  aspirin  chewable 81 milliGRAM(s) Oral daily  atorvastatin 20 milliGRAM(s) Oral at bedtime  chlorhexidine 4% Liquid 1 Application(s) Topical <User Schedule>  clopidogrel Tablet 75 milliGRAM(s) Oral daily  Dakins Solution - 1/4 Strength 1 Application(s) Topical daily  dextrose 40% Gel 15 Gram(s) Oral once PRN  dextrose 50% Injectable 12.5 Gram(s) IV Push once  dextrose 50% Injectable 25 Gram(s) IV Push once  dextrose 50% Injectable 25 Gram(s) IV Push once  glucagon  Injectable 1 milliGRAM(s) IntraMuscular once PRN  heparin  Injectable 5000 Unit(s) SubCutaneous every 8 hours  hydrochlorothiazide 25 milliGRAM(s) Oral daily  influenza   Vaccine 0.5 milliLiter(s) IntraMuscular once  insulin glargine Injectable (LANTUS) 38 Unit(s) SubCutaneous at bedtime  insulin lispro (HumaLOG) corrective regimen sliding scale   SubCutaneous at bedtime  insulin lispro (HumaLOG) corrective regimen sliding scale   SubCutaneous three times a day before meals  insulin lispro Injectable (HumaLOG) 18 Unit(s) SubCutaneous three times a day before meals  isosorbide   mononitrate ER Tablet (IMDUR) 30 milliGRAM(s) Oral daily  lisinopril 40 milliGRAM(s) Oral daily  magnesium sulfate  IVPB 1 Gram(s) IV Intermittent once  metoprolol tartrate 50 milliGRAM(s) Oral two times a day  morphine  - Injectable 2 milliGRAM(s) IV Push every 4 hours PRN  ondansetron Injectable 4 milliGRAM(s) IV Push once PRN  oxyCODONE    IR 5 milliGRAM(s) Oral every 4 hours PRN  sodium chloride 0.9% lock flush 10 milliLiter(s) IV Push every 1 hour PRN      Vital Signs Last 24 Hrs  T(C): 36.8 (02 Dec 2019 06:27), Max: 36.9 (01 Dec 2019 10:33)  T(F): 98.2 (02 Dec 2019 06:27), Max: 98.4 (01 Dec 2019 10:33)  HR: 73 (02 Dec 2019 06:27) (73 - 80)  BP: 151/69 (02 Dec 2019 06:27) (128/64 - 160/73)  BP(mean): --  RR: 18 (02 Dec 2019 06:27) (18 - 18)  SpO2: 97% (02 Dec 2019 06:27) (97% - 98%)    Physical Exam:  General: NAD, non toxic  Head: atraumatic, normocephalic  Eye: normal sclera and conjunctiva  Cardio:  regular rate and rhythm   Respiratory: nonlabored on room air, clear bilaterally, no wheezing  abd: soft, BS +, no tenderness  Musculoskeletal: right foot bandaged  Skin: medial aspect right leg bypass sites intact, no erythema, nontender, staples in place  Neurologic: no focal deficit  psych: normal affect                          8.2    6.27  )-----------( 225      ( 02 Dec 2019 07:09 )             24.7       12-02    140  |  103  |  24<H>  ----------------------------<  114<H>  4.1   |  23  |  1.01    Ca    9.2      02 Dec 2019 07:09  Phos  3.6     12-02  Mg     1.9     12-02      MICROBIOLOGY:  Culture - Tissue with Gram Stain (collected 11-28-19 @ 01:38)  Source: .Tissue Other  Gram Stain (11-28-19 @ 04:11):    No polymorphonuclear cells seen per low power field    No organisms seen per oil power field  Preliminary Report (11-29-19 @ 17:39):    Growth in fluid media only Pseudomonas aeruginosa  Organism: Pseudomonas aeruginosa (11-30-19 @ 16:39)  Organism: Pseudomonas aeruginosa (11-30-19 @ 16:39)      -  Amikacin: S <=16      -  Aztreonam: R >16      -  Cefepime: I 16      -  Ceftazidime: R >16      -  Ciprofloxacin: S <=1      -  Gentamicin: S 4      -  Imipenem: S <=1      -  Levofloxacin: S <=2      -  Meropenem: S <=1      -  Piperacillin/Tazobactam: R >64      -  Tobramycin: S <=2      Method Type: KRYSTLE    Culture - Abscess with Gram Stain (11.15.19 @ 03:30)    -  Amikacin: S <=16    -  Tobramycin: S <=2    -  Aztreonam: S <=4    -  Cefepime: S <=2    -  Ceftazidime: S 4    -  Ciprofloxacin: S <=1    -  Gentamicin: S 4    -  Imipenem: S <=1    -  Levofloxacin: S <=2    -  Meropenem: S <=1    -  Piperacillin/Tazobactam: S <=8    Specimen Source: .Abscess Leg - Right    Culture Results:   Moderate Pseudomonas aeruginosa  Moderate Streptococcus agalactiae (Group B) isolated  Group B streptococci are susceptible to ampicillin,  penicillin and cefazolin, but may be resistant to  erythromycin and clindamycin.  Recommendations for intrapartum prophylaxis for Group B  streptococci are penicillin or ampicillin.    Organism Identification: Pseudomonas aeruginosa    Organism: Pseudomonas aeruginosa    Method Type: KRYSTLE    RADIOLOGY:  Images below reviewed personally

## 2019-12-03 ENCOUNTER — TRANSCRIPTION ENCOUNTER (OUTPATIENT)
Age: 69
End: 2019-12-03

## 2019-12-03 VITALS
RESPIRATION RATE: 17 BRPM | HEART RATE: 89 BPM | DIASTOLIC BLOOD PRESSURE: 69 MMHG | OXYGEN SATURATION: 94 % | TEMPERATURE: 97 F | SYSTOLIC BLOOD PRESSURE: 143 MMHG

## 2019-12-03 LAB
ANION GAP SERPL CALC-SCNC: 12 MMOL/L — SIGNIFICANT CHANGE UP (ref 5–17)
BUN SERPL-MCNC: 23 MG/DL — SIGNIFICANT CHANGE UP (ref 7–23)
CALCIUM SERPL-MCNC: 9.3 MG/DL — SIGNIFICANT CHANGE UP (ref 8.4–10.5)
CHLORIDE SERPL-SCNC: 103 MMOL/L — SIGNIFICANT CHANGE UP (ref 96–108)
CO2 SERPL-SCNC: 24 MMOL/L — SIGNIFICANT CHANGE UP (ref 22–31)
CREAT SERPL-MCNC: 0.96 MG/DL — SIGNIFICANT CHANGE UP (ref 0.5–1.3)
GLUCOSE BLDC GLUCOMTR-MCNC: 182 MG/DL — HIGH (ref 70–99)
GLUCOSE BLDC GLUCOMTR-MCNC: 193 MG/DL — HIGH (ref 70–99)
GLUCOSE SERPL-MCNC: 119 MG/DL — HIGH (ref 70–99)
HCT VFR BLD CALC: 25.6 % — LOW (ref 39–50)
HGB BLD-MCNC: 8.5 G/DL — LOW (ref 13–17)
MAGNESIUM SERPL-MCNC: 2 MG/DL — SIGNIFICANT CHANGE UP (ref 1.6–2.6)
MCHC RBC-ENTMCNC: 27.2 PG — SIGNIFICANT CHANGE UP (ref 27–34)
MCHC RBC-ENTMCNC: 33.2 GM/DL — SIGNIFICANT CHANGE UP (ref 32–36)
MCV RBC AUTO: 81.8 FL — SIGNIFICANT CHANGE UP (ref 80–100)
NRBC # BLD: 0 /100 WBCS — SIGNIFICANT CHANGE UP (ref 0–0)
PHOSPHATE SERPL-MCNC: 3.1 MG/DL — SIGNIFICANT CHANGE UP (ref 2.5–4.5)
PLATELET # BLD AUTO: 224 K/UL — SIGNIFICANT CHANGE UP (ref 150–400)
POTASSIUM SERPL-MCNC: 4.2 MMOL/L — SIGNIFICANT CHANGE UP (ref 3.5–5.3)
POTASSIUM SERPL-SCNC: 4.2 MMOL/L — SIGNIFICANT CHANGE UP (ref 3.5–5.3)
RBC # BLD: 3.13 M/UL — LOW (ref 4.2–5.8)
RBC # FLD: 13.7 % — SIGNIFICANT CHANGE UP (ref 10.3–14.5)
SODIUM SERPL-SCNC: 139 MMOL/L — SIGNIFICANT CHANGE UP (ref 135–145)
WBC # BLD: 6.33 K/UL — SIGNIFICANT CHANGE UP (ref 3.8–10.5)
WBC # FLD AUTO: 6.33 K/UL — SIGNIFICANT CHANGE UP (ref 3.8–10.5)

## 2019-12-03 PROCEDURE — 84484 ASSAY OF TROPONIN QUANT: CPT

## 2019-12-03 PROCEDURE — 93926 LOWER EXTREMITY STUDY: CPT

## 2019-12-03 PROCEDURE — 83735 ASSAY OF MAGNESIUM: CPT

## 2019-12-03 PROCEDURE — C1894: CPT

## 2019-12-03 PROCEDURE — 82553 CREATINE MB FRACTION: CPT

## 2019-12-03 PROCEDURE — C1751: CPT

## 2019-12-03 PROCEDURE — 97161 PT EVAL LOW COMPLEX 20 MIN: CPT

## 2019-12-03 PROCEDURE — 82962 GLUCOSE BLOOD TEST: CPT

## 2019-12-03 PROCEDURE — 82947 ASSAY GLUCOSE BLOOD QUANT: CPT

## 2019-12-03 PROCEDURE — 97110 THERAPEUTIC EXERCISES: CPT

## 2019-12-03 PROCEDURE — 99232 SBSQ HOSP IP/OBS MODERATE 35: CPT

## 2019-12-03 PROCEDURE — 97116 GAIT TRAINING THERAPY: CPT

## 2019-12-03 PROCEDURE — 73552 X-RAY EXAM OF FEMUR 2/>: CPT

## 2019-12-03 PROCEDURE — 86901 BLOOD TYPING SEROLOGIC RH(D): CPT

## 2019-12-03 PROCEDURE — 71045 X-RAY EXAM CHEST 1 VIEW: CPT

## 2019-12-03 PROCEDURE — 85610 PROTHROMBIN TIME: CPT

## 2019-12-03 PROCEDURE — 86850 RBC ANTIBODY SCREEN: CPT

## 2019-12-03 PROCEDURE — 80053 COMPREHEN METABOLIC PANEL: CPT

## 2019-12-03 PROCEDURE — 93970 EXTREMITY STUDY: CPT

## 2019-12-03 PROCEDURE — C1769: CPT

## 2019-12-03 PROCEDURE — C1889: CPT

## 2019-12-03 PROCEDURE — 36569 INSJ PICC 5 YR+ W/O IMAGING: CPT

## 2019-12-03 PROCEDURE — 85730 THROMBOPLASTIN TIME PARTIAL: CPT

## 2019-12-03 PROCEDURE — C1887: CPT

## 2019-12-03 PROCEDURE — 81001 URINALYSIS AUTO W/SCOPE: CPT

## 2019-12-03 PROCEDURE — 99222 1ST HOSP IP/OBS MODERATE 55: CPT

## 2019-12-03 PROCEDURE — 76000 FLUOROSCOPY <1 HR PHYS/QHP: CPT

## 2019-12-03 PROCEDURE — 99285 EMERGENCY DEPT VISIT HI MDM: CPT | Mod: 25

## 2019-12-03 PROCEDURE — 87186 SC STD MICRODIL/AGAR DIL: CPT

## 2019-12-03 PROCEDURE — 83605 ASSAY OF LACTIC ACID: CPT

## 2019-12-03 PROCEDURE — 85014 HEMATOCRIT: CPT

## 2019-12-03 PROCEDURE — 73590 X-RAY EXAM OF LOWER LEG: CPT

## 2019-12-03 PROCEDURE — 82330 ASSAY OF CALCIUM: CPT

## 2019-12-03 PROCEDURE — 73718 MRI LOWER EXTREMITY W/O DYE: CPT

## 2019-12-03 PROCEDURE — 85027 COMPLETE CBC AUTOMATED: CPT

## 2019-12-03 PROCEDURE — 87070 CULTURE OTHR SPECIMN AEROBIC: CPT

## 2019-12-03 PROCEDURE — 99261: CPT

## 2019-12-03 PROCEDURE — 87205 SMEAR GRAM STAIN: CPT

## 2019-12-03 PROCEDURE — 88311 DECALCIFY TISSUE: CPT

## 2019-12-03 PROCEDURE — 88307 TISSUE EXAM BY PATHOLOGIST: CPT

## 2019-12-03 PROCEDURE — 82565 ASSAY OF CREATININE: CPT

## 2019-12-03 PROCEDURE — 73551 X-RAY EXAM OF FEMUR 1: CPT

## 2019-12-03 PROCEDURE — 96374 THER/PROPH/DIAG INJ IV PUSH: CPT

## 2019-12-03 PROCEDURE — 82803 BLOOD GASES ANY COMBINATION: CPT

## 2019-12-03 PROCEDURE — 97530 THERAPEUTIC ACTIVITIES: CPT

## 2019-12-03 PROCEDURE — 73630 X-RAY EXAM OF FOOT: CPT

## 2019-12-03 PROCEDURE — 85652 RBC SED RATE AUTOMATED: CPT

## 2019-12-03 PROCEDURE — 87040 BLOOD CULTURE FOR BACTERIA: CPT

## 2019-12-03 PROCEDURE — 93923 UPR/LXTR ART STDY 3+ LVLS: CPT

## 2019-12-03 PROCEDURE — C1760: CPT

## 2019-12-03 PROCEDURE — 84295 ASSAY OF SERUM SODIUM: CPT

## 2019-12-03 PROCEDURE — 84132 ASSAY OF SERUM POTASSIUM: CPT

## 2019-12-03 PROCEDURE — 80048 BASIC METABOLIC PNL TOTAL CA: CPT

## 2019-12-03 PROCEDURE — 70450 CT HEAD/BRAIN W/O DYE: CPT

## 2019-12-03 PROCEDURE — 86900 BLOOD TYPING SEROLOGIC ABO: CPT

## 2019-12-03 PROCEDURE — 97162 PT EVAL MOD COMPLEX 30 MIN: CPT

## 2019-12-03 PROCEDURE — 76978 US TRGT DYN MBUBB 1ST LES: CPT

## 2019-12-03 PROCEDURE — 86140 C-REACTIVE PROTEIN: CPT

## 2019-12-03 PROCEDURE — 82435 ASSAY OF BLOOD CHLORIDE: CPT

## 2019-12-03 PROCEDURE — 97602 WOUND(S) CARE NON-SELECTIVE: CPT

## 2019-12-03 PROCEDURE — 93005 ELECTROCARDIOGRAM TRACING: CPT

## 2019-12-03 PROCEDURE — 82550 ASSAY OF CK (CPK): CPT

## 2019-12-03 PROCEDURE — 83036 HEMOGLOBIN GLYCOSYLATED A1C: CPT

## 2019-12-03 PROCEDURE — 84100 ASSAY OF PHOSPHORUS: CPT

## 2019-12-03 PROCEDURE — 86803 HEPATITIS C AB TEST: CPT

## 2019-12-03 RX ORDER — INSULIN LISPRO 100/ML
18 VIAL (ML) SUBCUTANEOUS
Qty: 1 | Refills: 0
Start: 2019-12-03 | End: 2019-12-16

## 2019-12-03 RX ORDER — OXYCODONE HYDROCHLORIDE 5 MG/1
1 TABLET ORAL
Qty: 0 | Refills: 0 | DISCHARGE
Start: 2019-12-03

## 2019-12-03 RX ORDER — ACETAMINOPHEN 500 MG
2 TABLET ORAL
Qty: 0 | Refills: 0 | DISCHARGE
Start: 2019-12-03

## 2019-12-03 RX ORDER — MEROPENEM 1 G/30ML
1000 INJECTION INTRAVENOUS
Qty: 0 | Refills: 0 | DISCHARGE
Start: 2019-12-03 | End: 2019-12-26

## 2019-12-03 RX ORDER — ASCORBIC ACID 60 MG
1 TABLET,CHEWABLE ORAL
Qty: 0 | Refills: 0 | DISCHARGE
Start: 2019-12-03

## 2019-12-03 RX ORDER — AMLODIPINE BESYLATE 2.5 MG/1
1 TABLET ORAL
Qty: 0 | Refills: 0 | DISCHARGE
Start: 2019-12-03

## 2019-12-03 RX ORDER — SODIUM HYPOCHLORITE 0.125 %
1 SOLUTION, NON-ORAL MISCELLANEOUS
Qty: 0 | Refills: 0 | DISCHARGE
Start: 2019-12-03

## 2019-12-03 RX ORDER — INSULIN GLARGINE 100 [IU]/ML
34 INJECTION, SOLUTION SUBCUTANEOUS
Qty: 0 | Refills: 0 | DISCHARGE
Start: 2019-12-03

## 2019-12-03 RX ADMIN — Medication 18 UNIT(S): at 12:30

## 2019-12-03 RX ADMIN — CHLORHEXIDINE GLUCONATE 1 APPLICATION(S): 213 SOLUTION TOPICAL at 05:14

## 2019-12-03 RX ADMIN — Medication 2: at 12:30

## 2019-12-03 RX ADMIN — Medication 25 MILLIGRAM(S): at 05:13

## 2019-12-03 RX ADMIN — CLOPIDOGREL BISULFATE 75 MILLIGRAM(S): 75 TABLET, FILM COATED ORAL at 11:22

## 2019-12-03 RX ADMIN — HEPARIN SODIUM 5000 UNIT(S): 5000 INJECTION INTRAVENOUS; SUBCUTANEOUS at 13:53

## 2019-12-03 RX ADMIN — AMLODIPINE BESYLATE 10 MILLIGRAM(S): 2.5 TABLET ORAL at 05:13

## 2019-12-03 RX ADMIN — LISINOPRIL 40 MILLIGRAM(S): 2.5 TABLET ORAL at 05:13

## 2019-12-03 RX ADMIN — MEROPENEM 100 MILLIGRAM(S): 1 INJECTION INTRAVENOUS at 05:12

## 2019-12-03 RX ADMIN — MEROPENEM 100 MILLIGRAM(S): 1 INJECTION INTRAVENOUS at 13:53

## 2019-12-03 RX ADMIN — Medication 2: at 08:31

## 2019-12-03 RX ADMIN — Medication 50 MILLIGRAM(S): at 05:13

## 2019-12-03 RX ADMIN — Medication 500 MILLIGRAM(S): at 11:22

## 2019-12-03 RX ADMIN — ISOSORBIDE MONONITRATE 30 MILLIGRAM(S): 60 TABLET, EXTENDED RELEASE ORAL at 11:22

## 2019-12-03 RX ADMIN — Medication 81 MILLIGRAM(S): at 11:22

## 2019-12-03 RX ADMIN — Medication 1 TABLET(S): at 12:29

## 2019-12-03 RX ADMIN — Medication 18 UNIT(S): at 08:31

## 2019-12-03 RX ADMIN — HEPARIN SODIUM 5000 UNIT(S): 5000 INJECTION INTRAVENOUS; SUBCUTANEOUS at 05:14

## 2019-12-03 NOTE — PROGRESS NOTE ADULT - ASSESSMENT
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   Cultures growing Pseudomonas and GBS.   s/p popliteal-tibial bypass 11/25   s/p debridement, hallux bone biopsy and stravix graft application 11/27   Surprisingly the bone biopsy is without inflammation but I still think we should treat for osteomyelitis given the clinical picture and the area on the fifth metatarsal which was not biopsied.   Doing well, discharging to [facility] today.     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   Cultures growing Pseudomonas and GBS.   s/p popliteal-tibial bypass 11/25   s/p debridement, hallux bone biopsy and stravix graft application 11/27   Surprisingly the bone biopsy is without inflammation but I still think we should treat for osteomyelitis given the clinical picture and the area on the fifth metatarsal which was not biopsied.   Doing well, discharging to [facility] today.     Suggest  -Meropenem 1GM IV q8h, complete via PICC line through 12/26/19 with weekly CBC and BUN and creatinine faxed to me at [phone]     Spoke with vascular     Darryl Coleman MD   Infectious Disease   Pager [phone]   After 5PM and on weekends please page fellow on call or call [phone]

## 2019-12-03 NOTE — PROGRESS NOTE ADULT - ATTENDING COMMENTS
I have discussed the case with the surgical house staff. I have personally seen, examined, and participated in the care of this patient. I have reviewed all pertinent clinical information.      DOing well post right leg bypass. s/p debridement and graft to right foot by podiatry  s/p PICC for long term IV abx    Plan  - DC to rehab with IV abx via PICC  - Follow up podiatry today right foot wound care  - follow up in office upon discharge

## 2019-12-03 NOTE — PROGRESS NOTE ADULT - SUBJECTIVE AND OBJECTIVE BOX
Follow Up: Diabetic foot OM     Interval History/ROS: Bone biopsy actually without osteomyelitis. Discussed with patient. I told him and his wife I would still treat for OM since he had another area with OM on the MRI that was not biopsied and perhaps there was diseased bone not biopsied. Anyways he feels fine, no fevers, eating well, no diarrhea.   Discharging to [facility] today.     Allergies  No Known Allergies    ANTIMICROBIALS:  meropenem  IVPB 1000 every 8 hours      OTHER MEDS:  acetaminophen   Tablet .. 650 milliGRAM(s) Oral every 6 hours PRN  amLODIPine   Tablet 10 milliGRAM(s) Oral daily  ascorbic acid 500 milliGRAM(s) Oral daily  aspirin  chewable 81 milliGRAM(s) Oral daily  atorvastatin 20 milliGRAM(s) Oral at bedtime  chlorhexidine 4% Liquid 1 Application(s) Topical <User Schedule>  clopidogrel Tablet 75 milliGRAM(s) Oral daily  Dakins Solution - 1/4 Strength 1 Application(s) Topical daily  dextrose 40% Gel 15 Gram(s) Oral once PRN  dextrose 50% Injectable 12.5 Gram(s) IV Push once  dextrose 50% Injectable 25 Gram(s) IV Push once  dextrose 50% Injectable 25 Gram(s) IV Push once  glucagon  Injectable 1 milliGRAM(s) IntraMuscular once PRN  heparin  Injectable 5000 Unit(s) SubCutaneous every 8 hours  hydrochlorothiazide 25 milliGRAM(s) Oral daily  influenza   Vaccine 0.5 milliLiter(s) IntraMuscular once  insulin glargine Injectable (LANTUS) 34 Unit(s) SubCutaneous at bedtime  insulin lispro (HumaLOG) corrective regimen sliding scale   SubCutaneous at bedtime  insulin lispro (HumaLOG) corrective regimen sliding scale   SubCutaneous three times a day before meals  insulin lispro Injectable (HumaLOG) 18 Unit(s) SubCutaneous three times a day before meals  isosorbide   mononitrate ER Tablet (IMDUR) 30 milliGRAM(s) Oral daily  lisinopril 40 milliGRAM(s) Oral daily  metoprolol tartrate 50 milliGRAM(s) Oral two times a day  morphine  - Injectable 2 milliGRAM(s) IV Push every 4 hours PRN  multivitamin 1 Tablet(s) Oral daily  ondansetron Injectable 4 milliGRAM(s) IV Push once PRN  oxyCODONE    IR 5 milliGRAM(s) Oral every 4 hours PRN  sodium chloride 0.9% lock flush 10 milliLiter(s) IV Push every 1 hour PRN      Vital Signs Last 24 Hrs  T(C): 36.4 (03 Dec 2019 08:19), Max: 37.2 (02 Dec 2019 21:53)  T(F): 97.6 (03 Dec 2019 08:19), Max: 99 (02 Dec 2019 21:53)  HR: 75 (03 Dec 2019 08:19) (75 - 86)  BP: 127/67 (03 Dec 2019 08:19) (127/67 - 162/73)  BP(mean): --  RR: 17 (03 Dec 2019 08:19) (16 - 18)  SpO2: 92% (03 Dec 2019 08:19) (92% - 95%)    Physical Exam:  General: NAD, non toxic  Head: atraumatic, normocephalic  Eye: normal sclera and conjunctiva  ENT: no oropharyngeal lesions, no cervical lymphadenopathy   Cardio:  regular rate and rhythm   Respiratory: nonlabored, clear bilaterally, no wheezing  abd: soft, BS +, no tenderness  : no CVAT, no suprapubic tenderness, no  dyer  Musculoskeletal: no joint swelling, no edema  vascular: no phlebitis   Skin: no rash  Neurologic: no focal deficit  psych: normal affect                          8.5    6.33  )-----------( 224      ( 03 Dec 2019 07:09 )             25.6       12-03    139  |  103  |  23  ----------------------------<  119<H>  4.2   |  24  |  0.96    Ca    9.3      03 Dec 2019 07:09  Phos  3.1     12-03  Mg     2.0     12-03            MICROBIOLOGY:  v          RADIOLOGY:  Images below reviewed personally Follow Up: Diabetic foot OM     Interval History/ROS: Bone biopsy actually without osteomyelitis. Discussed with patient. I told him and his wife I would still treat for OM since he had another area with OM on the MRI that was not biopsied and perhaps there was diseased bone not biopsied. Anyways he feels fine, no fevers, eating well, no diarrhea.   Discharging to [facility] today.     Allergies  No Known Allergies    ANTIMICROBIALS:  meropenem  IVPB 1000 every 8 hours      OTHER MEDS:  acetaminophen   Tablet .. 650 milliGRAM(s) Oral every 6 hours PRN  amLODIPine   Tablet 10 milliGRAM(s) Oral daily  ascorbic acid 500 milliGRAM(s) Oral daily  aspirin  chewable 81 milliGRAM(s) Oral daily  atorvastatin 20 milliGRAM(s) Oral at bedtime  chlorhexidine 4% Liquid 1 Application(s) Topical <User Schedule>  clopidogrel Tablet 75 milliGRAM(s) Oral daily  Dakins Solution - 1/4 Strength 1 Application(s) Topical daily  dextrose 40% Gel 15 Gram(s) Oral once PRN  dextrose 50% Injectable 12.5 Gram(s) IV Push once  dextrose 50% Injectable 25 Gram(s) IV Push once  dextrose 50% Injectable 25 Gram(s) IV Push once  glucagon  Injectable 1 milliGRAM(s) IntraMuscular once PRN  heparin  Injectable 5000 Unit(s) SubCutaneous every 8 hours  hydrochlorothiazide 25 milliGRAM(s) Oral daily  influenza   Vaccine 0.5 milliLiter(s) IntraMuscular once  insulin glargine Injectable (LANTUS) 34 Unit(s) SubCutaneous at bedtime  insulin lispro (HumaLOG) corrective regimen sliding scale   SubCutaneous at bedtime  insulin lispro (HumaLOG) corrective regimen sliding scale   SubCutaneous three times a day before meals  insulin lispro Injectable (HumaLOG) 18 Unit(s) SubCutaneous three times a day before meals  isosorbide   mononitrate ER Tablet (IMDUR) 30 milliGRAM(s) Oral daily  lisinopril 40 milliGRAM(s) Oral daily  metoprolol tartrate 50 milliGRAM(s) Oral two times a day  morphine  - Injectable 2 milliGRAM(s) IV Push every 4 hours PRN  multivitamin 1 Tablet(s) Oral daily  ondansetron Injectable 4 milliGRAM(s) IV Push once PRN  oxyCODONE    IR 5 milliGRAM(s) Oral every 4 hours PRN  sodium chloride 0.9% lock flush 10 milliLiter(s) IV Push every 1 hour PRN      Vital Signs Last 24 Hrs  T(C): 36.4 (03 Dec 2019 08:19), Max: 37.2 (02 Dec 2019 21:53)  T(F): 97.6 (03 Dec 2019 08:19), Max: 99 (02 Dec 2019 21:53)  HR: 75 (03 Dec 2019 08:19) (75 - 86)  BP: 127/67 (03 Dec 2019 08:19) (127/67 - 162/73)  BP(mean): --  RR: 17 (03 Dec 2019 08:19) (16 - 18)  SpO2: 92% (03 Dec 2019 08:19) (92% - 95%)    Physical Exam:  General: NAD, non toxic  Head: atraumatic, normocephalic  Cardio:  regular rate and rhythm   Respiratory: nonlabored, clear bilaterally, no wheezing  abd: soft, BS +, no tenderness  Skin: right leg bypass sites intact, staples in place, no signs of infection, nontender. left shin anterior wound is superficial, does not appear infected   Neurologic: no focal deficit  psych: normal affect                          8.5    6.33  )-----------( 224      ( 03 Dec 2019 07:09 )             25.6       12-03    139  |  103  |  23  ----------------------------<  119<H>  4.2   |  24  |  0.96    Ca    9.3      03 Dec 2019 07:09  Phos  3.1     12-03  Mg     2.0     12-03    Surgical Pathology Report (11.27.19 @ 13:27)    Surgical Pathology Report:   ACCESSION No:  [id number]  JOVANNI MARTINEZ                     1  Surgical Final Report  Final Diagnosis  Bone, right foot, first proximal phalanx, biopsy  - Unremarkable bone  Specimen(s) Submitted  1     Right foot first proximal phalanx      MICROBIOLOGY:  Culture - Tissue with Gram Stain (collected 11-28-19 @ 01:38)  Source: .Tissue Other  Gram Stain (11-28-19 @ 04:11):    No polymorphonuclear cells seen per low power field    No organisms seen per oil power field  Final Report (12-02-19 @ 18:30):    Growth in fluid media only Pseudomonas aeruginosa  Organism: Pseudomonas aeruginosa (12-02-19 @ 18:30)  Organism: Pseudomonas aeruginosa (12-02-19 @ 18:30)      -  Amikacin: S <=16      -  Aztreonam: R >16      -  Cefepime: I 16      -  Ceftazidime: R >16      -  Ciprofloxacin: S <=1      -  Gentamicin: S 4      -  Imipenem: S <=1      -  Levofloxacin: S <=2      -  Meropenem: S <=1      -  Piperacillin/Tazobactam: R >64      -  Tobramycin: S <=2      Method Type: KRYSTLE    RADIOLOGY:  Images below reviewed personally

## 2019-12-03 NOTE — PROGRESS NOTE ADULT - SUBJECTIVE AND OBJECTIVE BOX
Morning Surgical Progress Note  Patient is a 68y old  Male who presents with a chief complaint of Foot infection (01 Dec 2019 14:29)      SUBJECTIVE: Patient seen and examined at bedside with surgical team, patient without complaints. 6/10 pain, unchanged and not using PRN pain meds. +nausea, no vomiting yesterday - resolved. Ambulating with walker. No problems with urination or BM.    MEDICATIONS  (STANDING):  amLODIPine   Tablet 10 milliGRAM(s) Oral daily  ascorbic acid 500 milliGRAM(s) Oral daily  aspirin  chewable 81 milliGRAM(s) Oral daily  atorvastatin 20 milliGRAM(s) Oral at bedtime  chlorhexidine 4% Liquid 1 Application(s) Topical <User Schedule>  clopidogrel Tablet 75 milliGRAM(s) Oral daily  Dakins Solution - 1/4 Strength 1 Application(s) Topical daily  dextrose 50% Injectable 12.5 Gram(s) IV Push once  dextrose 50% Injectable 25 Gram(s) IV Push once  dextrose 50% Injectable 25 Gram(s) IV Push once  heparin  Injectable 5000 Unit(s) SubCutaneous every 8 hours  hydrochlorothiazide 25 milliGRAM(s) Oral daily  influenza   Vaccine 0.5 milliLiter(s) IntraMuscular once  insulin glargine Injectable (LANTUS) 34 Unit(s) SubCutaneous at bedtime  insulin lispro (HumaLOG) corrective regimen sliding scale   SubCutaneous at bedtime  insulin lispro (HumaLOG) corrective regimen sliding scale   SubCutaneous three times a day before meals  insulin lispro Injectable (HumaLOG) 18 Unit(s) SubCutaneous three times a day before meals  isosorbide   mononitrate ER Tablet (IMDUR) 30 milliGRAM(s) Oral daily  lisinopril 40 milliGRAM(s) Oral daily  meropenem  IVPB 1000 milliGRAM(s) IV Intermittent every 8 hours  metoprolol tartrate 50 milliGRAM(s) Oral two times a day  multivitamin 1 Tablet(s) Oral daily    MEDICATIONS  (PRN):  acetaminophen   Tablet .. 650 milliGRAM(s) Oral every 6 hours PRN Mild Pain (1 - 3)  dextrose 40% Gel 15 Gram(s) Oral once PRN Blood Glucose LESS THAN 70 milliGRAM(s)/deciliter  glucagon  Injectable 1 milliGRAM(s) IntraMuscular once PRN Glucose LESS THAN 70 milligrams/deciliter  morphine  - Injectable 2 milliGRAM(s) IV Push every 4 hours PRN Severe Pain (7 - 10)  ondansetron Injectable 4 milliGRAM(s) IV Push once PRN Nausea and/or Vomiting  oxyCODONE    IR 5 milliGRAM(s) Oral every 4 hours PRN Moderate Pain (4 - 6)  sodium chloride 0.9% lock flush 10 milliLiter(s) IV Push every 1 hour PRN Pre/post blood products, medications, blood draw, and to maintain line patency      Vital Signs Last 24 Hrs  T(C): 36.4 (03 Dec 2019 08:19), Max: 37.2 (02 Dec 2019 21:53)  T(F): 97.6 (03 Dec 2019 08:19), Max: 99 (02 Dec 2019 21:53)  HR: 75 (03 Dec 2019 08:19) (75 - 86)  BP: 127/67 (03 Dec 2019 08:19) (127/67 - 162/73)  BP(mean): --  RR: 17 (03 Dec 2019 08:19) (16 - 18)  SpO2: 92% (03 Dec 2019 08:19) (92% - 97%)  Supplemental O2: [ ] No, on Room Air [ ] Yes,     I&O's Detail    02 Dec 2019 07:01  -  03 Dec 2019 07:00  --------------------------------------------------------  IN:    Oral Fluid: 1080 mL    Solution: 100 mL  Total IN: 1180 mL    OUT:    Voided: 2200 mL  Total OUT: 2200 mL    Total NET: -1020 mL      03 Dec 2019 07:01  -  03 Dec 2019 09:52  --------------------------------------------------------  IN:    Oral Fluid: 220 mL  Total IN: 220 mL    OUT:    Voided: 200 mL  Total OUT: 200 mL    Total NET: 20 mL      CAPILLARY BLOOD GLUCOSE      POCT Blood Glucose.: 193 mg/dL (03 Dec 2019 08:25)  POCT Blood Glucose.: 107 mg/dL (02 Dec 2019 21:42)  POCT Blood Glucose.: 136 mg/dL (02 Dec 2019 17:02)  POCT Blood Glucose.: 221 mg/dL (02 Dec 2019 12:05)      PHYSICAL EXAM:    Constitutional: A&Ox3, NAD  Respiratory: CTA bilterally  Cardiac: RRR, S1 and S2, no m/r/g  Gastrointestinal: abdomen soft, NT/ND  RLE: DP and PT signals present; dressings c/d/i  LLE: dressings c/d/i    LABS:                        8.5    6.33  )-----------( 224      ( 03 Dec 2019 07:09 )             25.6     12-03    139  |  103  |  23  ----------------------------<  119<H>  4.2   |  24  |  0.96    Ca    9.3      03 Dec 2019 07:09  Phos  3.1     12-03  Mg     2.0     12-03

## 2019-12-03 NOTE — DISCHARGE NOTE NURSING/CASE MANAGEMENT/SOCIAL WORK - PATIENT PORTAL LINK FT
You can access the FollowMyHealth Patient Portal offered by [facility] by registering at the following website: [email or web address]. By joining [employer]’s FollowMyHealth portal, you will also be able to view your health information using other applications (apps) compatible with our system.

## 2019-12-03 NOTE — CONSULT NOTE ADULT - SUBJECTIVE AND OBJECTIVE BOX
Wound Surgery Consult Note:    HPI:  69 yo male PMhx HTN, T2DM, CAD s/p stents, HLD presents to the ED c/o R foot wound x 5 weeks. The pt reports that he was found to have calluses in the R foot by his podiatrist, who removed the calluses, which caused a wound to form that was worsening. It was associated with malodorous discharge. The pt received regular wound care and bandaging, but it continued to get worse.  He received a 10 day course of Abx, which has not helped resolve his symptoms.The pt reports to experiencing pain when he stands on the foot, but reports that he has been able to carry out his ADLs despite the foot pain. The pt saw Dr. Ojeda (associate of Dr. [place]) in office yesterday for re-eval and was sent to hospital given state of wounds. The pt reports decreased sensation in his foot, but denies fever/chills, weakness,  n/v/d/SOB/CP  or other issues. Pt also has a hx of osteo of the fifth metatarsal head and base of  the fifth proximal phalanx.MRI on 12/30/16 showed a cutaneous ulceration along the lateral aspect of the L fifth MPJ with adjacent osteomyelitis in the fifth metatarsal head and base of  the fifth proximal phalanx. Treated w/ vanc/zosyn and debrided by podiatry.It was recommended that He should continue with local wound care and hyperbaric oxygen therapy per wound care center recommendations. (15 Nov 2019 04:31)    Patient encountered sitting in a recliner with legs elevated. He was seen with Dr. Michael.    PAST MEDICAL & SURGICAL HISTORY:  Essential hypertension  Coronary artery disease involving native coronary artery of native heart without angina pectoris  Peripheral artery disease  Type 2 diabetes mellitus with other circulatory complication, without long-term current use of insulin  Venous ulcer of left leg  S/P debridement    REVIEW OF SYSTEMS  General: no fever or chills  Respiratory and Thorax: no SOB or cough  Cardiovascular:	no CP  Gastrointestinal:	no n/v  Genitourinary: no dysuria	  Musculoskeletal:	 Right foot pain worse with standing  Neurological:	no LOC  Endocrine:	DM  Vascular: patient reports h/o DVT in Left leg  Skin: traumatic wound to Left anterior shin present for several weeks    MEDICATIONS  (STANDING):  amLODIPine   Tablet 10 milliGRAM(s) Oral daily  ascorbic acid 500 milliGRAM(s) Oral daily  aspirin  chewable 81 milliGRAM(s) Oral daily  atorvastatin 20 milliGRAM(s) Oral at bedtime  chlorhexidine 4% Liquid 1 Application(s) Topical <User Schedule>  clopidogrel Tablet 75 milliGRAM(s) Oral daily  Dakins Solution - 1/4 Strength 1 Application(s) Topical daily  dextrose 50% Injectable 12.5 Gram(s) IV Push once  dextrose 50% Injectable 25 Gram(s) IV Push once  dextrose 50% Injectable 25 Gram(s) IV Push once  heparin  Injectable 5000 Unit(s) SubCutaneous every 8 hours  hydrochlorothiazide 25 milliGRAM(s) Oral daily  influenza   Vaccine 0.5 milliLiter(s) IntraMuscular once  insulin glargine Injectable (LANTUS) 34 Unit(s) SubCutaneous at bedtime  insulin lispro (HumaLOG) corrective regimen sliding scale   SubCutaneous at bedtime  insulin lispro (HumaLOG) corrective regimen sliding scale   SubCutaneous three times a day before meals  insulin lispro Injectable (HumaLOG) 18 Unit(s) SubCutaneous three times a day before meals  isosorbide   mononitrate ER Tablet (IMDUR) 30 milliGRAM(s) Oral daily  lisinopril 40 milliGRAM(s) Oral daily  meropenem  IVPB 1000 milliGRAM(s) IV Intermittent every 8 hours  metoprolol tartrate 50 milliGRAM(s) Oral two times a day  multivitamin 1 Tablet(s) Oral daily    MEDICATIONS  (PRN):  acetaminophen   Tablet .. 650 milliGRAM(s) Oral every 6 hours PRN Mild Pain (1 - 3)  dextrose 40% Gel 15 Gram(s) Oral once PRN Blood Glucose LESS THAN 70 milliGRAM(s)/deciliter  glucagon  Injectable 1 milliGRAM(s) IntraMuscular once PRN Glucose LESS THAN 70 milligrams/deciliter  morphine  - Injectable 2 milliGRAM(s) IV Push every 4 hours PRN Severe Pain (7 - 10)  ondansetron Injectable 4 milliGRAM(s) IV Push once PRN Nausea and/or Vomiting  oxyCODONE    IR 5 milliGRAM(s) Oral every 4 hours PRN Moderate Pain (4 - 6)  sodium chloride 0.9% lock flush 10 milliLiter(s) IV Push every 1 hour PRN Pre/post blood products, medications, blood draw, and to maintain line patency    Allergies    No Known Allergies    Intolerances    ciprofloxacin (Vomiting)    SOCIAL HISTORY:  , lives with spouse; Former smoker    FAMILY HISTORY:  FH: diabetes mellitus: mother  Family history of diabetes mellitus (Sibling): brother  Family history of essential hypertension    Vital Signs Last 24 Hrs  T(C): 36.4 (03 Dec 2019 08:19), Max: 37.2 (02 Dec 2019 21:53)  T(F): 97.6 (03 Dec 2019 08:19), Max: 99 (02 Dec 2019 21:53)  HR: 75 (03 Dec 2019 08:19) (75 - 86)  BP: 127/67 (03 Dec 2019 08:19) (127/67 - 162/73)  BP(mean): --  RR: 17 (03 Dec 2019 08:19) (16 - 18)  SpO2: 92% (03 Dec 2019 08:19) (92% - 97%)    Physical Exam:  General: NAD,  A&Ox3  Respiratory: no SOB on room air  Gastrointestinal: soft NT/ND  Neurology: Right foot with dressing, Left foot paraesthesia  Musculoskeletal: no contractures  Vascular: Right LE edema, no LLE edema, LLE DP/PT pulses not manually palpable, BLE equally warm  Skin:  Left anterior shin wound - L 2.5cm x W 1.5cm x D 0.1cm wound bed is beefy red, no necrotic tissue, small amount of serosanguinous drainage  No odor, erythema, increased warmth, tenderness, induration, fluctuance, periwound remarkable for dry, flaky, hyperpigmented skin    LABS:  12-03    139  |  103  |  23  ----------------------------<  119<H>  4.2   |  24  |  0.96    Ca    9.3      03 Dec 2019 07:09  Phos  3.1     12-03  Mg     2.0     12-03                            8.5    6.33  )-----------( 224      ( 03 Dec 2019 07:09 )             25.6           RADIOLOGY & ADDITIONAL STUDIES:    EXAM:  DUPLEX SCAN EXT VEINS LOWER BI                        PROCEDURE DATE:  11/22/2019    INTERPRETATION:  CLINICAL INFORMATION: Preoperative planning for lower   extremity arterial bypass surgery. History of left leg endovenous laser   therapy.    TECHNIQUE: Duplex sonography of the bilateral lower extremities was   performed with color and spectral Doppler imaging, with and without   compression.    COMPARISON: None.    FINDINGS:     There is normal compressibility of the bilateral common femoral, femoral,   popliteal, posterior tibial, and peroneal veins. Doppler examination   shows normal spontaneous and phasic flow.    The RIGHT GREAT SAPHENOUS vein is patent.  The RIGHT SMALL SAPHENOUS vein is patent.  The LEFT GREAT SAPHENOUS vein previously underwent endovenous laser   therapy, and is partially thrombosed.  The LEFT SMALL SAPHENOUS is patent.    Measurements of the patent superficial veins are as follows:    RIGHT GREAT SAPHENOUS:   Saphenofemoral junction: 0.8 cm  Proximal thigh: 0.71 cm  Mid thigh: 0.60 cm  Distal thigh: 0.53 cm  Knee: 0.47 cm  Proximal calf: 0.49 cm  Mid calf: 0.32 cm  Distal calf: 0.27 cm    RIGHT SMALL SAPHENOUS:  Popliteal fossa: 0.26 cm  Proximal calf: 0.33 cm  Mid calf: 0.28 cm  Distal calf: 0.39 cm    LEFT GREAT SAPHENOUS:  Saphenofemoral junction: 0.51 cm  Proximal thigh: 0.41 cm  Mid thigh: Nonvisualized  Distal thigh: Partially recanalized cm  Knee: Partially recanalized cm  Proximal calf: Thrombosed  Distal calf: 0.32 cm    LEFT SMALL SAPHENOUS:  Popliteal fossa: 0.32 cm  Proximal calf: 0.32 cm  Mid calf: 0.38 cm  Distal calf: Not visualized      IMPRESSION:     No evidence of acute DVT in the right and left upper extremity. Partial   thrombosis of the left great saphenous vein.    Superficial vein mapping as above.     EXAM:  DUPLEX LOW ARTERIES UNI LTD RT  - RIGHT                       EXAM:  US LWR EXT TARGET DYN INIT LES                        PROCEDURE DATE:  11/22/2019    INTERPRETATION:  CLINICAL INFORMATION: Nonhealing ulcer of the plantar   right foot at the first metatarsal head.    TECHNIQUE: Grayscale, color and spectral Doppler imaging was performed at   the arteries of the Right lower extremity.    Contrast enhanced sonography was performed over the nonhealing ulcer of   the plantar surface of the right first metatarsal in the transverse and   sagittal planes. 4.6 cc Lumason (sulfur hexafluoride lipid-type A   microbubbles) contrast were administered in two 2.3 cc boluses; 0 cc were   discarded.    COMPARISON: None.    FINDINGS:    The ankle-brachial index (HUANG) is 0.42 on the right and 0.43 on the left.    There is occlusion of the mid posterior tibial artery with distal   reconstitution.    There is a severe stenosis in the proximal anterior tibial artery.    The peak systolic velocities of the right lower extremity arteries are as   follows:     Distal external iliac artery: 102 cm/s   Common femoral artery: 76 cm/s   Deep femoral artery: 52 cm/s  Superficial femoral artery: 70 cm/s proximal,  97 cm/s mid, 60 cm/s distal  Popliteal artery: 86 cm/s  Tibioperoneal trunk: 78 cm/s   Posterior tibial artery: 43 cm/s proximal,  occluded cm/s mid, 133 cm/s   distal  Peroneal artery: 50 cm/s proximal,  75 cm/s mid, 40 cm/s distal  Anterior tibial artery: 374 cm/s proximal,  46 cm/s mid, 41 cm/s distal  Dorsalis pedis artery: 35 cm/s    Following administration of contrast, there is markedly decreased tissue   perfusion directly over the wound.    IMPRESSION:    Markedly reduced right ankle-brachial index of 0.42.    Two-vessel runoff to the right foot, with occlusion of the mid posterior   tibial artery with distal reconstitution.    Severe stenosis of the proximal right anterior tibial artery.    Contrast-enhanced ultrasound demonstrates markedly decreased perfusion to   the soft tissues in the region of the nonhealing ulcer overlying the   plantar aspect of the first metatarsal head.

## 2019-12-03 NOTE — PROGRESS NOTE ADULT - REASON FOR ADMISSION
Foot infection

## 2019-12-03 NOTE — PROGRESS NOTE ADULT - ASSESSMENT
62yo M s/p below knee pop to PT bypass. Now s/p right foot wounds debridement with bone biopsy and stravix application.     Plan:  Plan for d/c to [facility] today  Follow up Pathology bone culture   PICC placed, pt will continue with IV meropenem 1g q8h through 12/26  Evaluate right foot when Podiatry performs dressing changes 69 yo M s/p below knee pop to PT bypass. Now s/p right foot wounds debridement with bone biopsy and stravix application.     Plan:  Plan for d/c to [facility] today  Follow up Pathology bone culture   PICC placed, pt will continue with IV meropenem 1g q8h through 12/26  Evaluate right foot when Podiatry performs dressing changes

## 2019-12-03 NOTE — CONSULT NOTE ADULT - ATTENDING COMMENTS
67 yo diabetic male with a traumatic wound of the left pretibial area , reported by patient to have been treated as OP with an Unna boot  S/P right leg revascularization , and right TMA  Bilat AAi are < .5  Wound of left pretibial area is found per patient in area where other venous wounds have healed with residual stasis dermatitis  Left leg is without rest pain  Vascular studies as above  left pretibial wound dressed with Acticoat   PVD will play a dominant role in the attempt to heal this traumatic left leg  wound  Patient was interviewed and status discussed in Lithuanian 67 yo diabetic male with a traumatic wound of the left pretibial area , reported by patient to have been treated as OP with an Unna boot  S/P right leg revascularization , and right TMA  Bilat AAi are < .5  Wound of left pretibial area is found per patient in area where other venous wounds have healed with residual stasis dermatitis  Left leg is without rest pain  Vascular studies as above  left pretibial wound dressed with Acticoat   PVD will play a dominant role in the attempt to heal this traumatic left leg  wound  Op f/u info was provided  Patient was interviewed and status discussed in Danish 67 yo diabetic male with a traumatic wound of the left pretibial area , reported by patient to have been treated as OP with an Unna boot  S/P right leg revascularization , and right TMA  Bilat AAi are < .5  reports that he is capable of limited ambulation to BR  Wound of left pretibial area is found per patient in area where other venous wounds have healed with residual stasis dermatitis  Left leg is without rest pain  Vascular studies as above  left pretibial wound dressed with Acticoat   PVD will play a dominant role in the attempt to heal this traumatic left leg  wound  Op f/u info was provided  Patient was interviewed and status discussed in Venezuelan

## 2019-12-03 NOTE — CONSULT NOTE ADULT - ASSESSMENT
Impression:    Left anterior shin traumatic wound complicated by PAD    Recommend:  1.) Topical Therapy: Left anterior shin wound - cleanse with sterile water, pat dry, apply sween moisturizer to periwound skin, apply wound gel to wound bed, cover with acticoat Flex 3 mesh cover with DSD, secure with loosely wrapped wai every 3 days.  2.) Glycemic control  3.) BLE elevation  4.) Moisturize intact skin w/ SWEEN cream daily  5.) Nutrition Optimization    Care as per medicine will follow w/ you  Upon discharge f/u as outpatient at [facility] [address] [place], [place] [phone]  Seen with Dr. Michael.  Thank you for this consult  Luh Alexander, NP-C, CWOCN [id number]

## 2019-12-27 ENCOUNTER — APPOINTMENT (OUTPATIENT)
Dept: VASCULAR SURGERY | Facility: CLINIC | Age: 69
End: 2019-12-27
Payer: MEDICARE

## 2019-12-27 VITALS
HEART RATE: 66 BPM | BODY MASS INDEX: 33.65 KG/M2 | DIASTOLIC BLOOD PRESSURE: 67 MMHG | WEIGHT: 222 LBS | SYSTOLIC BLOOD PRESSURE: 127 MMHG | HEIGHT: 68 IN | TEMPERATURE: 98 F

## 2019-12-27 PROCEDURE — 99024 POSTOP FOLLOW-UP VISIT: CPT

## 2019-12-27 PROCEDURE — 93926 LOWER EXTREMITY STUDY: CPT

## 2019-12-27 NOTE — PHYSICAL EXAM
[Normal Breath Sounds] : Normal breath sounds [Normal Heart Sounds] : normal heart sounds [de-identified] : awake, alert [FreeTextEntry1] : incision clean healing well.  Nylon sutures in the distal incision intact\par no signs cellulitis\par palp bypass pulse\par \par LLE healed ant shin ulcer, surrounding hyperpigmentation

## 2019-12-27 NOTE — HISTORY OF PRESENT ILLNESS
[de-identified] : doing well, at rehab.\par no pain at the incisions. \par pod following wound [FreeTextEntry1] : s/p pop to pt bypass w gsv 11/25/19

## 2019-12-27 NOTE — ASSESSMENT
[FreeTextEntry1] : doing well post op\par fu 3 months, bypass surveillance\par will also reeval left leg.  He has a hx of venous procedures, may require duplex if ulcer persists

## 2020-01-02 ENCOUNTER — APPOINTMENT (OUTPATIENT)
Dept: INFECTIOUS DISEASE | Facility: CLINIC | Age: 70
End: 2020-01-02
Payer: MEDICARE

## 2020-01-02 VITALS
HEART RATE: 66 BPM | TEMPERATURE: 97.3 F | RESPIRATION RATE: 18 BRPM | SYSTOLIC BLOOD PRESSURE: 143 MMHG | DIASTOLIC BLOOD PRESSURE: 69 MMHG | OXYGEN SATURATION: 99 % | HEIGHT: 68 IN

## 2020-01-02 PROCEDURE — 99214 OFFICE O/P EST MOD 30 MIN: CPT

## 2020-01-05 NOTE — PHYSICAL EXAM
[General Appearance - Well Nourished] : well nourished [General Appearance - Alert] : alert [General Appearance - In No Acute Distress] : in no acute distress [Outer Ear] : the ears and nose were normal in appearance [Sclera] : the sclera and conjunctiva were normal [Neck Appearance] : the appearance of the neck was normal [] : no respiratory distress [Oropharynx] : the oropharynx was normal with no thrush [Respiration, Rhythm And Depth] : normal respiratory rhythm and effort [Heart Rate And Rhythm] : heart rate was normal and rhythm regular [Auscultation Breath Sounds / Voice Sounds] : lungs were clear to auscultation bilaterally [Murmurs] : no murmurs [Abdomen Soft] : soft [Bowel Sounds] : normal bowel sounds [Musculoskeletal - Swelling] : no joint swelling [Abdomen Tenderness] : non-tender [Motor Tone] : muscle strength and tone were normal [FreeTextEntry1] : right leg medial aspect bypass scars well healed, sutures removed, no signs of local infection. wound between first two right toes appears smaller than in the hospital, unable to see the base due to moderate serous/yellow drainage, edges somewhat macerated, no surrounding cellulitis. clean shallow ulcers base of foot. lateral fifth metatarsal ulcer with fibrinous debris, looks about the same width, not deep, no surrounding cellulitis.  [No Focal Deficits] : no focal deficits

## 2020-01-05 NOTE — ASSESSMENT
[FreeTextEntry1] : 69M with DM, PVD, admitted in Nov with right foot osteomyelitis of the hallux and fifth metatarsal. \par s/p pop tib bypass and debridement (he did not want amputation) \par Pseudomonas and GBS \par Completed 6 weeks Meropenem 12/26/19 \par Clinically well, was never systemically ill or febrile \par The wounds don't look great but they are smaller. I'm not sure what to make of the drainage/debris but his inflammatory makers normalized over the course of his antibiotic treatment (ESR 60 down to 8) and I don't think further antibiotics are indicated. \par We'll see what what podiatry thinks at this visit this afternoon.

## 2020-01-05 NOTE — HISTORY OF PRESENT ILLNESS
[FreeTextEntry1] : 69M with DM, CAD s/p PCI, PVD, prior OM of the left foot 2016, recently hospitalized for Right foot osteomyelitis (hallux and fifth metatarsal), Pseudomonas and GBS. \par s/p pop-tib bypass followed by debridement and stravix graft application (between first and second toes)\par Completed 6 weeks of meropenem 12/26\par Here for follow up. \par Doing well, no fevers or chills, no diarrhea, no real pain. Never had fevers. \par Going to see podiatry after today's visit with me.

## 2020-01-05 NOTE — REVIEW OF SYSTEMS
[Fever] : no fever [Chills] : no chills [Eye Pain] : no eye pain [Nasal Discharge] : no nasal discharge [Sore Throat] : no sore throat [Shortness Of Breath] : no shortness of breath [Chest Pain] : no chest pain [Cough] : no cough [Diarrhea] : no diarrhea [Abdominal Pain] : no abdominal pain [Dysuria] : no dysuria [Joint Pain] : no joint pain [Skin Wound] : skin wound [Skin Lesions] : skin lesion

## 2020-01-16 ENCOUNTER — OUTPATIENT (OUTPATIENT)
Dept: OUTPATIENT SERVICES | Facility: HOSPITAL | Age: 70
LOS: 1 days | End: 2020-01-16
Payer: MEDICARE

## 2020-01-16 ENCOUNTER — APPOINTMENT (OUTPATIENT)
Dept: WOUND CARE | Facility: CLINIC | Age: 70
End: 2020-01-16
Payer: MEDICARE

## 2020-01-16 ENCOUNTER — APPOINTMENT (OUTPATIENT)
Dept: RADIOLOGY | Facility: CLINIC | Age: 70
End: 2020-01-16
Payer: MEDICARE

## 2020-01-16 VITALS
SYSTOLIC BLOOD PRESSURE: 178 MMHG | HEIGHT: 68 IN | WEIGHT: 222 LBS | DIASTOLIC BLOOD PRESSURE: 77 MMHG | TEMPERATURE: 97.7 F | BODY MASS INDEX: 33.65 KG/M2 | HEART RATE: 67 BPM | RESPIRATION RATE: 16 BRPM

## 2020-01-16 DIAGNOSIS — Z00.8 ENCOUNTER FOR OTHER GENERAL EXAMINATION: ICD-10-CM

## 2020-01-16 DIAGNOSIS — Z98.890 OTHER SPECIFIED POSTPROCEDURAL STATES: Chronic | ICD-10-CM

## 2020-01-16 PROCEDURE — 99203 OFFICE O/P NEW LOW 30 MIN: CPT

## 2020-01-16 PROCEDURE — 11042 DBRDMT SUBQ TIS 1ST 20SQCM/<: CPT

## 2020-01-16 PROCEDURE — 73630 X-RAY EXAM OF FOOT: CPT | Mod: 26,RT

## 2020-01-16 PROCEDURE — 73630 X-RAY EXAM OF FOOT: CPT

## 2020-01-16 NOTE — PLAN
[FreeTextEntry1] : -pt seen and evaluated\par - R foot wounds fibrogranular w/ no malodor w/ serous drainage ; palpable R PT, non palpable R DP. \par - sharp and excisional debridement w/ sterile currette on right foot wounds down to fascia \par - nursing orders put in for daily dressing changes w/ santyl \par - rx santyl to vivo\par - ordered R foot xray to assess status osteomyelitis \par - RTC 1 wk

## 2020-01-16 NOTE — HISTORY OF PRESENT ILLNESS
[FreeTextEntry1] : 68 yo M pt presents to [facility] in post op shoe w/ cc of R foot wound. Pt was recently discharged from [facility] on 12/3 and has been going to Rehab at [facility]. states that he was told to follow up here at [address] [facility] by dr. miller. PT states that the wound has been present since the end of september 2019. MR from [facility] shows that pt had OM of the sesamoids, proximal lateral asepct of the first proximal phalanx , and osteomyelitis w/ the lateral aspect of the fifth met head. pt is 11/27/19 s/p R foot wound debridement and graft application  w/ Dr. Sandhu at [facility] as well as s/p 11/25 RLE pop to PT bypass w/ dr. hawkins.  Finished PICC on 12/26/19 w/ meropenem. Has been seeing  Dr. Sandhu since d/c'd from hosp. Dr. miller has been debriding the wound and cleaning it up. Has recently seen dr. hawkins who told them that the blood flow is still good. Reports A1c was 8% while in hosp. denies any N/V//F/C/SOB.

## 2020-01-16 NOTE — PHYSICAL EXAM
[2+] : left 2+ [0] : left 0 [de-identified] : debridement to fascia, but not beyond fascia w/ sterile currette. probes to capsule  [FreeTextEntry1] : 1st interspace wound [FreeTextEntry2] : 1.8cm [FreeTextEntry3] : 1.4cm [FreeTextEntry4] : 0.4 cm [FreeTextEntry5] : currete [de-identified] : santyl  [de-identified] : debridement to fascia, but not beyond fascia w/ sterile currette. probes to capsule  [FreeTextEntry7] : lateral fifth met [FreeTextEntry8] : 3.7cm [FreeTextEntry9] : 2cm [de-identified] : 0.3cm [de-identified] : santyl  [de-identified] : debridement to fascia, but not beyond fascia w/ sterile currette. probes to capsule  [de-identified] : submet 1 wound [de-identified] : 0.2cm [de-identified] : 0.2cm [de-identified] : 0.3cm [TWNoteComboBox1] : Right [TWNoteComboBox6] : Diabetic [de-identified] : None [de-identified] : <20% [TWNoteComboBox9] : Right [TWNoteComboBox7] : Jovany [de-identified] : Right [de-identified] : <20% [de-identified] : Jovany [de-identified] : <20% [de-identified] : Debridement performed of all devitalized tissue to bleeding viable tissue

## 2020-01-23 ENCOUNTER — APPOINTMENT (OUTPATIENT)
Dept: WOUND CARE | Facility: CLINIC | Age: 70
End: 2020-01-23
Payer: MEDICARE

## 2020-01-23 VITALS
WEIGHT: 222 LBS | HEIGHT: 68 IN | TEMPERATURE: 98 F | BODY MASS INDEX: 33.65 KG/M2 | SYSTOLIC BLOOD PRESSURE: 169 MMHG | HEART RATE: 67 BPM | DIASTOLIC BLOOD PRESSURE: 76 MMHG

## 2020-01-23 PROCEDURE — 11043 DBRDMT MUSC&/FSCA 1ST 20/<: CPT

## 2020-01-23 NOTE — HISTORY OF PRESENT ILLNESS
[FreeTextEntry1] : 68 yo M pt presents to [facility] in post op shoe w/ cc of R foot wound. Pt was recently discharged from [facility] on 12/3 briefly went to [facility] but has since been discharged. Pt states that the wound has been present since the end of september 2019. MR from [facility] shows that pt had OM of the sesamoids, proximal lateral asepct of the first proximal phalanx , and osteomyelitis w/ the lateral aspect of the fifth met head. pt is 11/27/19 s/p R foot wound debridement and graft application  w/ Dr. Sandhu at [facility] as well as s/p 11/25 RLE pop to PT bypass w/ dr. hawkins.  Finished PICC on 12/26/19 w/ meropenem. Has recently seen dr. hawkins who told them that the blood flow is still good. Reports A1c was 8% while in hosp. Pts wife has been applying santyl to right foot wounds daily. States nurse never came. denies any N/V//F/C/SOB.

## 2020-01-23 NOTE — PHYSICAL EXAM
[2+] : left 2+ [0] : left 0 [FreeTextEntry1] : 1st interspace wound [de-identified] : debridement to fascia, but not beyond fascia w/ sterile currette. probes to capsule  [FreeTextEntry2] : 2.0 cm [FreeTextEntry5] : currete [FreeTextEntry4] : 0.4 cm [FreeTextEntry3] : 1.25 cm [de-identified] : debridement to healthy granular bleeding wound bed [de-identified] : santyl  [FreeTextEntry7] : lateral fifth met [FreeTextEntry9] : 1.8cm [FreeTextEntry8] : 2.5 cm [de-identified] : santyl  [de-identified] : debridement to fascia, but not beyond fascia w/ sterile currette. probes to capsule  [de-identified] : 0.3cm [de-identified] : submet 1 wound [de-identified] : 0.2cm [de-identified] : 0.3cm [de-identified] : 0.2cm [TWNoteComboBox6] : Diabetic [TWNoteComboBox1] : Right [de-identified] : 100% [de-identified] : None [TWNoteComboBox7] : Jovany [de-identified] : <20% [de-identified] : Right [TWNoteComboBox9] : Right [de-identified] : <20% [de-identified] : Jovany [de-identified] : Debridement performed of all devitalized tissue to bleeding viable tissue

## 2020-01-23 NOTE — PLAN
[FreeTextEntry1] : 68 yo M w/ R foot 1st interspace ulcer and lateral 5th met head ulceration\par - pt seen and evaluated\par - R foot 1st interspace is granular, lateral 5th met head is mostly fibrous tissue.\par - sharp and excisional debridement w/ sterile currette and #15 blade on right foot wounds down to subq at 1st interspace, and to fascia at lateral 5th met head \par - Home nursing never came, will attempt again to reinstate home nursing\par - In the meantime, pt to continue daily santyl dressing changes\par - XRAY reviewed, no obvious signs of OM at 5th met head\par - Planning for Apligraf application to both R foot wounds\par - RTC 1 wk

## 2020-01-30 ENCOUNTER — APPOINTMENT (OUTPATIENT)
Dept: WOUND CARE | Facility: CLINIC | Age: 70
End: 2020-01-30
Payer: MEDICARE

## 2020-01-30 PROCEDURE — 11042 DBRDMT SUBQ TIS 1ST 20SQCM/<: CPT

## 2020-01-30 NOTE — HISTORY OF PRESENT ILLNESS
[FreeTextEntry1] : 68 yo M pt presents to clinic in post op shoe w/ cc of R foot wound. Pt was recently discharged from [facility] on 12/3 briefly went to [facility] but has since been discharged. Pt states that the wound has been present since the end of September 2019. MR from [facility] shows that pt had OM of the sesamoids, proximal lateral asepct of the first proximal phalanx , and osteomyelitis w/ the lateral aspect of the fifth met head. pt is 11/27/19 s/p R foot wound debridement and graft application  w/ Dr. Sandhu at [facility] as well as s/p 11/25 RLE pop to PT bypass w/ Dr. Shanks. Finished PICC on 12/26/19 w/ Meropenem. Has recently seen Dr. Shanks who told them that the blood flow is still good. Reports A1c was 8% while in hosp. Home nursing has been coming daily to change dressing with Santyl. Denies any N/V/F/C/SOB.

## 2020-01-30 NOTE — PLAN
[FreeTextEntry1] : 68 yo M w/ R foot 1st interspace ulcer and lateral 5th met head ulceration\par - pt seen and evaluated\par - R foot 1st interspace is granular, lateral 5th met head is mostly fibrous tissue\par - sharp and excisional debridement w/ sterile w/ #15 blade on right foot wounds down to subq at 1st interspace, and to bone at lateral 5th met head \par - In the meantime, pt to continue daily Santyl dressing changes to 1st interspace and Alginate to lat 5th met ulcer\par - D/W patient the potential need for 5th met head resection\par - Planning for Apligraf application to both R foot wounds\par - RTC 1 wk

## 2020-01-30 NOTE — PHYSICAL EXAM
[2+] : left 2+ [0] : right 0 [de-identified] : debridement to fascia, but not beyond fascia w/ sterile currette. probes to capsule  [FreeTextEntry1] : 1st interspace wound [FreeTextEntry2] : 1.0 cm [FreeTextEntry3] : 1.0 cm [FreeTextEntry4] : 0.2 cm [de-identified] : santyl  [FreeTextEntry5] : currete [de-identified] : down to bone, fibrotic  [FreeTextEntry7] : lateral fifth met [FreeTextEntry8] : 3.5 cm [FreeTextEntry9] : 2.0 cm [de-identified] : 0.4 cm [de-identified] : santyl  [de-identified] : submet 1 wound [de-identified] : down to subQ, granular [de-identified] : 0.2cm [de-identified] : 0.2cm [de-identified] : 0.3cm [TWNoteComboBox1] : Right [de-identified] : None [TWNoteComboBox6] : Diabetic [de-identified] : 100% [TWNoteComboBox9] : Right [TWNoteComboBox7] : Jovany [de-identified] : <20% [de-identified] : Right [de-identified] : Bone [de-identified] : Jovany [de-identified] : <20% [de-identified] : Debridement performed of all devitalized tissue to bleeding viable tissue

## 2020-02-06 ENCOUNTER — RESULT REVIEW (OUTPATIENT)
Age: 70
End: 2020-02-06

## 2020-02-06 ENCOUNTER — LABORATORY RESULT (OUTPATIENT)
Age: 70
End: 2020-02-06

## 2020-02-06 ENCOUNTER — APPOINTMENT (OUTPATIENT)
Dept: WOUND CARE | Facility: CLINIC | Age: 70
End: 2020-02-06
Payer: MEDICARE

## 2020-02-06 VITALS
DIASTOLIC BLOOD PRESSURE: 77 MMHG | RESPIRATION RATE: 18 BRPM | TEMPERATURE: 97.6 F | SYSTOLIC BLOOD PRESSURE: 166 MMHG | HEART RATE: 66 BPM

## 2020-02-06 PROCEDURE — 11044 DBRDMT BONE 1ST 20 SQ CM/<: CPT

## 2020-02-06 NOTE — PHYSICAL EXAM
[2+] : left 2+ [0] : left 0 [de-identified] : 100% granular, healthy base, no SOI [FreeTextEntry1] : 1st interspace wound [FreeTextEntry2] : .7 cm [FreeTextEntry4] : .2 cm [FreeTextEntry3] : .7 cm [FreeTextEntry5] : 15 blade & DABC [de-identified] : santyl  [de-identified] : BONE DEBRIDEMENT TO BONE 5TH METATARSAL HEAD REMOVED NOTED AS BEING EXPOSED, necrotic tissue debrided, not clinically infected, nonhealing ulceration with bone exposure and fibrotic tissue extensive [FreeTextEntry8] : 3.5 cm [FreeTextEntry7] : lateral fifth met [FreeTextEntry9] : 2.0 cm [de-identified] : santyl  [de-identified] : 1.5 cm [de-identified] : submet 1 wound [de-identified] : HEALED 2/6/20 [de-identified] : 0.2cm [de-identified] : 0.2cm [de-identified] : 0.3cm [TWNoteComboBox1] : Right [TWNoteComboBox6] : Diabetic [de-identified] : Mild [TWNoteComboBox7] : Jovany [de-identified] : 100% [TWNoteComboBox9] : Right [de-identified] : Right [de-identified] : <20% [de-identified] : Bone [de-identified] : Jovany [de-identified] : Debridement performed of all devitalized tissue to bleeding viable tissue [de-identified] : <20%

## 2020-02-06 NOTE — HISTORY OF PRESENT ILLNESS
[FreeTextEntry1] : 70 yo M pt presents to clinic in post op shoe w/ cc of R foot wound. Pt was recently discharged from [facility] on 12/3 briefly went to [facility] but has since been discharged. Pt states that the wound has been present since the end of September 2019. MR from [facility] shows that pt had OM of the sesamoids, proximal lateral asepct of the first proximal phalanx , and osteomyelitis w/ the lateral aspect of the fifth met head. pt is 11/27/19 s/p R foot wound debridement and graft application  w/ Dr. Sandhu at [facility] as well as s/p 11/25 RLE pop to PT bypass w/ Dr. Shanks. Finished PICC on 12/26/19 w/ Meropenem. Has recently seen Dr. Shanks who told them that the blood flow is still good. Reports A1c was 8% while in hosp. Home nursing has been coming daily to change dressing with Santyl. \par \par Pt states that they were concern about odor from foot, noted changes in wound appearance.Denies any N/V/F/C/SOB.

## 2020-02-06 NOTE — PLAN
[FreeTextEntry1] : 68 yo M w/ R foot 1st interspace ulcer and lateral 5th met head ulceration with bone exposure 5th met head\par - pt seen and evaluated\par - Consent obtained for 5th metatarsal head resection/bone debridement, prepped betadine, sterile field, aseptic technique removal of 5th metatarsal head with DABC and flushed defect with saline followed by packing of Ag alginate\par - R foot 1st interspace is granular\par - sharp and excisional debridement w/ sterile w/ #15 blade on right foot wounds down to subq at 1st interspace, and to bone at lateral 5th met head \par - In the meantime, pt to continue daily Santyl dressing changes to 1st interspace, Ag alginate dressing to 5th metatarsal site daily\par - Pt advised if worsening signs of infection please visit ED immediately, no SOI acutely at this time pt with prior PICC will treat as chronic OM, path & culture sent\par - RTC 1 wk

## 2020-02-07 ENCOUNTER — APPOINTMENT (OUTPATIENT)
Dept: VASCULAR SURGERY | Facility: CLINIC | Age: 70
End: 2020-02-07
Payer: MEDICARE

## 2020-02-07 VITALS
WEIGHT: 222 LBS | HEART RATE: 69 BPM | HEIGHT: 68 IN | DIASTOLIC BLOOD PRESSURE: 84 MMHG | BODY MASS INDEX: 33.65 KG/M2 | SYSTOLIC BLOOD PRESSURE: 184 MMHG

## 2020-02-07 PROCEDURE — 99024 POSTOP FOLLOW-UP VISIT: CPT

## 2020-02-13 ENCOUNTER — APPOINTMENT (OUTPATIENT)
Dept: WOUND CARE | Facility: CLINIC | Age: 70
End: 2020-02-13
Payer: MEDICARE

## 2020-02-13 PROCEDURE — 11042 DBRDMT SUBQ TIS 1ST 20SQCM/<: CPT

## 2020-02-13 NOTE — HISTORY OF PRESENT ILLNESS
[FreeTextEntry1] : 70 yo M pt presents to clinic in post op shoe w/ cc of R foot wound. Pt was recently discharged from [facility] on 12/3 briefly went to [facility] but has since been discharged. Pt states that the wound has been present since the end of September 2019. MR from [facility] shows that pt had OM of the sesamoids, proximal lateral asepct of the first proximal phalanx , and osteomyelitis w/ the lateral aspect of the fifth met head. pt is 11/27/19 s/p R foot wound debridement and graft application  w/ Dr. Sandhu at [facility] as well as s/p 11/25 RLE pop to PT bypass w/ Dr. Shanks. Finished PICC on 12/26/19 w/ Meropenem. Has recently seen Dr. Shanks who told them that the blood flow is still good. Reports A1c was 8% while in hosp. Home nursing has been coming daily to change dressing with Santyl. \par \par s/p 5th metatarsal head resection/bone debridement in the office 1 week ago. Wound culture obtained was growing pseudomonas. [facility] has been changing the dressing daily w/ santyl and silver alginate. \par \par Denies any N/V/F/C/SOB.

## 2020-02-13 NOTE — PLAN
[FreeTextEntry1] : 70 yo M w/ R foot 1st interspace ulcer and lateral 5th met head ulceration with bone exposure 5th met head\par -Pt seen and evaluated\par -s/p right foot 5th met head resection in office, wound w/ improved appearance, exposed bone, no purulence or drainage, no acute signs of infection\par -Wound culture was growing pseudomonas \par -R foot 1st interspace wound is healthy and granular\par -Sharp excisional debridement of 5th met wound to level of but not beyond subQ using a sterile curette. Mechanical debridement using saline soaked gauze of both wounds.  \par -Continue daily dressing changes w/ santyl and packing in 5th met wound and santyl in 1st interspace wound \par -Rx Levaquin x10 days (w/ 1 refill) \par -RTC in 2 weeks

## 2020-02-13 NOTE — PHYSICAL EXAM
[2+] : left 2+ [0] : left 0 [de-identified] : 100% granular, healthy base, no signs of infection [FreeTextEntry2] : 0.5 cm [FreeTextEntry1] : 1st interspace wound [FreeTextEntry3] : 0.5 cm [FreeTextEntry4] : 0.1 cm [de-identified] : Santyl  [de-identified] : s/p 5th met head resection, bone exposed, fibrotic tissue debrided, no purulence or drainage, no acute signs of infection [FreeTextEntry7] : lateral fifth met [FreeTextEntry8] : 1.5 cm [FreeTextEntry9] : 2.0 cm [de-identified] : 0.5 cm [de-identified] : Santyl, packing [de-identified] : submet 1 wound [de-identified] : HEALED 2/6/20 [de-identified] : 0.2cm [de-identified] : 0.2cm [de-identified] : 0.3cm [TWNoteComboBox1] : Right [TWNoteComboBox6] : Diabetic [de-identified] : Mild [TWNoteComboBox7] : False [de-identified] : 100% [TWNoteComboBox9] : Right [de-identified] : 4 [de-identified] : FT [de-identified] : None [de-identified] : None [de-identified] : Yes [de-identified] : <20% [de-identified] : Bone [de-identified] : Right [de-identified] : <20% [de-identified] : Jovany [de-identified] : Debridement performed of all devitalized tissue to bleeding viable tissue

## 2020-02-21 ENCOUNTER — LABORATORY RESULT (OUTPATIENT)
Age: 70
End: 2020-02-21

## 2020-02-21 ENCOUNTER — NON-APPOINTMENT (OUTPATIENT)
Age: 70
End: 2020-02-21

## 2020-02-21 ENCOUNTER — APPOINTMENT (OUTPATIENT)
Dept: INTERNAL MEDICINE | Facility: CLINIC | Age: 70
End: 2020-02-21
Payer: MEDICARE

## 2020-02-21 VITALS
HEART RATE: 61 BPM | OXYGEN SATURATION: 99 % | BODY MASS INDEX: 33.34 KG/M2 | DIASTOLIC BLOOD PRESSURE: 70 MMHG | TEMPERATURE: 97.6 F | SYSTOLIC BLOOD PRESSURE: 142 MMHG | HEIGHT: 68 IN | WEIGHT: 220 LBS | RESPIRATION RATE: 14 BRPM

## 2020-02-21 VITALS — DIASTOLIC BLOOD PRESSURE: 64 MMHG | SYSTOLIC BLOOD PRESSURE: 140 MMHG

## 2020-02-21 DIAGNOSIS — Z82.49 FAMILY HISTORY OF ISCHEMIC HEART DISEASE AND OTHER DISEASES OF THE CIRCULATORY SYSTEM: ICD-10-CM

## 2020-02-21 DIAGNOSIS — Z95.5 PRESENCE OF CORONARY ANGIOPLASTY IMPLANT AND GRAFT: ICD-10-CM

## 2020-02-21 DIAGNOSIS — Z86.39 PERSONAL HISTORY OF OTHER ENDOCRINE, NUTRITIONAL AND METABOLIC DISEASE: ICD-10-CM

## 2020-02-21 DIAGNOSIS — Z86.79 PERSONAL HISTORY OF OTHER DISEASES OF THE CIRCULATORY SYSTEM: ICD-10-CM

## 2020-02-21 DIAGNOSIS — Z86.31 PERSONAL HISTORY OF DIABETIC FOOT ULCER: ICD-10-CM

## 2020-02-21 PROCEDURE — G0439: CPT

## 2020-02-21 PROCEDURE — 36415 COLL VENOUS BLD VENIPUNCTURE: CPT

## 2020-02-21 PROCEDURE — 93000 ELECTROCARDIOGRAM COMPLETE: CPT

## 2020-02-21 RX ORDER — ASPIRIN 81 MG
81 TABLET, DELAYED RELEASE (ENTERIC COATED) ORAL DAILY
Refills: 0 | Status: ACTIVE | COMMUNITY

## 2020-02-21 RX ORDER — MULTIVITAMIN
TABLET ORAL
Refills: 0 | Status: ACTIVE | COMMUNITY

## 2020-02-21 RX ORDER — ASCORBIC ACID 500 MG
500 TABLET ORAL
Refills: 0 | Status: ACTIVE | COMMUNITY

## 2020-02-21 RX ORDER — LEVOFLOXACIN 500 MG/1
500 TABLET, FILM COATED ORAL DAILY
Qty: 10 | Refills: 1 | Status: DISCONTINUED | COMMUNITY
Start: 2020-02-13 | End: 2020-02-21

## 2020-02-21 NOTE — HISTORY OF PRESENT ILLNESS
[FreeTextEntry1] : Here for physical. Pt is being treated for a chronic right foot diabetic ulcer. Had recent debridement.\par Received flu vaccine. [de-identified] : Other than right foot discomfort, pt is feeling well.\par Former smoker, quit at 31 yo. Social alcohol.\par Not exercising.\par Hasn't had a colonoscopy.\par Being treated by Wound Care for Right diabetic ulcer.

## 2020-02-21 NOTE — PHYSICAL EXAM
[No Acute Distress] : no acute distress [Well Nourished] : well nourished [Well Developed] : well developed [Well-Appearing] : well-appearing [Normal Sclera/Conjunctiva] : normal sclera/conjunctiva [PERRL] : pupils equal round and reactive to light [EOMI] : extraocular movements intact [Normal Outer Ear/Nose] : the outer ears and nose were normal in appearance [Normal Oropharynx] : the oropharynx was normal [No JVD] : no jugular venous distention [No Lymphadenopathy] : no lymphadenopathy [Supple] : supple [Thyroid Normal, No Nodules] : the thyroid was normal and there were no nodules present [No Accessory Muscle Use] : no accessory muscle use [No Respiratory Distress] : no respiratory distress  [Clear to Auscultation] : lungs were clear to auscultation bilaterally [Normal Rate] : normal rate  [Regular Rhythm] : with a regular rhythm [No Murmur] : no murmur heard [No Carotid Bruits] : no carotid bruits [Normal S1, S2] : normal S1 and S2 [Pedal Pulses Present] : the pedal pulses are present [No Abdominal Bruit] : a ~M bruit was not heard ~T in the abdomen [No Varicosities] : no varicosities [No Extremity Clubbing/Cyanosis] : no extremity clubbing/cyanosis [No Palpable Aorta] : no palpable aorta [No Edema] : there was no peripheral edema [Soft] : abdomen soft [Non Tender] : non-tender [Non-distended] : non-distended [No HSM] : no HSM [No Masses] : no abdominal mass palpated [Normal Bowel Sounds] : normal bowel sounds [Normal Anterior Cervical Nodes] : no anterior cervical lymphadenopathy [Normal Posterior Cervical Nodes] : no posterior cervical lymphadenopathy [No Spinal Tenderness] : no spinal tenderness [No CVA Tenderness] : no CVA  tenderness [No Rash] : no rash [Grossly Normal Strength/Tone] : grossly normal strength/tone [No Joint Swelling] : no joint swelling [Coordination Grossly Intact] : coordination grossly intact [Normal Gait] : normal gait [No Focal Deficits] : no focal deficits [Normal Affect] : the affect was normal [Deep Tendon Reflexes (DTR)] : deep tendon reflexes were 2+ and symmetric [Normal Insight/Judgement] : insight and judgment were intact

## 2020-02-21 NOTE — PHYSICAL EXAM
[Normal Breath Sounds] : Normal breath sounds [de-identified] : awake, alert [Normal Heart Sounds] : normal heart sounds [FreeTextEntry1] : incision well healed\par no signs cellulitis\par palp bypass pulse\par \par LLE healed ant shin ulcer, surrounding hyperpigmentation

## 2020-02-21 NOTE — HEALTH RISK ASSESSMENT
[Fair] :  ~his/her~ mood as fair [Yes] : Yes [] : No [de-identified] : Quit at 31yo [de-identified] : social [de-identified] : Not exercising

## 2020-02-21 NOTE — HISTORY OF PRESENT ILLNESS
[FreeTextEntry1] : s/p pop to pt bypass w gsv 11/25/19 [de-identified] : doing well, at rehab.\par no pain at the incisions. \par pod following wound\par \par 2/7/20: continues to follow w wound care, appearnce improving

## 2020-02-24 ENCOUNTER — APPOINTMENT (OUTPATIENT)
Dept: ENDOCRINOLOGY | Facility: CLINIC | Age: 70
End: 2020-02-24
Payer: MEDICARE

## 2020-02-24 VITALS
WEIGHT: 200 LBS | SYSTOLIC BLOOD PRESSURE: 157 MMHG | HEIGHT: 68 IN | HEART RATE: 78 BPM | BODY MASS INDEX: 30.31 KG/M2 | OXYGEN SATURATION: 98 % | DIASTOLIC BLOOD PRESSURE: 79 MMHG

## 2020-02-24 DIAGNOSIS — Z78.9 OTHER SPECIFIED HEALTH STATUS: ICD-10-CM

## 2020-02-24 DIAGNOSIS — Z87.891 PERSONAL HISTORY OF NICOTINE DEPENDENCE: ICD-10-CM

## 2020-02-24 DIAGNOSIS — Z83.3 FAMILY HISTORY OF DIABETES MELLITUS: ICD-10-CM

## 2020-02-24 LAB
ALBUMIN SERPL ELPH-MCNC: 4.9 G/DL
ALP BLD-CCNC: 64 U/L
ALT SERPL-CCNC: 10 U/L
ANION GAP SERPL CALC-SCNC: 13 MMOL/L
APPEARANCE: CLEAR
AST SERPL-CCNC: 15 U/L
BASOPHILS # BLD AUTO: 0.05 K/UL
BASOPHILS NFR BLD AUTO: 0.7 %
BILIRUB SERPL-MCNC: 0.5 MG/DL
BILIRUBIN URINE: NEGATIVE
BLOOD URINE: NEGATIVE
BUN SERPL-MCNC: 25 MG/DL
CALCIUM SERPL-MCNC: 9.8 MG/DL
CHLORIDE SERPL-SCNC: 104 MMOL/L
CHOLEST SERPL-MCNC: 100 MG/DL
CHOLEST/HDLC SERPL: 2.5 RATIO
CO2 SERPL-SCNC: 24 MMOL/L
COLOR: YELLOW
CREAT SERPL-MCNC: 1.01 MG/DL
EOSINOPHIL # BLD AUTO: 0.09 K/UL
EOSINOPHIL NFR BLD AUTO: 1.3 %
ESTIMATED AVERAGE GLUCOSE: 137 MG/DL
FOLATE SERPL-MCNC: >20 NG/ML
GLUCOSE BLDC GLUCOMTR-MCNC: 84
GLUCOSE QUALITATIVE U: NEGATIVE
GLUCOSE SERPL-MCNC: 145 MG/DL
HBA1C MFR BLD HPLC: 6.4 %
HCT VFR BLD CALC: 36.1 %
HDLC SERPL-MCNC: 39 MG/DL
HGB BLD-MCNC: 11.9 G/DL
IMM GRANULOCYTES NFR BLD AUTO: 0.1 %
KETONES URINE: NEGATIVE
LDLC SERPL CALC-MCNC: 41 MG/DL
LEUKOCYTE ESTERASE URINE: NEGATIVE
LYMPHOCYTES # BLD AUTO: 1.92 K/UL
LYMPHOCYTES NFR BLD AUTO: 28.6 %
MAN DIFF?: NORMAL
MCHC RBC-ENTMCNC: 26.6 PG
MCHC RBC-ENTMCNC: 33 GM/DL
MCV RBC AUTO: 80.6 FL
MONOCYTES # BLD AUTO: 0.39 K/UL
MONOCYTES NFR BLD AUTO: 5.8 %
NEUTROPHILS # BLD AUTO: 4.25 K/UL
NEUTROPHILS NFR BLD AUTO: 63.5 %
NITRITE URINE: NEGATIVE
PH URINE: 5.5
PLATELET # BLD AUTO: 205 K/UL
POTASSIUM SERPL-SCNC: 5.1 MMOL/L
PROT SERPL-MCNC: 7.3 G/DL
PROTEIN URINE: ABNORMAL
PSA SERPL-MCNC: 0.73 NG/ML
RBC # BLD: 4.48 M/UL
RBC # FLD: 13.9 %
SODIUM SERPL-SCNC: 142 MMOL/L
SPECIFIC GRAVITY URINE: 1.03
TRIGL SERPL-MCNC: 100 MG/DL
TSH SERPL-ACNC: 1.06 UIU/ML
UROBILINOGEN URINE: NORMAL
VIT B12 SERPL-MCNC: 763 PG/ML
WBC # FLD AUTO: 6.71 K/UL

## 2020-02-24 PROCEDURE — 36415 COLL VENOUS BLD VENIPUNCTURE: CPT

## 2020-02-24 PROCEDURE — 82962 GLUCOSE BLOOD TEST: CPT

## 2020-02-24 PROCEDURE — 99205 OFFICE O/P NEW HI 60 MIN: CPT | Mod: 25

## 2020-02-25 LAB
CREAT SPEC-SCNC: 364 MG/DL
FRUCTOSAMINE SERPL-MCNC: 289 UMOL/L
MICROALBUMIN 24H UR DL<=1MG/L-MCNC: 8.9 MG/DL
MICROALBUMIN/CREAT 24H UR-RTO: 24 MG/G

## 2020-02-27 ENCOUNTER — APPOINTMENT (OUTPATIENT)
Dept: WOUND CARE | Facility: CLINIC | Age: 70
End: 2020-02-27
Payer: MEDICARE

## 2020-02-27 VITALS — HEART RATE: 66 BPM | SYSTOLIC BLOOD PRESSURE: 177 MMHG | DIASTOLIC BLOOD PRESSURE: 78 MMHG | TEMPERATURE: 97.9 F

## 2020-02-27 DIAGNOSIS — M86.171 OTHER ACUTE OSTEOMYELITIS, RIGHT ANKLE AND FOOT: ICD-10-CM

## 2020-02-27 PROCEDURE — 11042 DBRDMT SUBQ TIS 1ST 20SQCM/<: CPT

## 2020-02-27 NOTE — PLAN
[FreeTextEntry1] : 68 yo M w/ R foot 1st interspace ulcer and lateral 5th met head ulceration with bone exposure 5th met head now with significant improvement in appearance\par -Pt seen and evaluated\par -s/p right foot 5th met head resection in office, wound w/ improved appearance, exposed bone, no purulence or drainage, no acute signs of infection\par -Wound culture was growing pseudomonas \par -R foot 1st interspace wound is healthy and granular\par -Sharp excisional debridement of 5th met wound to level of but not beyond subQ using a sterile curette. Mechanical debridement using saline soaked gauze of both wounds.  \par -Continue daily dressing changes w/ santyl and packing in 5th met wound and santyl in 1st interspace wound \par -Refill Levaquin x10 days\par -RTC in 3 weeks would benefit from DM shoes custom molded/inserts, Rx written

## 2020-02-27 NOTE — PHYSICAL EXAM
[2+] : left 2+ [0] : right 0 [de-identified] : 100% granular, healthy base, no signs of infection [FreeTextEntry1] : 1st interspace wound [FreeTextEntry2] : 0.3 cm [FreeTextEntry3] : 0.4 cm [FreeTextEntry4] : 0.1 cm [de-identified] : s/p 5th met head resection, bone exposed, fibrotic tissue debrided although majority is granulation tissue at this time, no purulence or drainage, no acute signs of infection [de-identified] : Santyl  [FreeTextEntry7] : lateral fifth met [FreeTextEntry8] : 1.0 cm [FreeTextEntry9] : 1.1 cm [de-identified] : Santyl, packing [de-identified] : HEALED 2/6/20 [de-identified] : 0.5 cm [de-identified] : submet 1 wound [de-identified] : 0.2cm [de-identified] : 0.2cm [de-identified] : 0.3cm [TWNoteComboBox1] : Right [TWNoteComboBox6] : Diabetic [de-identified] : Mild [de-identified] : 100% [de-identified] : 4 [TWNoteComboBox9] : Right [de-identified] : FT [de-identified] : None [de-identified] : None [de-identified] : >75% [de-identified] : Yes [de-identified] : Right [de-identified] : Bone [de-identified] : <20% [de-identified] : Debridement performed of all devitalized tissue to bleeding viable tissue [de-identified] : Jovany

## 2020-02-27 NOTE — HISTORY OF PRESENT ILLNESS
[FreeTextEntry1] : 68 yo M pt presents to clinic in post op shoe w/ cc of R foot wound. Pt was recently discharged from [facility] on 12/3 briefly went to [facility] but has since been discharged. Pt states that the wound has been present since the end of September 2019. MR from [facility] shows that pt had OM of the sesamoids, proximal lateral asepct of the first proximal phalanx , and osteomyelitis w/ the lateral aspect of the fifth met head. pt is 11/27/19 s/p R foot wound debridement and graft application  w/ Dr. Sandhu at [facility] as well as s/p 11/25 RLE pop to PT bypass w/ Dr. Shanks. Finished PICC on 12/26/19 w/ Meropenem. Has recently seen Dr. Shanks who told them that the blood flow is still good. Reports A1c was 8% while in hosp. Home nursing has been coming daily to change dressing with Santyl. \par \par s/p 5th metatarsal head resection/bone debridement in the office 1 week ago. Wound culture obtained was growing pseudomonas. [facility] has been changing the dressing daily w/ santyl and silver alginate. \par \par Denies any N/V/F/C/SOB. No new changes, improving wound appearance.

## 2020-03-04 ENCOUNTER — APPOINTMENT (OUTPATIENT)
Dept: CARDIOLOGY | Facility: CLINIC | Age: 70
End: 2020-03-04
Payer: MEDICARE

## 2020-03-04 VITALS
HEIGHT: 68 IN | DIASTOLIC BLOOD PRESSURE: 74 MMHG | OXYGEN SATURATION: 98 % | WEIGHT: 230 LBS | BODY MASS INDEX: 34.86 KG/M2 | HEART RATE: 68 BPM | SYSTOLIC BLOOD PRESSURE: 169 MMHG

## 2020-03-04 PROCEDURE — 99204 OFFICE O/P NEW MOD 45 MIN: CPT

## 2020-03-04 NOTE — CONSULT LETTER
[Dear  ___] : Dear  [unfilled], [Consult Letter:] : I had the pleasure of evaluating your patient, [unfilled]. [Consult Closing:] : Thank you very much for allowing me to participate in the care of this patient.  If you have any questions, please do not hesitate to contact me. [Sincerely,] : Sincerely, [Please see my note below.] : Please see my note below. [FreeTextEntry3] : Latricia Retana MS. DO.\par Endocrinology\par [address] [place] [address]\[place] [address]\par Tel [phone]\par Fax [phone]\par

## 2020-03-04 NOTE — HISTORY OF PRESENT ILLNESS
[FreeTextEntry1] : Kirill is a 69 year old male with HTN, DM2 and CAD with stents (most recent 3 years ago), here to establish care.\par \par He is feeling well overall. He denies chest pain, difficulty breathing, palpitations, lower extremity swelling, orthopnea, PND, dizziness, lightheadedness, near syncope. He reports that his blood pressures are in the 140/90 range at home. His diabetes has been better controlled. His most recent LDL was 44.\par He was last admitted to the hospital in 11/2019, with a nonhealing right foot ulcer. On 11/25, he had a popliteal artery bypass to posterior tibial artery.\par \par He is currently taking amlodipine 10, HCTZ 25, lisinopril 40, Imdur 30 and metoprolol 50 b.i.d. He also remains on aspirin, Plavix, and atorvastatin.

## 2020-03-04 NOTE — REASON FOR VISIT
[Initial Evaluation] : an initial evaluation of [Coronary Artery Disease] : coronary artery disease [Abnormal ECG] : an abnormal ECG

## 2020-03-04 NOTE — ASSESSMENT
[FreeTextEntry1] : 1. DM 2- uncontrolled, uncomplicated\par Patient counseled on the importance of diabetic control and complications. Patient's diet and the necessary changes discussed. Concerned about the level of hypoglycemia patient has with Glimepiride especially with long periods of not eating and now that patient has better carb portion control. Also he has history of anemia and can't rely on hga1c fully.\par \par Will check fructosamine\par Will check micro/cre\par Inc Metformin to 1000 mg BID\par Will do a trial of stopping Glimepiride to see if patient can be controlled only on Metformin. If the sugars go back up, then will restart Glimepiride\par Check fsg BID\par Cont diabetic diet\par Cont exercise\par optho referral\par \par 2. HLD- stable\par Well controlled on Atorvastatin\par \par \par

## 2020-03-04 NOTE — HISTORY OF PRESENT ILLNESS
[FreeTextEntry1] : Mr. JOVANNI MARTINEZ  is a 69 year old male with past medical history of Diabetes Mellitus Type 2, Osteomyelitis of R foot s/p debridement (11/2019), CAD who presents for management of his diabetes. Patient has a history of uncontrolled diabetes. He had an ulcer in his Left foot as well which is now resolved. He used to not be cautious with his diet but since the R foot OM, he has changed his diet and monitors his blood sugar carefully. He had a bypass done on the right leg. Patient denies any history of diabetic retinopathy, nephropathy. He denies any blurry vision, polyuria, polydipsia and numbness/tingling in the extremities.\par \par \par POCT Glucose: 84  mg/dL\par \par \par \par Diabetes first diagnosed: 3 years ago\par Type: 2\par \par Current diabetic regimen:\par MEtformin 1000 mg BID\par Glimepiride 2 mg QD\par \par Other relevant medications:\par atorvastatin 20\par lisinopril 40\par \par Pt checks FSG     { 1-2   } times per day:\par \par FSG:\par Pre-breakfast: 110-120\par Pre-dinner:120-130\par \par Hypoglycemia: every now and then. Patient feels shaky and he will eat a banana or something sugary\par \par \par Diet:\par Breakfast: cereal, waffles\par Lunch: meat, vegetables, rice/pasta\par Dinner: meat, vegetables/salads\par Snacks: nuts, apples\par \par Exercise: none\par \par Urine micro: NA\par lipid profile: LDL 41 2/21/20\par last hba1c: 6.4% 2/21/20\par eye exam: none\par diabetic foot exam/podiatry: NA\par \par \par \par \par

## 2020-03-04 NOTE — PHYSICAL EXAM
[Alert] : alert [Well Nourished] : well nourished [No Acute Distress] : no acute distress [Normal Sclera/Conjunctiva] : normal sclera/conjunctiva [Well Developed] : well developed [Normal Oropharynx] : the oropharynx was normal [EOMI] : extra ocular movement intact [No Proptosis] : no proptosis [No Thyroid Nodules] : there were no palpable thyroid nodules [Thyroid Not Enlarged] : the thyroid was not enlarged [No Respiratory Distress] : no respiratory distress [No Accessory Muscle Use] : no accessory muscle use [Clear to Auscultation] : lungs were clear to auscultation bilaterally [Normal Rate] : heart rate was normal  [Normal S1, S2] : normal S1 and S2 [Pedal Pulses Normal] : the pedal pulses are present [Regular Rhythm] : with a regular rhythm [No Edema] : there was no peripheral edema [Not Tender] : non-tender [Normal Bowel Sounds] : normal bowel sounds [Soft] : abdomen soft [Post Cervical Nodes] : posterior cervical nodes [Not Distended] : not distended [Anterior Cervical Nodes] : anterior cervical nodes [Axillary Nodes] : axillary nodes [Spine Straight] : spine straight [No Spinal Tenderness] : no spinal tenderness [No Stigmata of Cushings Syndrome] : no stigmata of cushings syndrome [No Rash] : no rash [Normal Strength/Tone] : muscle strength and tone were normal [Normal Gait] : normal gait [Normal Reflexes] : deep tendon reflexes were 2+ and symmetric [No Tremors] : no tremors [Oriented x3] : oriented to person, place, and time [Left Foot Was Examined] : left foot ~C was examined [Normal] : normal [1+] : 1+ in the dorsalis pedis [Murmurs] : no murmurs [Acanthosis Nigricans] : no acanthosis nigricans [Diminished Throughout Both Feet] : normal tactile sensation with monofilament testing throughout both feet [de-identified] : R foot in bandages

## 2020-03-04 NOTE — PHYSICAL EXAM
[General Appearance - Well Developed] : well developed [Normal Appearance] : normal appearance [Well Groomed] : well groomed [General Appearance - Well Nourished] : well nourished [General Appearance - In No Acute Distress] : no acute distress [No Deformities] : no deformities [Normal Conjunctiva] : the conjunctiva exhibited no abnormalities [No Oral Pallor] : no oral pallor [Eyelids - No Xanthelasma] : the eyelids demonstrated no xanthelasmas [Normal Oral Mucosa] : normal oral mucosa [No Oral Cyanosis] : no oral cyanosis [Normal Jugular Venous V Waves Present] : normal jugular venous V waves present [Normal Jugular Venous A Waves Present] : normal jugular venous A waves present [No Jugular Venous Tamayo A Waves] : no jugular venous tamayo A waves [Normal S1] : normal S1 [Normal Rate] : normal [Normal S2] : normal S2 [S3] : no S3 [S4] : no S4 [I] : a grade 1 [Right Carotid Bruit] : no bruit heard over the right carotid [Left Carotid Bruit] : no bruit heard over the left carotid [Right Femoral Bruit] : no bruit heard over the right femoral artery [Left Femoral Bruit] : no bruit heard over the left femoral artery [2+] : right 2+ [No Abnormalities] : the abdominal aorta was not enlarged and no bruit was heard [___ +] : [unfilled]U+ pitting edema to the right ankle [Exaggerated Use Of Accessory Muscles For Inspiration] : no accessory muscle use [Respiration, Rhythm And Depth] : normal respiratory rhythm and effort [Abdomen Soft] : soft [Auscultation Breath Sounds / Voice Sounds] : lungs were clear to auscultation bilaterally [Abdomen Tenderness] : non-tender [Abdomen Mass (___ Cm)] : no abdominal mass palpated [FreeTextEntry1] : wearing boot on right foot [Petechial Hemorrhages (___cm)] : no petechial hemorrhages [Cyanosis, Localized] : no localized cyanosis [Nail Clubbing] : no clubbing of the fingernails [Skin Color & Pigmentation] : normal skin color and pigmentation [No Venous Stasis] : no venous stasis [] : no rash [No Xanthoma] : no  xanthoma was observed [Skin Lesions] : no skin lesions [No Skin Ulcers] : no skin ulcer [Affect] : the affect was normal [Oriented To Time, Place, And Person] : oriented to person, place, and time [No Anxiety] : not feeling anxious [Mood] : the mood was normal

## 2020-03-09 LAB — HEMOCCULT STL QL IA: NEGATIVE

## 2020-04-02 ENCOUNTER — APPOINTMENT (OUTPATIENT)
Dept: WOUND CARE | Facility: CLINIC | Age: 70
End: 2020-04-02

## 2020-04-02 NOTE — HISTORY OF PRESENT ILLNESS
[Home] : at home, [unfilled] , at the time of the visit. [Medical Office: ([facility])___] : at ~his/her~ medical office located in [place] [Patient] : the patient [Self] : self [FreeTextEntry4] : Pts. wife [FreeTextEntry1] : 70 yo M pt presents to clinic in post op shoe w/ cc of R foot wound. Pt was recently discharged from [facility] on 12/3 briefly went to [facility] but has since been discharged. Pt states that the wound has been present since the end of September 2019. MR from [facility] shows that pt had OM of the sesamoids, proximal lateral asepct of the first proximal phalanx , and osteomyelitis w/ the lateral aspect of the fifth met head. pt is 11/27/19 s/p R foot wound debridement and graft application  w/ Dr. Sandhu at [facility] as well as s/p 11/25 RLE pop to PT bypass w/ Dr. Shanks. Finished PICC on 12/26/19 w/ Meropenem. Has recently seen Dr. Shanks who told them that the blood flow is still good. Reports A1c was 8% while in hosp. Home nursing has been coming daily to change dressing with Santyl. \par \par s/p 5th metatarsal head resection/bone debridement in the office 1 week ago. Wound culture obtained was growing pseudomonas. [facility] has been changing the dressing daily w/ santyl and silver alginate. \par \par Denies any N/V/F/C/SOB. No new changes, improving wound appearance.

## 2020-04-02 NOTE — PHYSICAL EXAM
[2+] : left 2+ [0] : left 0 [de-identified] : 100% granular, healthy base, no signs of infection [FreeTextEntry1] : 1st interspace wound [FreeTextEntry2] : 0.3 cm [FreeTextEntry3] : 0.4 cm [FreeTextEntry4] : 0.1 cm [de-identified] : aliginate [de-identified] : s/p 5th met head resection, bone exposed, fibrotic tissue debrided although majority is granulation tissue at this time, no purulence or drainage, no acute signs of infection [FreeTextEntry7] : lateral fifth met [FreeTextEntry8] : 0.5 cm [FreeTextEntry9] : 0.5 cm [de-identified] : 00cm [de-identified] : alginate [de-identified] : HEALED 2/6/20 [de-identified] : ulcer closed [de-identified] : 0 [de-identified] : 0 [de-identified] : 0 [TWNoteComboBox1] : Right [TWNoteComboBox6] : Diabetic [de-identified] : Mild [de-identified] : 100% [TWNoteComboBox9] : Right [de-identified] : 4 [de-identified] : FT [de-identified] : None [de-identified] : None [de-identified] : >75% [de-identified] : Yes [de-identified] : Bone [de-identified] : Right [de-identified] : <20% [de-identified] : Jovany [de-identified] : Debridement performed of all devitalized tissue to bleeding viable tissue

## 2020-04-02 NOTE — PLAN
[FreeTextEntry1] : 68 yo M w/ R foot 1st interspace ulcer and lateral 5th met head ulceration with bone exposure 5th met head now with significant improvement in appearance\par -Pt seen and evaluated\par -s/p right foot 5th met head resection in office, wound w/ improved appearance, exposed bone, no purulence or drainage, no acute signs of infection\par -Wound culture was growing pseudomonas \par -R foot 1st interspace wound is healthy and granular\par wounds are closing unremarkably. no drainage and no SOI noted (cell phone photos sent in today and spoke with pt directly.\par  Wife changes dressing 2xs week and home nursing weekly.\par  PROGNOSIS: wounds are healing unremarkably. no need for surgical debridement. Telemedicine visit in 2 weeks and likely d/c. \par  \par \par -RTC in 3 weeks would benefit from DM shoes custom molded/inserts, Rx written

## 2020-05-14 ENCOUNTER — APPOINTMENT (OUTPATIENT)
Dept: VASCULAR SURGERY | Facility: CLINIC | Age: 70
End: 2020-05-14

## 2020-06-01 ENCOUNTER — APPOINTMENT (OUTPATIENT)
Dept: ENDOCRINOLOGY | Facility: CLINIC | Age: 70
End: 2020-06-01

## 2020-06-01 ENCOUNTER — APPOINTMENT (OUTPATIENT)
Dept: VASCULAR SURGERY | Facility: CLINIC | Age: 70
End: 2020-06-01

## 2020-06-04 ENCOUNTER — APPOINTMENT (OUTPATIENT)
Dept: VASCULAR SURGERY | Facility: CLINIC | Age: 70
End: 2020-06-04

## 2020-06-05 ENCOUNTER — APPOINTMENT (OUTPATIENT)
Dept: INTERNAL MEDICINE | Facility: CLINIC | Age: 70
End: 2020-06-05
Payer: MEDICARE

## 2020-06-05 VITALS
HEART RATE: 73 BPM | TEMPERATURE: 98 F | HEIGHT: 68 IN | OXYGEN SATURATION: 96 % | RESPIRATION RATE: 14 BRPM | WEIGHT: 230 LBS | BODY MASS INDEX: 34.86 KG/M2 | SYSTOLIC BLOOD PRESSURE: 180 MMHG | DIASTOLIC BLOOD PRESSURE: 80 MMHG

## 2020-06-05 VITALS — DIASTOLIC BLOOD PRESSURE: 70 MMHG | SYSTOLIC BLOOD PRESSURE: 152 MMHG

## 2020-06-05 DIAGNOSIS — I10 ESSENTIAL (PRIMARY) HYPERTENSION: ICD-10-CM

## 2020-06-05 DIAGNOSIS — S69.91XA UNSPECIFIED INJURY OF RIGHT WRIST, HAND AND FINGER(S), INITIAL ENCOUNTER: ICD-10-CM

## 2020-06-05 DIAGNOSIS — Z86.79 PERSONAL HISTORY OF OTHER DISEASES OF THE CIRCULATORY SYSTEM: ICD-10-CM

## 2020-06-05 DIAGNOSIS — Z20.828 CONTACT WITH AND (SUSPECTED) EXPOSURE TO OTHER VIRAL COMMUNICABLE DISEASES: ICD-10-CM

## 2020-06-05 PROCEDURE — 36415 COLL VENOUS BLD VENIPUNCTURE: CPT

## 2020-06-05 PROCEDURE — 99214 OFFICE O/P EST MOD 30 MIN: CPT | Mod: 25

## 2020-06-05 RX ORDER — GLIMEPIRIDE 2 MG/1
2 TABLET ORAL
Qty: 90 | Refills: 1 | Status: DISCONTINUED | COMMUNITY
End: 2020-06-05

## 2020-06-05 RX ORDER — ISOSORBIDE MONONITRATE 30 MG/1
30 TABLET, EXTENDED RELEASE ORAL
Qty: 90 | Refills: 3 | Status: DISCONTINUED | COMMUNITY
End: 2020-06-05

## 2020-06-05 RX ORDER — COLLAGENASE SANTYL 250 [ARB'U]/G
250 OINTMENT TOPICAL DAILY
Qty: 1 | Refills: 2 | Status: DISCONTINUED | COMMUNITY
Start: 2020-01-16 | End: 2020-06-05

## 2020-06-05 RX ORDER — LEVOFLOXACIN 500 MG/1
500 TABLET, FILM COATED ORAL DAILY
Qty: 10 | Refills: 0 | Status: DISCONTINUED | COMMUNITY
End: 2020-06-05

## 2020-06-05 RX ORDER — COLLAGENASE SANTYL 250 [ARB'U]/G
250 OINTMENT TOPICAL DAILY
Qty: 90 | Refills: 5 | Status: DISCONTINUED | COMMUNITY
Start: 2017-02-08 | End: 2020-06-05

## 2020-06-05 NOTE — PHYSICAL EXAM
[No Acute Distress] : no acute distress [Well Developed] : well developed [Well Nourished] : well nourished [Well-Appearing] : well-appearing [Normal Sclera/Conjunctiva] : normal sclera/conjunctiva [PERRL] : pupils equal round and reactive to light [EOMI] : extraocular movements intact [Normal Outer Ear/Nose] : the outer ears and nose were normal in appearance [No JVD] : no jugular venous distention [Normal Oropharynx] : the oropharynx was normal [No Lymphadenopathy] : no lymphadenopathy [Supple] : supple [Thyroid Normal, No Nodules] : the thyroid was normal and there were no nodules present [No Respiratory Distress] : no respiratory distress  [No Accessory Muscle Use] : no accessory muscle use [Clear to Auscultation] : lungs were clear to auscultation bilaterally [Normal Rate] : normal rate  [Regular Rhythm] : with a regular rhythm [Normal S1, S2] : normal S1 and S2 [No Carotid Bruits] : no carotid bruits [No Murmur] : no murmur heard [No Abdominal Bruit] : a ~M bruit was not heard ~T in the abdomen [No Varicosities] : no varicosities [Pedal Pulses Present] : the pedal pulses are present [No Edema] : there was no peripheral edema [No Palpable Aorta] : no palpable aorta [No Extremity Clubbing/Cyanosis] : no extremity clubbing/cyanosis [Soft] : abdomen soft [Non Tender] : non-tender [Non-distended] : non-distended [No Masses] : no abdominal mass palpated [No HSM] : no HSM [Normal Bowel Sounds] : normal bowel sounds [Normal Posterior Cervical Nodes] : no posterior cervical lymphadenopathy [Normal Anterior Cervical Nodes] : no anterior cervical lymphadenopathy [No Joint Swelling] : no joint swelling [No CVA Tenderness] : no CVA  tenderness [No Spinal Tenderness] : no spinal tenderness [Grossly Normal Strength/Tone] : grossly normal strength/tone [No Rash] : no rash [Coordination Grossly Intact] : coordination grossly intact [No Focal Deficits] : no focal deficits [Normal Gait] : normal gait [Normal Affect] : the affect was normal [Deep Tendon Reflexes (DTR)] : deep tendon reflexes were 2+ and symmetric [Normal Insight/Judgement] : insight and judgment were intact

## 2020-06-05 NOTE — ASSESSMENT
[FreeTextEntry1] : BP elevated.\par Pt never increased Isosorbide-ER from 30mg QD to 60mg QD as recommended by Dr. Jackman, Cardiology

## 2020-06-05 NOTE — HISTORY OF PRESENT ILLNESS
[de-identified] : Right hand is somewhat swollen for 6 months. Has mild difficulty making a fist\par Diabetic foot ulcer has resolved.\par Fingerstick averaging around 140\par No longer on glimipiride.\par Metformin was increased 1000mg BID [FreeTextEntry1] : Pt is overall feeling well.\par Pt reports that his diabetic ulcer has resolved.\par Pt reports some swelling of the right hand. It is a little difficult to make a fist.

## 2020-06-05 NOTE — PLAN
[FreeTextEntry1] : Increase Isosorbide-ER to 60mg QD\par Continue other meds\par RTO 2 weeks to check BP\par See Orthopedic hand specialist

## 2020-06-11 ENCOUNTER — APPOINTMENT (OUTPATIENT)
Dept: VASCULAR SURGERY | Facility: CLINIC | Age: 70
End: 2020-06-11
Payer: MEDICARE

## 2020-06-11 VITALS
HEART RATE: 61 BPM | DIASTOLIC BLOOD PRESSURE: 78 MMHG | BODY MASS INDEX: 34.86 KG/M2 | SYSTOLIC BLOOD PRESSURE: 179 MMHG | TEMPERATURE: 98.1 F | HEIGHT: 68 IN | WEIGHT: 230 LBS

## 2020-06-11 PROCEDURE — 93926 LOWER EXTREMITY STUDY: CPT

## 2020-06-11 PROCEDURE — 99213 OFFICE O/P EST LOW 20 MIN: CPT

## 2020-06-11 NOTE — HISTORY OF PRESENT ILLNESS
[FreeTextEntry1] : 69 year old man s/p R pop to PT bypass with RGSV on 11/25/2019 for right foot wound and osteomyelitis.  [de-identified] : Doing well since last visit.\par Right foot wounds have healed completely at this time.\par No complaints. Denies claudication.

## 2020-06-11 NOTE — ASSESSMENT
[FreeTextEntry1] : JOVANNI MARTINEZ is a 69 year old man s/p R fem-PT bypass for tissue loss and osteomyelitis, doing well.\par \par Return in 6 months for bypass surveillance duplex.\par Instructed patient to continue with foot care daily. \par

## 2020-06-16 LAB
ALBUMIN SERPL ELPH-MCNC: 4.6 G/DL
ALP BLD-CCNC: 57 U/L
ALT SERPL-CCNC: 10 U/L
ANION GAP SERPL CALC-SCNC: 16 MMOL/L
AST SERPL-CCNC: 13 U/L
BASOPHILS # BLD AUTO: 0.04 K/UL
BASOPHILS NFR BLD AUTO: 0.7 %
BILIRUB SERPL-MCNC: 0.5 MG/DL
BUN SERPL-MCNC: 21 MG/DL
CALCIUM SERPL-MCNC: 9.5 MG/DL
CHLORIDE SERPL-SCNC: 102 MMOL/L
CHOLEST SERPL-MCNC: 113 MG/DL
CHOLEST/HDLC SERPL: 2.8 RATIO
CO2 SERPL-SCNC: 20 MMOL/L
CREAT SERPL-MCNC: 0.91 MG/DL
CREAT SPEC-SCNC: 183 MG/DL
EOSINOPHIL # BLD AUTO: 0.1 K/UL
EOSINOPHIL NFR BLD AUTO: 1.7 %
ESTIMATED AVERAGE GLUCOSE: 174 MG/DL
GLUCOSE SERPL-MCNC: 212 MG/DL
HBA1C MFR BLD HPLC: 7.7 %
HCT VFR BLD CALC: 36.1 %
HDLC SERPL-MCNC: 40 MG/DL
HGB BLD-MCNC: 12.2 G/DL
IMM GRANULOCYTES NFR BLD AUTO: 0.3 %
LDLC SERPL CALC-MCNC: 50 MG/DL
LYMPHOCYTES # BLD AUTO: 1.65 K/UL
LYMPHOCYTES NFR BLD AUTO: 28 %
MAN DIFF?: NORMAL
MCHC RBC-ENTMCNC: 28.2 PG
MCHC RBC-ENTMCNC: 33.8 GM/DL
MCV RBC AUTO: 83.4 FL
MICROALBUMIN 24H UR DL<=1MG/L-MCNC: 65.2 MG/DL
MICROALBUMIN/CREAT 24H UR-RTO: 356 MG/G
MONOCYTES # BLD AUTO: 0.39 K/UL
MONOCYTES NFR BLD AUTO: 6.6 %
NEUTROPHILS # BLD AUTO: 3.7 K/UL
NEUTROPHILS NFR BLD AUTO: 62.7 %
PLATELET # BLD AUTO: 193 K/UL
POTASSIUM SERPL-SCNC: 4.9 MMOL/L
PROT SERPL-MCNC: 6.7 G/DL
RBC # BLD: 4.33 M/UL
RBC # FLD: 13.2 %
SARS-COV-2 IGG SERPL IA-ACNC: 0.1 INDEX
SARS-COV-2 IGG SERPL QL IA: NEGATIVE
SODIUM SERPL-SCNC: 138 MMOL/L
TRIGL SERPL-MCNC: 108 MG/DL
WBC # FLD AUTO: 5.9 K/UL

## 2020-07-06 ENCOUNTER — APPOINTMENT (OUTPATIENT)
Dept: INTERNAL MEDICINE | Facility: CLINIC | Age: 70
End: 2020-07-06

## 2020-07-09 ENCOUNTER — APPOINTMENT (OUTPATIENT)
Dept: INTERNAL MEDICINE | Facility: CLINIC | Age: 70
End: 2020-07-09
Payer: MEDICARE

## 2020-07-09 VITALS
HEIGHT: 68 IN | TEMPERATURE: 98.3 F | DIASTOLIC BLOOD PRESSURE: 70 MMHG | WEIGHT: 228 LBS | RESPIRATION RATE: 14 BRPM | SYSTOLIC BLOOD PRESSURE: 140 MMHG | HEART RATE: 74 BPM | OXYGEN SATURATION: 98 % | BODY MASS INDEX: 34.56 KG/M2

## 2020-07-09 DIAGNOSIS — I70.90 UNSPECIFIED ATHEROSCLEROSIS: ICD-10-CM

## 2020-07-09 PROCEDURE — 99213 OFFICE O/P EST LOW 20 MIN: CPT

## 2020-07-09 NOTE — PHYSICAL EXAM
[Well Nourished] : well nourished [No Acute Distress] : no acute distress [Well Developed] : well developed [Normal Sclera/Conjunctiva] : normal sclera/conjunctiva [Well-Appearing] : well-appearing [EOMI] : extraocular movements intact [PERRL] : pupils equal round and reactive to light [Normal Outer Ear/Nose] : the outer ears and nose were normal in appearance [Normal Oropharynx] : the oropharynx was normal [No JVD] : no jugular venous distention [No Lymphadenopathy] : no lymphadenopathy [Supple] : supple [No Respiratory Distress] : no respiratory distress  [Thyroid Normal, No Nodules] : the thyroid was normal and there were no nodules present [No Accessory Muscle Use] : no accessory muscle use [Clear to Auscultation] : lungs were clear to auscultation bilaterally [Normal Rate] : normal rate  [Regular Rhythm] : with a regular rhythm [Normal S1, S2] : normal S1 and S2 [No Carotid Bruits] : no carotid bruits [No Murmur] : no murmur heard [No Abdominal Bruit] : a ~M bruit was not heard ~T in the abdomen [No Varicosities] : no varicosities [Pedal Pulses Present] : the pedal pulses are present [No Palpable Aorta] : no palpable aorta [No Edema] : there was no peripheral edema [Soft] : abdomen soft [No Extremity Clubbing/Cyanosis] : no extremity clubbing/cyanosis [Non Tender] : non-tender [No Masses] : no abdominal mass palpated [Non-distended] : non-distended [No HSM] : no HSM [Normal Bowel Sounds] : normal bowel sounds [Normal Posterior Cervical Nodes] : no posterior cervical lymphadenopathy [Normal Anterior Cervical Nodes] : no anterior cervical lymphadenopathy [No Spinal Tenderness] : no spinal tenderness [No CVA Tenderness] : no CVA  tenderness [No Joint Swelling] : no joint swelling [No Rash] : no rash [Grossly Normal Strength/Tone] : grossly normal strength/tone [No Focal Deficits] : no focal deficits [Coordination Grossly Intact] : coordination grossly intact [Normal Gait] : normal gait [Normal Affect] : the affect was normal [Deep Tendon Reflexes (DTR)] : deep tendon reflexes were 2+ and symmetric [Normal Insight/Judgement] : insight and judgment were intact

## 2020-07-21 ENCOUNTER — APPOINTMENT (OUTPATIENT)
Dept: INTERNAL MEDICINE | Facility: CLINIC | Age: 70
End: 2020-07-21
Payer: MEDICARE

## 2020-07-21 VITALS
SYSTOLIC BLOOD PRESSURE: 150 MMHG | HEART RATE: 70 BPM | TEMPERATURE: 98 F | WEIGHT: 228 LBS | BODY MASS INDEX: 34.56 KG/M2 | HEIGHT: 68 IN | OXYGEN SATURATION: 98 % | DIASTOLIC BLOOD PRESSURE: 70 MMHG | RESPIRATION RATE: 14 BRPM

## 2020-07-21 VITALS — SYSTOLIC BLOOD PRESSURE: 144 MMHG | DIASTOLIC BLOOD PRESSURE: 64 MMHG

## 2020-07-21 PROCEDURE — 99214 OFFICE O/P EST MOD 30 MIN: CPT

## 2020-07-21 NOTE — PLAN
[FreeTextEntry1] : I will defer to Cardiology and Vascular Surgery on how to manage plavix and aspirin in the perioperative period.\par I have asked the pt to have Dr. Goldberg discuss this matter with pt's cardiologist and vascular surgeon,\par Routine perioperative hemodynamic monitoring is recommended.

## 2020-07-21 NOTE — HISTORY OF PRESENT ILLNESS
[No Pertinent Pulmonary History] : no history of asthma, COPD, sleep apnea, or smoking [Coronary Artery Disease] : coronary artery disease [No Adverse Anesthesia Reaction] : no adverse anesthesia reaction in self or family member [Chronic Kidney Disease] : no chronic kidney disease [Chronic Anticoagulation] : chronic anticoagulation [(Patient denies any chest pain, claudication, dyspnea on exertion, orthopnea, palpitations or syncope)] : Patient denies any chest pain, claudication, dyspnea on exertion, orthopnea, palpitations or syncope [Diabetes] : diabetes [FreeTextEntry3] : Dr. Goldberg [FreeTextEntry1] : Right Vitrectomy for traction retinal detachment [FreeTextEntry4] : JOVANNI VARNERBELIA,  54, is here for presurgical evaluation.\par Pt is overall feeling well,\par Pt denies chest pain, dyspnea, palpitations, lightheadedness, fever, chills, or sweats. [FreeTextEntry2] : 7/29/20

## 2020-07-21 NOTE — ASSESSMENT
[Patient Optimized for Surgery] : Patient optimized for surgery [No Further Testing Recommended] : no further testing recommended [FreeTextEntry4] : EKG from 2/21/20 shows SR, RBBB.\par There are no medical contraindications to proceeding with the planned surgery

## 2020-09-17 ENCOUNTER — RX RENEWAL (OUTPATIENT)
Age: 70
End: 2020-09-17

## 2020-11-09 ENCOUNTER — RX RENEWAL (OUTPATIENT)
Age: 70
End: 2020-11-09

## 2020-12-07 ENCOUNTER — RX RENEWAL (OUTPATIENT)
Age: 70
End: 2020-12-07

## 2020-12-10 ENCOUNTER — APPOINTMENT (OUTPATIENT)
Dept: VASCULAR SURGERY | Facility: CLINIC | Age: 70
End: 2020-12-10

## 2021-01-07 ENCOUNTER — APPOINTMENT (OUTPATIENT)
Dept: VASCULAR SURGERY | Facility: CLINIC | Age: 71
End: 2021-01-07
Payer: MEDICARE

## 2021-01-07 VITALS
HEART RATE: 63 BPM | DIASTOLIC BLOOD PRESSURE: 76 MMHG | WEIGHT: 236 LBS | TEMPERATURE: 97.7 F | SYSTOLIC BLOOD PRESSURE: 190 MMHG | BODY MASS INDEX: 37.04 KG/M2 | HEIGHT: 67 IN

## 2021-01-07 PROCEDURE — 93922 UPR/L XTREMITY ART 2 LEVELS: CPT

## 2021-01-07 PROCEDURE — 99213 OFFICE O/P EST LOW 20 MIN: CPT

## 2021-01-07 PROCEDURE — 93926 LOWER EXTREMITY STUDY: CPT

## 2021-01-14 NOTE — ASSESSMENT
[FreeTextEntry1] : JOVANNI MARTINEZ is a 70 year old male s/p right pop-PT bypass w/ rgsv, threatened based on velocities.\par \par - Reviewed results of ultrasound with patient and wife. \par - Given elevated velocities at anastomosis with decreased velocities of distal graft, recommend angiogram with intervention to preserve bypass graft. \par - Risks, benefits, alteratives discussed with the patient. All questions answered. The patient elected to proceed with procedure. Will obtain clearance from patient's pcp/cardiologist.

## 2021-01-14 NOTE — PHYSICAL EXAM
[JVD] : no jugular venous distention  [Normal Breath Sounds] : Normal breath sounds [Respiratory Effort] : normal respiratory effort [Normal Heart Sounds] : normal heart sounds [Normal Rate and Rhythm] : normal rate and rhythm [2+] : right 2+ [0] : left 0 [Ankle Swelling (On Exam)] : not present [Varicose Veins Of Lower Extremities] : not present [] : not present [No Rash or Lesion] : No rash or lesion [Calm] : calm [de-identified] : Well-appearing  [de-identified] : EOMI, anicteric  [de-identified] : soft, nt, nd  [de-identified] : motor and sensation intact in all four extremities \par well healed incisions right leg [de-identified] : A&Ox4

## 2021-01-14 NOTE — DATA REVIEWED
[FreeTextEntry1] : 1/7/2021 - RLE duplex\par Patent right-PT bypass with severe stenosis at distal anastomosis. \par \par 1/7/2021 - TBI\par R TBI 0.52, toe pressure 78\par L HUANG 1.33, TBI 0.49, toe pressure 74

## 2021-01-14 NOTE — HISTORY OF PRESENT ILLNESS
[FreeTextEntry1] : JOVANNI MARTINEZ is a 70 year old male s/p right Pop to PT bypass with RGSV on 11/25/2019 for right foot wound and osteomyelitis.  \par \par 6/11/2020 - Doing well since last visit.\par Right foot wounds have healed completely at this time.\par No complaints. Denies claudication.  [de-identified] : 1/7/2021 - Doing well since visit. Patient reports stable mild numbness at the forefoot. Denies lower extremity pain. No fever or chills.

## 2021-01-20 ENCOUNTER — NON-APPOINTMENT (OUTPATIENT)
Age: 71
End: 2021-01-20

## 2021-01-20 ENCOUNTER — APPOINTMENT (OUTPATIENT)
Dept: CARDIOLOGY | Facility: CLINIC | Age: 71
End: 2021-01-20
Payer: MEDICARE

## 2021-01-20 VITALS
HEART RATE: 66 BPM | DIASTOLIC BLOOD PRESSURE: 98 MMHG | HEIGHT: 67 IN | SYSTOLIC BLOOD PRESSURE: 218 MMHG | OXYGEN SATURATION: 100 % | BODY MASS INDEX: 36.88 KG/M2 | WEIGHT: 235 LBS

## 2021-01-20 VITALS — DIASTOLIC BLOOD PRESSURE: 98 MMHG | SYSTOLIC BLOOD PRESSURE: 168 MMHG

## 2021-01-20 PROCEDURE — 99214 OFFICE O/P EST MOD 30 MIN: CPT

## 2021-01-20 PROCEDURE — 93000 ELECTROCARDIOGRAM COMPLETE: CPT

## 2021-01-20 NOTE — REASON FOR VISIT
[Follow-Up - Clinic] : a clinic follow-up of [Abnormal ECG] : an abnormal ECG [Coronary Artery Disease] : coronary artery disease [Spouse] : spouse

## 2021-01-24 ENCOUNTER — APPOINTMENT (OUTPATIENT)
Dept: DISASTER EMERGENCY | Facility: CLINIC | Age: 71
End: 2021-01-24

## 2021-01-25 NOTE — DISCUSSION/SUMMARY
[___ Month(s)] : [unfilled] month(s) [FreeTextEntry1] : Kirill has a history of hypertension, diabetes, CAD with stents, and peripheral vascular disease status post right lower extremity bypass in 11/2019.\par \par From a cardiac perspective, he is feeling well today. His blood pressure is significantly elevated, though he ran out of his Imdur and Norvasc. His physical exam is unremarkable. His EKG demonstrates a sinus rhythm with a right bundle branch block, unchanged from 2020.\par \par For now, he will continue his current blood pressure regimen and restart his Imdur and Norvasc. He  will start monitoring his blood pressures at home. He will remain on aspirin, Plavix, and a statin, along with Vascpea. His most recent LDL was at goal.\par \par I stressed the importance of diet, exercise, and weight loss, to reduce his overall cardiovascular risk. If his BP is better controlled by re-adding his prior meds, there will be no cardiac contraindication to proceeding with angioplasty of his lower extremity. Routine cardiac monitoring is recommended. I will see him again in 3 months.

## 2021-01-25 NOTE — PHYSICAL EXAM
[General Appearance - Well Developed] : well developed [Normal Appearance] : normal appearance [Well Groomed] : well groomed [General Appearance - Well Nourished] : well nourished [No Deformities] : no deformities [General Appearance - In No Acute Distress] : no acute distress [Normal Conjunctiva] : the conjunctiva exhibited no abnormalities [Eyelids - No Xanthelasma] : the eyelids demonstrated no xanthelasmas [Normal Oral Mucosa] : normal oral mucosa [No Oral Pallor] : no oral pallor [No Oral Cyanosis] : no oral cyanosis [Normal Jugular Venous A Waves Present] : normal jugular venous A waves present [Normal Jugular Venous V Waves Present] : normal jugular venous V waves present [No Jugular Venous Tamayo A Waves] : no jugular venous tamayo A waves [Respiration, Rhythm And Depth] : normal respiratory rhythm and effort [Exaggerated Use Of Accessory Muscles For Inspiration] : no accessory muscle use [Auscultation Breath Sounds / Voice Sounds] : lungs were clear to auscultation bilaterally [Abdomen Soft] : soft [Abdomen Tenderness] : non-tender [Abdomen Mass (___ Cm)] : no abdominal mass palpated [Abnormal Walk] : normal gait [Nail Clubbing] : no clubbing of the fingernails [Cyanosis, Localized] : no localized cyanosis [Petechial Hemorrhages (___cm)] : no petechial hemorrhages [Skin Color & Pigmentation] : normal skin color and pigmentation [] : no rash [No Venous Stasis] : no venous stasis [Skin Lesions] : no skin lesions [No Skin Ulcers] : no skin ulcer [No Xanthoma] : no  xanthoma was observed [Oriented To Time, Place, And Person] : oriented to person, place, and time [Affect] : the affect was normal [Mood] : the mood was normal [No Anxiety] : not feeling anxious [Normal Rate] : normal [Normal S1] : normal S1 [Normal S2] : normal S2 [I] : a grade 1 [2+] : left 2+ [No Abnormalities] : the abdominal aorta was not enlarged and no bruit was heard [___ +] : bilateral [unfilled]U+ pretibial pitting edema [S3] : no S3 [S4] : no S4 [Right Carotid Bruit] : no bruit heard over the right carotid [Left Carotid Bruit] : no bruit heard over the left carotid [Right Femoral Bruit] : no bruit heard over the right femoral artery [Left Femoral Bruit] : no bruit heard over the left femoral artery

## 2021-01-25 NOTE — HISTORY OF PRESENT ILLNESS
[FreeTextEntry1] : Kirill is a 70 year old male with HTN, DM2 and CAD with stents (most recent 3-4 years ago), here for follow up. \par \par I last saw him in 3/2020.\par He is feeling well overall. He denies chest pain, difficulty breathing, palpitations, lower extremity swelling, orthopnea, PND, dizziness, lightheadedness, near syncope. He reports that his blood pressures are in the 140/ range at home. His diabetes has been better controlled. His most recent LDL was 50.\par He was last admitted to the hospital in 11/2019, with a nonhealing right foot ulcer. On 11/25, he had a popliteal artery bypass to posterior tibial artery.\par \par He is currently taking amlodipine 10, HCTZ 25, lisinopril 40, Imdur 30 and metoprolol 50 b.i.d. He also remains on aspirin, Plavix, and atorvastatin.\par \par He is requesting cardiac clearance prior to angiogram/angioplasty of his LLE.

## 2021-01-25 NOTE — ADDENDUM
[FreeTextEntry1] : Spoke with patient and BP is much better controlled. No cardiac contraindication to proceeding.

## 2021-01-26 LAB — SARS-COV-2 N GENE NPH QL NAA+PROBE: NOT DETECTED

## 2021-01-27 ENCOUNTER — APPOINTMENT (OUTPATIENT)
Dept: VASCULAR SURGERY | Facility: HOSPITAL | Age: 71
End: 2021-01-27

## 2021-01-27 ENCOUNTER — OUTPATIENT (OUTPATIENT)
Dept: OUTPATIENT SERVICES | Facility: HOSPITAL | Age: 71
LOS: 1 days | Discharge: HOME | End: 2021-01-27
Payer: MEDICARE

## 2021-01-27 DIAGNOSIS — Z98.890 OTHER SPECIFIED POSTPROCEDURAL STATES: Chronic | ICD-10-CM

## 2021-01-27 DIAGNOSIS — I70.90 UNSPECIFIED ATHEROSCLEROSIS: ICD-10-CM

## 2021-01-27 LAB
A1C WITH ESTIMATED AVERAGE GLUCOSE RESULT: 6.3 % — HIGH (ref 4–5.6)
ANION GAP SERPL CALC-SCNC: 11 MMOL/L — SIGNIFICANT CHANGE UP (ref 7–14)
BUN SERPL-MCNC: 30 MG/DL — HIGH (ref 7–23)
CALCIUM SERPL-MCNC: 9.6 MG/DL — SIGNIFICANT CHANGE UP (ref 8.4–10.5)
CHLORIDE SERPL-SCNC: 103 MMOL/L — SIGNIFICANT CHANGE UP (ref 98–107)
CO2 SERPL-SCNC: 27 MMOL/L — SIGNIFICANT CHANGE UP (ref 22–31)
CREAT SERPL-MCNC: 0.99 MG/DL — SIGNIFICANT CHANGE UP (ref 0.5–1.3)
ESTIMATED AVERAGE GLUCOSE: 134 MG/DL — HIGH (ref 68–114)
GLUCOSE BLDC GLUCOMTR-MCNC: 145 MG/DL — HIGH (ref 70–99)
GLUCOSE SERPL-MCNC: 158 MG/DL — HIGH (ref 70–99)
HCT VFR BLD CALC: 43.4 % — SIGNIFICANT CHANGE UP (ref 39–50)
HGB BLD-MCNC: 14.1 G/DL — SIGNIFICANT CHANGE UP (ref 13–17)
MCHC RBC-ENTMCNC: 27.2 PG — SIGNIFICANT CHANGE UP (ref 27–34)
MCHC RBC-ENTMCNC: 32.5 GM/DL — SIGNIFICANT CHANGE UP (ref 32–36)
MCV RBC AUTO: 83.8 FL — SIGNIFICANT CHANGE UP (ref 80–100)
NRBC # BLD: 0 /100 WBCS — SIGNIFICANT CHANGE UP
NRBC # FLD: 0 K/UL — SIGNIFICANT CHANGE UP
PLATELET # BLD AUTO: 191 K/UL — SIGNIFICANT CHANGE UP (ref 150–400)
POTASSIUM SERPL-MCNC: 4.5 MMOL/L — SIGNIFICANT CHANGE UP (ref 3.5–5.3)
POTASSIUM SERPL-SCNC: 4.5 MMOL/L — SIGNIFICANT CHANGE UP (ref 3.5–5.3)
RBC # BLD: 5.18 M/UL — SIGNIFICANT CHANGE UP (ref 4.2–5.8)
RBC # FLD: 13.4 % — SIGNIFICANT CHANGE UP (ref 10.3–14.5)
SODIUM SERPL-SCNC: 141 MMOL/L — SIGNIFICANT CHANGE UP (ref 135–145)
WBC # BLD: 5.84 K/UL — SIGNIFICANT CHANGE UP (ref 3.8–10.5)
WBC # FLD AUTO: 5.84 K/UL — SIGNIFICANT CHANGE UP (ref 3.8–10.5)

## 2021-01-27 PROCEDURE — 75710 ARTERY X-RAYS ARM/LEG: CPT | Mod: 26,RT

## 2021-01-27 PROCEDURE — 76937 US GUIDE VASCULAR ACCESS: CPT | Mod: 26

## 2021-01-27 PROCEDURE — 99152 MOD SED SAME PHYS/QHP 5/>YRS: CPT

## 2021-01-27 RX ORDER — SODIUM CHLORIDE 9 MG/ML
3 INJECTION INTRAMUSCULAR; INTRAVENOUS; SUBCUTANEOUS EVERY 8 HOURS
Refills: 0 | Status: DISCONTINUED | OUTPATIENT
Start: 2021-01-27 | End: 2021-02-10

## 2021-01-27 RX ORDER — SODIUM CHLORIDE 9 MG/ML
500 INJECTION INTRAMUSCULAR; INTRAVENOUS; SUBCUTANEOUS
Refills: 0 | Status: DISCONTINUED | OUTPATIENT
Start: 2021-01-27 | End: 2021-02-10

## 2021-01-27 RX ADMIN — SODIUM CHLORIDE 75 MILLILITER(S): 9 INJECTION INTRAMUSCULAR; INTRAVENOUS; SUBCUTANEOUS at 17:23

## 2021-01-27 RX ADMIN — SODIUM CHLORIDE 3 MILLILITER(S): 9 INJECTION INTRAMUSCULAR; INTRAVENOUS; SUBCUTANEOUS at 17:22

## 2021-01-27 RX ADMIN — SODIUM CHLORIDE 75 MILLILITER(S): 9 INJECTION INTRAMUSCULAR; INTRAVENOUS; SUBCUTANEOUS at 17:22

## 2021-01-27 NOTE — H&P CARDIOLOGY - HISTORY OF PRESENT ILLNESS
70 year old male with HTN, CAD, DM-II, PAD with Right Popliteal to PT bypass with 11/2019 who has been doing well, followed up with his Vascular surgeon and described symptoms of mild right leg numbness. Denies tingling, open wounds.   In light of patients risk factors, symptoms there is high suspicion for PAD progression. Patient is now referred to [facility] for a peripheral angiogram with possible PTA/stent.     Denies fever, cough, chills, headache, flu like symptoms, sick contact or recent travel  COVID PCR not detected on 1/24/21    please see hard copy H&P in paper chart 1/7/21  PATIENT SEEN AND EXAMINED AND NO NEW CLINICAL CHANGES SINCE office visit

## 2021-01-27 NOTE — H&P CARDIOLOGY - ATTENDING COMMENTS
Risks, benefits, and alternatives of the procedure were discussed with the patient. All questions were answered. He agrees to proceed with the procedure.

## 2021-01-27 NOTE — CHART NOTE - NSCHARTNOTEFT_GEN_A_CORE
POST-OPERATIVE NOTE    Subjective: Patient seen at bedside this evening. Reports that he is feeling well, without complaints. Denies CP/SOB/N/V. Eager to go home tonight.     Patient is s/p RLE diagnostic angiogram with R groin access. Recovering appropriately.     Vital Signs Last 24 Hrs  T(C): --  T(F): --  HR: --  BP: --  BP(mean): --  RR: --  SpO2: --  I&O's Detail      PAST MEDICAL & SURGICAL HISTORY:  PAD (peripheral artery disease)    Essential hypertension    Coronary artery disease involving native coronary artery of native heart without angina pectoris    Peripheral artery disease    Type 2 diabetes mellitus with other circulatory complication, without long-term current use of insulin    Venous ulcer of left leg    S/P debridement      Physical Exam:   GEN: laying flat in bed comfortably in NAD  RESP: no increased WOB  GROIN: R groin site with dressing in place, c/d/i; no palpable hematomas or fluid collection; non-tender to light palpation               L groin with palpable femoral pulse  EXTR: warm, well-perfused without gross deformities; spontaneous movement in b/l U/L extrem            b/l feet, shins, calves, and thighs with sensation intact to light touch;             4/5 dorsiflexion and plantarflexion, full ROM in ankle  NEURO awake, alert    LABS:                        14.1   5.84  )-----------( 191      ( 27 Jan 2021 10:00 )             43.4     01-27    141  |  103  |  30<H>  ----------------------------<  158<H>  4.5   |  27  |  0.99    Ca    9.6      27 Jan 2021 10:00        CAPILLARY BLOOD GLUCOSE      POCT Blood Glucose.: 145 mg/dL (27 Jan 2021 09:47)      Radiology and Additional Studies:    Assessment:  The patient is a 70y Male who is now several hours post-op from a RLE diagnostic angiogram. Patient tolerated procedure well, recovering in PACU uneventfully. Stable for d/c home.     Plan:  - Pain control as needed  - Lay flat during PACU stay  - D/c home      Darlene Olivo, PGY-1  C Team Surgery  #[id number]

## 2021-01-27 NOTE — H&P CARDIOLOGY - FAMILY HISTORY
Family history of essential hypertension     FH: diabetes mellitus, mother     Sibling  Still living? Unknown  Family history of diabetes mellitus, Age at diagnosis: Age Unknown

## 2021-01-27 NOTE — H&P CARDIOLOGY - PMH
Coronary artery disease involving native coronary artery of native heart without angina pectoris    Essential hypertension    PAD (peripheral artery disease)    Peripheral artery disease    Type 2 diabetes mellitus with other circulatory complication, without long-term current use of insulin    Venous ulcer of left leg

## 2021-01-29 ENCOUNTER — RX RENEWAL (OUTPATIENT)
Age: 71
End: 2021-01-29

## 2021-02-04 PROBLEM — I73.9 PERIPHERAL VASCULAR DISEASE, UNSPECIFIED: Chronic | Status: ACTIVE | Noted: 2021-01-27

## 2021-02-08 NOTE — ED PROVIDER NOTE - CHIEF COMPLAINT
The patient is a 66y Male complaining of pain, foot. Valtrex Counseling: I discussed with the patient the risks of valacyclovir including but not limited to kidney damage, nausea, vomiting and severe allergy.  The patient understands that if the infection seems to be worsening or is not improving, they are to call.

## 2021-02-17 ENCOUNTER — APPOINTMENT (OUTPATIENT)
Dept: ENDOCRINOLOGY | Facility: CLINIC | Age: 71
End: 2021-02-17
Payer: MEDICARE

## 2021-02-17 VITALS
SYSTOLIC BLOOD PRESSURE: 159 MMHG | DIASTOLIC BLOOD PRESSURE: 82 MMHG | RESPIRATION RATE: 14 BRPM | WEIGHT: 235 LBS | BODY MASS INDEX: 36.88 KG/M2 | OXYGEN SATURATION: 99 % | HEART RATE: 79 BPM | HEIGHT: 67 IN

## 2021-02-17 PROCEDURE — 82962 GLUCOSE BLOOD TEST: CPT

## 2021-02-17 PROCEDURE — 99214 OFFICE O/P EST MOD 30 MIN: CPT | Mod: 25

## 2021-02-17 PROCEDURE — 83036 HEMOGLOBIN GLYCOSYLATED A1C: CPT | Mod: QW

## 2021-02-17 PROCEDURE — 36415 COLL VENOUS BLD VENIPUNCTURE: CPT

## 2021-02-17 NOTE — PHYSICAL EXAM
[Alert] : alert [Well Nourished] : well nourished [No Acute Distress] : no acute distress [Well Developed] : well developed [Normal Sclera/Conjunctiva] : normal sclera/conjunctiva [EOMI] : extra ocular movement intact [No Proptosis] : no proptosis [Normal Oropharynx] : the oropharynx was normal [Thyroid Not Enlarged] : the thyroid was not enlarged [No Thyroid Nodules] : no palpable thyroid nodules [No Respiratory Distress] : no respiratory distress [No Accessory Muscle Use] : no accessory muscle use [Clear to Auscultation] : lungs were clear to auscultation bilaterally [Normal S1, S2] : normal S1 and S2 [Normal Rate] : heart rate was normal [Regular Rhythm] : with a regular rhythm [No Edema] : no peripheral edema [Pedal Pulses Normal] : the pedal pulses are present [Normal Bowel Sounds] : normal bowel sounds [Not Tender] : non-tender [Not Distended] : not distended [Soft] : abdomen soft [Normal Anterior Cervical Nodes] : no anterior cervical lymphadenopathy [Normal Posterior Cervical Nodes] : no posterior cervical lymphadenopathy [No Spinal Tenderness] : no spinal tenderness [Spine Straight] : spine straight [No Stigmata of Cushings Syndrome] : no stigmata of Cushings Syndrome [Normal Gait] : normal gait [Normal Strength/Tone] : muscle strength and tone were normal [No Rash] : no rash [Normal Reflexes] : deep tendon reflexes were 2+ and symmetric [No Tremors] : no tremors [Oriented x3] : oriented to person, place, and time [Acanthosis Nigricans] : no acanthosis nigricans [Right Foot Was Examined] : right foot ~C was examined [Left Foot Was Examined] : left foot ~C was examined [Swelling] : swollen [1+] : 1+ in the dorsalis pedis [Normal] : normal [2+] : 2+ in the dorsalis pedis [#1 Diminished] : number 1 was diminished [#2 Diminished] : number 2 was normal [#3 Diminished] : number 3 was normal [#4 Diminished] : number 4 was diminished [#5 Diminished] : number 5 was normal [#6 Diminished] : number 6 was normal [#7 Diminished] : number 7 was normal [#8 Diminished] : number 8 was normal [#9 Diminished] : number 9 was normal [#10 Diminished] : number 10 was normal

## 2021-02-17 NOTE — ASSESSMENT
[FreeTextEntry1] : 1. DM 2- controlled, complicated\par Patient counseled on the importance of diabetic control and complications. Patient's diet and the necessary changes discussed. \par \par Cont Metformin 1000 mg BID\par Cont Glimepiride 2 mg QD\par Check fsg BID\par Cont diabetic diet\par Cont exercise\par optho referral\par labs today \par \par 2. HLD- stable\par Well controlled on Atorvastatin\par \par \par

## 2021-02-17 NOTE — HISTORY OF PRESENT ILLNESS
Duplicate Encounter (please see 03/07/2018 MyChart Refill Encounter)  MyChart Message sent    Ivanna Kelly RN  [place] Triage     [FreeTextEntry1] : Mr. JOVANNI MARTINEZ  is a 69 year old male with past medical history of Diabetes Mellitus Type 2, Osteomyelitis of R foot s/p debridement (11/2019), CAD who presents for management of his diabetes. Patient is following up after a year. He will going for surgery for bypass re- arthrosclerosis. \par \par \par POCT Glucose: 171  mg/dL\par \par \par \par Diabetes first diagnosed: 3 years ago\par Type: 2\par \par Current diabetic regimen:\par Metformin 1000 mg BID\par Glimepiride 2 mg QD\par \par Other relevant medications:\par atorvastatin 20\par lisinopril 40\par \par Pt checks FSG       { 1-2     } times per day:\par \par FSG:\par Pre-breakfast: 120-140\par Pre-dinner:140-160\par \par Hypoglycemia: denies\par \par \par Diet:\par Breakfast: cereal, waffles\par Lunch: meat, vegetables, rice/pasta\par Dinner: meat, vegetables/salads\par Snacks: nuts, apples\par \par Exercise: none\par \par Urine micro: elevated (6/2020)\par lipid profile: LDL 50 6/2020, 41 2/21/20\par last hba1c: 6.2% 2/2021, 7.7% 6/2020, 6.4% 2/21/20\par eye exam: +retinopathy planned for laser this week\par diabetic foot exam/podiatry: 2/2020\par \par \par \par \par

## 2021-02-18 ENCOUNTER — APPOINTMENT (OUTPATIENT)
Dept: VASCULAR SURGERY | Facility: CLINIC | Age: 71
End: 2021-02-18
Payer: MEDICARE

## 2021-02-18 VITALS
WEIGHT: 235 LBS | HEIGHT: 67 IN | HEART RATE: 63 BPM | DIASTOLIC BLOOD PRESSURE: 81 MMHG | BODY MASS INDEX: 36.88 KG/M2 | SYSTOLIC BLOOD PRESSURE: 166 MMHG | TEMPERATURE: 96.7 F

## 2021-02-18 PROCEDURE — 99213 OFFICE O/P EST LOW 20 MIN: CPT

## 2021-02-18 NOTE — ASSESSMENT
[FreeTextEntry1] : JOVANNI MARTINEZ is a 70 year old male s/p right pop-PT bypass w/ rgsv now closed.\par \par - Patient without wounds or rest pain currently. Will continue medical management with aspirin, statin, diabetes and hypertension control.\par –Continue use of gabapentin.\par –Follow-up in 3 months.

## 2021-02-18 NOTE — HISTORY OF PRESENT ILLNESS
[FreeTextEntry1] : JOVANNI MARTINEZ is a 70 year old male s/p right Pop to PT bypass with RGSV on 11/25/2019 for right foot wound and osteomyelitis.  \par \par 6/11/2020 - Doing well since last visit.\par Right foot wounds have healed completely at this time.\par No complaints. Denies claudication. \par \par 1/7/2021 - Doing well since visit. Patient reports stable mild numbness at the forefoot. Denies lower extremity pain. No fever or chills.  [de-identified] : 1/27/2021–right lower extremity angiogram revealed occlusion of the distal posterior tibial artery distal to the anastomosis, nonreconstructible. \par \par 2/18/2021–Doing well since angiogram. Reports continued mild right foot numbness and pain. This is worse in the morning but improves during the day. The patient denies any wounds on the right foot. Has taken gabapentin with some improvement in symptoms. No fever or chills.

## 2021-02-18 NOTE — PHYSICAL EXAM
[JVD] : no jugular venous distention  [Normal Breath Sounds] : Normal breath sounds [Respiratory Effort] : normal respiratory effort [Normal Heart Sounds] : normal heart sounds [Normal Rate and Rhythm] : normal rate and rhythm [2+] : right 2+ [0] : left 0 [Ankle Swelling (On Exam)] : not present [Varicose Veins Of Lower Extremities] : not present [No Rash or Lesion] : No rash or lesion [] : not present [Calm] : calm [de-identified] : Well-appearing  [de-identified] : EOMI, anicteric  [de-identified] : soft, nt, nd  [de-identified] : motor and sensation intact in all four extremities \par well healed incisions right leg [de-identified] : A&Ox4

## 2021-02-22 NOTE — PHYSICAL THERAPY INITIAL EVALUATION ADULT - ASR WT BEARING STATUS EVAL
no weight-bearing restrictions Detail Level: Zone Topical Retinoids Recommendations: Retinol Detail Level: Detailed

## 2021-02-25 LAB
ALBUMIN SERPL ELPH-MCNC: 4.8 G/DL
ALP BLD-CCNC: 63 U/L
ALT SERPL-CCNC: 16 U/L
ANION GAP SERPL CALC-SCNC: 13 MMOL/L
AST SERPL-CCNC: 18 U/L
BASOPHILS # BLD AUTO: 0.05 K/UL
BASOPHILS NFR BLD AUTO: 0.8 %
BILIRUB SERPL-MCNC: 0.6 MG/DL
BUN SERPL-MCNC: 22 MG/DL
CALCIUM SERPL-MCNC: 9.6 MG/DL
CHLORIDE SERPL-SCNC: 100 MMOL/L
CHOLEST SERPL-MCNC: 132 MG/DL
CO2 SERPL-SCNC: 24 MMOL/L
CREAT SERPL-MCNC: 1.05 MG/DL
CREAT SPEC-SCNC: 158 MG/DL
EOSINOPHIL # BLD AUTO: 0.09 K/UL
EOSINOPHIL NFR BLD AUTO: 1.4 %
FRUCTOSAMINE SERPL-MCNC: 294 UMOL/L
GLUCOSE SERPL-MCNC: 179 MG/DL
HCT VFR BLD CALC: 40.6 %
HDLC SERPL-MCNC: 45 MG/DL
HGB BLD-MCNC: 13.5 G/DL
IMM GRANULOCYTES NFR BLD AUTO: 0.2 %
LDLC SERPL CALC-MCNC: 73 MG/DL
LYMPHOCYTES # BLD AUTO: 1.82 K/UL
LYMPHOCYTES NFR BLD AUTO: 28.3 %
MAN DIFF?: NORMAL
MCHC RBC-ENTMCNC: 27.8 PG
MCHC RBC-ENTMCNC: 33.3 GM/DL
MCV RBC AUTO: 83.7 FL
MICROALBUMIN 24H UR DL<=1MG/L-MCNC: 100.8 MG/DL
MICROALBUMIN/CREAT 24H UR-RTO: 638 MG/G
MONOCYTES # BLD AUTO: 0.46 K/UL
MONOCYTES NFR BLD AUTO: 7.2 %
NEUTROPHILS # BLD AUTO: 3.99 K/UL
NEUTROPHILS NFR BLD AUTO: 62.1 %
NONHDLC SERPL-MCNC: 87 MG/DL
PLATELET # BLD AUTO: 192 K/UL
POTASSIUM SERPL-SCNC: 5 MMOL/L
PROT SERPL-MCNC: 7 G/DL
RBC # BLD: 4.85 M/UL
RBC # FLD: 13.2 %
SODIUM SERPL-SCNC: 137 MMOL/L
TRIGL SERPL-MCNC: 71 MG/DL
WBC # FLD AUTO: 6.42 K/UL

## 2021-03-22 ENCOUNTER — RX RENEWAL (OUTPATIENT)
Age: 71
End: 2021-03-22

## 2021-04-14 ENCOUNTER — APPOINTMENT (OUTPATIENT)
Dept: DISASTER EMERGENCY | Facility: OTHER | Age: 71
End: 2021-04-14
Payer: MEDICARE

## 2021-04-14 PROCEDURE — 0012A: CPT

## 2021-04-20 ENCOUNTER — APPOINTMENT (OUTPATIENT)
Dept: CARDIOLOGY | Facility: CLINIC | Age: 71
End: 2021-04-20
Payer: MEDICARE

## 2021-04-20 ENCOUNTER — NON-APPOINTMENT (OUTPATIENT)
Age: 71
End: 2021-04-20

## 2021-04-20 VITALS
SYSTOLIC BLOOD PRESSURE: 174 MMHG | DIASTOLIC BLOOD PRESSURE: 78 MMHG | BODY MASS INDEX: 36.1 KG/M2 | HEART RATE: 65 BPM | HEIGHT: 67 IN | WEIGHT: 230 LBS | OXYGEN SATURATION: 99 %

## 2021-04-20 DIAGNOSIS — R94.31 ABNORMAL ELECTROCARDIOGRAM [ECG] [EKG]: ICD-10-CM

## 2021-04-20 PROCEDURE — 99214 OFFICE O/P EST MOD 30 MIN: CPT

## 2021-04-20 PROCEDURE — 93000 ELECTROCARDIOGRAM COMPLETE: CPT

## 2021-04-20 NOTE — HISTORY OF PRESENT ILLNESS
[FreeTextEntry1] : Kirill is a 70 year old male with HTN, DM2 and CAD with stents (most recent 3-4 years ago), here for follow up. \par \par I last saw him in 1/2021.\par \par He is feeling well overall. He denies chest pain, difficulty breathing, palpitations, lower extremity swelling, orthopnea, PND, dizziness, lightheadedness, near syncope. He reports that his blood pressures are in the 140/ range at home. His diabetes has been better controlled. His most recent LDL was 50.\par \par He was admitted to the hospital in 11/2019, with a nonhealing right foot ulcer. On 11/25, he had a popliteal artery bypass to posterior tibial artery. In 2021, RLE angiogram with occlusion of the distal posterior tibial artery.\par \par He is currently taking amlodipine 10, HCTZ 25, lisinopril 40, Imdur 30 and metoprolol 50 b.i.d. He also remains on aspirin, Plavix, and atorvastatin.\par

## 2021-04-20 NOTE — DISCUSSION/SUMMARY
[___ Month(s)] : [unfilled] month(s) [FreeTextEntry1] : Kirill has a history of hypertension, diabetes, CAD with stents, and peripheral vascular disease status post right lower extremity bypass in 11/2019.\par \par From a cardiac perspective, he is feeling well today. His blood pressure is elevated. His physical exam is unremarkable. His EKG demonstrates a sinus rhythm with a right bundle branch block, unchanged from 2020.\par \par For now, he will continue his current blood pressure regimen. He will start monitoring his blood pressures at home. He will have an echocardiogram. He will remain on aspirin, Plavix, and a statin, along with Vascpea. His most recent LDL was at goal.\par \par I stressed the importance of diet, exercise, and weight loss, to reduce his overall cardiovascular risk.\par We will speak after the echo and re-evaluate his BP.

## 2021-04-20 NOTE — PHYSICAL EXAM
[General Appearance - Well Developed] : well developed [Normal Appearance] : normal appearance [Well Groomed] : well groomed [General Appearance - Well Nourished] : well nourished [No Deformities] : no deformities [General Appearance - In No Acute Distress] : no acute distress [Normal Conjunctiva] : the conjunctiva exhibited no abnormalities [Eyelids - No Xanthelasma] : the eyelids demonstrated no xanthelasmas [Normal Oral Mucosa] : normal oral mucosa [No Oral Pallor] : no oral pallor [No Oral Cyanosis] : no oral cyanosis [Normal Jugular Venous A Waves Present] : normal jugular venous A waves present [Normal Jugular Venous V Waves Present] : normal jugular venous V waves present [No Jugular Venous Tamayo A Waves] : no jugular venous tamayo A waves [Exaggerated Use Of Accessory Muscles For Inspiration] : no accessory muscle use [Respiration, Rhythm And Depth] : normal respiratory rhythm and effort [Auscultation Breath Sounds / Voice Sounds] : lungs were clear to auscultation bilaterally [Abdomen Soft] : soft [Abdomen Tenderness] : non-tender [Abdomen Mass (___ Cm)] : no abdominal mass palpated [Abnormal Walk] : normal gait [Nail Clubbing] : no clubbing of the fingernails [Cyanosis, Localized] : no localized cyanosis [Petechial Hemorrhages (___cm)] : no petechial hemorrhages [Skin Color & Pigmentation] : normal skin color and pigmentation [] : no rash [No Venous Stasis] : no venous stasis [Skin Lesions] : no skin lesions [No Skin Ulcers] : no skin ulcer [No Xanthoma] : no  xanthoma was observed [Oriented To Time, Place, And Person] : oriented to person, place, and time [Affect] : the affect was normal [Mood] : the mood was normal [No Anxiety] : not feeling anxious [Normal Rate] : normal [Normal S1] : normal S1 [Normal S2] : normal S2 [S3] : no S3 [S4] : no S4 [I] : a grade 1 [Right Carotid Bruit] : no bruit heard over the right carotid [Left Carotid Bruit] : no bruit heard over the left carotid [Right Femoral Bruit] : no bruit heard over the right femoral artery [Left Femoral Bruit] : no bruit heard over the left femoral artery [2+] : left 2+ [No Abnormalities] : the abdominal aorta was not enlarged and no bruit was heard [___ +] : bilateral [unfilled]U+ pretibial pitting edema

## 2021-04-29 ENCOUNTER — APPOINTMENT (OUTPATIENT)
Dept: CARDIOLOGY | Facility: CLINIC | Age: 71
End: 2021-04-29
Payer: MEDICARE

## 2021-04-29 PROCEDURE — 93306 TTE W/DOPPLER COMPLETE: CPT

## 2021-05-17 ENCOUNTER — APPOINTMENT (OUTPATIENT)
Dept: ENDOCRINOLOGY | Facility: CLINIC | Age: 71
End: 2021-05-17
Payer: MEDICARE

## 2021-05-17 VITALS
HEIGHT: 67 IN | WEIGHT: 232 LBS | HEART RATE: 70 BPM | SYSTOLIC BLOOD PRESSURE: 145 MMHG | OXYGEN SATURATION: 99 % | DIASTOLIC BLOOD PRESSURE: 80 MMHG | BODY MASS INDEX: 36.41 KG/M2

## 2021-05-17 PROCEDURE — 82962 GLUCOSE BLOOD TEST: CPT

## 2021-05-17 PROCEDURE — 99214 OFFICE O/P EST MOD 30 MIN: CPT | Mod: 25

## 2021-05-17 PROCEDURE — 83036 HEMOGLOBIN GLYCOSYLATED A1C: CPT | Mod: QW

## 2021-05-17 NOTE — ASSESSMENT
[FreeTextEntry1] : 70 year old male with past medical history of Diabetes Mellitus Type 2, Osteomyelitis of R foot s/p debridement (11/2019), CAD who presents for management of his diabetes\par \par 1. DM 2- controlled, complicated\par Patient counseled on the importance of diabetic control and complications. Patient's diet and the necessary changes discussed. Discussed other options for diabetes management. Given his CVD and his weight, he would benefit from a GLP-1. It will also help with HTN and microalbumin if he loses more weight. Discussed side effects.\par \par Cont Metformin 1000 mg BID\par Stop Glimepiride 2 mg QD\par Start Trulicity\par Check fsg BID\par Cont diabetic diet\par Cont exercise\par optho referral\par labs today \par \par 2. HLD- stable\par Cont Atorvastatin\par \par \par

## 2021-05-17 NOTE — HISTORY OF PRESENT ILLNESS
[FreeTextEntry1] : Mr. JOVANNI MARTINEZ  is a 70 year old male with past medical history of Diabetes Mellitus Type 2, Osteomyelitis of R foot s/p debridement (11/2019), CAD who presents for management of his diabetes. Recent echo shows mild LVH. PAD was no able to be re-vascularized. \par \par \par POCT Glucose: 108  mg/dL\par \par Diabetes first diagnosed: 3 years ago\par Type: 2\par \par Current diabetic regimen:\par Metformin 1000 mg BID\par Glimepiride 2 mg QD\par \par Other relevant medications:\par atorvastatin 20\par lisinopril 40\par \par Pt checks FSG          { 1-2        } times per day:\par \par FSG:\par Pre-breakfast: 120-140\par Pre-dinner:140-160\par \par Hypoglycemia: denies\par \par \par Diet:\par Breakfast: cereal, waffles\par Lunch: meat, vegetables, rice/pasta\par Dinner: meat, vegetables/salads\par Snacks: nuts, apples\par \par Exercise: none\par \par Urine micro:638 (2/2021)  elevated (6/2020)\par lipid profile:LDL 73 (2/2021),  LDL 50 6/2020, 41 2/21/20\par last hba1c: 5.5% (5/2021), 6.2% 2/2021, 7.7% 6/2020, 6.4% 2/21/20\par eye exam: +retinopathy, getting laser\par diabetic foot exam/podiatry: 2/2021 in office\par \par \par \par \par

## 2021-05-18 ENCOUNTER — RX RENEWAL (OUTPATIENT)
Age: 71
End: 2021-05-18

## 2021-05-20 ENCOUNTER — APPOINTMENT (OUTPATIENT)
Dept: VASCULAR SURGERY | Facility: CLINIC | Age: 71
End: 2021-05-20
Payer: MEDICARE

## 2021-05-20 VITALS
SYSTOLIC BLOOD PRESSURE: 175 MMHG | WEIGHT: 230 LBS | HEART RATE: 59 BPM | TEMPERATURE: 96.7 F | DIASTOLIC BLOOD PRESSURE: 85 MMHG | HEIGHT: 68 IN | BODY MASS INDEX: 34.86 KG/M2

## 2021-05-20 DIAGNOSIS — M79.2 NEURALGIA AND NEURITIS, UNSPECIFIED: ICD-10-CM

## 2021-05-20 PROCEDURE — 93922 UPR/L XTREMITY ART 2 LEVELS: CPT

## 2021-05-20 PROCEDURE — 99213 OFFICE O/P EST LOW 20 MIN: CPT

## 2021-05-20 NOTE — PHYSICAL EXAM
[JVD] : no jugular venous distention  [2+] : right 2+ [0] : left 0 [Ankle Swelling (On Exam)] : not present [Varicose Veins Of Lower Extremities] : present [] : present [No Rash or Lesion] : No rash or lesion [Calm] : calm [de-identified] : Well-appearing  [de-identified] : EOMI, anicteric  [de-identified] : soft, nt, nd  [de-identified] : motor and sensation intact in all four extremities \par well healed incisions right leg [de-identified] : A&Ox4

## 2021-05-20 NOTE — HISTORY OF PRESENT ILLNESS
[FreeTextEntry1] : JOVANNI MARTINEZ is a 70 year old male s/p right Pop to PT bypass with RGSV on 11/25/2019 for right foot wound and osteomyelitis.  \par \par 6/11/2020 - Doing well since last visit.\par Right foot wounds have healed completely at this time.\par No complaints. Denies claudication. \par \par 1/7/2021 - Doing well since visit. Patient reports stable mild numbness at the forefoot. Denies lower extremity pain. No fever or chills. \par \par 1/27/2021–right lower extremity angiogram revealed occlusion of the distal posterior tibial artery distal to the anastomosis, nonreconstructible. \par \par 2/18/2021–Doing well since angiogram. Reports continued mild right foot numbness and pain. This is worse in the morning but improves during the day. The patient denies any wounds on the right foot. Has taken gabapentin with some improvement in symptoms. No fever or chills.  [de-identified] : 5/20/2021–Patient presents for follow-up with his wife.  He reports stable right lower extremity symptoms.  He reports continued bilateral foot numbness at the soles.  He continues to take the gabapentin with some relief in symptoms.  He recently saw his cardiologist who performed an echocardiogram.  Although today he is hypertensive with a systolic blood pressure 175, his wife reports that repeated measurements at home range from 135 to 150 mmHg. Patient continues to take plavix.

## 2021-05-20 NOTE — ASSESSMENT
[FreeTextEntry1] : JOVANNI MARTINEZ is a 70 year old male s/p right pop-PT bypass w/ rgsv now closed.\par \par - Patient without wounds or rest pain currently. Will continue medical management with aspirin, statin, diabetes and hypertension control.\par –Will increase gabapentin to 100 mg TID. \par –Follow-up in 6 months with venous reflux study. Recommend compression stocking use, leg elevation, and continued ambulation.

## 2021-05-20 NOTE — DATA REVIEWED
[FreeTextEntry1] : 5/20/2021 - HUANG\par R TBI 0.54, toe pressure 87\par L TBI 0.43, toe pressure 70, HUANG 1.08

## 2021-07-26 ENCOUNTER — APPOINTMENT (OUTPATIENT)
Dept: INTERNAL MEDICINE | Facility: CLINIC | Age: 71
End: 2021-07-26
Payer: MEDICARE

## 2021-07-26 VITALS
DIASTOLIC BLOOD PRESSURE: 80 MMHG | SYSTOLIC BLOOD PRESSURE: 144 MMHG | OXYGEN SATURATION: 98 % | BODY MASS INDEX: 31.98 KG/M2 | RESPIRATION RATE: 14 BRPM | WEIGHT: 211 LBS | HEART RATE: 78 BPM | HEIGHT: 68 IN

## 2021-07-26 DIAGNOSIS — I25.10 ATHEROSCLEROTIC HEART DISEASE OF NATIVE CORONARY ARTERY W/OUT ANGINA PECTORIS: ICD-10-CM

## 2021-07-26 DIAGNOSIS — H35.9 UNSPECIFIED RETINAL DISORDER: ICD-10-CM

## 2021-07-26 DIAGNOSIS — I10 ESSENTIAL (PRIMARY) HYPERTENSION: ICD-10-CM

## 2021-07-26 PROCEDURE — 99214 OFFICE O/P EST MOD 30 MIN: CPT

## 2021-07-26 RX ORDER — GLIMEPIRIDE 2 MG/1
2 TABLET ORAL DAILY
Qty: 90 | Refills: 2 | Status: DISCONTINUED | COMMUNITY
Start: 2021-02-17 | End: 2021-07-26

## 2021-07-26 NOTE — ASSESSMENT
[Patient Optimized for Surgery] : Patient optimized for surgery [No Further Testing Recommended] : no further testing recommended [FreeTextEntry4] : EKG SR, RBBB, unchanged from previous ekg\par Thee are no medical contraindications to proceeding with the planned surgery.

## 2021-07-26 NOTE — HISTORY OF PRESENT ILLNESS
[Coronary Artery Disease] : coronary artery disease [No Pertinent Pulmonary History] : no history of asthma, COPD, sleep apnea, or smoking [No Adverse Anesthesia Reaction] : no adverse anesthesia reaction in self or family member [Chronic Anticoagulation] : chronic anticoagulation [Diabetes] : diabetes [(Patient denies any chest pain, claudication, dyspnea on exertion, orthopnea, palpitations or syncope)] : Patient denies any chest pain, claudication, dyspnea on exertion, orthopnea, palpitations or syncope [Aortic Stenosis] : no aortic stenosis [Atrial Fibrillation] : no atrial fibrillation [Recent Myocardial Infarction] : no recent myocardial infarction [Implantable Device/Pacemaker] : no implantable device/pacemaker [Chronic Kidney Disease] : no chronic kidney disease [FreeTextEntry1] : pars plana vitrectomy, silicone oil removal, endopanretinal photo coagulation and fluid air exchange [FreeTextEntry2] : 8/11/21 [FreeTextEntry3] : Dr. Shaw Goldberg [FreeTextEntry4] : JOVANNI JUAN,  54, is here for presurgical evaluation.\par Pt is overall feeling well.\par Pt denies chest pain, dyspnea, lightheadedness, palpitaions, fever, chills, or sweats

## 2021-07-30 ENCOUNTER — RX RENEWAL (OUTPATIENT)
Age: 71
End: 2021-07-30

## 2021-08-21 ENCOUNTER — RX RENEWAL (OUTPATIENT)
Age: 71
End: 2021-08-21

## 2021-08-25 ENCOUNTER — APPOINTMENT (OUTPATIENT)
Dept: ENDOCRINOLOGY | Facility: CLINIC | Age: 71
End: 2021-08-25
Payer: MEDICARE

## 2021-08-25 VITALS
SYSTOLIC BLOOD PRESSURE: 160 MMHG | HEIGHT: 68 IN | BODY MASS INDEX: 32.89 KG/M2 | HEART RATE: 65 BPM | WEIGHT: 217 LBS | OXYGEN SATURATION: 99 % | DIASTOLIC BLOOD PRESSURE: 60 MMHG

## 2021-08-25 PROCEDURE — 83036 HEMOGLOBIN GLYCOSYLATED A1C: CPT | Mod: QW

## 2021-08-25 PROCEDURE — 99214 OFFICE O/P EST MOD 30 MIN: CPT | Mod: 25

## 2021-08-25 PROCEDURE — 82962 GLUCOSE BLOOD TEST: CPT

## 2021-08-25 RX ORDER — BLOOD SUGAR DIAGNOSTIC
STRIP MISCELLANEOUS TWICE DAILY
Qty: 200 | Refills: 2 | Status: DISCONTINUED | COMMUNITY
Start: 2020-04-07 | End: 2021-08-25

## 2021-08-26 NOTE — HISTORY OF PRESENT ILLNESS
[FreeTextEntry1] : Mr. JOVANNI MARTINEZ  is a 70 year old male with past medical history of Diabetes Mellitus Type 2, Osteomyelitis of R foot s/p debridement (11/2019), CAD who presents for management of his diabetes. Recent \par \par \par POCT Glucose: 149  mg/dL\par POCT Hga1c: 6.5% \par \par Diabetes first diagnosed: 3 years ago\par Type: 2\par \par Current diabetic regimen:\par Metformin 1000 mg BID\par Glimepiride 2 mg QD (stopped)\par Trulicity 0.75 mg Qweekly\par \par Other relevant medications:\par atorvastatin 20\par lisinopril 40\par \par Pt checks FSG             { 1-2           } times per day:\par \par FSG:\par Pre-breakfast: 120-140\par Pre-dinner:140-160\par \par Hypoglycemia: denies\par \par \par Diet:\par Breakfast: cereal, waffles\par Lunch: meat, vegetables, rice/pasta\par Dinner: meat, vegetables/salads\par Snacks: nuts, apples\par \par Exercise: none\par \par Urine micro:638 (2/2021)  elevated (6/2020)\par lipid profile:LDL 73 (2/2021),  LDL 50 6/2020, 41 2/21/20\par last hba1c: 6.5% (8/2021), 5.5% (5/2021), 6.2% 2/2021, 7.7% 6/2020, 6.4% 2/21/20\par eye exam: +retinopathy, getting laser\par diabetic foot exam/podiatry: 2/2021 in office\par \par \par \par \par

## 2021-08-26 NOTE — PHYSICAL EXAM
[Alert] : alert [Well Nourished] : well nourished [No Acute Distress] : no acute distress [Well Developed] : well developed [Normal Sclera/Conjunctiva] : normal sclera/conjunctiva [EOMI] : extra ocular movement intact [No Proptosis] : no proptosis [Normal Oropharynx] : the oropharynx was normal [Thyroid Not Enlarged] : the thyroid was not enlarged [No Thyroid Nodules] : no palpable thyroid nodules [No Respiratory Distress] : no respiratory distress [No Accessory Muscle Use] : no accessory muscle use [Clear to Auscultation] : lungs were clear to auscultation bilaterally [Normal S1, S2] : normal S1 and S2 [Normal Rate] : heart rate was normal [Regular Rhythm] : with a regular rhythm [No Edema] : no peripheral edema [Pedal Pulses Normal] : the pedal pulses are present [Normal Bowel Sounds] : normal bowel sounds [Not Tender] : non-tender [Not Distended] : not distended [Soft] : abdomen soft [Normal Anterior Cervical Nodes] : no anterior cervical lymphadenopathy [Normal Posterior Cervical Nodes] : no posterior cervical lymphadenopathy [No Spinal Tenderness] : no spinal tenderness [Spine Straight] : spine straight [No Stigmata of Cushings Syndrome] : no stigmata of Cushings Syndrome [Normal Strength/Tone] : muscle strength and tone were normal [Normal Gait] : normal gait [No Rash] : no rash [Normal Reflexes] : deep tendon reflexes were 2+ and symmetric [Acanthosis Nigricans] : no acanthosis nigricans [No Tremors] : no tremors [Oriented x3] : oriented to person, place, and time

## 2021-08-26 NOTE — ASSESSMENT
[FreeTextEntry1] : 70 year old male with past medical history of Diabetes Mellitus Type 2, Osteomyelitis of R foot s/p debridement (11/2019), CAD who presents for management of his diabetes\par \par 1. DM 2- controlled, complicated\par Patient counseled on the importance of diabetic control and complications. Patient's diet and the necessary changes discussed. Discussed other options for diabetes management. Given his CVD and his weight, he would benefit from a GLP-1. It will also help with HTN and microalbumin if he loses more weight. Discussed side effects.\par \par Cont Metformin 1000 mg BID\par Cont  0.75 mg qweekly\par Check fsg BID\par Cont diabetic diet\par Cont exercise\par optho referral\par labs today \par \par 2. HLD- stable\par Cont Atorvastatin\par \par \par

## 2021-08-29 NOTE — H&P CARDIOLOGY - NS HEP C RISK YEAR OPTION
"ED Provider Note    Scribed for Ernesto Zavaleta M.D. by Gricelda Pina. 8/28/2021, 5:39 PM.    Primary care provider: Wilma Guillermo M.D.  Means of arrival: Ambulance  History obtained from: Patient  History limited by: None    CHIEF COMPLAINT  Chief Complaint   Patient presents with    ALOC     pt arrives via [facility] with c/o ALOC. per report family called for ALOC, LKW 1100, EMS states AAOx1 on their arrival, on fire arrival patient AAOx4, mildly confused re: details of todays events. fire reports pt lost wife 2 days ago, also recently had covid approx 2.5 weeks ago. glucose 184 PTA. PIV est.        HPI  Alfred Erazo is a 85 y.o. male who presents to the Emergency Department via EMS for evaluation of previous altered mental status. Per the daughter, the patient was doing well at 11AM. But around 4:09PM, he began talking \"gibberish\" mid-conversation with the daughter's sister-in-law. He became disoriented and unresponsive. A few minutes after EMS arrived, the patient began to return to normal. However, he could not speak full sentences and his words were slightly slurred. The patient is now doing well and back to baseline. Alfred caregiver mentioned that he recently began a new medication. He is diabetic and has no history of seizures. Alfred lost his wife on Monday.   Per the patient, he has abdominal pain and diarrhea. But, he has no headache, numbness or tingling, or chest pain. The patient had COVID two weeks ago. Alfred is fully vaccinated. The patient takes an aspirin.       REVIEW OF SYSTEMS  Pertinent positives include altered mental status, abdominal pain, and diarrhea. Pertinent negatives include headache, numbness or tingling, or chest pain. All other systems negative.    PAST MEDICAL HISTORY   has a past medical history of Asthma, BPH (benign prostatic hyperplasia), Diabetes (HCC), Diverticulitis, Esophageal stricture, Hiatal hernia, History of shingles (8/27/2015), Hyperlipidemia " (7/8/2015), Hypertension, Hypothyroid, PHN (postherpetic neuralgia) (8/27/2015), Psoriasis (6/14/2017), TIA (transient ischemic attack), and Vitamin D deficiency (8/27/2015).    SURGICAL HISTORY   has a past surgical history that includes cataract extraction with iol; tonsillectomy; and vasectomy.    SOCIAL HISTORY  Social History     Tobacco Use    Smoking status: Never Smoker    Smokeless tobacco: Never Used   Vaping Use    Vaping Use: Never used   Substance Use Topics    Alcohol use: No     Comment: occ    Drug use: No      Social History     Substance and Sexual Activity   Drug Use No       FAMILY HISTORY  Family History   Problem Relation Age of Onset    Stroke Brother     Stroke Father        CURRENT MEDICATIONS      Current Outpatient Medications:     glimepiride (AMARYL) 4 MG Tab, Take 1 tablet by mouth every morning., Disp: 100 tablet, Rfl: 3    levothyroxine (SYNTHROID) 75 MCG Tab, Take 1 Tab by mouth every day., Disp: 90 Tab, Rfl: 3    finasteride (PROSCAR) 5 MG Tab, Take 1 Tab by mouth every day., Disp: 90 Tab, Rfl: 3    liothyronine (CYTOMEL) 5 MCG Tab, TAKE 2 TABLETS BY MOUTH EVERY DAY, Disp: 180 Tab, Rfl: 3    vitamin D (CHOLECALCIFEROL) 1000 Unit Tab, TAKE 2 TABLETS BY MOUTH EVERY DAY, Disp: 180 Tab, Rfl: 3    Loratadine (CLARITIN) 10 MG Cap, Take 10 mg by mouth every day., Disp: 90 Cap, Rfl: 3    atorvastatin (LIPITOR) 20 MG Tab, Take 1 Tab by mouth every day., Disp: 100 Tab, Rfl: 3    venlafaxine XR (EFFEXOR XR) 37.5 MG CAPSULE SR 24 HR, TK ONE C PO QAM AFTER MEAL, Disp: , Rfl:     tamsulosin (FLOMAX) 0.4 MG capsule, TAKE 1 CAPSULE BY MOUTH 0.5 HOUR AFTER BREAKFAST, Disp: 90 Cap, Rfl: 1    clobetasol (TEMOVATE) 0.05 % Cream, Apply to involved area twice daily for 2 weeks., Disp: 45 g, Rfl: 0    cholestyramine (QUESTRAN) 4 g packet, Take 2-4 g by mouth every day., Disp: 30 Each, Rfl: 3    aspirin EC (ECOTRIN) 81 MG Tablet Delayed Response, Take 81 mg by mouth every day., Disp: , Rfl:     Blood  "Glucose Monitoring Suppl Device, Meter: Dispense glucose meter preferred on patient's insurance.  Sig. Use as directed for blood sugar monitoring., Disp: 1 Device, Rfl: 0    cyanocobalamin (VITAMIN B-12) 100 MCG Tab, Take 100 mcg by mouth every day., Disp: , Rfl:         ALLERGIES  Allergies   Allergen Reactions    Pcn [Penicillins]      \"my joints start to bubble up\"    Sulfa Drugs      \"my joints start to bubble up\"  \"Its been 40 yrs since I've had either of those\"       PHYSICAL EXAM  VITAL SIGNS: /74   Pulse 75   Resp 17   Ht 1.778 m (5' 10\")   Wt 79.4 kg (175 lb)   SpO2 94%   BMI 25.11 kg/m²     Constitutional: Well developed, Well nourished  HENT: Normocephalic, Atraumatic, mask in place.  Cardiovascular: Normal heart rate, Normal rhythm, No murmurs, equal pulses.   Pulmonary: Normal breath sounds, No respiratory distress, No wheezing, No rales, No rhonchi.  Chest: No chest wall tenderness or deformity.   Abdomen:Soft, No tenderness, No masses, no rebound, no guarding.   Back: No CVA tenderness.   Musculoskeletal: 5/5 strength in lower extremities, No major deformities noted, No tenderness.   Skin: Warm, Dry, No erythema, No rash.   Neurologic: Normal finger to nose, Normal cranial nerves II-XII, No pronator drift. Equal strength in upper and lower extremities bilaterally. No facial droop.  Normal finger-nose, normal heel shin, NIH score of 0  Psychiatric: Affect normal, Judgment normal, Mood normal.     LABS  Results for orders placed or performed during the hospital encounter of 08/28/21   CBC WITH DIFFERENTIAL   Result Value Ref Range    WBC 10.3 4.8 - 10.8 K/uL    RBC 4.52 (L) 4.70 - 6.10 M/uL    Hemoglobin 14.4 14.0 - 18.0 g/dL    Hematocrit 43.8 42.0 - 52.0 %    MCV 96.9 81.4 - 97.8 fL    MCH 31.9 27.0 - 33.0 pg    MCHC 32.9 (L) 33.7 - 35.3 g/dL    RDW 49.5 35.9 - 50.0 fL    Platelet Count 228 164 - 446 K/uL    MPV 11.1 9.0 - 12.9 fL    Neutrophils-Polys 76.60 (H) 44.00 - 72.00 %    " Lymphocytes 13.50 (L) 22.00 - 41.00 %    Monocytes 7.70 0.00 - 13.40 %    Eosinophils 1.40 0.00 - 6.90 %    Basophils 0.20 0.00 - 1.80 %    Immature Granulocytes 0.60 0.00 - 0.90 %    Nucleated RBC 0.00 /100 WBC    Neutrophils (Absolute) 7.87 (H) 1.82 - 7.42 K/uL    Lymphs (Absolute) 1.39 1.00 - 4.80 K/uL    Monos (Absolute) 0.79 0.00 - 0.85 K/uL    Eos (Absolute) 0.14 0.00 - 0.51 K/uL    Baso (Absolute) 0.02 0.00 - 0.12 K/uL    Immature Granulocytes (abs) 0.06 0.00 - 0.11 K/uL    NRBC (Absolute) 0.00 K/uL   COMP METABOLIC PANEL   Result Value Ref Range    Sodium 138 135 - 145 mmol/L    Potassium 4.2 3.6 - 5.5 mmol/L    Chloride 103 96 - 112 mmol/L    Co2 18 (L) 20 - 33 mmol/L    Anion Gap 17.0 (H) 7.0 - 16.0    Glucose 173 (H) 65 - 99 mg/dL    Bun 10 8 - 22 mg/dL    Creatinine 0.88 0.50 - 1.40 mg/dL    Calcium 9.4 8.5 - 10.5 mg/dL    AST(SGOT) 33 12 - 45 U/L    ALT(SGPT) 38 2 - 50 U/L    Alkaline Phosphatase 81 30 - 99 U/L    Total Bilirubin 0.7 0.1 - 1.5 mg/dL    Albumin 4.1 3.2 - 4.9 g/dL    Total Protein 7.0 6.0 - 8.2 g/dL    Globulin 2.9 1.9 - 3.5 g/dL    A-G Ratio 1.4 g/dL   TROPONIN   Result Value Ref Range    Troponin T 30 (H) 6 - 19 ng/L   URINALYSIS (UA)    Specimen: Urine   Result Value Ref Range    Color Yellow     Character Clear     Specific Gravity 1.011 <1.035    Ph 5.5 5.0 - 8.0    Glucose >=1000 (A) Negative mg/dL    Ketones Negative Negative mg/dL    Protein Negative Negative mg/dL    Bilirubin Negative Negative    Urobilinogen, Urine 0.2 Negative    Nitrite Negative Negative    Leukocyte Esterase Negative Negative    Occult Blood Negative Negative    Micro Urine Req see below    APTT   Result Value Ref Range    APTT 27.8 24.7 - 36.0 sec   PROTHROMBIN TIME (INR)   Result Value Ref Range    PT 13.9 12.0 - 14.6 sec    INR 1.10 0.87 - 1.13   COV-2, FLU A/B, AND RSV BY PCR (2-4 HOURS CEPHEID): Collect NP swab in VTM    Specimen: Respirate   Result Value Ref Range    Influenza virus A RNA Negative  Negative    Influenza virus B, PCR Negative Negative    RSV, PCR Negative Negative    SARS-CoV-2 by PCR DETECTED (AA)     SARS-CoV-2 Source NP Swab    ESTIMATED GFR   Result Value Ref Range    GFR If African American >60 >60 mL/min/1.73 m 2    GFR If Non African American >60 >60 mL/min/1.73 m 2   Hemoglobin A1C   Result Value Ref Range    Glycohemoglobin 9.5 (H) 4.0 - 5.6 %    Est Avg Glucose 226 mg/dL   HIV Antibodies (serum)   Result Value Ref Range    HIV Ag/Ab Combo Assay Non-Reactive Non Reactive   Urine drug screen   Result Value Ref Range    Amphetamines Urine Negative Negative    Barbiturates Negative Negative    Benzodiazepines Negative Negative    Cocaine Metabolite Negative Negative    Methadone Negative Negative    Opiates Negative Negative    Oxycodone Negative Negative    Phencyclidine -Pcp Negative Negative    Propoxyphene Negative Negative    Cannabinoid Metab Negative Negative   T.PALLIDUM AB EIA   Result Value Ref Range    Syphilis, Treponemal Qual Non-Reactive Non-Reactive   D-DIMER   Result Value Ref Range    D-Dimer Screen 1.32 (H) 0.00 - 0.50 ug/mL (FEU)   TROPONIN   Result Value Ref Range    Troponin T 28 (H) 6 - 19 ng/L   PROCALCITONIN   Result Value Ref Range    Procalcitonin 0.05 <0.25 ng/mL   CRP QUANTITIVE (NON-CARDIAC)   Result Value Ref Range    Stat C-Reactive Protein 1.80 (H) 0.00 - 0.75 mg/dL   CREATINE KINASE   Result Value Ref Range    CPK Total 46 0 - 154 U/L   CREATINE KINASE   Result Value Ref Range    CPK Total 38 0 - 154 U/L   CRP QUANTITIVE (NON-CARDIAC)   Result Value Ref Range    Stat C-Reactive Protein 2.03 (H) 0.00 - 0.75 mg/dL   PROCALCITONIN   Result Value Ref Range    Procalcitonin <0.05 <0.25 ng/mL   EKG   Result Value Ref Range    Report       [facility] Emergency Dept.    Test Date:  2021-08-28  Pt Name:    FRANKY TEMPLE                  Department: ER  MRN:        [id number]                      Room:       RD 07  Gender:     Male                          Technician: [id number]  :        [id number]                   Requested By:ER TRIAGE PROTOCOL  Order #:    [id number]                    Reading MD: WILIAM MOONEY MD    Measurements  Intervals                                Axis  Rate:       82                           P:          0  OK:         158                          QRS:        -41  QRSD:       142                          T:          -3  QT:         424  QTc:        496    Interpretive Statements  SINUS RHYTHM, rate of 82, leftward axis  CONSIDER RIGHT ATRIAL ABNORMALITY  RBBB AND LAFB  LEFT VENTRICULAR HYPERTROPHY  Compared to ECG 2016 13:05:53  Left ventricular hypertrophy now present  Sinus bradycardia no longer present  Electronically Signed On 2021 19:58:54 PDT by WILIAM ELENA MD     EKG (NOW)   Result Value Ref Range    Report       [facility] Emergency Dept.    Test Date:  2021  Pt Name:    FRANKY TEMPLE                  Department: ER  MRN:        [id number]                      Room:       [facility]  Gender:     Male                         Technician: [id number]  :        [id number]                   Requested By:WILIAM MOONEY  Order #:    [id number]                    Reading MD: WILIAM MOONEY MD    Measurements  Intervals                                Axis  Rate:       73                           P:          0  OK:         332                          QRS:        -39  QRSD:       144                          T:          -9  QT:         400  QTc:        441    Interpretive Statements  SINUS RHYTHM  SINUS PAUSE/ARREST WITH ATRIAL ESCAPE  FIRST DEGREE AV BLOCK  RBBB AND LAFB  LEFT VENTRICULAR HYPERTROPHY  Compared to ECG 2021 17:03:45  Sinus pause or arrest now present  First degree AV block now present  Electronically Signed On 2021 19:58:12 PDT by WILIAM MOONEY MD     POCT glucose device results   Result Value Ref Range    Glucose - Accu-Ck 120 (H) 65 - 99 mg/dL        All labs reviewed by me.    EKG  12 Lead EKG interpreted by me as shown above.     RADIOLOGY  CT-CTA NECK WITH & W/O-POST PROCESSING   Final Result      1.  No focal stenosis, dissection or occlusion of the cervical carotid or vertebral arteries.   2.  LEFT vertebral artery is diffusely hypoplastic.      CT-CTA HEAD WITH & W/O-POST PROCESS   Final Result      1.  No intracranial aneurysm, focal stenosis or abrupt vessel cut off.   2.  Hypoplastic distal LEFT vertebral artery.         CT-HEAD W/O   Final Result         NO ACUTE ABNORMALITIES ARE NOTED ON CT SCAN OF THE HEAD.      Findings are consistent with atrophy.  Decreased attenuation in the periventricular white matter likely indicates microvascular ischemic disease.      EC-ECHOCARDIOGRAM COMPLETE W/O CONT    (Results Pending)   MR-BRAIN-W/O    (Results Pending)     The radiologist's interpretation of all radiological studies have been reviewed by me.    COURSE & MEDICAL DECISION MAKING  Pertinent Labs & Imaging studies reviewed. (See chart for details)    5:39 PM - Patient seen and examined at bedside. Ordered CT-head, CBC with diff, CMP, Troponin, Urinalysis, APTT, Prothrombin, and EKG to evaluate his symptoms. The differential diagnoses include but are not limited to: TIA, stroke, and seizure.     6:11 PM- Paged Neurology.     6:13 PM-  I discussed the patient's case and the above findings with Dr. Pimentel (Neurology) who recommends ordering a CT and CTA. Also, we should admit him for an MRI and TIA work up.     6:35 PM - Patient was reevaluated at bedside. Discussed lab and radiology results with the patient and informed them of the CT and CTA.     7:32 PM- Paged Hospitalist.     7:39 PM- Patient was reevaluated at bedside. Discussed that the CT head showed no signs of bleeding. Will admit him for further work up.     7:46PM- I discussed the patient's case and the above findings with Dr. Jackson (Hospitalist) who kindly agreed to evaluate the patient.         Medical Decision Making: At this point time I think the patient likely had a TIA causing his altered mental status is aphasia.  Patient is presumably due to return.  He does not have any intracranial hemorrhage or thrombosis that is seen on CTAs.  She does have an elevated troponin as well I think this will need to be trended.  Patient will be admitted at the recommendation of neurology for MRI.    DISPOSITION:  Patient will be hospitalized by Dr. Jackson in guarded condition.       FINAL IMPRESSION  1. TIA (transient ischemic attack)    2. Expressive aphasia    3. Elevated troponin    4. Cardiac arrhythmia, unspecified cardiac arrhythmia type    5. COVID-19 virus infection    6. Suspected cerebrovascular accident (CVA)          Gricelda CARRENO (Scribe), am scribing for, and in the presence of, Ernesto Zavaleta M.D.    Electronically signed by: Gricelda Pina (Anali), 8/28/2021    Ernesto CARRENO M.D. personally performed the services described in this documentation, as scribed by Gricelda Pina in my presence, and it is both accurate and complete.    The note accurately reflects work and decisions made by me.  Ernesto Zavaleta M.D.  8/29/2021  12:08 AM     Patient Refused

## 2021-09-09 ENCOUNTER — RX RENEWAL (OUTPATIENT)
Age: 71
End: 2021-09-09

## 2021-09-20 NOTE — PROGRESS NOTE ADULT - SUBJECTIVE AND OBJECTIVE BOX
Vascular Surgery Daily Progress Note    Subjective  - Pt seen and examined on AM rounds  - No events overnight  - Pt reports pain is well controlled, tolerating diet. Pt worked with PT yesterday    Objective    Physical Exam  Constitutional: NAD  Respiratory: No increased WOB  Cardiac: RRR  RLE: incisions c/d/i; dressings changed; DP and PT signals present.    Vital Signs  T(C): 36.8 (11-29-19 @ 05:00), Max: 37.2 (11-28-19 @ 17:20)  T(F): 98.3 (11-29-19 @ 05:00), Max: 99 (11-28-19 @ 17:20)  HR: 69 (11-29-19 @ 05:00) (69 - 80)  BP: 158/83 (11-29-19 @ 05:00) (127/66 - 169/73)  RR: 17 (11-29-19 @ 05:00) (17 - 18)  SpO2: 97% (11-29-19 @ 05:00) (93% - 97%)      28 Nov 2019 07:01  -  29 Nov 2019 07:00  --------------------------------------------------------  IN:    Oral Fluid: 840 mL    Solution: 100 mL  Total IN: 940 mL    OUT:    Voided: 2025 mL  Total OUT: 2025 mL    Total NET: -1085 mL attending Psychiatrist without NP/Trainee

## 2021-10-06 PROBLEM — I10 ESSENTIAL HYPERTENSION: Status: ACTIVE | Noted: 2020-03-04

## 2021-10-08 ENCOUNTER — APPOINTMENT (OUTPATIENT)
Dept: CARDIOLOGY | Facility: CLINIC | Age: 71
End: 2021-10-08
Payer: MEDICARE

## 2021-10-08 ENCOUNTER — NON-APPOINTMENT (OUTPATIENT)
Age: 71
End: 2021-10-08

## 2021-10-08 ENCOUNTER — APPOINTMENT (OUTPATIENT)
Dept: INTERNAL MEDICINE | Facility: CLINIC | Age: 71
End: 2021-10-08
Payer: MEDICARE

## 2021-10-08 VITALS
WEIGHT: 214 LBS | HEART RATE: 66 BPM | DIASTOLIC BLOOD PRESSURE: 72 MMHG | SYSTOLIC BLOOD PRESSURE: 158 MMHG | HEIGHT: 68 IN | BODY MASS INDEX: 32.43 KG/M2 | OXYGEN SATURATION: 100 %

## 2021-10-08 VITALS — DIASTOLIC BLOOD PRESSURE: 78 MMHG | SYSTOLIC BLOOD PRESSURE: 140 MMHG

## 2021-10-08 DIAGNOSIS — Z23 ENCOUNTER FOR IMMUNIZATION: ICD-10-CM

## 2021-10-08 PROCEDURE — 99214 OFFICE O/P EST MOD 30 MIN: CPT

## 2021-10-08 PROCEDURE — 93000 ELECTROCARDIOGRAM COMPLETE: CPT

## 2021-10-08 PROCEDURE — 90662 IIV NO PRSV INCREASED AG IM: CPT

## 2021-10-08 PROCEDURE — G0008: CPT

## 2021-10-08 NOTE — DISCUSSION/SUMMARY
[___ Month(s)] : in [unfilled] month(s) [FreeTextEntry1] : Kirill has a history of hypertension, diabetes, CAD with stents, and peripheral vascular disease status post right lower extremity bypass in 11/2019.\par \par From a cardiac perspective, he is feeling well today. His blood pressure is elevated though better overall. His physical exam is unremarkable. His EKG demonstrates a sinus rhythm with a right bundle branch block, unchanged from 2020.\par \par For now, he will continue his current blood pressure regimen though I have changed his metoprolol to coreg 12.5 bid. He will continue monitoring his blood pressures at home. He has normal LV function, with mild LVH. He will remain on aspirin, Plavix, and a statin, along with Vascpea. His most recent LDL was at goal.\par \par I stressed the importance of diet, exercise, and weight loss, to reduce his overall cardiovascular risk.\par I will see him again in 3 months.

## 2021-10-08 NOTE — HISTORY OF PRESENT ILLNESS
[FreeTextEntry1] : Kirill is a 70 year old male with HTN, DM2 and CAD with stents (most recent 3-4 years ago), here for follow up. \par \par I last saw him in 4/2021.\par \par He is feeling well overall. He denies chest pain, difficulty breathing, palpitations, lower extremity swelling, orthopnea, PND, dizziness, lightheadedness, near syncope. He reports that his blood pressures are in the 140/ range at home. His diabetes has been better controlled. His most recent LDL was 50.\par \par He was admitted to the hospital in 11/2019, with a nonhealing right foot ulcer. On 11/25, he had a popliteal artery bypass to posterior tibial artery. In 2021, RLE angiogram with occlusion of the distal posterior tibial artery.\par \par He is currently taking amlodipine 10, HCTZ 25, lisinopril 40, Imdur 60 and metoprolol 50 b.i.d. He also remains on aspirin, Plavix, and atorvastatin.\par His echocardiogram in 2021 showed an EF of 53% with mid-mod MR, mild DD and mild LVH.\par

## 2021-11-23 ENCOUNTER — RX RENEWAL (OUTPATIENT)
Age: 71
End: 2021-11-23

## 2021-11-29 ENCOUNTER — APPOINTMENT (OUTPATIENT)
Dept: ENDOCRINOLOGY | Facility: CLINIC | Age: 71
End: 2021-11-29

## 2021-12-02 ENCOUNTER — APPOINTMENT (OUTPATIENT)
Dept: VASCULAR SURGERY | Facility: CLINIC | Age: 71
End: 2021-12-02

## 2021-12-02 NOTE — HISTORY OF PRESENT ILLNESS
[FreeTextEntry1] : incomplete note pt scheudled for 12/7/2021\par \par JOVANNI MARTINEZ is a 70 year old male s/p right Pop to PT bypass with RGSV on 11/25/2019 for right foot wound and osteomyelitis.  \par \par 6/11/2020 - Doing well since last visit.\par Right foot wounds have healed completely at this time.\par No complaints. Denies claudication. \par \par 1/7/2021 - Doing well since visit. Patient reports stable mild numbness at the forefoot. Denies lower extremity pain. No fever or chills. \par \par 1/27/2021–right lower extremity angiogram revealed occlusion of the distal posterior tibial artery distal to the anastomosis, nonreconstructible. \par \par 2/18/2021–Doing well since angiogram. Reports continued mild right foot numbness and pain. This is worse in the morning but improves during the day. The patient denies any wounds on the right foot. Has taken gabapentin with some improvement in symptoms. No fever or chills. \par \par 5/20/2021–Patient presents for follow-up with his wife.  He reports stable right lower extremity symptoms.  He reports continued bilateral foot numbness at the soles.  He continues to take the gabapentin with some relief in symptoms.  He recently saw his cardiologist who performed an echocardiogram.  Although today he is hypertensive with a systolic blood pressure 175, his wife reports that repeated measurements at home range from 135 to 150 mmHg. Patient continues to take plavix.  [de-identified] : 12/7/2021–Patient presents for follow-up.

## 2021-12-02 NOTE — DATA REVIEWED
[FreeTextEntry1] : 12/7/2021–\par \par 5/20/2021 - HUANG\par R TBI 0.54, toe pressure 87\par L TBI 0.43, toe pressure 70, HUANG 1.08.

## 2021-12-07 ENCOUNTER — APPOINTMENT (OUTPATIENT)
Dept: VASCULAR SURGERY | Facility: CLINIC | Age: 71
End: 2021-12-07

## 2022-02-01 ENCOUNTER — APPOINTMENT (OUTPATIENT)
Dept: CARDIOLOGY | Facility: CLINIC | Age: 72
End: 2022-02-01
Payer: MEDICARE

## 2022-02-01 ENCOUNTER — NON-APPOINTMENT (OUTPATIENT)
Age: 72
End: 2022-02-01

## 2022-02-01 VITALS
HEART RATE: 66 BPM | WEIGHT: 213 LBS | DIASTOLIC BLOOD PRESSURE: 78 MMHG | HEIGHT: 68 IN | BODY MASS INDEX: 32.28 KG/M2 | SYSTOLIC BLOOD PRESSURE: 173 MMHG | OXYGEN SATURATION: 99 %

## 2022-02-01 PROCEDURE — 93000 ELECTROCARDIOGRAM COMPLETE: CPT

## 2022-02-01 PROCEDURE — 99214 OFFICE O/P EST MOD 30 MIN: CPT

## 2022-02-01 NOTE — HISTORY OF PRESENT ILLNESS
[FreeTextEntry1] : Kirill is a 71 year old male with HTN, DM2 and CAD with stents (most recent 3-4 years ago), here for follow up. \par \par I last saw him in 10/2021.\par \par He is feeling well overall. He denies chest pain, difficulty breathing, palpitations, lower extremity swelling, orthopnea, PND, dizziness, lightheadedness, near syncope. He reports that his blood pressures are in the 140/ range at home. His diabetes has been better controlled. His most recent LDL was 73.\par He shoveled snow this past weekend without any issues. \par \par He was admitted to the hospital in 11/2019, with a nonhealing right foot ulcer. On 11/25, he had a popliteal artery bypass to posterior tibial artery. In 2021, RLE angiogram with occlusion of the distal posterior tibial artery.\par \par He is currently taking amlodipine 10, HCTZ 25, lisinopril 40, Imdur 60 and coreg 12.5 bid. He also remains on aspirin, Plavix, and atorvastatin.\par His echocardiogram in 2021 showed an EF of 53% with mid-mod MR, mild DD and mild LVH.\par

## 2022-02-01 NOTE — DISCUSSION/SUMMARY
[___ Month(s)] : in [unfilled] month(s) [FreeTextEntry1] : Kirill has a history of hypertension, diabetes, CAD with stents, and peripheral vascular disease status post right lower extremity bypass in 11/2019.\par \par From a cardiac perspective, he is doing ok. His blood pressure is elevated though better overall. His physical exam is unremarkable. His EKG demonstrates a sinus rhythm with a right bundle branch block.\par \par For now, he will continue his current blood pressure regimen though I have increased his Coreg to 25 q12. He will continue monitoring his blood pressures at home. He has normal LV function, with mild LVH. He will remain on aspirin, Plavix, and a statin, along with Vascpea. His most recent LDL was in the 70's. He is going to have a pharm stress test to evaluate his residual CAD (in the setting of fatigue, PCI >5 years ago) and a carotid doppler to evaluate for atherosclerosis.\par \par I stressed the importance of diet, exercise, and weight loss, to reduce his overall cardiovascular risk.\par I will see him again in 3 months.

## 2022-02-08 ENCOUNTER — APPOINTMENT (OUTPATIENT)
Dept: CARDIOLOGY | Facility: CLINIC | Age: 72
End: 2022-02-08
Payer: MEDICARE

## 2022-02-08 PROCEDURE — 93880 EXTRACRANIAL BILAT STUDY: CPT

## 2022-02-14 ENCOUNTER — NON-APPOINTMENT (OUTPATIENT)
Age: 72
End: 2022-02-14

## 2022-02-15 ENCOUNTER — APPOINTMENT (OUTPATIENT)
Dept: CARDIOLOGY | Facility: CLINIC | Age: 72
End: 2022-02-15
Payer: MEDICARE

## 2022-02-15 PROCEDURE — 93015 CV STRESS TEST SUPVJ I&R: CPT

## 2022-02-15 PROCEDURE — A9500: CPT

## 2022-02-15 PROCEDURE — 78452 HT MUSCLE IMAGE SPECT MULT: CPT

## 2022-02-28 ENCOUNTER — APPOINTMENT (OUTPATIENT)
Dept: ENDOCRINOLOGY | Facility: CLINIC | Age: 72
End: 2022-02-28
Payer: MEDICARE

## 2022-02-28 VITALS
SYSTOLIC BLOOD PRESSURE: 117 MMHG | DIASTOLIC BLOOD PRESSURE: 73 MMHG | OXYGEN SATURATION: 99 % | HEART RATE: 66 BPM | WEIGHT: 214 LBS | BODY MASS INDEX: 32.54 KG/M2

## 2022-02-28 PROCEDURE — 82962 GLUCOSE BLOOD TEST: CPT

## 2022-02-28 PROCEDURE — 36415 COLL VENOUS BLD VENIPUNCTURE: CPT

## 2022-02-28 PROCEDURE — 99215 OFFICE O/P EST HI 40 MIN: CPT | Mod: 25

## 2022-02-28 NOTE — HISTORY OF PRESENT ILLNESS
[FreeTextEntry1] : Mr. JOVANNI MARTINEZ  is a 71 year old male with past medical history of Diabetes Mellitus Type 2, Osteomyelitis of R foot s/p debridement (11/2019), CAD who presents for management of his diabetes. Recent normal stress test.\par \par \par POCT Glucose: 229  mg/dL, admits to extra pasta and wine\par POCT Hga1c: % \par \par Diabetes first diagnosed: 3 years ago\par Type: 2\par \par Current diabetic regimen:\par Metformin 1000 mg BID\par Glimepiride 2 mg QD (stopped)\par Trulicity 0.75 mg Qweekly\par \par Other relevant medications:\par atorvastatin 20\par lisinopril 40\par \par Pt checks FSG                { 1-2              } times per day:\par \par FSG:\par Pre-breakfast: 120-140\par Pre-dinner:140-160\par \par Hypoglycemia: denies\par \par \par Diet:\par Breakfast: cereal, waffles\par Lunch: meat, vegetables, rice/pasta\par Dinner: meat, vegetables/salads\par Snacks: nuts, apples\par \par Exercise: none\par \par Urine micro:638 (2/2021)  elevated (6/2020)\par lipid profile:LDL 73 (2/2021),  LDL 50 6/2020, 41 2/21/20\par last hba1c: 6.5% (8/2021), 5.5% (5/2021), 6.2% 2/2021, 7.7% 6/2020, 6.4% 2/21/20\par eye exam: +retinopathy, getting cataract surgery\par diabetic foot exam/podiatry: 2/2021 in office\par \par \par \par \par

## 2022-02-28 NOTE — PHYSICAL EXAM
[Alert] : alert [Well Nourished] : well nourished [No Acute Distress] : no acute distress [Well Developed] : well developed [Normal Sclera/Conjunctiva] : normal sclera/conjunctiva [EOMI] : extra ocular movement intact [No Proptosis] : no proptosis [Normal Oropharynx] : the oropharynx was normal [Thyroid Not Enlarged] : the thyroid was not enlarged [No Thyroid Nodules] : no palpable thyroid nodules [No Respiratory Distress] : no respiratory distress [No Accessory Muscle Use] : no accessory muscle use [Clear to Auscultation] : lungs were clear to auscultation bilaterally [Normal S1, S2] : normal S1 and S2 [Normal Rate] : heart rate was normal [Regular Rhythm] : with a regular rhythm [No Edema] : no peripheral edema [Pedal Pulses Normal] : the pedal pulses are present [Normal Bowel Sounds] : normal bowel sounds [Not Tender] : non-tender [Not Distended] : not distended [Soft] : abdomen soft [Normal Anterior Cervical Nodes] : no anterior cervical lymphadenopathy [Normal Posterior Cervical Nodes] : no posterior cervical lymphadenopathy [No Spinal Tenderness] : no spinal tenderness [Spine Straight] : spine straight [No Stigmata of Cushings Syndrome] : no stigmata of Cushings Syndrome [Normal Gait] : normal gait [Normal Strength/Tone] : muscle strength and tone were normal [No Rash] : no rash [Right foot was examined, including] : right foot ~C was examined, including visual inspection with sensory and pulse exams [Left foot was examined, including] : left foot ~C was examined, including visual inspection with sensory and pulse exams [Normal] : normal [1+] : 1+ in the dorsalis pedis [#1 Diminished] : number 1 was diminished [#2 Diminished] : number 2 was diminished [#3 Diminished] : number 3 was diminished [Normal Reflexes] : deep tendon reflexes were 2+ and symmetric [No Tremors] : no tremors [Oriented x3] : oriented to person, place, and time [Acanthosis Nigricans] : no acanthosis nigricans [FreeTextEntry1] : overgrowth of callous in multiple spots

## 2022-02-28 NOTE — ASSESSMENT
[FreeTextEntry1] : 71 year old male with past medical history of Diabetes Mellitus Type 2, Osteomyelitis of R foot s/p debridement (11/2019), CAD who presents for management of his diabetes\par \par 1. DM 2- controlled, complicated\par Patient counseled on the importance of diabetic control and complications. Patient's diet and the necessary changes discussed. \par \par Cont Metformin 1000 mg BID\par Cont  0.75 mg qweekly\par Check fsg BID\par Cont diabetic diet\par Cont exercise\par foot exam done- rec to see podiatry\par labs today \par optho up to date\par \par 2. HLD- stable\par Cont Atorvastatin\par \par \par

## 2022-03-01 ENCOUNTER — APPOINTMENT (OUTPATIENT)
Dept: VASCULAR SURGERY | Facility: CLINIC | Age: 72
End: 2022-03-01
Payer: MEDICARE

## 2022-03-01 VITALS
HEIGHT: 68 IN | BODY MASS INDEX: 32.28 KG/M2 | HEART RATE: 59 BPM | SYSTOLIC BLOOD PRESSURE: 132 MMHG | DIASTOLIC BLOOD PRESSURE: 77 MMHG | WEIGHT: 213 LBS | TEMPERATURE: 96.8 F

## 2022-03-01 VITALS — DIASTOLIC BLOOD PRESSURE: 78 MMHG | HEART RATE: 59 BPM | SYSTOLIC BLOOD PRESSURE: 155 MMHG

## 2022-03-01 PROCEDURE — 93970 EXTREMITY STUDY: CPT

## 2022-03-01 PROCEDURE — 99213 OFFICE O/P EST LOW 20 MIN: CPT

## 2022-03-01 PROCEDURE — 93922 UPR/L XTREMITY ART 2 LEVELS: CPT

## 2022-03-02 NOTE — DATA REVIEWED
[FreeTextEntry1] : 3/1/2022-HUANG\par R HUANG 1.38, TBI 0.36, toe pressure 51\par L HUANG 1.12, TBI 0.44, toe pressure 63\par \par 3/1/2022-BLE venous duplex\par Negative for DVT bilaterally.\par Right - GSV harvested. Reflux in GSV mid-distal calf. \par Left - Chronic poorly recanalized SVT in GSV with reflux in GSV and SSV. \par ----------------------------------------\par 5/20/2021 - HUANG\par R TBI 0.54, toe pressure 87\par L TBI 0.43, toe pressure 70, HUANG 1.08.

## 2022-03-02 NOTE — ASSESSMENT
[FreeTextEntry1] : JOVANNI MARTINEZ is a 70 year old male s/p right pop-PT bypass w/ rgsv now closed.\par \par - Patient without wounds or rest pain currently. Will continue medical management with aspirin, statin, diabetes and hypertension control.\par –Continue use of gabapentin.\par –Follow up in six months.

## 2022-03-02 NOTE — HISTORY OF PRESENT ILLNESS
[FreeTextEntry1] : JOVANNI MARTINEZ is a 70 year old male s/p right Pop to PT bypass with RGSV on 11/25/2019 for right foot wound and osteomyelitis.  \par \par 6/11/2020 - Doing well since last visit.\par Right foot wounds have healed completely at this time.\par No complaints. Denies claudication. \par \par 1/7/2021 - Doing well since visit. Patient reports stable mild numbness at the forefoot. Denies lower extremity pain. No fever or chills. \par \par 1/27/2021–right lower extremity angiogram revealed occlusion of the distal posterior tibial artery distal to the anastomosis, nonreconstructible. \par \par 2/18/2021–Doing well since angiogram. Reports continued mild right foot numbness and pain. This is worse in the morning but improves during the day. The patient denies any wounds on the right foot. Has taken gabapentin with some improvement in symptoms. No fever or chills. \par \par 5/20/2021–Patient presents for follow-up with his wife.  He reports stable right lower extremity symptoms.  He reports continued bilateral foot numbness at the soles.  He continues to take the gabapentin with some relief in symptoms.  He recently saw his cardiologist who performed an echocardiogram.  Although today he is hypertensive with a systolic blood pressure 175, his wife reports that repeated measurements at home range from 135 to 150 mmHg. Patient continues to take plavix.  [de-identified] : 3/1/2022-Pt presents for follow up with his wife. Since last visit, has been doing well. Denies lower extremity pain or wounds. He continues to walk without issues. He takes his aspirin and statin.

## 2022-03-02 NOTE — PHYSICAL EXAM
[0] : left 0 [Varicose Veins Of Lower Extremities] : present [] : present [No Rash or Lesion] : No rash or lesion [Calm] : calm [JVD] : no jugular venous distention  [Ankle Swelling (On Exam)] : not present [de-identified] : Well-appearing  [de-identified] : EOMI, anicteric  [de-identified] : soft, nt, nd  [de-identified] : motor and sensation intact in all four extremities \par well healed incisions right leg [de-identified] : A&Ox4

## 2022-03-03 ENCOUNTER — NON-APPOINTMENT (OUTPATIENT)
Age: 72
End: 2022-03-03

## 2022-03-03 LAB
ALBUMIN SERPL ELPH-MCNC: 4.6 G/DL
ALP BLD-CCNC: 74 U/L
ALT SERPL-CCNC: 8 U/L
ANION GAP SERPL CALC-SCNC: 13 MMOL/L
AST SERPL-CCNC: 12 U/L
BASOPHILS # BLD AUTO: 0.03 K/UL
BASOPHILS NFR BLD AUTO: 0.5 %
BILIRUB SERPL-MCNC: 0.4 MG/DL
BUN SERPL-MCNC: 32 MG/DL
CALCIUM SERPL-MCNC: 9.3 MG/DL
CHLORIDE SERPL-SCNC: 106 MMOL/L
CHOLEST SERPL-MCNC: 108 MG/DL
CO2 SERPL-SCNC: 22 MMOL/L
CREAT SERPL-MCNC: 0.93 MG/DL
CREAT SPEC-SCNC: 182 MG/DL
EGFR: 89 ML/MIN/1.73M2
EOSINOPHIL # BLD AUTO: 0.09 K/UL
EOSINOPHIL NFR BLD AUTO: 1.6 %
ESTIMATED AVERAGE GLUCOSE: 151 MG/DL
FRUCTOSAMINE SERPL-MCNC: 312 UMOL/L
GLUCOSE SERPL-MCNC: 214 MG/DL
HBA1C MFR BLD HPLC: 6.9 %
HCT VFR BLD CALC: 35.9 %
HDLC SERPL-MCNC: 40 MG/DL
HGB BLD-MCNC: 11.7 G/DL
IMM GRANULOCYTES NFR BLD AUTO: 0.2 %
LDLC SERPL CALC-MCNC: 55 MG/DL
LYMPHOCYTES # BLD AUTO: 1.34 K/UL
LYMPHOCYTES NFR BLD AUTO: 24.3 %
MAN DIFF?: NORMAL
MCHC RBC-ENTMCNC: 27.3 PG
MCHC RBC-ENTMCNC: 32.6 GM/DL
MCV RBC AUTO: 83.9 FL
MICROALBUMIN 24H UR DL<=1MG/L-MCNC: 16.7 MG/DL
MICROALBUMIN/CREAT 24H UR-RTO: 91 MG/G
MONOCYTES # BLD AUTO: 0.43 K/UL
MONOCYTES NFR BLD AUTO: 7.8 %
NEUTROPHILS # BLD AUTO: 3.62 K/UL
NEUTROPHILS NFR BLD AUTO: 65.6 %
NONHDLC SERPL-MCNC: 68 MG/DL
PLATELET # BLD AUTO: 190 K/UL
POTASSIUM SERPL-SCNC: 4.9 MMOL/L
PROT SERPL-MCNC: 6.5 G/DL
RBC # BLD: 4.28 M/UL
RBC # FLD: 13.3 %
SODIUM SERPL-SCNC: 140 MMOL/L
TRIGL SERPL-MCNC: 70 MG/DL
WBC # FLD AUTO: 5.52 K/UL

## 2022-03-04 ENCOUNTER — NON-APPOINTMENT (OUTPATIENT)
Age: 72
End: 2022-03-04

## 2022-03-08 ENCOUNTER — APPOINTMENT (OUTPATIENT)
Dept: INTERNAL MEDICINE | Facility: CLINIC | Age: 72
End: 2022-03-08
Payer: MEDICARE

## 2022-03-08 VITALS
DIASTOLIC BLOOD PRESSURE: 88 MMHG | RESPIRATION RATE: 16 BRPM | HEART RATE: 79 BPM | BODY MASS INDEX: 32.28 KG/M2 | SYSTOLIC BLOOD PRESSURE: 154 MMHG | OXYGEN SATURATION: 98 % | WEIGHT: 213 LBS | TEMPERATURE: 97.4 F | HEIGHT: 68 IN

## 2022-03-08 VITALS — DIASTOLIC BLOOD PRESSURE: 70 MMHG | SYSTOLIC BLOOD PRESSURE: 136 MMHG

## 2022-03-08 DIAGNOSIS — E11.621 TYPE 2 DIABETES MELLITUS WITH FOOT ULCER: ICD-10-CM

## 2022-03-08 DIAGNOSIS — L97.509 TYPE 2 DIABETES MELLITUS WITH FOOT ULCER: ICD-10-CM

## 2022-03-08 PROCEDURE — 99215 OFFICE O/P EST HI 40 MIN: CPT

## 2022-03-08 NOTE — HISTORY OF PRESENT ILLNESS
[Coronary Artery Disease] : coronary artery disease [Diabetes] : diabetes [(Patient denies any chest pain, claudication, dyspnea on exertion, orthopnea, palpitations or syncope)] : Patient denies any chest pain, claudication, dyspnea on exertion, orthopnea, palpitations or syncope [Moderate (4-6 METs)] : Moderate (4-6 METs) [Anti-Platelet Agents: _____] : Anti-Platelet Agents: [unfilled] [Atrial Fibrillation] : no atrial fibrillation [Recent Myocardial Infarction] : no recent myocardial infarction [Implantable Device/Pacemaker] : no implantable device/pacemaker [Asthma] : no asthma [COPD] : no COPD [Sleep Apnea] : no sleep apnea [Smoker] : not a smoker [Family Member] : no family member with adverse anesthesia reaction/sudden death [Self] : no previous adverse anesthesia reaction [Chronic Anticoagulation] : no chronic anticoagulation [Chronic Kidney Disease] : no chronic kidney disease [FreeTextEntry1] : Cataract [FreeTextEntry2] : 3/10/22 [FreeTextEntry3] : Dr Mark

## 2022-03-08 NOTE — REVIEW OF SYSTEMS
[Fever] : no fever [Chills] : no chills [Night Sweats] : no night sweats [Discharge] : no discharge [Itching] : no itching [Earache] : no earache [Nasal Discharge] : no nasal discharge [Sore Throat] : no sore throat [Chest Pain] : no chest pain [Palpitations] : no palpitations [Lower Ext Edema] : no lower extremity edema [Shortness Of Breath] : no shortness of breath [Wheezing] : no wheezing [Cough] : no cough [Dyspnea on Exertion] : not dyspnea on exertion [Abdominal Pain] : no abdominal pain [Nausea] : no nausea [Constipation] : no constipation [Diarrhea] : no diarrhea [Vomiting] : no vomiting [Dysuria] : no dysuria [Muscle Pain] : no muscle pain [Muscle Weakness] : no muscle weakness [Itching] : no itching [Skin Rash] : no skin rash [Headache] : no headache [Dizziness] : no dizziness [Easy Bleeding] : no easy bleeding [Easy Bruising] : no easy bruising [Swollen Glands] : no swollen glands

## 2022-03-08 NOTE — ASSESSMENT
[High Risk Surgery - Intraperitoneal, Intrathoracic or Supringuinal Vascular Procedures] : High Risk Surgery - Intraperitoneal, Intrathoracic or Supringuinal Vascular Procedures - No (0) [Ischemic Heart Disease] : Ischemic Heart Disease  - Yes (1) [Congestive Heart Failure] : Congestive Heart Failure - No (0) [Prior Cerebrovascular Accident or TIA] : Prior Cerebrovascular Accident or TIA - No (0) [Creatinine >= 2mg/dL (1 Point)] : Creatinine >= 2mg/dL - No (0) [Insulin-dependent Diabetic (1 Point)] : Insulin-dependent Diabetic - No (0) [1] : 1 , RCRI Class: II, Risk of Post-Op Cardiac Complications: 6.0%, 95% CI for Risk Estimate: 4.9% - 7.4% [Patient Optimized for Surgery] : Patient optimized for surgery [No Further Testing Recommended] : no further testing recommended [Modify medications prior to procedure] : Modify medications prior to procedure [FreeTextEntry7] : hold metformin morning of and restart after surgery, resume ASA, plavix after cleared by Dr Mark

## 2022-03-09 ENCOUNTER — APPOINTMENT (OUTPATIENT)
Dept: CARDIOLOGY | Facility: CLINIC | Age: 72
End: 2022-03-09
Payer: MEDICARE

## 2022-03-09 ENCOUNTER — NON-APPOINTMENT (OUTPATIENT)
Age: 72
End: 2022-03-09

## 2022-03-09 VITALS — SYSTOLIC BLOOD PRESSURE: 139 MMHG | DIASTOLIC BLOOD PRESSURE: 70 MMHG

## 2022-03-09 VITALS
HEIGHT: 68 IN | WEIGHT: 219 LBS | SYSTOLIC BLOOD PRESSURE: 165 MMHG | BODY MASS INDEX: 33.19 KG/M2 | DIASTOLIC BLOOD PRESSURE: 71 MMHG | OXYGEN SATURATION: 100 % | HEART RATE: 68 BPM

## 2022-03-09 PROCEDURE — 99214 OFFICE O/P EST MOD 30 MIN: CPT

## 2022-03-09 PROCEDURE — 93000 ELECTROCARDIOGRAM COMPLETE: CPT

## 2022-03-09 NOTE — DISCUSSION/SUMMARY
[___ Month(s)] : in [unfilled] month(s) [FreeTextEntry1] : Kirill has a history of hypertension, diabetes, CAD with stents, and peripheral vascular disease status post right lower extremity bypass in 11/2019.\par \par From a cardiac perspective, he is doing well. His blood pressure is better overall. His physical exam is unremarkable. His EKG demonstrates a sinus rhythm with a right bundle branch block, unchanged from prior tracings.\par \par For now, he will continue his current blood pressure regimen, including an increased dose of carvedilol.  He has normal LV function, with mild LVH. He will remain on aspirin, Plavix, and a statin, along with Vascepa. His most recent LDL was in the 70's.  His stress test demonstrated inferior infarct, without ischemia, and his carotid Doppler demonstrated mild to moderate atherosclerosis, without stenosis.\par \par He has no worrisome symptoms at this time.  There is no cardiac contraindication to proceeding with cataract surgery.  Routine cardiac monitoring is recommended.\par \par I stressed the importance of diet, exercise, and weight loss, to reduce his overall cardiovascular risk.\par I will see him again in 3 months.

## 2022-03-09 NOTE — HISTORY OF PRESENT ILLNESS
[FreeTextEntry1] : Kirill is a 71 year old male with HTN, DM2 and CAD with stents (most recent 3-4 years ago), here for follow up. \par He was admitted to the hospital in 11/2019, with a nonhealing right foot ulcer. On 11/25, he had a popliteal artery bypass to posterior tibial artery. In 2021, RLE angiogram with occlusion of the distal posterior tibial artery.\par \par I last saw him in 2/1/22.\par He is feeling well overall. He denies chest pain, difficulty breathing, palpitations, lower extremity swelling, orthopnea, PND, dizziness, lightheadedness, near syncope. He reports that his blood pressures are in the 140/ range at home. His diabetes has been better controlled. His most recent LDL was 73.\par He was able to shovel snow over the winter without any issue.\par \par A pharmacologic stress test in 2/22 showed inferior infarct, but no evidence of ischemia.  A carotid Doppler showed mild to moderate atherosclerosis, without significant stenosis.\par His echocardiogram in 2021 showed an EF of 53% with mid-mod MR, mild DD and mild LVH.\par \par Today, he is requesting cardiac clearance prior to cataract surgery.\par

## 2022-03-19 ENCOUNTER — RX RENEWAL (OUTPATIENT)
Age: 72
End: 2022-03-19

## 2022-04-22 ENCOUNTER — RX RENEWAL (OUTPATIENT)
Age: 72
End: 2022-04-22

## 2022-05-09 ENCOUNTER — APPOINTMENT (OUTPATIENT)
Dept: CARDIOLOGY | Facility: CLINIC | Age: 72
End: 2022-05-09
Payer: MEDICARE

## 2022-05-09 ENCOUNTER — NON-APPOINTMENT (OUTPATIENT)
Age: 72
End: 2022-05-09

## 2022-05-09 VITALS
BODY MASS INDEX: 33.65 KG/M2 | OXYGEN SATURATION: 100 % | SYSTOLIC BLOOD PRESSURE: 150 MMHG | HEART RATE: 64 BPM | DIASTOLIC BLOOD PRESSURE: 73 MMHG | WEIGHT: 222 LBS | HEIGHT: 68 IN

## 2022-05-09 VITALS — DIASTOLIC BLOOD PRESSURE: 65 MMHG | SYSTOLIC BLOOD PRESSURE: 132 MMHG

## 2022-05-09 PROCEDURE — 99214 OFFICE O/P EST MOD 30 MIN: CPT

## 2022-05-09 PROCEDURE — 93000 ELECTROCARDIOGRAM COMPLETE: CPT

## 2022-05-09 NOTE — DISCUSSION/SUMMARY
[___ Month(s)] : in [unfilled] month(s) [FreeTextEntry1] : Kirill has a history of hypertension, diabetes, CAD with stents, and peripheral vascular disease status post right lower extremity bypass in 11/2019.\par \par From a cardiac perspective, he is doing well. His blood pressure is better overall. His EKG demonstrates a sinus rhythm with a right bundle branch block, unchanged from prior tracings.\par \par For now, he will continue his current blood pressure regimen, including an increased dose of carvedilol.  He has normal LV function, with mild LVH. We will repeat his echocardiogram, given the systolic murmur I hear on exam. He will remain on aspirin, Plavix, and a statin, along with Vascepa. His most recent LDL was in the 50's.  His stress test demonstrated inferior infarct, without ischemia, and his carotid Doppler demonstrated mild to moderate atherosclerosis, without stenosis.\par \par I stressed the importance of diet, exercise, and weight loss, to reduce his overall cardiovascular risk.\par If no issues, I will see him again in 3-5 months

## 2022-05-09 NOTE — HISTORY OF PRESENT ILLNESS
[FreeTextEntry1] : Kirill is a 71 year old male with HTN, DM2 and CAD with stents (most recent 3-4 years ago), here for follow up. \par He was admitted to the hospital in 11/2019, with a nonhealing right foot ulcer. On 11/25, he had a popliteal artery bypass to posterior tibial artery. In 2021, RLE angiogram with occlusion of the distal posterior tibial artery.\par \par I last saw him in 3/22.\par \par He is feeling well overall. He denies chest pain, difficulty breathing, palpitations, lower extremity swelling, orthopnea, PND, dizziness, lightheadedness, near syncope. He reports that his blood pressures are much better these days. His diabetes has been better controlled. His most recent LDL was 55.\par He was able to shovel snow over the winter without any issue.\par \par A pharmacologic stress test in 2/22 showed inferior infarct, but no evidence of ischemia.  A carotid Doppler showed mild to moderate atherosclerosis, without significant stenosis.\par His echocardiogram in 2021 showed an EF of 53% with mid-mod MR, mild DD and mild LVH.\par \par Since last visit, he had a cataract surgery on his right eye, and now needs laser therapy.\par

## 2022-05-09 NOTE — PHYSICAL EXAM
[Well Developed] : well developed [Well Nourished] : well nourished [No Acute Distress] : no acute distress [Normal Conjunctiva] : normal conjunctiva [Normal Venous Pressure] : normal venous pressure [No Carotid Bruit] : no carotid bruit [No Rub] : no rub [No Gallop] : no gallop [Clear Lung Fields] : clear lung fields [Good Air Entry] : good air entry [No Respiratory Distress] : no respiratory distress  [Soft] : abdomen soft [Non Tender] : non-tender [No Masses/organomegaly] : no masses/organomegaly [Normal Bowel Sounds] : normal bowel sounds [Normal Gait] : normal gait [No Edema] : no edema [No Cyanosis] : no cyanosis [No Clubbing] : no clubbing [No Varicosities] : no varicosities [No Rash] : no rash [No Skin Lesions] : no skin lesions [Moves all extremities] : moves all extremities [No Focal Deficits] : no focal deficits [Normal Speech] : normal speech [Alert and Oriented] : alert and oriented [Normal memory] : normal memory [Normal S1, S2] : normal S1, S2 [de-identified] : + SM at /sb

## 2022-05-23 ENCOUNTER — APPOINTMENT (OUTPATIENT)
Dept: CARDIOLOGY | Facility: CLINIC | Age: 72
End: 2022-05-23
Payer: MEDICARE

## 2022-05-23 PROCEDURE — 93306 TTE W/DOPPLER COMPLETE: CPT

## 2022-05-27 ENCOUNTER — RX RENEWAL (OUTPATIENT)
Age: 72
End: 2022-05-27

## 2022-05-31 ENCOUNTER — APPOINTMENT (OUTPATIENT)
Dept: ENDOCRINOLOGY | Facility: CLINIC | Age: 72
End: 2022-05-31
Payer: MEDICARE

## 2022-05-31 VITALS
HEART RATE: 69 BPM | SYSTOLIC BLOOD PRESSURE: 135 MMHG | DIASTOLIC BLOOD PRESSURE: 77 MMHG | WEIGHT: 215 LBS | OXYGEN SATURATION: 99 % | BODY MASS INDEX: 32.69 KG/M2

## 2022-05-31 PROCEDURE — 99214 OFFICE O/P EST MOD 30 MIN: CPT | Mod: 25

## 2022-05-31 PROCEDURE — 82962 GLUCOSE BLOOD TEST: CPT

## 2022-05-31 PROCEDURE — 83036 HEMOGLOBIN GLYCOSYLATED A1C: CPT | Mod: QW

## 2022-05-31 RX ORDER — LANCETS
EACH MISCELLANEOUS
Qty: 180 | Refills: 2 | Status: ACTIVE | COMMUNITY
Start: 2020-04-07 | End: 1900-01-01

## 2022-05-31 NOTE — DISCUSSION/SUMMARY
[___ Month(s)] : [unfilled] month(s) [FreeTextEntry1] : Kirill is here to establish care. He has a history of hypertension, diabetes, CAD with stents, and peripheral vascular disease status post right lower extremity bypass in 11/2019.\par \par From a cardiac perspective, he is feeling well today. His blood pressure is elevated, and has been running in the 140 range at home. His physical exam is unremarkable. His EKG demonstrates a sinus rhythm with a right bundle branch block, though no prior tracing is available.\par \par I have requested a copy of his most recent cardiac evaluation. For now, he will continue his current blood pressure regimen of amlodipine 10, HCTZ 25, lisinopril 40 and metoprolol 50 b.i.d. I have increased his Imdur to 60 mg daily, and he will continue to monitor his blood pressures at home. He will remain on aspirin, Plavix, and a statin, given his recent bypass. His most recent LDL was at goal.\par \par I stressed the importance of diet, exercise, and weight loss, to reduce his overall cardiovascular risk. We will speak after obtaining his records, and I will see him again in 4-6 months time. all other ROS negative except as per HPI

## 2022-05-31 NOTE — ASSESSMENT
[FreeTextEntry1] : 71 year old male with past medical history of Diabetes Mellitus Type 2, Osteomyelitis of R foot s/p debridement (11/2019), CAD who presents for management of his diabetes\par \par 1. DM 2- controlled, complicated\par Patient counseled on the importance of diabetic control and complications. Patient's diet and the necessary changes discussed. Discussed diet. Patient has to be more cautious. Hga1c is trending up.\par \par Cont Metformin 1000 mg BID\par Cont  0.75 mg qweekly\par Check fsg BID\par Cont diabetic diet\par Cont exercise\par foot exam done- rec to see podiatry\par labs today \par optho up to date\par \par 2. HLD- stable\par Cont Atorvastatin\par \par \par

## 2022-05-31 NOTE — HISTORY OF PRESENT ILLNESS
[FreeTextEntry1] : Mr. JOVANNI MARTINEZ  is a 71 year old male with past medical history of Diabetes Mellitus Type 2, Osteomyelitis of R foot s/p debridement (11/2019), CAD who presents for management of his diabetes. Recent normal stress test.\par \par \par POCT Glucose: 166 mg/dL\par POCT Hga1c: 7 % \par \par Diabetes first diagnosed: 3 years ago\par Type: 2\par \par Current diabetic regimen:\par Metformin 1000 mg BID\par Glimepiride 2 mg QD (stopped)\par Trulicity 0.75 mg Qweekly\par \par Other relevant medications:\par atorvastatin 20\par lisinopril 40\par \par Pt checks FSG    { 1-2     } times per day:\par \par FSG:\par Pre-breakfast: 130-150\par Pre-dinner:140-160\par \par Hypoglycemia: denies\par \par \par Diet:\par Breakfast: cereal, waffles\par Lunch: meat, vegetables, rice/pasta\par Dinner: meat, vegetables/salads\par Snacks: nuts, apples\par \par Exercise: none\par \par Urine micro:91 (3/2022), 638 (2/2021)  elevated (6/2020)\par lipid profile:LDL 55 (3/2022), LDL 73 (2/2021),  LDL 50 6/2020, 41 2/21/20\par last hba1c:7% (5/2022),  6.9% (2/2022), 6.5% (8/2021), 5.5% (5/2021), 6.2% 2/2021, 7.7% 6/2020, 6.4% 2/21/20\par eye exam: +retinopathy, getting cataract surgery\par diabetic foot exam/podiatry: 2/2022 in office\par \par \par \par \par

## 2022-05-31 NOTE — PHYSICAL EXAM
[Alert] : alert [Well Nourished] : well nourished [No Acute Distress] : no acute distress [Well Developed] : well developed [Normal Voice/Communication] : normal voice communication [Normal Sclera/Conjunctiva] : normal sclera/conjunctiva [No Neck Mass] : no neck mass was observed [No Respiratory Distress] : no respiratory distress [No Rash] : no rash [Oriented x3] : oriented to person, place, and time

## 2022-06-06 NOTE — PROGRESS NOTE ADULT - PROBLEM SELECTOR PLAN 2
Hello Ms. Arrington,  Some of your results came back and are within acceptable limits. -Normal red blood cell (hgb) levels, normal white blood cell count and normal platelet levels.. If you have any further concerns please do not hesitate to contact us by message, phone or making an appointment.  Have a good day   Dr Hitesh MARIE  Sera BridgesKam Arrington,  Your results came back and HBA1c improved no longer in prediabetic range. Continue to eat healthy and loosing few pounds in the future can help prevent developing diabetes.  If you have any further concerns please do not hesitate to contact us by message, phone or making an appointment.  Have a good day   Dr Hitesh MARIE  Sera Ms. Arrington,  Your results came back showing  Liver and gallbladder tests (AST, Alk phos,bilirubin) are normal. BUT the ALT is elevated at 71. Drink in moderation & avoid more than 2 grams acetaminophen a day. Recheck in 3 months. If remains elevated we should do an ultrasound of the liver.  -Kidney function (GFR) is normal.  -Sodium is normal.  -Potassium is normal.  -Calcium is normal.  -Glucose is slight elevated and may be a sign of early diabetes (prediabetes). ADVISE:: eating a low carbohydrate diet, exercising, trying to lose weight (if necessary) and rechecking your glucose level in 3   months.. If you have any further concerns please do not hesitate to contact us by message, phone or making an appointment.  Have a good day   Dr Hitesh MARIE  new DENISSE likely prerenal given vomiting on 11/16, poor intake, ACE/HCTZ  -vanco d/c'd given nephrotoxic combo with zosyn and low suspicion for MRSA,   -hold ACE and HCTZ until DENISSE resolved  -IVF, NS at 60cc/hr  -Cr improving currently  -monitor bmps, no confirmed R foot osteo on MRI, elevated ESR/CRP:  - c/w IV Zosyn (started 11/15) given pseudomonas on wound cx  - clinda d/c'd due to side effect of nausea, vomiting  - vanc dc'ed as no MRSA on culture and new DENISSE, nephrotoxic combo of vanc/zosyn  - ID consulted, rec zosyn only  - wound culture with GBS and pseudomonas  - podiatry following  - blood culture NGTD, afebrile, leukocytosis resolved, monitor cbc

## 2022-06-08 NOTE — PHYSICAL EXAM
[Mother] : mother [No Acute Distress] : no acute distress [Well Nourished] : well nourished [Well-Appearing] : well-appearing [Well Developed] : well developed [PERRL] : pupils equal round and reactive to light [Normal Sclera/Conjunctiva] : normal sclera/conjunctiva [EOMI] : extraocular movements intact [Normal Oropharynx] : the oropharynx was normal [Normal Outer Ear/Nose] : the outer ears and nose were normal in appearance [No JVD] : no jugular venous distention [No Lymphadenopathy] : no lymphadenopathy [Supple] : supple [Thyroid Normal, No Nodules] : the thyroid was normal and there were no nodules present [No Respiratory Distress] : no respiratory distress  [No Accessory Muscle Use] : no accessory muscle use [Clear to Auscultation] : lungs were clear to auscultation bilaterally [Normal S1, S2] : normal S1 and S2 [Normal Rate] : normal rate  [Regular Rhythm] : with a regular rhythm [No Murmur] : no murmur heard [No Carotid Bruits] : no carotid bruits [No Abdominal Bruit] : a ~M bruit was not heard ~T in the abdomen [Pedal Pulses Present] : the pedal pulses are present [No Varicosities] : no varicosities [No Edema] : there was no peripheral edema [No Extremity Clubbing/Cyanosis] : no extremity clubbing/cyanosis [No Palpable Aorta] : no palpable aorta [Non Tender] : non-tender [Soft] : abdomen soft [Non-distended] : non-distended [No Masses] : no abdominal mass palpated [No HSM] : no HSM [Normal Bowel Sounds] : normal bowel sounds [Normal Posterior Cervical Nodes] : no posterior cervical lymphadenopathy [No CVA Tenderness] : no CVA  tenderness [Normal Anterior Cervical Nodes] : no anterior cervical lymphadenopathy [No Spinal Tenderness] : no spinal tenderness [No Joint Swelling] : no joint swelling [Coordination Grossly Intact] : coordination grossly intact [Grossly Normal Strength/Tone] : grossly normal strength/tone [No Rash] : no rash [Normal Gait] : normal gait [No Focal Deficits] : no focal deficits [Normal Affect] : the affect was normal [Deep Tendon Reflexes (DTR)] : deep tendon reflexes were 2+ and symmetric [Normal Insight/Judgement] : insight and judgment were intact

## 2022-08-02 ENCOUNTER — APPOINTMENT (OUTPATIENT)
Dept: VASCULAR SURGERY | Facility: CLINIC | Age: 72
End: 2022-08-02

## 2022-08-06 ENCOUNTER — RX RENEWAL (OUTPATIENT)
Age: 72
End: 2022-08-06

## 2022-08-09 ENCOUNTER — APPOINTMENT (OUTPATIENT)
Dept: VASCULAR SURGERY | Facility: CLINIC | Age: 72
End: 2022-08-09

## 2022-08-09 VITALS
BODY MASS INDEX: 32.58 KG/M2 | HEART RATE: 64 BPM | TEMPERATURE: 98.1 F | HEIGHT: 68 IN | WEIGHT: 215 LBS | DIASTOLIC BLOOD PRESSURE: 60 MMHG | SYSTOLIC BLOOD PRESSURE: 117 MMHG

## 2022-08-09 PROCEDURE — 99213 OFFICE O/P EST LOW 20 MIN: CPT

## 2022-08-09 PROCEDURE — 93923 UPR/LXTR ART STDY 3+ LVLS: CPT

## 2022-08-09 RX ORDER — ICOSAPENT ETHYL 1 G/1
1 CAPSULE ORAL TWICE DAILY
Qty: 360 | Refills: 2 | Status: DISCONTINUED | COMMUNITY
Start: 2022-05-31 | End: 2022-08-09

## 2022-08-09 NOTE — DATA REVIEWED
[FreeTextEntry1] : 8/9/2022 -HUANG/PVR\par Right HUANG 0.45, TBI 0.30, toe pressure 38\par Left HUANG 0.89, TBI 0.24, toe pressure 30\par ---------------------------------- \par 3/1/2022-HUANG\par R HUANG 1.38, TBI 0.36, toe pressure 51\par L HUANG 1.12, TBI 0.44, toe pressure 63\par \par 3/1/2022-BLE venous duplex\par Negative for DVT bilaterally.\par Right - GSV harvested. Reflux in GSV mid-distal calf. \par Left - Chronic poorly recanalized SVT in GSV with reflux in GSV and SSV. \par ----------------------------------------\par 5/20/2021 - HUANG\par R TBI 0.54, toe pressure 87\par L TBI 0.43, toe pressure 70, HUANG 1.08.

## 2022-08-09 NOTE — HISTORY OF PRESENT ILLNESS
[FreeTextEntry1] : JOVANNI MARTINEZ is a 70 year old male s/p right Pop to PT bypass with RGSV on 11/25/2019 for right foot wound and osteomyelitis.  \par \par 6/11/2020 - Doing well since last visit.\par Right foot wounds have healed completely at this time.\par No complaints. Denies claudication. \par \par 1/7/2021 - Doing well since visit. Patient reports stable mild numbness at the forefoot. Denies lower extremity pain. No fever or chills. \par \par 1/27/2021–right lower extremity angiogram revealed occlusion of the distal posterior tibial artery distal to the anastomosis, nonreconstructible. \par \par 2/18/2021–Doing well since angiogram. Reports continued mild right foot numbness and pain. This is worse in the morning but improves during the day. The patient denies any wounds on the right foot. Has taken gabapentin with some improvement in symptoms. No fever or chills. \par \par 5/20/2021–Patient presents for follow-up with his wife.  He reports stable right lower extremity symptoms.  He reports continued bilateral foot numbness at the soles.  He continues to take the gabapentin with some relief in symptoms.  He recently saw his cardiologist who performed an echocardiogram.  Although today he is hypertensive with a systolic blood pressure 175, his wife reports that repeated measurements at home range from 135 to 150 mmHg. Patient continues to take plavix. \par \par 3/1/2022-Pt presents for follow up with his wife. Since last visit, has been doing well. Denies lower extremity pain or wounds. He continues to walk without issues. He takes his aspirin and statin.  [de-identified] : 8/9/2022-Pt presents for follow up. Since last visit, patient reports stable right leg symptoms. Denies rest pain or bilateral foot wounds. He continues to take gabapentin 100 mg BID. He has been active at home and walks daily. Continues to take aspirin, plavix, statin.

## 2022-08-09 NOTE — CONSULT LETTER
[Dear  ___] : Dear  [unfilled], [Courtesy Letter:] : I had the pleasure of seeing your patient, [unfilled], in my office today. [Please see my note below.] : Please see my note below. [Consult Closing:] : Thank you very much for allowing me to participate in the care of this patient.  If you have any questions, please do not hesitate to contact me. [Sincerely,] : Sincerely, [FreeTextEntry2] : S [FreeTextEntry1] : Presents to me for follow-up for his peripheral vascular disease.  Since his last visit, he denies any lower extremity wounds.  He denies any rest pain.  He has persistent neuropathy of his bilateral feet and continues to take gabapentin.  He has been adherent to his medications and continues to take aspirin, Plavix, statin.  On exam, he has nonpalpable pedal pulses.  There are no wounds to his bilateral feet.  HUANG/PVRs are suggestive of arterial insufficiency bilaterally.  At this time, there is no evidence of critical limb ischemia without rest pain or wounds.  Therefore, I am recommending continued medical management with continuation of aspirin, Plavix, statin, diabetic and blood pressure control.  I am including a copy of his latest study for your records. [FreeTextEntry3] : \par \par \par \par Keri Shanks MD, RPVI\par Division of Vascular & Endovascular Surgery\par [facility], Department of Surgery

## 2022-08-09 NOTE — PHYSICAL EXAM
[0] : left 0 [Varicose Veins Of Lower Extremities] : present [] : present [No Rash or Lesion] : No rash or lesion [Calm] : calm [JVD] : no jugular venous distention  [Ankle Swelling (On Exam)] : not present [de-identified] : Well-appearing  [de-identified] : EOMI, anicteric  [de-identified] : soft, nt, nd  [de-identified] : motor and sensation intact in all four extremities \par well healed incisions right leg [de-identified] : A&Ox4

## 2022-08-09 NOTE — ASSESSMENT
[FreeTextEntry1] : JOVANNI MARTINEZ is a 71 year old male presents for follow up.\par \par > PAD s/p right pop-PT bypass w/ rgsv now closed.\par – Reviewed results of studies with patient.  There is evidence of arterial insufficiency bilaterally.\par – Patient without wounds or rest pain currently. Will continue medical management with aspirin, Plavix, statin, diabetes and hypertension control.\par – Continue use of gabapentin.\par – Follow up in six months with repeat deondre/pvrs.  [Foot care/Footwear] : foot care/footwear

## 2022-08-22 ENCOUNTER — APPOINTMENT (OUTPATIENT)
Dept: INTERNAL MEDICINE | Facility: CLINIC | Age: 72
End: 2022-08-22

## 2022-08-22 ENCOUNTER — NON-APPOINTMENT (OUTPATIENT)
Age: 72
End: 2022-08-22

## 2022-08-22 VITALS
SYSTOLIC BLOOD PRESSURE: 130 MMHG | DIASTOLIC BLOOD PRESSURE: 60 MMHG | TEMPERATURE: 97.2 F | HEIGHT: 68 IN | RESPIRATION RATE: 16 BRPM | HEART RATE: 65 BPM | BODY MASS INDEX: 32.58 KG/M2 | WEIGHT: 215 LBS | OXYGEN SATURATION: 98 %

## 2022-08-22 DIAGNOSIS — Z01.818 ENCOUNTER FOR OTHER PREPROCEDURAL EXAMINATION: ICD-10-CM

## 2022-08-22 PROCEDURE — 99214 OFFICE O/P EST MOD 30 MIN: CPT | Mod: 25

## 2022-08-22 PROCEDURE — 93000 ELECTROCARDIOGRAM COMPLETE: CPT

## 2022-08-22 NOTE — HISTORY OF PRESENT ILLNESS
[Coronary Artery Disease] : coronary artery disease [No Pertinent Pulmonary History] : no history of asthma, COPD, sleep apnea, or smoking [No Adverse Anesthesia Reaction] : no adverse anesthesia reaction in self or family member [Chronic Anticoagulation] : chronic anticoagulation [Diabetes] : diabetes [(Patient denies any chest pain, claudication, dyspnea on exertion, orthopnea, palpitations or syncope)] : Patient denies any chest pain, claudication, dyspnea on exertion, orthopnea, palpitations or syncope [Aortic Stenosis] : no aortic stenosis [Atrial Fibrillation] : no atrial fibrillation [Recent Myocardial Infarction] : no recent myocardial infarction [Implantable Device/Pacemaker] : no implantable device/pacemaker [Chronic Kidney Disease] : no chronic kidney disease [FreeTextEntry1] : IOL reposition or exchange [FreeTextEntry2] : 9/6/22 [FreeTextEntry3] : Dr. Kam [FreeTextEntry4] : JOVANNIBERNARD MARTINEZ,  54, is here for presurgical evaluation.\par Ptnis overall feeling well.\par Pt denies chest pain, dyspnea, lightheadedness, palpitations, fever, chills, or sweats

## 2022-08-22 NOTE — ASSESSMENT
[Patient Optimized for Surgery] : Patient optimized for surgery [No Further Testing Recommended] : no further testing recommended [FreeTextEntry4] : EKG SR, RBBB, unchanged from previous\par There are no medical contraindications to proceeding with the planned surgery.

## 2022-09-04 ENCOUNTER — RX RENEWAL (OUTPATIENT)
Age: 72
End: 2022-09-04

## 2022-09-08 ENCOUNTER — APPOINTMENT (OUTPATIENT)
Dept: ENDOCRINOLOGY | Facility: CLINIC | Age: 72
End: 2022-09-08

## 2022-09-19 ENCOUNTER — APPOINTMENT (OUTPATIENT)
Dept: CARDIOLOGY | Facility: CLINIC | Age: 72
End: 2022-09-19

## 2022-09-19 ENCOUNTER — NON-APPOINTMENT (OUTPATIENT)
Age: 72
End: 2022-09-19

## 2022-09-19 VITALS
BODY MASS INDEX: 32.13 KG/M2 | SYSTOLIC BLOOD PRESSURE: 172 MMHG | HEART RATE: 70 BPM | WEIGHT: 212 LBS | DIASTOLIC BLOOD PRESSURE: 79 MMHG | HEIGHT: 68 IN | OXYGEN SATURATION: 98 %

## 2022-09-19 VITALS — DIASTOLIC BLOOD PRESSURE: 78 MMHG | SYSTOLIC BLOOD PRESSURE: 158 MMHG

## 2022-09-19 PROCEDURE — 99214 OFFICE O/P EST MOD 30 MIN: CPT

## 2022-09-19 PROCEDURE — 93000 ELECTROCARDIOGRAM COMPLETE: CPT

## 2022-09-19 RX ORDER — METOPROLOL TARTRATE 50 MG/1
50 TABLET, FILM COATED ORAL
Qty: 180 | Refills: 1 | Status: DISCONTINUED | COMMUNITY
Start: 2020-09-17 | End: 2022-09-19

## 2022-09-19 RX ORDER — HYDROCORTISONE 1 %
12 CREAM (GRAM) TOPICAL
Qty: 400 | Refills: 0 | Status: ACTIVE | COMMUNITY
Start: 2022-04-23

## 2022-09-19 NOTE — DISCUSSION/SUMMARY
[___ Month(s)] : in [unfilled] month(s) [FreeTextEntry1] : Kirill has a history of hypertension, diabetes, CAD with stents, and peripheral vascular disease status post right lower extremity bypass in 11/2019.\par \par From a cardiac perspective, he is doing well. His blood pressure is elevated, though has been better recetnly. His EKG demonstrates a sinus rhythm with a right bundle branch block, unchanged from prior tracings.\par \par For now, he will continue his current blood pressure regimen, including an increased dose of carvedilol.  He has normal LV function, with mild LVH. He will remain on aspirin, Plavix, and a statin, along with Vascepa. His most recent LDL was in the 50's.  His stress test demonstrated inferior infarct, without ischemia, and his carotid Doppler demonstrated mild to moderate atherosclerosis, without stenosis.\par \par I stressed the importance of diet, exercise, and weight loss, to reduce his overall cardiovascular risk.\par His wife will keep an eye on his blood pressure at home.  If no issues, I will see him again in 3 to 6 months.

## 2022-09-19 NOTE — PHYSICAL EXAM
[Well Developed] : well developed [Well Nourished] : well nourished [No Acute Distress] : no acute distress [Normal Conjunctiva] : normal conjunctiva [Normal Venous Pressure] : normal venous pressure [No Carotid Bruit] : no carotid bruit [Normal S1, S2] : normal S1, S2 [No Rub] : no rub [No Gallop] : no gallop [Clear Lung Fields] : clear lung fields [Good Air Entry] : good air entry [No Respiratory Distress] : no respiratory distress  [Soft] : abdomen soft [Non Tender] : non-tender [No Masses/organomegaly] : no masses/organomegaly [Normal Bowel Sounds] : normal bowel sounds [Normal Gait] : normal gait [No Edema] : no edema [No Cyanosis] : no cyanosis [No Clubbing] : no clubbing [No Varicosities] : no varicosities [No Rash] : no rash [No Skin Lesions] : no skin lesions [Moves all extremities] : moves all extremities [No Focal Deficits] : no focal deficits [Normal Speech] : normal speech [Alert and Oriented] : alert and oriented [Normal memory] : normal memory [de-identified] : + SM at /sb

## 2022-09-21 ENCOUNTER — RX RENEWAL (OUTPATIENT)
Age: 72
End: 2022-09-21

## 2022-09-29 ENCOUNTER — APPOINTMENT (OUTPATIENT)
Dept: INTERNAL MEDICINE | Facility: CLINIC | Age: 72
End: 2022-09-29

## 2022-09-29 DIAGNOSIS — U07.1 COVID-19: ICD-10-CM

## 2022-09-29 PROCEDURE — 99443: CPT | Mod: CS,95

## 2022-09-30 PROBLEM — U07.1 COVID-19 VIRUS INFECTION: Status: ACTIVE | Noted: 2022-09-30

## 2022-09-30 NOTE — PLAN
[FreeTextEntry1] : Tylenol PRN\par Adequate hydration\par Call with any problems\par I spent 21 minutes speaking to this pt on the phone

## 2022-09-30 NOTE — HISTORY OF PRESENT ILLNESS
[FreeTextEntry8] : Pt consents to this telephone consultation\par Pt tested positive for covid 4 days ago.\par He developed mild symptoms that day\par He is feeling mostly improved

## 2022-09-30 NOTE — ASSESSMENT
[FreeTextEntry1] : mild covid symptoms that have mostly improved\par See no need for paxlovid or monoclonal antibodies at this point.

## 2022-10-12 ENCOUNTER — NON-APPOINTMENT (OUTPATIENT)
Age: 72
End: 2022-10-12

## 2022-11-29 NOTE — PROGRESS NOTE ADULT - PROBLEM SELECTOR PLAN 8
No Pt is RCRI class III risk (1 point each due to ischemic heart disease, preop requirement for insulin) with 10.1% risk of death, MI or cardiac arrest within 30 days of procedure. Pt is currently moderate to high risk for a moderate-high risk procedure (LE vascular bypass/revascularization). Pt is currently without any CP, SOB, or fever. He has good functional status at home and is independent in all ADLs. EKG performed during this admission showed right bundle branch block, however unclear if new, recent ischemic workup on 11/16 negative. No history of heart failure, TTE from 2016 showing low-normal LV systolic fxn with EF of 55%. Pt with recent LE angiogram performed with no anesthetic or surgical complications. Patient is currently medically optimized for this procedure.

## 2022-12-12 ENCOUNTER — LABORATORY RESULT (OUTPATIENT)
Age: 72
End: 2022-12-12

## 2022-12-12 ENCOUNTER — APPOINTMENT (OUTPATIENT)
Dept: INTERNAL MEDICINE | Facility: CLINIC | Age: 72
End: 2022-12-12

## 2022-12-12 VITALS — SYSTOLIC BLOOD PRESSURE: 124 MMHG | DIASTOLIC BLOOD PRESSURE: 60 MMHG

## 2022-12-12 VITALS
WEIGHT: 210 LBS | HEART RATE: 61 BPM | BODY MASS INDEX: 31.83 KG/M2 | TEMPERATURE: 96.6 F | RESPIRATION RATE: 16 BRPM | DIASTOLIC BLOOD PRESSURE: 64 MMHG | OXYGEN SATURATION: 98 % | HEIGHT: 68 IN | SYSTOLIC BLOOD PRESSURE: 140 MMHG

## 2022-12-12 PROCEDURE — G0439: CPT

## 2022-12-12 RX ORDER — DORZOLAMIDE HYDROCHLORIDE TIMOLOL MALEATE 20; 5 MG/ML; MG/ML
22.3-6.8 SOLUTION/ DROPS OPHTHALMIC
Qty: 20 | Refills: 0 | Status: ACTIVE | COMMUNITY
Start: 2022-12-06

## 2022-12-12 RX ORDER — CYCLOPENTOLATE HYDROCHLORIDE 10 MG/ML
1 SOLUTION OPHTHALMIC
Qty: 2 | Refills: 0 | Status: DISCONTINUED | COMMUNITY
Start: 2022-08-19 | End: 2022-12-12

## 2022-12-12 RX ORDER — KETOROLAC TROMETHAMINE 5 MG/ML
0.5 SOLUTION OPHTHALMIC
Qty: 5 | Refills: 0 | Status: DISCONTINUED | COMMUNITY
Start: 2022-08-19 | End: 2022-12-12

## 2022-12-12 RX ORDER — ACETAZOLAMIDE 500 MG/1
500 CAPSULE ORAL
Qty: 60 | Refills: 0 | Status: DISCONTINUED | COMMUNITY
Start: 2022-09-22

## 2022-12-12 RX ORDER — LOTEPREDNOL ETABONATE 10 MG/ML
1 SUSPENSION TOPICAL
Qty: 3 | Refills: 0 | Status: DISCONTINUED | COMMUNITY
Start: 2022-02-23 | End: 2022-12-12

## 2022-12-12 RX ORDER — LATANOPROST/PF 0.005 %
0.01 DROPS OPHTHALMIC (EYE)
Qty: 8 | Refills: 0 | Status: DISCONTINUED | COMMUNITY
Start: 2022-03-27 | End: 2022-12-12

## 2022-12-12 RX ORDER — DULAGLUTIDE 0.75 MG/.5ML
0.75 INJECTION, SOLUTION SUBCUTANEOUS
Qty: 6 | Refills: 0 | Status: DISCONTINUED | COMMUNITY
Start: 2022-10-01

## 2022-12-12 RX ORDER — OFLOXACIN 3 MG/ML
0.3 SOLUTION/ DROPS OPHTHALMIC
Qty: 5 | Refills: 0 | Status: DISCONTINUED | COMMUNITY
Start: 2022-08-19 | End: 2022-12-12

## 2022-12-12 RX ORDER — TIMOLOL MALEATE 5 MG/ML
0.5 SOLUTION OPHTHALMIC
Qty: 15 | Refills: 0 | Status: DISCONTINUED | COMMUNITY
Start: 2022-01-26 | End: 2022-12-12

## 2022-12-12 RX ORDER — PREDNISOLONE ACETATE 10 MG/ML
1 SUSPENSION/ DROPS OPHTHALMIC
Qty: 10 | Refills: 0 | Status: DISCONTINUED | COMMUNITY
Start: 2022-08-19 | End: 2022-12-12

## 2022-12-12 NOTE — PLAN
[FreeTextEntry1] : check labs \par Recommend screening colonoscopy\par Recommend frequent exercise and healthy diet

## 2022-12-12 NOTE — HEALTH RISK ASSESSMENT
[Very Good] : ~his/her~  mood as very good [Former] : Former [0-4] : 0-4 [Yes] : Yes [de-identified] : Quit over 40 years ago [de-identified] : occasional [de-identified] : Walks for exercise

## 2022-12-12 NOTE — HISTORY OF PRESENT ILLNESS
[FreeTextEntry1] : Here for CPE\par Overall feels well\par Vaxed and one booster against covid. Received flu vaccine. [de-identified] : Doesn't smoke. Occasional alcohol.\par Hasn't had a colonoscopy\par Walks for exercise

## 2022-12-14 LAB
ALBUMIN SERPL ELPH-MCNC: 4.7 G/DL
ALP BLD-CCNC: 54 U/L
ALT SERPL-CCNC: 8 U/L
ANION GAP SERPL CALC-SCNC: 12 MMOL/L
APPEARANCE: CLEAR
AST SERPL-CCNC: 12 U/L
BASOPHILS # BLD AUTO: 0.03 K/UL
BASOPHILS NFR BLD AUTO: 0.6 %
BILIRUB SERPL-MCNC: 0.5 MG/DL
BILIRUBIN URINE: NEGATIVE
BLOOD URINE: NEGATIVE
BUN SERPL-MCNC: 34 MG/DL
CALCIUM SERPL-MCNC: 9.4 MG/DL
CHLORIDE SERPL-SCNC: 104 MMOL/L
CHOLEST SERPL-MCNC: 102 MG/DL
CO2 SERPL-SCNC: 24 MMOL/L
COLOR: NORMAL
CREAT SERPL-MCNC: 1.21 MG/DL
EGFR: 65 ML/MIN/1.73M2
EOSINOPHIL # BLD AUTO: 0.11 K/UL
EOSINOPHIL NFR BLD AUTO: 2 %
ESTIMATED AVERAGE GLUCOSE: 134 MG/DL
FOLATE SERPL-MCNC: 10.4 NG/ML
GLUCOSE QUALITATIVE U: NEGATIVE
GLUCOSE SERPL-MCNC: 182 MG/DL
HBA1C MFR BLD HPLC: 6.3 %
HCT VFR BLD CALC: 33 %
HDLC SERPL-MCNC: 40 MG/DL
HGB BLD-MCNC: 11.6 G/DL
IMM GRANULOCYTES NFR BLD AUTO: 0.2 %
KETONES URINE: NEGATIVE
LDLC SERPL CALC-MCNC: 49 MG/DL
LEUKOCYTE ESTERASE URINE: NEGATIVE
LYMPHOCYTES # BLD AUTO: 1.42 K/UL
LYMPHOCYTES NFR BLD AUTO: 26.4 %
MAN DIFF?: NORMAL
MCHC RBC-ENTMCNC: 29.1 PG
MCHC RBC-ENTMCNC: 35.2 GM/DL
MCV RBC AUTO: 82.9 FL
MONOCYTES # BLD AUTO: 0.39 K/UL
MONOCYTES NFR BLD AUTO: 7.2 %
NEUTROPHILS # BLD AUTO: 3.42 K/UL
NEUTROPHILS NFR BLD AUTO: 63.6 %
NITRITE URINE: NEGATIVE
NONHDLC SERPL-MCNC: 62 MG/DL
PH URINE: 5.5
PLATELET # BLD AUTO: 167 K/UL
POTASSIUM SERPL-SCNC: 4.7 MMOL/L
PROT SERPL-MCNC: 6.6 G/DL
PROTEIN URINE: ABNORMAL
PSA SERPL-MCNC: 0.84 NG/ML
RBC # BLD: 3.98 M/UL
RBC # FLD: 13.4 %
SODIUM SERPL-SCNC: 140 MMOL/L
SPECIFIC GRAVITY URINE: 1.02
TRIGL SERPL-MCNC: 63 MG/DL
TSH SERPL-ACNC: 1.1 UIU/ML
UROBILINOGEN URINE: NORMAL
VIT B12 SERPL-MCNC: 399 PG/ML
WBC # FLD AUTO: 5.38 K/UL

## 2022-12-15 ENCOUNTER — APPOINTMENT (OUTPATIENT)
Dept: ENDOCRINOLOGY | Facility: CLINIC | Age: 72
End: 2022-12-15

## 2022-12-15 VITALS
DIASTOLIC BLOOD PRESSURE: 72 MMHG | WEIGHT: 212 LBS | OXYGEN SATURATION: 99 % | BODY MASS INDEX: 32.23 KG/M2 | SYSTOLIC BLOOD PRESSURE: 111 MMHG | HEART RATE: 63 BPM

## 2022-12-15 PROCEDURE — 99214 OFFICE O/P EST MOD 30 MIN: CPT

## 2022-12-17 NOTE — ASSESSMENT
[FreeTextEntry1] : 71 year old male with past medical history of Diabetes Mellitus Type 2, Osteomyelitis of R foot s/p debridement (11/2019), CAD who presents for management of his diabetes\par \par 1. DM 2- controlled, complicated\par Patient counseled on the importance of diabetic control and complications. Patient's diet and the necessary changes discussed. Discussed diet. Patient has to be more cautious. Hga1c is trending up.\par \par Cont Metformin 1000 mg BID\par Cont Trulicity 1.5 mg qweekly\par Check fsg BID\par Cont diabetic diet\par Cont exercise\par foot exam done- rec to see podiatry\par labs today \par optho up to date\par \par 2. HLD- stable\par Cont Atorvastatin\par \par \par

## 2022-12-17 NOTE — HISTORY OF PRESENT ILLNESS
[FreeTextEntry1] : Mr. JOVANNI MARTINEZ  is a 71 year old male with past medical history of Diabetes Mellitus Type 2, Osteomyelitis of R foot s/p debridement (11/2019), CAD who presents for management of his diabetes. Patient was placed on steroids for inflammation on his eyes and became hyperglycemic. We increased trulicity and sugars are improved.\par \par \par POCT Glucose:  mg/dL\par POCT Hga1c:  % \par \par Diabetes first diagnosed: 3 years ago\par Type: 2\par \par Current diabetic regimen:\par Metformin 1000 mg BID\par Glimepiride 2 mg QD (stopped)\par Trulicity 1.5 mg Qweekly\par \par Other relevant medications:\par atorvastatin 20\par lisinopril 40\par \par Pt checks FSG       { 1-2        } times per day:\par \par FSG:\par Pre-breakfast: 125-135\par Pre-dinner:140-160\par \par Hypoglycemia: denies\par \par \par Diet:\par Breakfast: cereal, waffles\par Lunch: meat, vegetables, rice/pasta\par Dinner: meat, vegetables/salads\par Snacks: nuts, apples\par \par Exercise: none\par \par Urine micro:91 (3/2022), 638 (2/2021)  elevated (6/2020)\par lipid profile:LDL 49 (12/2022)LDL 55 (3/2022), LDL 73 (2/2021),  LDL 50 6/2020, 41 2/21/20\par last hba1c:6.3% (12/2022), 7% (5/2022),  6.9% (2/2022), 6.5% (8/2021), 5.5% (5/2021), 6.2% 2/2021, 7.7% 6/2020, 6.4% 2/21/20\par eye exam: +retinopathy, getting cataract surgery\par diabetic foot exam/podiatry: 2/2022 in office\par \par \par \par \par

## 2023-02-14 ENCOUNTER — APPOINTMENT (OUTPATIENT)
Dept: VASCULAR SURGERY | Facility: CLINIC | Age: 73
End: 2023-02-14
Payer: MEDICARE

## 2023-02-14 PROCEDURE — 93923 UPR/LXTR ART STDY 3+ LVLS: CPT

## 2023-02-15 ENCOUNTER — APPOINTMENT (OUTPATIENT)
Dept: VASCULAR SURGERY | Facility: CLINIC | Age: 73
End: 2023-02-15
Payer: MEDICARE

## 2023-02-15 VITALS
HEART RATE: 61 BPM | DIASTOLIC BLOOD PRESSURE: 73 MMHG | SYSTOLIC BLOOD PRESSURE: 137 MMHG | HEIGHT: 68 IN | OXYGEN SATURATION: 99 % | WEIGHT: 211 LBS | BODY MASS INDEX: 31.98 KG/M2 | TEMPERATURE: 97.9 F

## 2023-02-15 PROCEDURE — 99213 OFFICE O/P EST LOW 20 MIN: CPT

## 2023-02-15 RX ORDER — EFINACONAZOLE 100 MG/ML
10 SOLUTION TOPICAL
Qty: 8 | Refills: 0 | Status: DISCONTINUED | COMMUNITY
Start: 2022-06-27 | End: 2023-02-15

## 2023-02-15 NOTE — HISTORY OF PRESENT ILLNESS
[FreeTextEntry1] : JOVANNI MARTINEZ is a 70 year old male s/p right Pop to PT bypass with RGSV on 11/25/2019 for right foot wound and osteomyelitis.  \par \par 6/11/2020 - Doing well since last visit.\par Right foot wounds have healed completely at this time.\par No complaints. Denies claudication. \par \par 1/7/2021 - Doing well since visit. Patient reports stable mild numbness at the forefoot. Denies lower extremity pain. No fever or chills. \par \par 1/27/2021–right lower extremity angiogram revealed occlusion of the distal posterior tibial artery distal to the anastomosis, nonreconstructible. \par \par 2/18/2021–Doing well since angiogram. Reports continued mild right foot numbness and pain. This is worse in the morning but improves during the day. The patient denies any wounds on the right foot. Has taken gabapentin with some improvement in symptoms. No fever or chills. \par \par 5/20/2021–Patient presents for follow-up with his wife.  He reports stable right lower extremity symptoms.  He reports continued bilateral foot numbness at the soles.  He continues to take the gabapentin with some relief in symptoms.  He recently saw his cardiologist who performed an echocardiogram.  Although today he is hypertensive with a systolic blood pressure 175, his wife reports that repeated measurements at home range from 135 to 150 mmHg. Patient continues to take plavix. \par \par 3/1/2022-Pt presents for follow up with his wife. Since last visit, has been doing well. Denies lower extremity pain or wounds. He continues to walk without issues. He takes his aspirin and statin. \par \par 8/9/2022-Pt presents for follow up. Since last visit, patient reports stable right leg symptoms. Denies rest pain or bilateral foot wounds. He continues to take gabapentin 100 mg BID. He has been active at home and walks daily. Continues to take aspirin, plavix, statin.  [de-identified] : 2/15/2023 - Pt presents for follow up visit. He reports stable foot symptoms with bilateral sole of feet numbness. He continues to take gabapentin with relief of his symptoms. Denies any rest pain or lower extremity wounds. Continues to take aspirin, plavix, statin.

## 2023-02-15 NOTE — PHYSICAL EXAM
[0] : left 0 [Varicose Veins Of Lower Extremities] : present [] : present [No Rash or Lesion] : No rash or lesion [Calm] : calm [JVD] : no jugular venous distention  [Ankle Swelling (On Exam)] : not present [de-identified] : Well-appearing  [de-identified] : EOMI, anicteric  [de-identified] : soft, nt, nd  [de-identified] : motor and sensation intact in all four extremities \par well healed incisions right leg [de-identified] : no wounds bilateral feet, brisk cap refill < 2 seconds on toes [de-identified] : A&Ox4

## 2023-02-15 NOTE — DATA REVIEWED
[FreeTextEntry1] : 2/14/2023 - huang/pvrs\par R HUANG 1.1, TBI 0.30, toe pressure 40\par L HUANG 1.1, TBI 0.52, toe pressure 69\par ---------------------------------\par 8/9/2022 -HUANG/PVR\par Right HUANG 0.45, TBI 0.30, toe pressure 38\par Left HUANG 0.89, TBI 0.24, toe pressure 30\par ---------------------------------- \par 3/1/2022-HUANG\par R HUANG 1.38, TBI 0.36, toe pressure 51\par L HUANG 1.12, TBI 0.44, toe pressure 63\par \par 3/1/2022-BLE venous duplex\par Negative for DVT bilaterally.\par Right - GSV harvested. Reflux in GSV mid-distal calf. \par Left - Chronic poorly recanalized SVT in GSV with reflux in GSV and SSV. \par ----------------------------------------\par 5/20/2021 - HUANG\par R TBI 0.54, toe pressure 87\par L TBI 0.43, toe pressure 70, HUANG 1.08.

## 2023-02-15 NOTE — ASSESSMENT
[FreeTextEntry1] : JOVANNI MARTINEZ is a 72  year old male presents for follow up.\par \par > PAD s/p right pop-PT bypass w/ rgsv now closed.\par – Reviewed results of studies with patient. ABIs are falsely elevated but TBIs and toe pressures remain stable. \par – Patient without wounds or rest pain currently. Will continue medical management with aspirin, Plavix, statin, diabetes and hypertension control.\par – Continue use of gabapentin.\par – Follow up in six months with repeat deondre/pvrs.

## 2023-02-17 ENCOUNTER — RX RENEWAL (OUTPATIENT)
Age: 73
End: 2023-02-17

## 2023-03-06 ENCOUNTER — NON-APPOINTMENT (OUTPATIENT)
Age: 73
End: 2023-03-06

## 2023-03-06 ENCOUNTER — APPOINTMENT (OUTPATIENT)
Dept: CARDIOLOGY | Facility: CLINIC | Age: 73
End: 2023-03-06
Payer: MEDICARE

## 2023-03-06 VITALS
HEIGHT: 68 IN | OXYGEN SATURATION: 100 % | BODY MASS INDEX: 31.98 KG/M2 | SYSTOLIC BLOOD PRESSURE: 155 MMHG | DIASTOLIC BLOOD PRESSURE: 70 MMHG | HEART RATE: 65 BPM | WEIGHT: 211 LBS

## 2023-03-06 VITALS — SYSTOLIC BLOOD PRESSURE: 140 MMHG | DIASTOLIC BLOOD PRESSURE: 70 MMHG

## 2023-03-06 PROCEDURE — 93000 ELECTROCARDIOGRAM COMPLETE: CPT

## 2023-03-06 PROCEDURE — 99214 OFFICE O/P EST MOD 30 MIN: CPT

## 2023-03-06 NOTE — PHYSICAL EXAM
[Well Developed] : well developed [Well Nourished] : well nourished [No Acute Distress] : no acute distress [Normal Conjunctiva] : normal conjunctiva [Normal Venous Pressure] : normal venous pressure [No Carotid Bruit] : no carotid bruit [Normal S1, S2] : normal S1, S2 [No Rub] : no rub [No Gallop] : no gallop [Clear Lung Fields] : clear lung fields [Good Air Entry] : good air entry [No Respiratory Distress] : no respiratory distress  [Soft] : abdomen soft [Non Tender] : non-tender [No Masses/organomegaly] : no masses/organomegaly [Normal Bowel Sounds] : normal bowel sounds [Normal Gait] : normal gait [No Edema] : no edema [No Cyanosis] : no cyanosis [No Clubbing] : no clubbing [No Varicosities] : no varicosities [No Rash] : no rash [No Skin Lesions] : no skin lesions [Moves all extremities] : moves all extremities [No Focal Deficits] : no focal deficits [Normal Speech] : normal speech [Alert and Oriented] : alert and oriented [Normal memory] : normal memory [de-identified] : + SM at /sb

## 2023-03-06 NOTE — HISTORY OF PRESENT ILLNESS
[FreeTextEntry1] : Kirill is a 72 year old male with HTN, DM2 and CAD with stents (most recent 3-4 years ago), here for follow up. \par He was admitted to the hospital in 11/2019, with a nonhealing right foot ulcer. On 11/25, he had a popliteal artery bypass to posterior tibial artery. In 2021, RLE angiogram with occlusion of the distal posterior tibial artery.\par \par I last saw him in 9/22.\par \par He is feeling well overall. He denies chest pain, difficulty breathing, palpitations, lower extremity swelling, orthopnea, PND, dizziness, lightheadedness, near syncope. He reports that his blood pressures are much better these days. His diabetes has been better controlled. His most recent LDL was 49\par \par His SBP has been in the  130-140's at home at home. \par \par He is requesting cardiac clearance prior to cataract surgery.\par \par A pharmacologic stress test in 2/22 showed inferior infarct, but no evidence of ischemia.  A carotid Doppler showed mild to moderate atherosclerosis, without significant stenosis.\par His echocardiogram in 2021 showed an EF of 53% with mid-mod MR, mild DD and mild LVH. This was unchanged in 5/22, though EF was noted to be 60-65%.\par

## 2023-03-06 NOTE — DISCUSSION/SUMMARY
[___ Month(s)] : in [unfilled] month(s) [FreeTextEntry1] : Kirill has a history of hypertension, diabetes, CAD with stents, and peripheral vascular disease status post right lower extremity bypass in 11/2019.\par \par From a cardiac perspective, he is doing well. His blood pressure is mildly elevated, though has been better recently. His EKG demonstrates a sinus rhythm with a right bundle branch block, unchanged from prior tracings.\par \par For now, he will continue his current blood pressure regimen. He has normal LV function, with mild LVH. He will remain on aspirin, Plavix, and a statin, along with Vascepa. His most recent LDL was in the 40's.  His stress test demonstrated inferior infarct, without ischemia, and his carotid Doppler demonstrated mild to moderate atherosclerosis, without stenosis.\par \par I stressed the importance of diet, exercise, and weight loss, to reduce his overall cardiovascular risk.\par His wife will keep an eye on his blood pressure at home.  \par There is no cardiac contraindication to proceeding with cataract surgery.  Routine cardiac monitoring is recommended.\par If no issues, I will see him again in 3 to 6 months. [EKG obtained to assist in diagnosis and management of assessed problem(s)] : EKG obtained to assist in diagnosis and management of assessed problem(s)

## 2023-03-10 ENCOUNTER — APPOINTMENT (OUTPATIENT)
Dept: INTERNAL MEDICINE | Facility: CLINIC | Age: 73
End: 2023-03-10
Payer: MEDICARE

## 2023-03-10 VITALS — SYSTOLIC BLOOD PRESSURE: 164 MMHG | DIASTOLIC BLOOD PRESSURE: 70 MMHG

## 2023-03-10 VITALS
RESPIRATION RATE: 16 BRPM | SYSTOLIC BLOOD PRESSURE: 170 MMHG | HEART RATE: 75 BPM | OXYGEN SATURATION: 97 % | DIASTOLIC BLOOD PRESSURE: 80 MMHG

## 2023-03-10 DIAGNOSIS — H26.9 UNSPECIFIED CATARACT: ICD-10-CM

## 2023-03-10 DIAGNOSIS — Z01.818 ENCOUNTER FOR OTHER PREPROCEDURAL EXAMINATION: ICD-10-CM

## 2023-03-10 PROCEDURE — 99214 OFFICE O/P EST MOD 30 MIN: CPT

## 2023-03-13 ENCOUNTER — APPOINTMENT (OUTPATIENT)
Dept: INTERNAL MEDICINE | Facility: CLINIC | Age: 73
End: 2023-03-13
Payer: MEDICARE

## 2023-03-13 VITALS
SYSTOLIC BLOOD PRESSURE: 140 MMHG | HEART RATE: 74 BPM | OXYGEN SATURATION: 98 % | RESPIRATION RATE: 16 BRPM | DIASTOLIC BLOOD PRESSURE: 78 MMHG

## 2023-03-13 VITALS — DIASTOLIC BLOOD PRESSURE: 60 MMHG | SYSTOLIC BLOOD PRESSURE: 136 MMHG

## 2023-03-13 PROCEDURE — 99213 OFFICE O/P EST LOW 20 MIN: CPT

## 2023-03-13 NOTE — HISTORY OF PRESENT ILLNESS
[FreeTextEntry1] : Here for follow-up.\par BP was elevated at last visit.  Pt hadn't taken his BP meds that day, [de-identified] : Feeling well.

## 2023-03-13 NOTE — ASSESSMENT
[FreeTextEntry1] : May proceed with cataract surgery.\par See Preop note.
Laceration of nose, initial encounter

## 2023-03-14 NOTE — HISTORY OF PRESENT ILLNESS
[Coronary Artery Disease] : coronary artery disease [No Pertinent Pulmonary History] : no history of asthma, COPD, sleep apnea, or smoking [No Adverse Anesthesia Reaction] : no adverse anesthesia reaction in self or family member [Chronic Anticoagulation] : chronic anticoagulation [Diabetes] : diabetes [(Patient denies any chest pain, claudication, dyspnea on exertion, orthopnea, palpitations or syncope)] : Patient denies any chest pain, claudication, dyspnea on exertion, orthopnea, palpitations or syncope [Aortic Stenosis] : no aortic stenosis [Atrial Fibrillation] : no atrial fibrillation [Recent Myocardial Infarction] : no recent myocardial infarction [Implantable Device/Pacemaker] : no implantable device/pacemaker [Chronic Kidney Disease] : no chronic kidney disease [FreeTextEntry1] : Left cataract surgey [FreeTextEntry2] : 3/21/23 [FreeTextEntry3] : Dr.Richard JORI Kam [FreeTextEntry4] : JOVANNI MARTINEZ,  54, is here for presurgical evaluation prior to Left cataract surgery on 3/21/23\par Pt is overall feeling well.\par Pt denies chest pain, dyspnea, palpitations, lightheadedness, fever, chills, or sweats [FreeTextEntry5] : on plavix.Had two stents placed 2016 as per pt.

## 2023-03-14 NOTE — ASSESSMENT
[FreeTextEntry4] : EKG SR, RBBB, old anteroseptal infarct\par BP elevated today; Pt forgot to take his BP meds today\par Pt has been cleared for surgery by Cardiology

## 2023-03-14 NOTE — ADDENDUM
[FreeTextEntry1] : Repeat /60 on 3/13/23\par There are no medical contraindications to proceeding with the planned cataract surgery.\par Routine perioperative hemodynamic monitoring is recommended.

## 2023-03-21 NOTE — DISCHARGE NOTE NURSING/CASE MANAGEMENT/SOCIAL WORK - NSDPFAC_GEN_ALL_CORE
PT DAILY TREATMENT NOTE 2-15    Patient Name: Deidra Hsu  Date:3/21/2023  : 2003  [x]  Patient  Verified  Payor: Gloria Benito / Plan: [address] [place] [address] [address] / Product Type: Managed Care Medicaid /    In time: 430 PM  Out time: 525 PM  Total Treatment Time (min):  55 timed (45 total)  Visit #:  15    Treatment Area: Right knee pain [M25.561]    SUBJECTIVE  Pain Level (0-10 scale): 3-4/10,  pt reports was sick last week. Knee pain on and off, limiting her participation in track. Agg. With work up exercises (high knees, lateral lunge) and 2 laps for warm up. Any medication changes, allergies to medications, adverse drug reactions, diagnosis change, or new procedure performed?: [x] No    [] Yes (see summary sheet for update)  Subjective functional status/changes:   [] No changes reported  See above. OBJECTIVE        Modality rationale: decrease edema, decrease inflammation, and decrease pain to improve the patients ability to ambulate and perform activities without pain.    Min Type Additional Details       [] Estim: []Att   []Unatt    []TENS instruct                  []IFC  []Premod   []NMES                     []Other:  []w/US   []w/ice   []w/heat  Position:  Location:       []  Traction: [] Cervical       []Lumbar                       [] Prone          []Supine                       []Intermittent   []Continuous Lbs:  [] before manual  [] after manual  []w/heat    []  Ultrasound: []Continuous   [] Pulsed                       at: []1MHz   []3MHz Location:  W/cm2:    [] Paraffin         Location:   []w/heat   10 [x]  Ice     []  Heat  []  Ice massage Position: supine  Location: R knee    []  Laser  []  Other: Position:  Location:      []  Vasopneumatic Device Pressure:       [] lo [] med [] hi   Temperature:      [x] Skin assessment post-treatment:  [x]intact []redness- no adverse reaction    []redness - adverse reaction:     45 min Therapeutic Exercise:  [x] See flow sheet : Bridge with FR 2x10  Prone on forearms hip ext  Paloff press with slight squat green t-band 2x10   Stagger stance weight shift L to R with power cord WITH short foot  Same as above but with R SLS short foot and left hip flexion  Short foot (standing) SLS with rebounder (6x10)  DLS with R foot forward (good alignment) red t-band chop/lift  Stability kick hip ABD, stance R, red infinity band 2x10  Running \"hops\" in SLS with targets on floor x 4 (5 cycles)  Lateral stepping in PF with 5 lb med ball 3 laps (3 laps without)   Sports cord forward SL hop and hold (hold today)  Quadruped knee hover 10 \" x 6     Review of her track warm up. Rationale: increase ROM, increase strength, improve coordination, improve balance, and increase proprioception to improve the patients ability to improve patients ability to ambulate and perform activities without pain. 0 min Manual Therapy:  hypofix and rincon taping technique, superior lateral glide of patella, pt ed. On tape removal. Given tape remover      Rationale: decrease pain, increase ROM, increase tissue extensibility, decrease edema , and decrease trigger points  to improve the patients ability to to improve the patients ability to improve patients ability to ambulate and perform activities without pain. With   [] TE   [] TA   [] Neuro   [] SC   [] other: Patient Education: [x] Review HEP    [] Progressed/Changed HEP based on:   [] positioning   [] body mechanics   [] transfers   [] heat/ice application    [] other:      Other Objective/Functional Measures:   See above. Pain Level (0-10 scale) post treatment: 0     ASSESSMENT/Changes in Function:   Pt with decreased pain end of session. Discussed doing a few HEP from PT prior to work out in practice for facilitation of glute med., abdominals, foot intrinsics/posterior tib. For improved stability at PF joing.        Patient will continue to benefit from skilled PT services to modify and progress therapeutic interventions, address functional mobility deficits, address ROM deficits, address strength deficits, analyze and address soft tissue restrictions, analyze and cue movement patterns, analyze and modify body mechanics/ergonomics, assess and modify postural abnormalities, address imbalance/dizziness, and instruct in home and community integration to attain remaining goals. []  See Plan of Care  []  See progress note/recertification  []  See Discharge Summary         Progress towards goals / Updated goals:  Short Term Goals: To be accomplished in 2-3 weeks:              1) Pt independent in HEP from day 1 MET              2) Pt will improve R knee flexion to 130 deg or more for less difficulty with ADL's, low seats and normal range considering her age. MET  Long Term Goals: To be accomplished in 6-8 weeks:              1) Pt independent in final HEP. 2) Pt will be able to go up and down 12 steps in clinic without UE support and without increased pain. 3) Pt will be able to run for 1-2 minutes without increased knee pain to be able to progress toward return to running/possibly spring track (100m, shot put)              4) Pt will able to perform squat to 90 deg without increased pain.                       PLAN  []  Upgrade activities as tolerated     [x]  Continue plan of care  [x]  Update interventions per flow sheet       []  Discharge due to:_  [x]  Other:_  cont PT 1-2x/week for 4 weeks from 02/09  Re-assess next visit     Sohail Henry, PT 3/21/2023 [place]

## 2023-04-05 ENCOUNTER — APPOINTMENT (OUTPATIENT)
Dept: ENDOCRINOLOGY | Facility: CLINIC | Age: 73
End: 2023-04-05
Payer: MEDICARE

## 2023-04-05 PROCEDURE — 83036 HEMOGLOBIN GLYCOSYLATED A1C: CPT | Mod: QW

## 2023-04-05 PROCEDURE — 82962 GLUCOSE BLOOD TEST: CPT

## 2023-04-05 PROCEDURE — 99214 OFFICE O/P EST MOD 30 MIN: CPT | Mod: 25

## 2023-04-06 NOTE — ASSESSMENT
[FreeTextEntry1] : 72 year old male with past medical history of Diabetes Mellitus Type 2, Osteomyelitis of R foot s/p debridement (11/2019), CAD who presents for management of his diabetes\par \par 1. DM 2- controlled, complicated\par Patient counseled on the importance of diabetic control and complications. Patient's diet and the necessary changes discussed. Discussed diet. Patient has to be more cautious. Hga1c is trending up.\par \par Cont Metformin 1000 mg BID\par Cont Trulicity 1.5 mg qweekly\par Check fsg BID\par Cont diabetic diet\par Cont exercise\par foot exam up to date\par labs up to date\par optho up to date\par \par 2. HLD- stable\par Cont Atorvastatin\par \par \par

## 2023-04-06 NOTE — HISTORY OF PRESENT ILLNESS
[FreeTextEntry1] : Mr. JOVANNI MARTINEZ  is a 72 year old male with past medical history of Diabetes Mellitus Type 2, Osteomyelitis of R foot s/p debridement (11/2019), CAD who presents for management of his diabetes. \par \par \par POCT Glucose: 151 mg/dL\par POCT Hga1c:  6.4% \par \par Diabetes first diagnosed: 3 years ago\par Type: 2\par \par Current diabetic regimen:\par Metformin 1000 mg BID\par Glimepiride 2 mg QD (stopped)\par Trulicity 1.5 mg Qweekly\par \par Other relevant medications:\par atorvastatin 20\par lisinopril 40\par \par Pt checks FSG    { 1-2  } times per day:\par \par FSG:\par Pre-breakfast: 125-135\par Pre-dinner:140-160\par \par Hypoglycemia: denies\par \par \par Diet:\par Breakfast: cereal, waffles\par Lunch: meat, vegetables, rice/pasta\par Dinner: meat, vegetables/salads\par Snacks: nuts, apples\par \par Exercise: none\par \par Urine micro:91 (3/2022), 638 (2/2021)  elevated (6/2020)\par lipid profile:LDL 49 (12/2022)LDL 55 (3/2022), LDL 73 (2/2021),  LDL 50 6/2020, 41 2/21/20\par last hba1c:6.4% (4/2023), 6.3% (12/2022), 7% (5/2022),  6.9% (2/2022), 6.5% (8/2021), 5.5% (5/2021), 6.2% 2/2021, 7.7% 6/2020, 6.4% 2/21/20\par eye exam: +retinopathy, s/p cataract surgery\par diabetic foot exam/podiatry: 2/2022 in office, sees podiatry\par \par \par \par \par

## 2023-04-25 ENCOUNTER — RX RENEWAL (OUTPATIENT)
Age: 73
End: 2023-04-25

## 2023-05-28 ENCOUNTER — RX RENEWAL (OUTPATIENT)
Age: 73
End: 2023-05-28

## 2023-06-26 NOTE — HISTORY OF PRESENT ILLNESS
[FreeTextEntry1] : Kirill is a 71 year old male with HTN, DM2 and CAD with stents (most recent 3-4 years ago), here for follow up. \par He was admitted to the hospital in 2019, with a nonhealing right foot ulcer. On , he had a popliteal artery bypass to posterior tibial artery. In , RLE angiogram with occlusion of the distal posterior tibial artery.\par \par I last saw him in .\par \par He is feeling well overall. He denies chest pain, difficulty breathing, palpitations, lower extremity swelling, orthopnea, PND, dizziness, lightheadedness, near syncope. He reports that his blood pressures are much better these days. His diabetes has been better controlled. His most recent LDL was 55.\par \par He required surgery on his cataract, and has been less active. His SBP has been in the 140's at home. He is under a lot of stress and his older brother just  of ALS.\par \par A pharmacologic stress test in  showed inferior infarct, but no evidence of ischemia.  A carotid Doppler showed mild to moderate atherosclerosis, without significant stenosis.\par His echocardiogram in  showed an EF of 53% with mid-mod MR, mild DD and mild LVH. This was unchanged in , though EF was noted to be 60-65%.\par  Initial (On Arrival)

## 2023-08-16 ENCOUNTER — APPOINTMENT (OUTPATIENT)
Dept: VASCULAR SURGERY | Facility: CLINIC | Age: 73
End: 2023-08-16

## 2023-09-15 ENCOUNTER — APPOINTMENT (OUTPATIENT)
Dept: CARDIOLOGY | Facility: CLINIC | Age: 73
End: 2023-09-15
Payer: MEDICARE

## 2023-09-15 VITALS
WEIGHT: 215 LBS | HEIGHT: 68 IN | BODY MASS INDEX: 32.58 KG/M2 | DIASTOLIC BLOOD PRESSURE: 80 MMHG | OXYGEN SATURATION: 100 % | HEART RATE: 70 BPM | SYSTOLIC BLOOD PRESSURE: 146 MMHG

## 2023-09-15 VITALS — SYSTOLIC BLOOD PRESSURE: 138 MMHG | DIASTOLIC BLOOD PRESSURE: 78 MMHG

## 2023-09-15 PROCEDURE — 93000 ELECTROCARDIOGRAM COMPLETE: CPT

## 2023-09-15 PROCEDURE — 99214 OFFICE O/P EST MOD 30 MIN: CPT

## 2023-09-21 ENCOUNTER — APPOINTMENT (OUTPATIENT)
Dept: CARDIOLOGY | Facility: CLINIC | Age: 73
End: 2023-09-21
Payer: MEDICARE

## 2023-09-21 PROCEDURE — 93306 TTE W/DOPPLER COMPLETE: CPT

## 2023-10-09 ENCOUNTER — APPOINTMENT (OUTPATIENT)
Dept: ENDOCRINOLOGY | Facility: CLINIC | Age: 73
End: 2023-10-09

## 2023-10-11 ENCOUNTER — APPOINTMENT (OUTPATIENT)
Dept: VASCULAR SURGERY | Facility: CLINIC | Age: 73
End: 2023-10-11
Payer: MEDICARE

## 2023-10-11 VITALS
WEIGHT: 216 LBS | HEART RATE: 68 BPM | SYSTOLIC BLOOD PRESSURE: 183 MMHG | TEMPERATURE: 97.8 F | HEIGHT: 67 IN | BODY MASS INDEX: 33.9 KG/M2 | DIASTOLIC BLOOD PRESSURE: 96 MMHG

## 2023-10-11 PROCEDURE — 93923 UPR/LXTR ART STDY 3+ LVLS: CPT

## 2023-10-11 PROCEDURE — 99213 OFFICE O/P EST LOW 20 MIN: CPT

## 2023-10-16 ENCOUNTER — INPATIENT (INPATIENT)
Facility: HOSPITAL | Age: 73
LOS: 3 days | Discharge: HOME | DRG: 617 | End: 2023-10-20
Attending: STUDENT IN AN ORGANIZED HEALTH CARE EDUCATION/TRAINING PROGRAM | Admitting: HOSPITALIST
Payer: MEDICARE

## 2023-10-16 VITALS
SYSTOLIC BLOOD PRESSURE: 165 MMHG | WEIGHT: 216.05 LBS | DIASTOLIC BLOOD PRESSURE: 75 MMHG | TEMPERATURE: 98 F | RESPIRATION RATE: 20 BRPM | OXYGEN SATURATION: 98 % | HEIGHT: 68 IN | HEART RATE: 72 BPM

## 2023-10-16 DIAGNOSIS — Z98.890 OTHER SPECIFIED POSTPROCEDURAL STATES: Chronic | ICD-10-CM

## 2023-10-16 LAB
ALBUMIN SERPL ELPH-MCNC: 4.7 G/DL — SIGNIFICANT CHANGE UP (ref 3.3–5)
ALBUMIN SERPL ELPH-MCNC: 4.7 G/DL — SIGNIFICANT CHANGE UP (ref 3.3–5)
ALP SERPL-CCNC: 70 U/L — SIGNIFICANT CHANGE UP (ref 40–120)
ALP SERPL-CCNC: 70 U/L — SIGNIFICANT CHANGE UP (ref 40–120)
ALT FLD-CCNC: 8 U/L — LOW (ref 10–45)
ALT FLD-CCNC: 8 U/L — LOW (ref 10–45)
ANION GAP SERPL CALC-SCNC: 14 MMOL/L — SIGNIFICANT CHANGE UP (ref 5–17)
ANION GAP SERPL CALC-SCNC: 14 MMOL/L — SIGNIFICANT CHANGE UP (ref 5–17)
APTT BLD: 31.3 SEC — SIGNIFICANT CHANGE UP (ref 24.5–35.6)
APTT BLD: 31.3 SEC — SIGNIFICANT CHANGE UP (ref 24.5–35.6)
AST SERPL-CCNC: 14 U/L — SIGNIFICANT CHANGE UP (ref 10–40)
AST SERPL-CCNC: 14 U/L — SIGNIFICANT CHANGE UP (ref 10–40)
BASOPHILS # BLD AUTO: 0.05 K/UL — SIGNIFICANT CHANGE UP (ref 0–0.2)
BASOPHILS # BLD AUTO: 0.05 K/UL — SIGNIFICANT CHANGE UP (ref 0–0.2)
BASOPHILS NFR BLD AUTO: 0.8 % — SIGNIFICANT CHANGE UP (ref 0–2)
BASOPHILS NFR BLD AUTO: 0.8 % — SIGNIFICANT CHANGE UP (ref 0–2)
BILIRUB SERPL-MCNC: 0.6 MG/DL — SIGNIFICANT CHANGE UP (ref 0.2–1.2)
BILIRUB SERPL-MCNC: 0.6 MG/DL — SIGNIFICANT CHANGE UP (ref 0.2–1.2)
BLD GP AB SCN SERPL QL: NEGATIVE — SIGNIFICANT CHANGE UP
BLD GP AB SCN SERPL QL: NEGATIVE — SIGNIFICANT CHANGE UP
BUN SERPL-MCNC: 22 MG/DL — SIGNIFICANT CHANGE UP (ref 7–23)
BUN SERPL-MCNC: 22 MG/DL — SIGNIFICANT CHANGE UP (ref 7–23)
CALCIUM SERPL-MCNC: 9.9 MG/DL — SIGNIFICANT CHANGE UP (ref 8.4–10.5)
CALCIUM SERPL-MCNC: 9.9 MG/DL — SIGNIFICANT CHANGE UP (ref 8.4–10.5)
CHLORIDE SERPL-SCNC: 106 MMOL/L — SIGNIFICANT CHANGE UP (ref 96–108)
CHLORIDE SERPL-SCNC: 106 MMOL/L — SIGNIFICANT CHANGE UP (ref 96–108)
CO2 SERPL-SCNC: 22 MMOL/L — SIGNIFICANT CHANGE UP (ref 22–31)
CO2 SERPL-SCNC: 22 MMOL/L — SIGNIFICANT CHANGE UP (ref 22–31)
CREAT SERPL-MCNC: 0.96 MG/DL — SIGNIFICANT CHANGE UP (ref 0.5–1.3)
CREAT SERPL-MCNC: 0.96 MG/DL — SIGNIFICANT CHANGE UP (ref 0.5–1.3)
CRP SERPL-MCNC: <3 MG/L — SIGNIFICANT CHANGE UP (ref 0–4)
CRP SERPL-MCNC: <3 MG/L — SIGNIFICANT CHANGE UP (ref 0–4)
EGFR: 85 ML/MIN/1.73M2 — SIGNIFICANT CHANGE UP
EGFR: 85 ML/MIN/1.73M2 — SIGNIFICANT CHANGE UP
EOSINOPHIL # BLD AUTO: 0.07 K/UL — SIGNIFICANT CHANGE UP (ref 0–0.5)
EOSINOPHIL # BLD AUTO: 0.07 K/UL — SIGNIFICANT CHANGE UP (ref 0–0.5)
EOSINOPHIL NFR BLD AUTO: 1.1 % — SIGNIFICANT CHANGE UP (ref 0–6)
EOSINOPHIL NFR BLD AUTO: 1.1 % — SIGNIFICANT CHANGE UP (ref 0–6)
GLUCOSE SERPL-MCNC: 119 MG/DL — HIGH (ref 70–99)
GLUCOSE SERPL-MCNC: 119 MG/DL — HIGH (ref 70–99)
HCT VFR BLD CALC: 35.3 % — LOW (ref 39–50)
HCT VFR BLD CALC: 35.3 % — LOW (ref 39–50)
HGB BLD-MCNC: 12.2 G/DL — LOW (ref 13–17)
HGB BLD-MCNC: 12.2 G/DL — LOW (ref 13–17)
IMM GRANULOCYTES NFR BLD AUTO: 0.2 % — SIGNIFICANT CHANGE UP (ref 0–0.9)
IMM GRANULOCYTES NFR BLD AUTO: 0.2 % — SIGNIFICANT CHANGE UP (ref 0–0.9)
INR BLD: 1.06 RATIO — SIGNIFICANT CHANGE UP (ref 0.85–1.18)
INR BLD: 1.06 RATIO — SIGNIFICANT CHANGE UP (ref 0.85–1.18)
LYMPHOCYTES # BLD AUTO: 1.56 K/UL — SIGNIFICANT CHANGE UP (ref 1–3.3)
LYMPHOCYTES # BLD AUTO: 1.56 K/UL — SIGNIFICANT CHANGE UP (ref 1–3.3)
LYMPHOCYTES # BLD AUTO: 25.6 % — SIGNIFICANT CHANGE UP (ref 13–44)
LYMPHOCYTES # BLD AUTO: 25.6 % — SIGNIFICANT CHANGE UP (ref 13–44)
MCHC RBC-ENTMCNC: 28.5 PG — SIGNIFICANT CHANGE UP (ref 27–34)
MCHC RBC-ENTMCNC: 28.5 PG — SIGNIFICANT CHANGE UP (ref 27–34)
MCHC RBC-ENTMCNC: 34.6 GM/DL — SIGNIFICANT CHANGE UP (ref 32–36)
MCHC RBC-ENTMCNC: 34.6 GM/DL — SIGNIFICANT CHANGE UP (ref 32–36)
MCV RBC AUTO: 82.5 FL — SIGNIFICANT CHANGE UP (ref 80–100)
MCV RBC AUTO: 82.5 FL — SIGNIFICANT CHANGE UP (ref 80–100)
MONOCYTES # BLD AUTO: 0.43 K/UL — SIGNIFICANT CHANGE UP (ref 0–0.9)
MONOCYTES # BLD AUTO: 0.43 K/UL — SIGNIFICANT CHANGE UP (ref 0–0.9)
MONOCYTES NFR BLD AUTO: 7.1 % — SIGNIFICANT CHANGE UP (ref 2–14)
MONOCYTES NFR BLD AUTO: 7.1 % — SIGNIFICANT CHANGE UP (ref 2–14)
NEUTROPHILS # BLD AUTO: 3.97 K/UL — SIGNIFICANT CHANGE UP (ref 1.8–7.4)
NEUTROPHILS # BLD AUTO: 3.97 K/UL — SIGNIFICANT CHANGE UP (ref 1.8–7.4)
NEUTROPHILS NFR BLD AUTO: 65.2 % — SIGNIFICANT CHANGE UP (ref 43–77)
NEUTROPHILS NFR BLD AUTO: 65.2 % — SIGNIFICANT CHANGE UP (ref 43–77)
NRBC # BLD: 0 /100 WBCS — SIGNIFICANT CHANGE UP (ref 0–0)
NRBC # BLD: 0 /100 WBCS — SIGNIFICANT CHANGE UP (ref 0–0)
PLATELET # BLD AUTO: 173 K/UL — SIGNIFICANT CHANGE UP (ref 150–400)
PLATELET # BLD AUTO: 173 K/UL — SIGNIFICANT CHANGE UP (ref 150–400)
POTASSIUM SERPL-MCNC: 4.7 MMOL/L — SIGNIFICANT CHANGE UP (ref 3.5–5.3)
POTASSIUM SERPL-MCNC: 4.7 MMOL/L — SIGNIFICANT CHANGE UP (ref 3.5–5.3)
POTASSIUM SERPL-SCNC: 4.7 MMOL/L — SIGNIFICANT CHANGE UP (ref 3.5–5.3)
POTASSIUM SERPL-SCNC: 4.7 MMOL/L — SIGNIFICANT CHANGE UP (ref 3.5–5.3)
PROT SERPL-MCNC: 7.3 G/DL — SIGNIFICANT CHANGE UP (ref 6–8.3)
PROT SERPL-MCNC: 7.3 G/DL — SIGNIFICANT CHANGE UP (ref 6–8.3)
PROTHROM AB SERPL-ACNC: 11.1 SEC — SIGNIFICANT CHANGE UP (ref 9.5–13)
PROTHROM AB SERPL-ACNC: 11.1 SEC — SIGNIFICANT CHANGE UP (ref 9.5–13)
RBC # BLD: 4.28 M/UL — SIGNIFICANT CHANGE UP (ref 4.2–5.8)
RBC # BLD: 4.28 M/UL — SIGNIFICANT CHANGE UP (ref 4.2–5.8)
RBC # FLD: 13.4 % — SIGNIFICANT CHANGE UP (ref 10.3–14.5)
RBC # FLD: 13.4 % — SIGNIFICANT CHANGE UP (ref 10.3–14.5)
RH IG SCN BLD-IMP: POSITIVE — SIGNIFICANT CHANGE UP
RH IG SCN BLD-IMP: POSITIVE — SIGNIFICANT CHANGE UP
SODIUM SERPL-SCNC: 142 MMOL/L — SIGNIFICANT CHANGE UP (ref 135–145)
SODIUM SERPL-SCNC: 142 MMOL/L — SIGNIFICANT CHANGE UP (ref 135–145)
WBC # BLD: 6.09 K/UL — SIGNIFICANT CHANGE UP (ref 3.8–10.5)
WBC # BLD: 6.09 K/UL — SIGNIFICANT CHANGE UP (ref 3.8–10.5)
WBC # FLD AUTO: 6.09 K/UL — SIGNIFICANT CHANGE UP (ref 3.8–10.5)
WBC # FLD AUTO: 6.09 K/UL — SIGNIFICANT CHANGE UP (ref 3.8–10.5)

## 2023-10-16 PROCEDURE — 99285 EMERGENCY DEPT VISIT HI MDM: CPT

## 2023-10-16 PROCEDURE — 71045 X-RAY EXAM CHEST 1 VIEW: CPT | Mod: 26

## 2023-10-16 PROCEDURE — 73630 X-RAY EXAM OF FOOT: CPT | Mod: 26,RT

## 2023-10-16 RX ORDER — VANCOMYCIN HCL 1 G
1000 VIAL (EA) INTRAVENOUS ONCE
Refills: 0 | Status: COMPLETED | OUTPATIENT
Start: 2023-10-16 | End: 2023-10-16

## 2023-10-16 RX ORDER — CEFEPIME 1 G/1
1000 INJECTION, POWDER, FOR SOLUTION INTRAMUSCULAR; INTRAVENOUS ONCE
Refills: 0 | Status: COMPLETED | OUTPATIENT
Start: 2023-10-16 | End: 2023-10-16

## 2023-10-16 RX ADMIN — Medication 250 MILLIGRAM(S): at 23:02

## 2023-10-16 RX ADMIN — CEFEPIME 100 MILLIGRAM(S): 1 INJECTION, POWDER, FOR SOLUTION INTRAMUSCULAR; INTRAVENOUS at 22:42

## 2023-10-16 NOTE — ED PROVIDER NOTE - NSICDXFAMILYHX_GEN_ALL_CORE_FT
FAMILY HISTORY:  Family history of essential hypertension  FH: diabetes mellitus, mother    Sibling  Still living? Unknown  Family history of diabetes mellitus, Age at diagnosis: Age Unknown

## 2023-10-16 NOTE — ED ADULT TRIAGE NOTE - CHIEF COMPLAINT QUOTE
wound on right foot for "a couple of weeks" told to come in by podiatrist today for antibiotics as well as vascular for an angiogram   Denies fever, numbness/tingling

## 2023-10-16 NOTE — ED PROVIDER NOTE - PHYSICAL EXAMINATION
Attending Kely Christian: Gen: NAD, heent: atrauamtic, eomi, , mmm, op pink, uvula midline, neck; nttp, no nuchal rigidity, chest: nttp, no crepitus, cv: rrr, no murmurs, lungs: ctab, abd: soft, nontender, nondistended, no peritoneal signs, , no guarding, ext: wwp, swelling to right 3rd metatarsal with ulceration to plantar aspect skin: no rash, neuro: awake and alert, following commands, speech clear, sensation and strength intact, no focal deficits

## 2023-10-16 NOTE — ED ADULT NURSE NOTE - OBJECTIVE STATEMENT
Pt is a 72y F PMH DM2 p/w R 3rd toe wound. Pt being seen by vascular, sent to ED for further workup, r/o osteo. Pt reports non-healing wound to plantar surface of 3rd toe x weeks. Endorsing numbness/tingling to RLE 2/2 peripheral neuropathy with no recent change to sensation. Denies fevers, pain elsewhere. A&Ox4, MONTALVO, lungs clear, distal pulses intact, abdomen soft, skin with wound to R 3 toe. Side rails up for safety, call bell and personal items within reach, instructed to call for assistance, verbalizes understanding. Will continue to monitor.

## 2023-10-16 NOTE — ED PROVIDER NOTE - NSICDXPASTMEDICALHX_GEN_ALL_CORE_FT
PAST MEDICAL HISTORY:  Coronary artery disease involving native coronary artery of native heart without angina pectoris     Essential hypertension     PAD (peripheral artery disease)     Peripheral artery disease     Venous ulcer of left leg

## 2023-10-16 NOTE — ED ADULT NURSE NOTE - NSFALLUNIVINTERV_ED_ALL_ED
Bed/Stretcher in lowest position, wheels locked, appropriate side rails in place/Call bell, personal items and telephone in reach/Instruct patient to call for assistance before getting out of bed/chair/stretcher/Non-slip footwear applied when patient is off stretcher/[place] to call system/Physically safe environment - no spills, clutter or unnecessary equipment/Purposeful proactive rounding/Room/bathroom lighting operational, light cord in reach

## 2023-10-16 NOTE — CONSULT NOTE ADULT - ASSESSMENT
72y Male PMH DMT2, HTN, CAD w/ 3 stents (last 2 years ago) Right foot gangrene/OM PSH: right pop to PT bypass (now closed), resection of right 5th digit presents to ED after being sent in by podiatrist for worsening right toe wound. Labs WNL, Xray suggestive of osteomyelitis. Podiatry consulted and recommended local wound care vs possible 3rd ray resection. Vascular consulted for evaluation of RLE perfusion.    Plan:  - no acute vascular intervention   - appreciate podiatry recs  - rec medicine admission  - rec arterial duplex, IV abx, continue AC  - Further plan pending discussion with vascular fellow on call    Vascular Surgery  [id number]  72y Male PMH DMT2, HTN, CAD w/ 3 stents (last 2 years ago) Right foot gangrene/OM PSH: right pop to PT bypass (now closed), resection of right 5th digit presents to ED after being sent in by podiatrist for worsening right toe wound. Labs WNL, Xray suggestive of osteomyelitis. Podiatry consulted and recommended local wound care vs possible 3rd ray resection. Vascular consulted for evaluation of RLE perfusion.    Plan:  - no acute vascular intervention   - appreciate podiatry recs  - rec medicine admission  - rec HUANG/PVRs, IV abx, continue AC  - Further plan pending discussion with vascular fellow on call    Vascular Surgery  [id number]  72y Male PMH DMT2, HTN, CAD w/ 3 stents (last 2 years ago) Right foot gangrene/OM PSH: right pop to PT bypass (now closed), resection of right 5th digit presents to ED after being sent in by podiatrist for worsening right toe wound. Labs WNL, Xray suggestive of osteomyelitis. Podiatry consulted and recommended local wound care vs possible 3rd ray resection. Vascular consulted for evaluation of RLE perfusion.    Plan:  - no acute vascular intervention   - appreciate podiatry recs  - rec medicine admission  - Please document medical optimization for operative intervention  - rec HUANG/PVRs, IV abx, continue AC  - Further plan pending discussion with vascular fellow on call    Vascular Surgery  [id number]  72y Male PMH DMT2, HTN, CAD w/ 3 stents (last 2 years ago) Right foot gangrene/OM PSH: right pop to PT bypass (now closed), resection of right 5th digit presents to ED after being sent in by podiatrist for worsening right toe wound. Labs WNL, Xray suggestive of osteomyelitis. Podiatry consulted and recommended local wound care vs possible 3rd ray resection. Vascular consulted for evaluation of RLE perfusion.    Plan:  - no acute vascular intervention   - appreciate podiatry recs  - rec medicine admission  - Please document medical optimization for operative intervention  - rec HUANG/PVRs, IV abx, continue AC    Plan Discussed with Vascular Fellow on behalf of Dr. Charlton    Vascular Surgery  [id number]  72y Male PMH DMT2, HTN, CAD w/ 3 stents (last 2 years ago) Right foot gangrene/OM PSH: right pop to PT bypass (now closed), resection of right 5th digit presents to ED after being sent in by podiatrist for worsening right toe wound. Labs WNL, Xray suggestive of osteomyelitis. Podiatry consulted and recommended local wound care vs possible 3rd ray resection. Vascular consulted for evaluation of RLE perfusion.    Plan:   - appreciate podiatry recs  - rec medicine admission  - Please document medical optimization for operative intervention  - rec HUANG/PVRs, IV abx, continue AC    Plan Discussed with Vascular Fellow on behalf of Dr. Charlton    Vascular Surgery  [id number]  72y Male PMH DMT2, HTN, CAD w/ 3 stents (last 2 years ago) Right foot gangrene/OM PSH: right pop to PT bypass (now closed), resection of right 5th digit presents to ED after being sent in by podiatrist for worsening right toe wound. Labs WNL, Xray suggestive of osteomyelitis. Podiatry consulted and recommended local wound care vs possible 3rd ray resection. Vascular consulted for evaluation of RLE perfusion.    Plan:   - appreciate podiatry recs  - rec medicine admission  - Please document medical optimization for operative intervention  - No need for repeat HUANG/PVRS.   -Continue antiplatelets     Plan Discussed with Vascular Fellow on behalf of Dr. Charlton    Vascular Surgery  [id number]

## 2023-10-16 NOTE — ED PROVIDER NOTE - PROGRESS NOTE DETAILS
Attending Kely Christian: pt seen by podiatry recommending abx, vascular paged Attending Kely Christian: d/w vascular recommended medicine admission

## 2023-10-16 NOTE — ED PROVIDER NOTE - CLINICAL SUMMARY MEDICAL DECISION MAKING FREE TEXT BOX
Attending Kely Christian: 71 yo male presenting with concern for swelling and ulceration to right 3rd toe. upon arrival pt awake and alert following commands. on exam ext wwp, pt with ulceration to base of third toe with surrounding erythema. unable to palpate a pulse. pt sent in by vascular and podiatry for possible angio and abx.

## 2023-10-16 NOTE — ED ADULT NURSE REASSESSMENT NOTE - NS ED NURSE REASSESS COMMENT FT1
PT provided with MRI forms at this time. PT states he is unable to fill out the forms because his wife took his glasses.

## 2023-10-16 NOTE — ED ADULT NURSE NOTE - ED STAT RN HANDOFF DETAILS 2
hand off given to oncoming GURJIT Rosario. MRI forms were filled out and sent. Pt A&Ox4. Voiding well. Ate lunch. No c/o at present

## 2023-10-16 NOTE — CONSULT NOTE ADULT - ASSESSMENT
72M presents with right foot third digit distal tuft wound to bone.  - Pt seen and evaluated.  - WBC, ESR, CRP all pending.  - Right foot 3rd distal tuft wound to bone, fibrotic wound bed, no purulence, erythema to the dorsal 3rd MPJ, scant serous drainage. Right foot distal medial 4th digit wound to dermis. Left foot no wounds, no acute signs of infection.  - Recommend admit to medicine.  - Right Foot Wound Culture: Obtained.  - Recommend IV vancomycin and zosyn.  - Recommend vascular consult with Dr. Shanks.  - Right Foot MRI: Ordered.  - Pod Plan: Local wound care vs right foot partial third ray resection pending [facility] recs and imaging.  - Please document medical clearance for possible podiatric surgery under anesthesia.  - Discussed with attending. 72M presents with right foot third digit distal tuft wound to bone.  - Pt seen and evaluated.  - WBC, ESR, CRP all pending.  - Right foot 3rd distal tuft wound to bone, fibrotic wound bed, no purulence, erythema to the dorsal 3rd MPJ, scant serous drainage. Right foot distal medial 4th digit wound to dermis. Left foot no wounds, no acute signs of infection.  - Right Foot X-Rays: Ordered.  - Recommend admit to medicine.  - Right Foot Wound Culture: Obtained.  - Recommend IV vancomycin and zosyn.  - Recommend vascular consult with Dr. Shanks.  - Right Foot MRI: Ordered.  - Pod Plan: Local wound care vs right foot partial third ray resection pending [facility] recs and imaging.  - Please document medical clearance for possible podiatric surgery under anesthesia.  - Discussed with attending.

## 2023-10-16 NOTE — ED PROVIDER NOTE - RAPID ASSESSMENT
72-year-old male history of CAD with stents and PVD follows with vascular surgery presents to the emergency department at the advice of his podiatrist.  Patient was doing routine follow-up last week with vascular and was noted to have concerning ABIs on the right side and was planning to do an outpatient angiogram.  He had a small wound on the right third toe at that time and today followed up with podiatry at which time it was noted to have progressed.  X-ray suggestive of osteomyelitis so patient was sent in for IV antibiotics and admission 72-year-old male history of CAD with stents and PVD follows with vascular surgery presents to the emergency department at the advice of his podiatrist.  Patient was doing routine follow-up last week with vascular and was noted to have concerning ABIs on the right side and was planning to do an outpatient angiogram.  He had a small wound on the right third toe at that time and today followed up with podiatry at which time it was noted to have progressed.  X-ray suggestive of osteomyelitis so patient was sent in for IV antibiotics and admission    Ebonie Sanchez MD (Attending): I was available for real-time consultation while the patient was evaluated by the PA/NP in the waiting room as part of a triage screening process but did not directly see or examine the patient.

## 2023-10-16 NOTE — ED PROVIDER NOTE - OBJECTIVE STATEMENT
Attending Kely Christian: 72-year-old male with history of coronary artery disease with prior stents, diabetes, high blood pressure, hyperlipidemia and history of right toe gangrene with prior resection sent in from his podiatrist office for concern for increased redness and swelling to his right foot.  Patient states noticed increased swelling to the area and went to see his doctor.  No fevers or any chills.  Not currently on any antibiotics.  Patient recently saw vascular and was told that he might need an angiogram of his leg.

## 2023-10-16 NOTE — ED ADULT NURSE NOTE - NSICDXPASTMEDICALHX_GEN_ALL_CORE_FT
PAST MEDICAL HISTORY:  Coronary artery disease involving native coronary artery of native heart without angina pectoris     Essential hypertension     PAD (peripheral artery disease)     Peripheral artery disease     Type 2 diabetes mellitus with other circulatory complication, without long-term current use of insulin     Venous ulcer of left leg

## 2023-10-16 NOTE — CONSULT NOTE ADULT - ATTENDING COMMENTS
Patient with PAD, h/o of right leg bypass, now thrombosed, with new right toe wound.   Patient was scheduled for right leg angiogram as outpatient but presents with worsening infection of the toe wound/osteomyelitis.   Patient is scheduled for right leg angiogram with possible intervention this THursday 10/19/2023.   Will need optimization prior to planned to procedure. Patient with PAD, h/o of right leg bypass, now thrombosed, with new right toe wound.   Patient was scheduled for right leg angiogram as outpatient but presents with worsening infection of the toe wound/osteomyelitis.   Patient is scheduled for right leg angiogram with possible intervention this Thursday 10/19/2023.   Please continue aspirin 81 mg, plavix 75 mg, and statin.   Will need optimization prior to planned to procedure.

## 2023-10-17 DIAGNOSIS — I73.9 PERIPHERAL VASCULAR DISEASE, UNSPECIFIED: ICD-10-CM

## 2023-10-17 DIAGNOSIS — E78.49 OTHER HYPERLIPIDEMIA: ICD-10-CM

## 2023-10-17 DIAGNOSIS — M86.9 OSTEOMYELITIS, UNSPECIFIED: ICD-10-CM

## 2023-10-17 DIAGNOSIS — I10 ESSENTIAL (PRIMARY) HYPERTENSION: ICD-10-CM

## 2023-10-17 DIAGNOSIS — E11.9 TYPE 2 DIABETES MELLITUS WITHOUT COMPLICATIONS: ICD-10-CM

## 2023-10-17 DIAGNOSIS — M86.10 OTHER ACUTE OSTEOMYELITIS, UNSPECIFIED SITE: ICD-10-CM

## 2023-10-17 LAB
ALBUMIN SERPL ELPH-MCNC: 4.4 G/DL — SIGNIFICANT CHANGE UP (ref 3.3–5)
ALBUMIN SERPL ELPH-MCNC: 4.4 G/DL — SIGNIFICANT CHANGE UP (ref 3.3–5)
ALP SERPL-CCNC: 67 U/L — SIGNIFICANT CHANGE UP (ref 40–120)
ALP SERPL-CCNC: 67 U/L — SIGNIFICANT CHANGE UP (ref 40–120)
ALT FLD-CCNC: 8 U/L — LOW (ref 10–45)
ALT FLD-CCNC: 8 U/L — LOW (ref 10–45)
ANION GAP SERPL CALC-SCNC: 13 MMOL/L — SIGNIFICANT CHANGE UP (ref 5–17)
ANION GAP SERPL CALC-SCNC: 13 MMOL/L — SIGNIFICANT CHANGE UP (ref 5–17)
AST SERPL-CCNC: 14 U/L — SIGNIFICANT CHANGE UP (ref 10–40)
AST SERPL-CCNC: 14 U/L — SIGNIFICANT CHANGE UP (ref 10–40)
BASOPHILS # BLD AUTO: 0.02 K/UL — SIGNIFICANT CHANGE UP (ref 0–0.2)
BASOPHILS # BLD AUTO: 0.02 K/UL — SIGNIFICANT CHANGE UP (ref 0–0.2)
BASOPHILS NFR BLD AUTO: 0.4 % — SIGNIFICANT CHANGE UP (ref 0–2)
BASOPHILS NFR BLD AUTO: 0.4 % — SIGNIFICANT CHANGE UP (ref 0–2)
BILIRUB SERPL-MCNC: 0.8 MG/DL — SIGNIFICANT CHANGE UP (ref 0.2–1.2)
BILIRUB SERPL-MCNC: 0.8 MG/DL — SIGNIFICANT CHANGE UP (ref 0.2–1.2)
BUN SERPL-MCNC: 16 MG/DL — SIGNIFICANT CHANGE UP (ref 7–23)
BUN SERPL-MCNC: 16 MG/DL — SIGNIFICANT CHANGE UP (ref 7–23)
CALCIUM SERPL-MCNC: 9.8 MG/DL — SIGNIFICANT CHANGE UP (ref 8.4–10.5)
CALCIUM SERPL-MCNC: 9.8 MG/DL — SIGNIFICANT CHANGE UP (ref 8.4–10.5)
CHLORIDE SERPL-SCNC: 104 MMOL/L — SIGNIFICANT CHANGE UP (ref 96–108)
CHLORIDE SERPL-SCNC: 104 MMOL/L — SIGNIFICANT CHANGE UP (ref 96–108)
CO2 SERPL-SCNC: 23 MMOL/L — SIGNIFICANT CHANGE UP (ref 22–31)
CO2 SERPL-SCNC: 23 MMOL/L — SIGNIFICANT CHANGE UP (ref 22–31)
CREAT SERPL-MCNC: 0.95 MG/DL — SIGNIFICANT CHANGE UP (ref 0.5–1.3)
CREAT SERPL-MCNC: 0.95 MG/DL — SIGNIFICANT CHANGE UP (ref 0.5–1.3)
EGFR: 86 ML/MIN/1.73M2 — SIGNIFICANT CHANGE UP
EGFR: 86 ML/MIN/1.73M2 — SIGNIFICANT CHANGE UP
EOSINOPHIL # BLD AUTO: 0.04 K/UL — SIGNIFICANT CHANGE UP (ref 0–0.5)
EOSINOPHIL # BLD AUTO: 0.04 K/UL — SIGNIFICANT CHANGE UP (ref 0–0.5)
EOSINOPHIL NFR BLD AUTO: 0.7 % — SIGNIFICANT CHANGE UP (ref 0–6)
EOSINOPHIL NFR BLD AUTO: 0.7 % — SIGNIFICANT CHANGE UP (ref 0–6)
ERYTHROCYTE [SEDIMENTATION RATE] IN BLOOD: 17 MM/HR — SIGNIFICANT CHANGE UP (ref 0–20)
ERYTHROCYTE [SEDIMENTATION RATE] IN BLOOD: 17 MM/HR — SIGNIFICANT CHANGE UP (ref 0–20)
GLUCOSE BLDC GLUCOMTR-MCNC: 129 MG/DL — HIGH (ref 70–99)
GLUCOSE BLDC GLUCOMTR-MCNC: 129 MG/DL — HIGH (ref 70–99)
GLUCOSE BLDC GLUCOMTR-MCNC: 197 MG/DL — HIGH (ref 70–99)
GLUCOSE BLDC GLUCOMTR-MCNC: 197 MG/DL — HIGH (ref 70–99)
GLUCOSE BLDC GLUCOMTR-MCNC: 208 MG/DL — HIGH (ref 70–99)
GLUCOSE BLDC GLUCOMTR-MCNC: 208 MG/DL — HIGH (ref 70–99)
GLUCOSE SERPL-MCNC: 234 MG/DL — HIGH (ref 70–99)
GLUCOSE SERPL-MCNC: 234 MG/DL — HIGH (ref 70–99)
GRAM STN FLD: SIGNIFICANT CHANGE UP
GRAM STN FLD: SIGNIFICANT CHANGE UP
HCT VFR BLD CALC: 36.3 % — LOW (ref 39–50)
HCT VFR BLD CALC: 36.3 % — LOW (ref 39–50)
HGB BLD-MCNC: 12.6 G/DL — LOW (ref 13–17)
HGB BLD-MCNC: 12.6 G/DL — LOW (ref 13–17)
IMM GRANULOCYTES NFR BLD AUTO: 0.4 % — SIGNIFICANT CHANGE UP (ref 0–0.9)
IMM GRANULOCYTES NFR BLD AUTO: 0.4 % — SIGNIFICANT CHANGE UP (ref 0–0.9)
LYMPHOCYTES # BLD AUTO: 0.95 K/UL — LOW (ref 1–3.3)
LYMPHOCYTES # BLD AUTO: 0.95 K/UL — LOW (ref 1–3.3)
LYMPHOCYTES # BLD AUTO: 17.1 % — SIGNIFICANT CHANGE UP (ref 13–44)
LYMPHOCYTES # BLD AUTO: 17.1 % — SIGNIFICANT CHANGE UP (ref 13–44)
MCHC RBC-ENTMCNC: 28.1 PG — SIGNIFICANT CHANGE UP (ref 27–34)
MCHC RBC-ENTMCNC: 28.1 PG — SIGNIFICANT CHANGE UP (ref 27–34)
MCHC RBC-ENTMCNC: 34.7 GM/DL — SIGNIFICANT CHANGE UP (ref 32–36)
MCHC RBC-ENTMCNC: 34.7 GM/DL — SIGNIFICANT CHANGE UP (ref 32–36)
MCV RBC AUTO: 80.8 FL — SIGNIFICANT CHANGE UP (ref 80–100)
MCV RBC AUTO: 80.8 FL — SIGNIFICANT CHANGE UP (ref 80–100)
MONOCYTES # BLD AUTO: 0.38 K/UL — SIGNIFICANT CHANGE UP (ref 0–0.9)
MONOCYTES # BLD AUTO: 0.38 K/UL — SIGNIFICANT CHANGE UP (ref 0–0.9)
MONOCYTES NFR BLD AUTO: 6.8 % — SIGNIFICANT CHANGE UP (ref 2–14)
MONOCYTES NFR BLD AUTO: 6.8 % — SIGNIFICANT CHANGE UP (ref 2–14)
NEUTROPHILS # BLD AUTO: 4.15 K/UL — SIGNIFICANT CHANGE UP (ref 1.8–7.4)
NEUTROPHILS # BLD AUTO: 4.15 K/UL — SIGNIFICANT CHANGE UP (ref 1.8–7.4)
NEUTROPHILS NFR BLD AUTO: 74.6 % — SIGNIFICANT CHANGE UP (ref 43–77)
NEUTROPHILS NFR BLD AUTO: 74.6 % — SIGNIFICANT CHANGE UP (ref 43–77)
NRBC # BLD: 0 /100 WBCS — SIGNIFICANT CHANGE UP (ref 0–0)
NRBC # BLD: 0 /100 WBCS — SIGNIFICANT CHANGE UP (ref 0–0)
PLATELET # BLD AUTO: 182 K/UL — SIGNIFICANT CHANGE UP (ref 150–400)
PLATELET # BLD AUTO: 182 K/UL — SIGNIFICANT CHANGE UP (ref 150–400)
POTASSIUM SERPL-MCNC: 4.4 MMOL/L — SIGNIFICANT CHANGE UP (ref 3.5–5.3)
POTASSIUM SERPL-MCNC: 4.4 MMOL/L — SIGNIFICANT CHANGE UP (ref 3.5–5.3)
POTASSIUM SERPL-SCNC: 4.4 MMOL/L — SIGNIFICANT CHANGE UP (ref 3.5–5.3)
POTASSIUM SERPL-SCNC: 4.4 MMOL/L — SIGNIFICANT CHANGE UP (ref 3.5–5.3)
PROT SERPL-MCNC: 6.8 G/DL — SIGNIFICANT CHANGE UP (ref 6–8.3)
PROT SERPL-MCNC: 6.8 G/DL — SIGNIFICANT CHANGE UP (ref 6–8.3)
RBC # BLD: 4.49 M/UL — SIGNIFICANT CHANGE UP (ref 4.2–5.8)
RBC # BLD: 4.49 M/UL — SIGNIFICANT CHANGE UP (ref 4.2–5.8)
RBC # FLD: 13.2 % — SIGNIFICANT CHANGE UP (ref 10.3–14.5)
RBC # FLD: 13.2 % — SIGNIFICANT CHANGE UP (ref 10.3–14.5)
SODIUM SERPL-SCNC: 140 MMOL/L — SIGNIFICANT CHANGE UP (ref 135–145)
SODIUM SERPL-SCNC: 140 MMOL/L — SIGNIFICANT CHANGE UP (ref 135–145)
SPECIMEN SOURCE: SIGNIFICANT CHANGE UP
SPECIMEN SOURCE: SIGNIFICANT CHANGE UP
WBC # BLD: 5.56 K/UL — SIGNIFICANT CHANGE UP (ref 3.8–10.5)
WBC # BLD: 5.56 K/UL — SIGNIFICANT CHANGE UP (ref 3.8–10.5)
WBC # FLD AUTO: 5.56 K/UL — SIGNIFICANT CHANGE UP (ref 3.8–10.5)
WBC # FLD AUTO: 5.56 K/UL — SIGNIFICANT CHANGE UP (ref 3.8–10.5)

## 2023-10-17 PROCEDURE — 99223 1ST HOSP IP/OBS HIGH 75: CPT

## 2023-10-17 PROCEDURE — 73720 MRI LWR EXTREMITY W/O&W/DYE: CPT | Mod: 26,RT

## 2023-10-17 RX ORDER — DEXTROSE 50 % IN WATER 50 %
25 SYRINGE (ML) INTRAVENOUS ONCE
Refills: 0 | Status: DISCONTINUED | OUTPATIENT
Start: 2023-10-17 | End: 2023-10-19

## 2023-10-17 RX ORDER — PIPERACILLIN AND TAZOBACTAM 4; .5 G/20ML; G/20ML
3.38 INJECTION, POWDER, LYOPHILIZED, FOR SOLUTION INTRAVENOUS ONCE
Refills: 0 | Status: COMPLETED | OUTPATIENT
Start: 2023-10-17 | End: 2023-10-17

## 2023-10-17 RX ORDER — METFORMIN HYDROCHLORIDE 850 MG/1
1 TABLET ORAL
Refills: 0 | DISCHARGE

## 2023-10-17 RX ORDER — ISOSORBIDE MONONITRATE 60 MG/1
2 TABLET, EXTENDED RELEASE ORAL
Qty: 0 | Refills: 0 | DISCHARGE

## 2023-10-17 RX ORDER — GABAPENTIN 400 MG/1
100 CAPSULE ORAL EVERY 12 HOURS
Refills: 0 | Status: DISCONTINUED | OUTPATIENT
Start: 2023-10-17 | End: 2023-10-19

## 2023-10-17 RX ORDER — ISOSORBIDE MONONITRATE 60 MG/1
1 TABLET, EXTENDED RELEASE ORAL
Qty: 0 | Refills: 0 | DISCHARGE

## 2023-10-17 RX ORDER — ICOSAPENT ETHYL 500 MG/1
1 CAPSULE, LIQUID FILLED ORAL
Qty: 0 | Refills: 0 | DISCHARGE

## 2023-10-17 RX ORDER — CARVEDILOL PHOSPHATE 80 MG/1
25 CAPSULE, EXTENDED RELEASE ORAL EVERY 12 HOURS
Refills: 0 | Status: DISCONTINUED | OUTPATIENT
Start: 2023-10-17 | End: 2023-10-19

## 2023-10-17 RX ORDER — GLIMEPIRIDE 1 MG
1 TABLET ORAL
Qty: 0 | Refills: 0 | DISCHARGE

## 2023-10-17 RX ORDER — AMLODIPINE BESYLATE 2.5 MG/1
10 TABLET ORAL DAILY
Refills: 0 | Status: DISCONTINUED | OUTPATIENT
Start: 2023-10-17 | End: 2023-10-19

## 2023-10-17 RX ORDER — CLOPIDOGREL BISULFATE 75 MG/1
75 TABLET, FILM COATED ORAL DAILY
Refills: 0 | Status: DISCONTINUED | OUTPATIENT
Start: 2023-10-17 | End: 2023-10-19

## 2023-10-17 RX ORDER — PIPERACILLIN AND TAZOBACTAM 4; .5 G/20ML; G/20ML
3.38 INJECTION, POWDER, LYOPHILIZED, FOR SOLUTION INTRAVENOUS EVERY 8 HOURS
Refills: 0 | Status: DISCONTINUED | OUTPATIENT
Start: 2023-10-17 | End: 2023-10-19

## 2023-10-17 RX ORDER — ATORVASTATIN CALCIUM 80 MG/1
20 TABLET, FILM COATED ORAL AT BEDTIME
Refills: 0 | Status: DISCONTINUED | OUTPATIENT
Start: 2023-10-17 | End: 2023-10-19

## 2023-10-17 RX ORDER — ISOSORBIDE MONONITRATE 60 MG/1
60 TABLET, EXTENDED RELEASE ORAL DAILY
Refills: 0 | Status: DISCONTINUED | OUTPATIENT
Start: 2023-10-17 | End: 2023-10-19

## 2023-10-17 RX ORDER — HYDROCHLOROTHIAZIDE 25 MG
1 TABLET ORAL
Qty: 0 | Refills: 0 | DISCHARGE

## 2023-10-17 RX ORDER — DEXTROSE 50 % IN WATER 50 %
15 SYRINGE (ML) INTRAVENOUS ONCE
Refills: 0 | Status: DISCONTINUED | OUTPATIENT
Start: 2023-10-17 | End: 2023-10-19

## 2023-10-17 RX ORDER — SODIUM CHLORIDE 9 MG/ML
1000 INJECTION, SOLUTION INTRAVENOUS
Refills: 0 | Status: DISCONTINUED | OUTPATIENT
Start: 2023-10-17 | End: 2023-10-19

## 2023-10-17 RX ORDER — METOPROLOL TARTRATE 50 MG
50 TABLET ORAL
Refills: 0 | Status: DISCONTINUED | OUTPATIENT
Start: 2023-10-17 | End: 2023-10-17

## 2023-10-17 RX ORDER — CLOPIDOGREL BISULFATE 75 MG/1
1 TABLET, FILM COATED ORAL
Refills: 0 | DISCHARGE

## 2023-10-17 RX ORDER — GABAPENTIN 400 MG/1
1 CAPSULE ORAL
Qty: 0 | Refills: 0 | DISCHARGE

## 2023-10-17 RX ORDER — GLUCAGON INJECTION, SOLUTION 0.5 MG/.1ML
1 INJECTION, SOLUTION SUBCUTANEOUS ONCE
Refills: 0 | Status: DISCONTINUED | OUTPATIENT
Start: 2023-10-17 | End: 2023-10-19

## 2023-10-17 RX ORDER — INSULIN LISPRO 100/ML
VIAL (ML) SUBCUTANEOUS
Refills: 0 | Status: DISCONTINUED | OUTPATIENT
Start: 2023-10-17 | End: 2023-10-19

## 2023-10-17 RX ORDER — METFORMIN HYDROCHLORIDE 850 MG/1
1 TABLET ORAL
Qty: 0 | Refills: 0 | DISCHARGE

## 2023-10-17 RX ORDER — INFLUENZA VIRUS VACCINE 15; 15; 15; 15 UG/.5ML; UG/.5ML; UG/.5ML; UG/.5ML
0.7 SUSPENSION INTRAMUSCULAR ONCE
Refills: 0 | Status: DISCONTINUED | OUTPATIENT
Start: 2023-10-17 | End: 2023-10-20

## 2023-10-17 RX ORDER — CARVEDILOL PHOSPHATE 80 MG/1
1 CAPSULE, EXTENDED RELEASE ORAL
Refills: 0 | DISCHARGE

## 2023-10-17 RX ORDER — VANCOMYCIN HCL 1 G
2000 VIAL (EA) INTRAVENOUS EVERY 12 HOURS
Refills: 0 | Status: DISCONTINUED | OUTPATIENT
Start: 2023-10-17 | End: 2023-10-19

## 2023-10-17 RX ORDER — DEXTROSE 50 % IN WATER 50 %
12.5 SYRINGE (ML) INTRAVENOUS ONCE
Refills: 0 | Status: DISCONTINUED | OUTPATIENT
Start: 2023-10-17 | End: 2023-10-19

## 2023-10-17 RX ORDER — INSULIN LISPRO 100/ML
18 VIAL (ML) SUBCUTANEOUS
Qty: 0 | Refills: 0 | DISCHARGE

## 2023-10-17 RX ADMIN — PIPERACILLIN AND TAZOBACTAM 25 GRAM(S): 4; .5 INJECTION, POWDER, LYOPHILIZED, FOR SOLUTION INTRAVENOUS at 16:22

## 2023-10-17 RX ADMIN — GABAPENTIN 100 MILLIGRAM(S): 400 CAPSULE ORAL at 18:04

## 2023-10-17 RX ADMIN — AMLODIPINE BESYLATE 10 MILLIGRAM(S): 2.5 TABLET ORAL at 06:12

## 2023-10-17 RX ADMIN — ISOSORBIDE MONONITRATE 60 MILLIGRAM(S): 60 TABLET, EXTENDED RELEASE ORAL at 18:04

## 2023-10-17 RX ADMIN — CARVEDILOL PHOSPHATE 25 MILLIGRAM(S): 80 CAPSULE, EXTENDED RELEASE ORAL at 18:05

## 2023-10-17 RX ADMIN — PIPERACILLIN AND TAZOBACTAM 200 GRAM(S): 4; .5 INJECTION, POWDER, LYOPHILIZED, FOR SOLUTION INTRAVENOUS at 12:15

## 2023-10-17 RX ADMIN — Medication 0.1 MILLIGRAM(S): at 18:04

## 2023-10-17 RX ADMIN — PIPERACILLIN AND TAZOBACTAM 25 GRAM(S): 4; .5 INJECTION, POWDER, LYOPHILIZED, FOR SOLUTION INTRAVENOUS at 21:36

## 2023-10-17 RX ADMIN — Medication 250 MILLIGRAM(S): at 18:39

## 2023-10-17 RX ADMIN — Medication 50 MILLIGRAM(S): at 06:12

## 2023-10-17 RX ADMIN — Medication 2: at 12:23

## 2023-10-17 RX ADMIN — Medication 1000 MILLIGRAM(S): at 04:30

## 2023-10-17 RX ADMIN — CLOPIDOGREL BISULFATE 75 MILLIGRAM(S): 75 TABLET, FILM COATED ORAL at 12:14

## 2023-10-17 RX ADMIN — ATORVASTATIN CALCIUM 20 MILLIGRAM(S): 80 TABLET, FILM COATED ORAL at 21:36

## 2023-10-17 RX ADMIN — CEFEPIME 1000 MILLIGRAM(S): 1 INJECTION, POWDER, FOR SOLUTION INTRAMUSCULAR; INTRAVENOUS at 04:30

## 2023-10-17 NOTE — PATIENT PROFILE ADULT - FALL HARM RISK - HARM RISK INTERVENTIONS

## 2023-10-17 NOTE — H&P ADULT - ASSESSMENT
Patient is a 72 year old male with CAD s/p stents in 2020, HTN, HLD and hx of right 5th toe gangrene s/p resection presents to the ED sent in from podiatrist office for poor healing of right third toe wound concerning for OM and consideration of resection.  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

## 2023-10-17 NOTE — H&P ADULT - PROBLEM SELECTOR PLAN 1
- Right foot 3rd distal tuft wound to bone  - WBC, ESR, CRP normal  - Right Foot X-Rays ordered.  - Right Foot Wound Culture obtained.  - IV vancomycin and zosyn started on 10/17  - Right Foot MRI ordered.  - Pod Plan: Local wound care vs right foot partial third ray resection pending LE angiogram  - RCRI score: 2 points, Class III risk. 10.1%, 30 day risk of death, MI or cardiac arrest. Patient is a high risk for an intermediate risk procedure.  - I have consulted cardiology for cardiac optimization Dr Orozco  - podiatry following

## 2023-10-17 NOTE — H&P ADULT - PROBLEM SELECTOR PLAN 2
Health Maintenance Summary     Topic Due On Due Status Completed On    Colorectal Cancer Screening - Colonoscopy Feb 28, 2019 Not Due Feb 28, 2014    IMMUNIZATION - DTaP/Tdap/Td Jun 1, 2019 Not Due Jun 1, 2009    Immunization-Influenza Sep 1, 2017 Not Due           Patient is up to date, no discussion needed .       - LE angiogram pending  - c/w plavix 75 mg po daily  - no need to repeat deondre/pvr  - vascular surgery following

## 2023-10-17 NOTE — H&P ADULT - NSHPREVIEWOFSYSTEMS_GEN_ALL_CORE
GENERAL: No fevers, no chills.  EYES: No blurry vision,  No photophobia  ENT: No sore throat.  No dysphagia  Cardiovascular: No chest pain, palpitations, orthopnea  Pulmonary: No cough, no wheezing. No shortness of breath  Gastrointestinal: No abdominal pain, no diarrhea, no constipation.    Musculoskeletal: + right foot pain (near toes). No weakness.  No myalgias.  Dermatology:  No rashes.  Neuro: No Headache.  No vertigo.  No dizziness.  Psych: No anxiety, no depression.  Denies suicidal thoughts.

## 2023-10-17 NOTE — H&P ADULT - NSHPLABSRESULTS_GEN_ALL_CORE
.  LABS:                         12.2   6.09  )-----------( 173      ( 16 Oct 2023 19:55 )             35.3     10-16    142  |  106  |  22  ----------------------------<  119<H>  4.7   |  22  |  0.96    Ca    9.9      16 Oct 2023 19:55    TPro  7.3  /  Alb  4.7  /  TBili  0.6  /  DBili  x   /  AST  14  /  ALT  8<L>  /  AlkPhos  70  10-16    PT/INR - ( 16 Oct 2023 19:55 )   PT: 11.1 sec;   INR: 1.06 ratio         PTT - ( 16 Oct 2023 19:55 )  PTT:31.3 sec  Urinalysis Basic - ( 16 Oct 2023 19:55 )    Color: x / Appearance: x / SG: x / pH: x  Gluc: 119 mg/dL / Ketone: x  / Bili: x / Urobili: x   Blood: x / Protein: x / Nitrite: x   Leuk Esterase: x / RBC: x / WBC x   Sq Epi: x / Non Sq Epi: x / Bacteria: x            RADIOLOGY, EKG & ADDITIONAL TESTS: Reviewed. ekg pending

## 2023-10-17 NOTE — H&P ADULT - HISTORY OF PRESENT ILLNESS
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 Patient endorses pain with ambulation but is able to walk without any difficulty. He was planned for RLE angiogram as an outpatient with dr Shanks from vascular surgery. Currently, denies fevers, chills, chest pain, dyspnea, palpitations, nausea, vomiting, or dizziness.      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 Patient endorses pain with ambulation but is able to walk without any difficulty. He was planned for RLE angiogram as an outpatient with dr Shanks from vascular surgery. Currently, denies fevers, chills, chest pain, dyspnea, palpitations, nausea, vomiting, or dizziness.

## 2023-10-17 NOTE — ED ADULT NURSE REASSESSMENT NOTE - NS ED NURSE REASSESS COMMENT FT1
0725 Pt in ER [facility] 5. TBA. No bed yet. A&Ox4. Fall risk precautions maintained. Right 3rd toe swollen and erythematous. No drainage. Denies pain. IVL intact x 2 without sx of infilt L hand and RACF

## 2023-10-17 NOTE — ED ADULT NURSE REASSESSMENT NOTE - NS ED NURSE REASSESS COMMENT FT1
Report received on patient. Admitted, pending admission bed placement. all needs met. Updated on plan of care.

## 2023-10-17 NOTE — H&P ADULT - NSHPPHYSICALEXAM_GEN_ALL_CORE
Vital Signs Last 24 Hrs  T(C): 36.8 (17 Oct 2023 07:25), Max: 36.9 (17 Oct 2023 04:30)  T(F): 98.2 (17 Oct 2023 07:25), Max: 98.5 (17 Oct 2023 04:30)  HR: 78 (17 Oct 2023 07:25) (67 - 78)  BP: 186/93 (17 Oct 2023 07:25) (165/75 - 187/81)  BP(mean): 111 (17 Oct 2023 04:30) (111 - 117)  RR: 20 (17 Oct 2023 07:25) (20 - 20)  SpO2: 98% (17 Oct 2023 07:25) (97% - 98%)    Parameters below as of 17 Oct 2023 07:25  Patient On (Oxygen Delivery Method): room air        GENERAL: NAD, well-developed  HEAD:  Atraumatic, Normocephalic  EYES: EOMI, PERRLA, conjunctiva and sclera clear  ENT: Pharynx not erythematous  PULMONARY: Clear to auscultation bilaterally; No wheeze  CARDIOVASCULAR: Regular rate and rhythm; No murmurs, rubs, or gallops  ABDOMEN: Soft, Nontender, Nondistended; Bowel sounds present  EXTREMITIES:  2+ Peripheral Pulses, No clubbing, cyanosis, or edema  MUSCULOSKELETAL: right third toe wound, right fifth toe resected  PSYCH: AAOx3, normal affect  SKIN: warm and dry, No rashes or lesions

## 2023-10-18 ENCOUNTER — TRANSCRIPTION ENCOUNTER (OUTPATIENT)
Age: 73
End: 2023-10-18

## 2023-10-18 LAB
-  AMPICILLIN/SULBACTAM: SIGNIFICANT CHANGE UP
-  AMPICILLIN/SULBACTAM: SIGNIFICANT CHANGE UP
-  CEFAZOLIN: SIGNIFICANT CHANGE UP
-  CEFAZOLIN: SIGNIFICANT CHANGE UP
-  CLINDAMYCIN: SIGNIFICANT CHANGE UP
-  CLINDAMYCIN: SIGNIFICANT CHANGE UP
-  ERYTHROMYCIN: SIGNIFICANT CHANGE UP
-  ERYTHROMYCIN: SIGNIFICANT CHANGE UP
-  GENTAMICIN: SIGNIFICANT CHANGE UP
-  GENTAMICIN: SIGNIFICANT CHANGE UP
-  OXACILLIN: SIGNIFICANT CHANGE UP
-  OXACILLIN: SIGNIFICANT CHANGE UP
-  PENICILLIN: SIGNIFICANT CHANGE UP
-  PENICILLIN: SIGNIFICANT CHANGE UP
-  RIFAMPIN: SIGNIFICANT CHANGE UP
-  RIFAMPIN: SIGNIFICANT CHANGE UP
-  TETRACYCLINE: SIGNIFICANT CHANGE UP
-  TETRACYCLINE: SIGNIFICANT CHANGE UP
-  TRIMETHOPRIM/SULFAMETHOXAZOLE: SIGNIFICANT CHANGE UP
-  TRIMETHOPRIM/SULFAMETHOXAZOLE: SIGNIFICANT CHANGE UP
-  VANCOMYCIN: SIGNIFICANT CHANGE UP
-  VANCOMYCIN: SIGNIFICANT CHANGE UP
A1C WITH ESTIMATED AVERAGE GLUCOSE RESULT: 6.8 % — HIGH (ref 4–5.6)
A1C WITH ESTIMATED AVERAGE GLUCOSE RESULT: 6.8 % — HIGH (ref 4–5.6)
ALBUMIN SERPL ELPH-MCNC: 4.2 G/DL — SIGNIFICANT CHANGE UP (ref 3.3–5)
ALBUMIN SERPL ELPH-MCNC: 4.2 G/DL — SIGNIFICANT CHANGE UP (ref 3.3–5)
ALP SERPL-CCNC: 60 U/L — SIGNIFICANT CHANGE UP (ref 40–120)
ALP SERPL-CCNC: 60 U/L — SIGNIFICANT CHANGE UP (ref 40–120)
ALT FLD-CCNC: 6 U/L — LOW (ref 10–45)
ALT FLD-CCNC: 6 U/L — LOW (ref 10–45)
ANION GAP SERPL CALC-SCNC: 16 MMOL/L — SIGNIFICANT CHANGE UP (ref 5–17)
ANION GAP SERPL CALC-SCNC: 16 MMOL/L — SIGNIFICANT CHANGE UP (ref 5–17)
AST SERPL-CCNC: 9 U/L — LOW (ref 10–40)
AST SERPL-CCNC: 9 U/L — LOW (ref 10–40)
BASOPHILS # BLD AUTO: 0.04 K/UL — SIGNIFICANT CHANGE UP (ref 0–0.2)
BASOPHILS # BLD AUTO: 0.04 K/UL — SIGNIFICANT CHANGE UP (ref 0–0.2)
BASOPHILS NFR BLD AUTO: 0.7 % — SIGNIFICANT CHANGE UP (ref 0–2)
BASOPHILS NFR BLD AUTO: 0.7 % — SIGNIFICANT CHANGE UP (ref 0–2)
BILIRUB SERPL-MCNC: 0.6 MG/DL — SIGNIFICANT CHANGE UP (ref 0.2–1.2)
BILIRUB SERPL-MCNC: 0.6 MG/DL — SIGNIFICANT CHANGE UP (ref 0.2–1.2)
BUN SERPL-MCNC: 22 MG/DL — SIGNIFICANT CHANGE UP (ref 7–23)
BUN SERPL-MCNC: 22 MG/DL — SIGNIFICANT CHANGE UP (ref 7–23)
CALCIUM SERPL-MCNC: 9.3 MG/DL — SIGNIFICANT CHANGE UP (ref 8.4–10.5)
CALCIUM SERPL-MCNC: 9.3 MG/DL — SIGNIFICANT CHANGE UP (ref 8.4–10.5)
CHLORIDE SERPL-SCNC: 103 MMOL/L — SIGNIFICANT CHANGE UP (ref 96–108)
CHLORIDE SERPL-SCNC: 103 MMOL/L — SIGNIFICANT CHANGE UP (ref 96–108)
CO2 SERPL-SCNC: 22 MMOL/L — SIGNIFICANT CHANGE UP (ref 22–31)
CO2 SERPL-SCNC: 22 MMOL/L — SIGNIFICANT CHANGE UP (ref 22–31)
CREAT SERPL-MCNC: 1.24 MG/DL — SIGNIFICANT CHANGE UP (ref 0.5–1.3)
CREAT SERPL-MCNC: 1.24 MG/DL — SIGNIFICANT CHANGE UP (ref 0.5–1.3)
EGFR: 63 ML/MIN/1.73M2 — SIGNIFICANT CHANGE UP
EGFR: 63 ML/MIN/1.73M2 — SIGNIFICANT CHANGE UP
EOSINOPHIL # BLD AUTO: 0.07 K/UL — SIGNIFICANT CHANGE UP (ref 0–0.5)
EOSINOPHIL # BLD AUTO: 0.07 K/UL — SIGNIFICANT CHANGE UP (ref 0–0.5)
EOSINOPHIL NFR BLD AUTO: 1.2 % — SIGNIFICANT CHANGE UP (ref 0–6)
EOSINOPHIL NFR BLD AUTO: 1.2 % — SIGNIFICANT CHANGE UP (ref 0–6)
ESTIMATED AVERAGE GLUCOSE: 148 MG/DL — HIGH (ref 68–114)
ESTIMATED AVERAGE GLUCOSE: 148 MG/DL — HIGH (ref 68–114)
GLUCOSE BLDC GLUCOMTR-MCNC: 148 MG/DL — HIGH (ref 70–99)
GLUCOSE BLDC GLUCOMTR-MCNC: 148 MG/DL — HIGH (ref 70–99)
GLUCOSE BLDC GLUCOMTR-MCNC: 166 MG/DL — HIGH (ref 70–99)
GLUCOSE BLDC GLUCOMTR-MCNC: 166 MG/DL — HIGH (ref 70–99)
GLUCOSE BLDC GLUCOMTR-MCNC: 207 MG/DL — HIGH (ref 70–99)
GLUCOSE BLDC GLUCOMTR-MCNC: 207 MG/DL — HIGH (ref 70–99)
GLUCOSE BLDC GLUCOMTR-MCNC: 209 MG/DL — HIGH (ref 70–99)
GLUCOSE BLDC GLUCOMTR-MCNC: 209 MG/DL — HIGH (ref 70–99)
GLUCOSE SERPL-MCNC: 145 MG/DL — HIGH (ref 70–99)
GLUCOSE SERPL-MCNC: 145 MG/DL — HIGH (ref 70–99)
HCT VFR BLD CALC: 34 % — LOW (ref 39–50)
HCT VFR BLD CALC: 34 % — LOW (ref 39–50)
HGB BLD-MCNC: 11.9 G/DL — LOW (ref 13–17)
HGB BLD-MCNC: 11.9 G/DL — LOW (ref 13–17)
IMM GRANULOCYTES NFR BLD AUTO: 0.5 % — SIGNIFICANT CHANGE UP (ref 0–0.9)
IMM GRANULOCYTES NFR BLD AUTO: 0.5 % — SIGNIFICANT CHANGE UP (ref 0–0.9)
LYMPHOCYTES # BLD AUTO: 1.59 K/UL — SIGNIFICANT CHANGE UP (ref 1–3.3)
LYMPHOCYTES # BLD AUTO: 1.59 K/UL — SIGNIFICANT CHANGE UP (ref 1–3.3)
LYMPHOCYTES # BLD AUTO: 28.2 % — SIGNIFICANT CHANGE UP (ref 13–44)
LYMPHOCYTES # BLD AUTO: 28.2 % — SIGNIFICANT CHANGE UP (ref 13–44)
MCHC RBC-ENTMCNC: 28.6 PG — SIGNIFICANT CHANGE UP (ref 27–34)
MCHC RBC-ENTMCNC: 28.6 PG — SIGNIFICANT CHANGE UP (ref 27–34)
MCHC RBC-ENTMCNC: 35 GM/DL — SIGNIFICANT CHANGE UP (ref 32–36)
MCHC RBC-ENTMCNC: 35 GM/DL — SIGNIFICANT CHANGE UP (ref 32–36)
MCV RBC AUTO: 81.7 FL — SIGNIFICANT CHANGE UP (ref 80–100)
MCV RBC AUTO: 81.7 FL — SIGNIFICANT CHANGE UP (ref 80–100)
METHOD TYPE: SIGNIFICANT CHANGE UP
METHOD TYPE: SIGNIFICANT CHANGE UP
MONOCYTES # BLD AUTO: 0.56 K/UL — SIGNIFICANT CHANGE UP (ref 0–0.9)
MONOCYTES # BLD AUTO: 0.56 K/UL — SIGNIFICANT CHANGE UP (ref 0–0.9)
MONOCYTES NFR BLD AUTO: 9.9 % — SIGNIFICANT CHANGE UP (ref 2–14)
MONOCYTES NFR BLD AUTO: 9.9 % — SIGNIFICANT CHANGE UP (ref 2–14)
NEUTROPHILS # BLD AUTO: 3.34 K/UL — SIGNIFICANT CHANGE UP (ref 1.8–7.4)
NEUTROPHILS # BLD AUTO: 3.34 K/UL — SIGNIFICANT CHANGE UP (ref 1.8–7.4)
NEUTROPHILS NFR BLD AUTO: 59.5 % — SIGNIFICANT CHANGE UP (ref 43–77)
NEUTROPHILS NFR BLD AUTO: 59.5 % — SIGNIFICANT CHANGE UP (ref 43–77)
NRBC # BLD: 0 /100 WBCS — SIGNIFICANT CHANGE UP (ref 0–0)
NRBC # BLD: 0 /100 WBCS — SIGNIFICANT CHANGE UP (ref 0–0)
PLATELET # BLD AUTO: 164 K/UL — SIGNIFICANT CHANGE UP (ref 150–400)
PLATELET # BLD AUTO: 164 K/UL — SIGNIFICANT CHANGE UP (ref 150–400)
POTASSIUM SERPL-MCNC: 3.7 MMOL/L — SIGNIFICANT CHANGE UP (ref 3.5–5.3)
POTASSIUM SERPL-MCNC: 3.7 MMOL/L — SIGNIFICANT CHANGE UP (ref 3.5–5.3)
POTASSIUM SERPL-SCNC: 3.7 MMOL/L — SIGNIFICANT CHANGE UP (ref 3.5–5.3)
POTASSIUM SERPL-SCNC: 3.7 MMOL/L — SIGNIFICANT CHANGE UP (ref 3.5–5.3)
PROT SERPL-MCNC: 6.6 G/DL — SIGNIFICANT CHANGE UP (ref 6–8.3)
PROT SERPL-MCNC: 6.6 G/DL — SIGNIFICANT CHANGE UP (ref 6–8.3)
RBC # BLD: 4.16 M/UL — LOW (ref 4.2–5.8)
RBC # BLD: 4.16 M/UL — LOW (ref 4.2–5.8)
RBC # FLD: 13.3 % — SIGNIFICANT CHANGE UP (ref 10.3–14.5)
RBC # FLD: 13.3 % — SIGNIFICANT CHANGE UP (ref 10.3–14.5)
SODIUM SERPL-SCNC: 141 MMOL/L — SIGNIFICANT CHANGE UP (ref 135–145)
SODIUM SERPL-SCNC: 141 MMOL/L — SIGNIFICANT CHANGE UP (ref 135–145)
VANCOMYCIN TROUGH SERPL-MCNC: 12.2 UG/ML — SIGNIFICANT CHANGE UP (ref 10–20)
VANCOMYCIN TROUGH SERPL-MCNC: 12.2 UG/ML — SIGNIFICANT CHANGE UP (ref 10–20)
WBC # BLD: 5.63 K/UL — SIGNIFICANT CHANGE UP (ref 3.8–10.5)
WBC # BLD: 5.63 K/UL — SIGNIFICANT CHANGE UP (ref 3.8–10.5)
WBC # FLD AUTO: 5.63 K/UL — SIGNIFICANT CHANGE UP (ref 3.8–10.5)
WBC # FLD AUTO: 5.63 K/UL — SIGNIFICANT CHANGE UP (ref 3.8–10.5)

## 2023-10-18 PROCEDURE — 99233 SBSQ HOSP IP/OBS HIGH 50: CPT

## 2023-10-18 PROCEDURE — 99222 1ST HOSP IP/OBS MODERATE 55: CPT

## 2023-10-18 RX ORDER — ASPIRIN/CALCIUM CARB/MAGNESIUM 324 MG
81 TABLET ORAL DAILY
Refills: 0 | Status: DISCONTINUED | OUTPATIENT
Start: 2023-10-18 | End: 2023-10-19

## 2023-10-18 RX ORDER — SODIUM CHLORIDE 9 MG/ML
1000 INJECTION, SOLUTION INTRAVENOUS
Refills: 0 | Status: DISCONTINUED | OUTPATIENT
Start: 2023-10-18 | End: 2023-10-19

## 2023-10-18 RX ADMIN — PIPERACILLIN AND TAZOBACTAM 25 GRAM(S): 4; .5 INJECTION, POWDER, LYOPHILIZED, FOR SOLUTION INTRAVENOUS at 21:18

## 2023-10-18 RX ADMIN — ISOSORBIDE MONONITRATE 60 MILLIGRAM(S): 60 TABLET, EXTENDED RELEASE ORAL at 17:38

## 2023-10-18 RX ADMIN — GABAPENTIN 100 MILLIGRAM(S): 400 CAPSULE ORAL at 17:37

## 2023-10-18 RX ADMIN — Medication 2: at 08:52

## 2023-10-18 RX ADMIN — ATORVASTATIN CALCIUM 20 MILLIGRAM(S): 80 TABLET, FILM COATED ORAL at 21:18

## 2023-10-18 RX ADMIN — Medication 1: at 12:40

## 2023-10-18 RX ADMIN — Medication 0.1 MILLIGRAM(S): at 17:38

## 2023-10-18 RX ADMIN — GABAPENTIN 100 MILLIGRAM(S): 400 CAPSULE ORAL at 05:33

## 2023-10-18 RX ADMIN — AMLODIPINE BESYLATE 10 MILLIGRAM(S): 2.5 TABLET ORAL at 05:33

## 2023-10-18 RX ADMIN — Medication 250 MILLIGRAM(S): at 18:36

## 2023-10-18 RX ADMIN — CLOPIDOGREL BISULFATE 75 MILLIGRAM(S): 75 TABLET, FILM COATED ORAL at 11:53

## 2023-10-18 RX ADMIN — Medication 81 MILLIGRAM(S): at 17:42

## 2023-10-18 RX ADMIN — PIPERACILLIN AND TAZOBACTAM 25 GRAM(S): 4; .5 INJECTION, POWDER, LYOPHILIZED, FOR SOLUTION INTRAVENOUS at 13:04

## 2023-10-18 RX ADMIN — Medication 250 MILLIGRAM(S): at 06:08

## 2023-10-18 RX ADMIN — SODIUM CHLORIDE 100 MILLILITER(S): 9 INJECTION, SOLUTION INTRAVENOUS at 09:54

## 2023-10-18 RX ADMIN — PIPERACILLIN AND TAZOBACTAM 25 GRAM(S): 4; .5 INJECTION, POWDER, LYOPHILIZED, FOR SOLUTION INTRAVENOUS at 05:34

## 2023-10-18 RX ADMIN — CARVEDILOL PHOSPHATE 25 MILLIGRAM(S): 80 CAPSULE, EXTENDED RELEASE ORAL at 17:38

## 2023-10-18 RX ADMIN — Medication 0.1 MILLIGRAM(S): at 05:33

## 2023-10-18 RX ADMIN — CARVEDILOL PHOSPHATE 25 MILLIGRAM(S): 80 CAPSULE, EXTENDED RELEASE ORAL at 05:34

## 2023-10-18 NOTE — PROGRESS NOTE ADULT - ASSESSMENT
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 Podiatry planning for right foot 3rd ray resection. Vascular consulted for evaluation of RLE perfusion.    Plan:   - RLE angio with possible revascularization tomorrow (10/19)      - NPO at midnight      - 04:00 AM labs ordered (CBC, BMP, mag, phos, PT/PTT/INR, T&S)  - DVT ppx: ASA & plavix  - Care as per primary team      Vascular Surgery  [id number]

## 2023-10-18 NOTE — PROGRESS NOTE ADULT - SUBJECTIVE AND OBJECTIVE BOX
Patient is a 72y old  Male who presents with a chief complaint of right toe OM (18 Oct 2023 10:07)       INTERVAL HPI/OVERNIGHT EVENTS:  Patient seen and evaluated at bedside.  Pt is resting comfortable in NAD. Denies N/V/F/C.    Allergies    No Known Allergies    Intolerances        Vital Signs Last 24 Hrs  T(C): 36.7 (18 Oct 2023 11:45), Max: 37.2 (18 Oct 2023 04:54)  T(F): 98.1 (18 Oct 2023 11:45), Max: 98.9 (18 Oct 2023 04:54)  HR: 70 (18 Oct 2023 11:45) (65 - 77)  BP: 137/76 (18 Oct 2023 11:45) (136/71 - 201/96)  BP(mean): 123 (17 Oct 2023 16:42) (123 - 123)  RR: 18 (18 Oct 2023 11:45) (18 - 18)  SpO2: 96% (18 Oct 2023 11:45) (96% - 100%)    Parameters below as of 18 Oct 2023 11:45  Patient On (Oxygen Delivery Method): room air        LABS:                        11.9   5.63  )-----------( 164      ( 18 Oct 2023 07:09 )             34.0     10-18    141  |  103  |  22  ----------------------------<  145<H>  3.7   |  22  |  1.24    Ca    9.3      18 Oct 2023 07:05    TPro  6.6  /  Alb  4.2  /  TBili  0.6  /  DBili  x   /  AST  9<L>  /  ALT  6<L>  /  AlkPhos  60  10-18    PT/INR - ( 16 Oct 2023 19:55 )   PT: 11.1 sec;   INR: 1.06 ratio         PTT - ( 16 Oct 2023 19:55 )  PTT:31.3 sec  Urinalysis Basic - ( 18 Oct 2023 07:05 )    Color: x / Appearance: x / SG: x / pH: x  Gluc: 145 mg/dL / Ketone: x  / Bili: x / Urobili: x   Blood: x / Protein: x / Nitrite: x   Leuk Esterase: x / RBC: x / WBC x   Sq Epi: x / Non Sq Epi: x / Bacteria: x      CAPILLARY BLOOD GLUCOSE      POCT Blood Glucose.: 209 mg/dL (18 Oct 2023 08:38)  POCT Blood Glucose.: 197 mg/dL (17 Oct 2023 21:05)  POCT Blood Glucose.: 129 mg/dL (17 Oct 2023 17:32)      Lower Extremity Physical Exam:  Vascular: DP/PT 0/4, B/L, CFT <3 seconds B/L, Temperature gradient warm to cool, B/L.   Neuro: Epicritic sensation diminished to the level of digits, B/L.  Musculoskeletal/Ortho: Unremarkable.  Skin: Right foot 3rd distal tuft wound to bone, fibrotic wound bed, no purulence, erythema to the dorsal 3rd MPJ improving, scant serous drainage. Right foot distal medial 4th digit wound to dermis. Left foot no wounds, no acute signs of infection.    RADIOLOGY & ADDITIONAL TESTS:

## 2023-10-18 NOTE — PROGRESS NOTE ADULT - ASSESSMENT
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

## 2023-10-18 NOTE — PROGRESS NOTE ADULT - SUBJECTIVE AND OBJECTIVE BOX
VASCULAR SURGERY DAILY PROGRESS NOTE    SUBJECTIVE: Patient seen and evaluated on AM rounds. Pt is resting comfortably in bed with no complaints. Denies fevers/chills, chest pain, dyspnea, abdominal pain, nausea, vomiting, and diarrhea    24 Hour/Overnight Events: No acute events overnight    ------------------------------------------------------------------------------------------------------------  OBJECTIVE:  Vital Signs Last 24 Hrs  T(C): 36.7 (18 Oct 2023 11:45), Max: 37.2 (18 Oct 2023 04:54)  T(F): 98.1 (18 Oct 2023 11:45), Max: 98.9 (18 Oct 2023 04:54)  HR: 70 (18 Oct 2023 11:45) (65 - 77)  BP: 137/76 (18 Oct 2023 11:45) (136/71 - 201/96)  BP(mean): 123 (17 Oct 2023 16:42) (123 - 123)  RR: 18 (18 Oct 2023 11:45) (18 - 18)  SpO2: 96% (18 Oct 2023 11:45) (96% - 100%)    Parameters below as of 18 Oct 2023 11:45  Patient On (Oxygen Delivery Method): room air      I&O's Detail    17 Oct 2023 07:01  -  18 Oct 2023 07:00  --------------------------------------------------------  IN:    IV PiggyBack: 100 mL    Oral Fluid: 150 mL  Total IN: 250 mL    OUT:    Voided (mL): 300 mL  Total OUT: 300 mL    Total NET: -50 mL        Daily     Daily Weight in k.8 (18 Oct 2023 12:37)  MEDICATIONS  (STANDING):  amLODIPine   Tablet 10 milliGRAM(s) Oral daily  aspirin enteric coated 81 milliGRAM(s) Oral daily  atorvastatin 20 milliGRAM(s) Oral at bedtime  carvedilol 25 milliGRAM(s) Oral every 12 hours  cloNIDine 0.1 milliGRAM(s) Oral every 12 hours  clopidogrel Tablet 75 milliGRAM(s) Oral daily  dextrose 5%. 1000 milliLiter(s) (100 mL/Hr) IV Continuous <Continuous>  dextrose 5%. 1000 milliLiter(s) (50 mL/Hr) IV Continuous <Continuous>  dextrose 50% Injectable 25 Gram(s) IV Push once  dextrose 50% Injectable 12.5 Gram(s) IV Push once  dextrose 50% Injectable 25 Gram(s) IV Push once  gabapentin 100 milliGRAM(s) Oral every 12 hours  glucagon  Injectable 1 milliGRAM(s) IntraMuscular once  hydrochlorothiazide 25 milliGRAM(s) Oral daily  influenza  Vaccine (HIGH DOSE) 0.7 milliLiter(s) IntraMuscular once  insulin lispro (ADMELOG) corrective regimen sliding scale   SubCutaneous three times a day before meals  isosorbide   mononitrate ER Tablet (IMDUR) 60 milliGRAM(s) Oral daily  lactated ringers. 1000 milliLiter(s) (100 mL/Hr) IV Continuous <Continuous>  piperacillin/tazobactam IVPB.. 3.375 Gram(s) IV Intermittent every 8 hours  vancomycin  IVPB 2000 milliGRAM(s) IV Intermittent every 12 hours    MEDICATIONS  (PRN):  dextrose Oral Gel 15 Gram(s) Oral once PRN Blood Glucose LESS THAN 70 milliGRAM(s)/deciliter      LABS:                        11.9   5.63  )-----------( 164      ( 18 Oct 2023 07:09 )             34.0     10-    141  |  103  |  22  ----------------------------<  145<H>  3.7   |  22  |  1.24    Ca    9.3      18 Oct 2023 07:05    TPro  6.6  /  Alb  4.2  /  TBili  0.6  /  DBili  x   /  AST  9<L>  /  ALT  6<L>  /  AlkPhos  60  10-    LIVER FUNCTIONS - ( 18 Oct 2023 07:05 )  Alb: 4.2 g/dL / Pro: 6.6 g/dL / ALK PHOS: 60 U/L / ALT: 6 U/L / AST: 9 U/L / GGT: x           PT/INR - ( 16 Oct 2023 19:55 )   PT: 11.1 sec;   INR: 1.06 ratio    PTT - ( 16 Oct 2023 19:55 )  PTT:31.3 sec    Urinalysis Basic - ( 18 Oct 2023 07:05 )  Color: x / Appearance: x / SG: x / pH: x  Gluc: 145 mg/dL / Ketone: x  / Bili: x / Urobili: x   Blood: x / Protein: x / Nitrite: x   Leuk Esterase: x / RBC: x / WBC x   Sq Epi: x / Non Sq Epi: x / Bacteria: x      PHYSICAL EXAM:  Constitutional: Well developed, well nourished, NAD  Pulmonary: Symmetric chest rise bilaterally, no increased WOB  Gastrointestinal: Abdomen soft, nontender, nondistended  Extremities: (+) DP/PT signals. RLE 3rd, 4th digit wounds with erythema and minimal serous drainage, but no purulence or malodor. Warm to touch, normal strength, sensory and motor intact, no clubbing/cyanosis/edema/hematoma

## 2023-10-18 NOTE — PROGRESS NOTE ADULT - SUBJECTIVE AND OBJECTIVE BOX
Rheumatoid Factor (Blood)  Does this test have other names?  RF blood test  What is this test?  This test measures the level of a substance called rheumatoid factor (RF) in your blood. It helps your healthcare provider find out whether you have rheumatoid arthritis (RA).  RF is an autoantibody that responds to inflammation caused by RA. Antibodies increase in your blood when they find a foreign substance, such as bacteria. Autoantibodies, on the other hand, attack your own body's proteins.  RF is linked to long-term (chronic) inflammation. So it may be higher if you have RA, which is an inflammatory condition. Although this autoantibody does not directly cause arthritis, it plays a role in increasing inflammation if you have joint damage. RF is found in the blood of 70% to 80% of people with RA.    This test may also help diagnose other rheumatic diseases, chronic infections, or autoimmune diseases, such as Sjögren's syndrome or lupus erythematosus..  Why do I need this test?  You may need this test if your healthcare provider suspects that you have RA. Symptoms of RA include:  · Low-grade fever  · Extreme tiredness  · Loss of weight  · Muscle pain  · Numbing or tingling sensation in your hands  · Morning joint stiffness  Arthritis pain often affects finger and toe joints. The knees and shoulders may also be affected by RA.  If RA is not treated, it can severely affect your daily life and can make it hard to walk. It's important to start treatment early on. It can be hard to know the problem is RA and not other inflammatory illnesses, such as polyarthritis. Women are 3 times more likely than men to have RA. Infections and cigarette smoking may increase the pain of existing RA.  Long-term effects of RA include damage to your cartilage and bones and decreased function in your joints.  What other tests might I have along with this test?  Your healthcare provider may order other blood tests to help diagnose RA. These  include:  · Anti-citrullinated peptide/protein antibody test  · Antinuclear antibody, or STEPHANIE, testing  · Complete blood count, or CBC  Your provider may also order X-rays of your wrists, hands, and feet to look for joint damage.  What do my test results mean?  Many things may affect your lab test results. These include the method each lab uses to do the test. Even if your test results are different from the normal value, you may not have a problem. To learn what the results mean for you, talk with your healthcare provider.  Results are given in units per milliliter (U/mL). If your level is lower than 60 U/mL, your results are considered negative and you likely don't have RA. Levels above that may mean that you have RA or another autoimmune disease.  The normal level for an older adult may be slightly higher than 60 U/mL.  How is this test done?  The test requires a blood sample, which is drawn through a needle from a vein in your arm.  Does this test pose any risks?  Taking a blood sample with a needle carries risks that include bleeding, infection, bruising, or feeling dizzy. When the needle pricks your arm, you may feel a slight stinging sensation or pain. Afterward, the site may be slightly sore.  What might affect my test results?  Certain infections can raise your level of RF.    How do I get ready for this test?  You don't need to prepare for this test.      © 4504-8730 [employer]. [address] [place] [address]. All rights reserved. This information is not intended as a substitute for professional medical care. Always follow your healthcare professional's instructions.        Rheumatoid Arthritis  You have rheumatoid arthritis (RA). This is a chronic disease that mainly affects the joints. Sometimes, it also affects other parts of the body. RA is an autoimmune disease. This means that the bodys immune system, which normally protects the body, causes harm instead. With RA, the immune  system attacks the joints. The reason for this is unknown.  In most cases, RA affects pairs of joints on both sides of the body. These can include joints in both elbows, wrists, hands, knees, feet, or ankles. The disease often starts slowly. Early symptoms include stiffness, muscle aches, weakness, and fatigue. Over time, the joints may begin to hurt. They may also become warm, swollen, or tender. Symptoms may feel worse in the morning after a nights rest and may get better with activity.  With RA, you may have periods of active disease (when symptoms worsen) followed by periods of remission (when symptoms improve or go away). There is no known cure for RA. But medical treatment can slow or stop the progress of the disease. It can also help relieve symptoms. For advanced disease, surgery, such as joint replacement, may be the best option.  Home care  · If you were prescribed a medicine, take it as directed.  · To help control swelling and pain, acetaminophen, ibuprofen, or another NSAID (non-steroidal anti-inflammatory drug) may be recommended. Note: If you have chronic liver or kidney disease or ever had a stomach ulcer or gastrointestinal bleeding, tell your healthcare provider before taking any of these medicines.  · Some persons find relief with heat (hot shower, hot bath, or heating pad). Others prefer cold (ice in a plastic bag, wrapped in a towel). Try both. Then use the method you like best. Use heat or cold for about 20 minutes, a few times a day.  · Exercise is a key part of treatment for RA. It helps reduce pain. It may also improve flexibility. Do your best to be active daily. Move your joints through their full range of motion each morning. Avoid staying in any one position for long periods of time. Take breaks throughout the day and move around. Also, ask your healthcare provider or physical therapist what exercises are best for you.  · If you are overweight, lose weight. Extra weight puts stress on  your joints.  · If you smoke, quit. Smoking raises the risk of other problems linked to RA.  · No herbal product or nutritional supplement has been proven to help RA. But treatments such as acupuncture and massage may help relieve pain.  · Talk to your healthcare provider or occupational therapist about easier ways to perform daily tasks. This may include the use of assistive devices. These are special tools that can help with things like dressing, bathing, cooking, driving, and moving or getting around.  Follow-up care  Follow up with your healthcare provider, or as advised.   When to seek medical advice  Call your healthcare provider right away if any of these occur:  · Increasing weakness, pale color of the skin, fainting  · Chest pain or shortness of breath  · Blood in vomit or stool (black or red color)  · Changes in vision  · Skin ulcers  · Fever of 100.4ºF (38ºC) or higher, or as directed by your healthcare provider  · New joint pain  · New rash  Resources  To learn more about RA, contact:  · Arthritis Foundation, 160.184.2064, www.arthritis.org  · National [employer] of Arthritis and Musculoskeletal and Skin Diseases (NIAMS), 388.225.1720, www.niams.nih.gov     Date Last Reviewed: 3/1/2017  © 2501-7365 [employer]. [address] [place] [address], [place] [address]. All rights reserved. This information is not intended as a substitute for professional medical care. Always follow your healthcare professional's instructions.        Keys to Managing Stress  There are several keys to managing stress. First, learn to recognize when youre under stress and what triggers it. Next, find positive ways of responding to your triggers. Be sure to take good care of your health and make time to relax. Read on to learn more about the keys to managing stress.    Recognizing stress  Learn to recognize your stress and find out what triggers it. To do this, try to be aware of how you feel each day. If you notice your heart  racing or your muscles tightening, your body may be responding to stress. Ask yourself why. Then write down your answer. To keep the process going, make a list of all the things that trigger stressful feelings.  Living a healthy life  Keeping yourself healthy helps you deal better with stress. This means getting enough sleep, eating right, and exercising. It also means knowing what you value most in life, and making time for yourself. Keep a daily health journal to see if youre doing these things. Then, read your journal each week. If you dont take good care of yourself, you may feel more stressed.  Responding better to stress  Life is full of stressors that you cant control. But you can learn more positive ways of responding to them. This will help you feel more in control. To begin, try this tip: Think about how much effort you want to put into dealing with a certain stressor. Do you really need to handle that stressor? If so, decide on the best way to do this. Change what you can. But if the stressor isnt important, or if its out of your control, then why worry about it?  Relaxing to slow down  Relaxing can help you prevent or relieve stressful feelings. This tip may also help: When youre facing a stressor, pause for a moment. Then take a deep breath and slowly breathe out as you count to 10. This will help clear your mind so you can respond to stress better.  Date Last Reviewed: 1/1/2017  © 1473-4343 [employer]. [address] [place] [address]. All rights reserved. This information is not intended as a substitute for professional medical care. Always follow your healthcare professional's instructions.        Osteoarthritis  Osteoarthritis (also called degenerative joint disease) happens when the cartilage in a joint becomes damaged and worn. This may be due to age, wear and tear, overuse of the joint, or other problems. Osteoarthritis can affect any joint. But it is most common  in hands, knees, spine, hips, and feet. Symptoms include joint stiffness, pain, and swelling.  Home care  · When a joint is more sore than usual, rest it for a day or two.  · Heat can help relieve stiffness. Take a hot bath or apply a heating pad for up to 30 minutes at a time. If symptoms are worse in the morning, using heat just after awakening can help relax the muscle and soothe the joints.   · Ice helps relieve pain and swelling. It is often used after activity. Use a cold pack wrapped in a thin cloth on the joint for 10 to 15 minutes at a time.   · Alternating hot and cold can also help relieve pain. Try this for 20 minutes at a time, several times per day.  · Exercise helps prevent the muscles and ligaments around the joint from becoming weak. It also helps maintain function in the joint.  Be as active as you can. Talk to your healthcare provider about what activity program is best for you.  · Excess weight puts a lot of extra strain on weight-bearing joints of the lower back, hips, knees, feet and ankles. If you are overweight, talk to your healthcare provider about a safe and effective weight loss program.  · Use anti-inflammatory medicines as prescribed for pain. This includes acetaminophen or NSAIDs such as ibuprofen or naproxen. If needed, topical or injected medicines may be recommended. Talk to your healthcare provider if these options are not enough to manage your pain.  · Talk with your healthcare provider about devices that might help improve your function and reduce pain.  Follow-up care  Follow up with your healthcare provider as advised by our staff.  When to seek medical advice  Call your healthcare provider right away if any of these occur:  · Redness or swelling of a painful joint  · Discharge or pus from a painful joint  · Fever of 100.4ºF (38ºC) or higher, or as directed by your healthcare provider  · Worsening joint pain  · Decreased ability to move the joint or bear weight on the  joint  Date Last Reviewed: 3/1/2017  © 1008-8942 [employer]. [address] [place] [address], [place] [address]. All rights reserved. This information is not intended as a substitute for professional medical care. Always follow your healthcare professional's instructions.        Understanding Fibromyalgia     People with fibromyalgia tend to have at least 11 of the 18 tender points shown above.     Fibromyalgia is a condition that causes pain at specific points on the body, stiffness, and fatigue.  What are the symptoms of fibromyalgia?  In most cases, you will have tender points on your body. These points are very sore, especially when touched. Finding several of these points helps your healthcare provider diagnose fibromyalgia.  Along with the tender points, you may have some or all of the following symptoms:  · Constant tiredness (fatigue), even after a full nights sleep (non-restorative sleep)  · A burning or throbbing pain in many parts of the body (this pain may vary during the day)  · Stiffness or aching all over your body  · Numbness or tingling in your arms and legs  · Trouble sleeping  · Bowel problems (bloating, diarrhea, constipation)  · Headaches  · Depression  How is fibromyalgia diagnosed?  There is no lab test to diagnose fibromyalgia. Instead, your healthcare provider will take your health history and examine your joints and muscles. Certain criteria are used when diagnosing fibromyalgia. Symptoms need to be present for at least 3 months. Tests may be done to rule out other conditions with similar symptoms. Then, together you can design a plan to help you manage your symptoms.   How is fibromyalgia treated?  Several medications are approved to treat fibromyalgia. Two were originally made to treat depression. They are duloxetine, and milnacipran. A third, called pregaballin, was developed to treat nerve pain. Certain medicines used to treat depression have been helpful with fibromyalgia. Other  medications include pain relievers such as acetaminophen or stronger narcotics. These may be prescribed short term.  NSAIDs (nonsteroidal anti-inflammatory drugs) including aspirin, ibuprofen, and naproxen are used to relieve pain.  Getting enough sleep, regular exercise, and eating a healthy diet can help manage symptoms. Cognitive behavioral therapy (a form of psychotherapy) can be helpful for fibromyalgia. Some people find alternative treatments such as massage, chiropractic treatments, biofeedback, or acupuncture also help with symptoms.  Date Last Reviewed: 2/14/2016 © 2000-2017 [employer]. [address] [place] [address]. All rights reserved. This information is not intended as a substitute for professional medical care. Always follow your healthcare professional's instructions.         Patient is a 72y old  Male who presents with a chief complaint of right toe OM (18 Oct 2023 12:01)      SUBJECTIVE / OVERNIGHT EVENTS: Patient seen and examined at bedside. States that he feels well denies any complaints, No acute events overnight     ROS:  All other review of systems negative    Allergies    No Known Allergies    Intolerances        MEDICATIONS  (STANDING):  amLODIPine   Tablet 10 milliGRAM(s) Oral daily  aspirin enteric coated 81 milliGRAM(s) Oral daily  atorvastatin 20 milliGRAM(s) Oral at bedtime  carvedilol 25 milliGRAM(s) Oral every 12 hours  cloNIDine 0.1 milliGRAM(s) Oral every 12 hours  clopidogrel Tablet 75 milliGRAM(s) Oral daily  dextrose 5%. 1000 milliLiter(s) (50 mL/Hr) IV Continuous <Continuous>  dextrose 5%. 1000 milliLiter(s) (100 mL/Hr) IV Continuous <Continuous>  dextrose 50% Injectable 12.5 Gram(s) IV Push once  dextrose 50% Injectable 25 Gram(s) IV Push once  dextrose 50% Injectable 25 Gram(s) IV Push once  gabapentin 100 milliGRAM(s) Oral every 12 hours  glucagon  Injectable 1 milliGRAM(s) IntraMuscular once  hydrochlorothiazide 25 milliGRAM(s) Oral daily  influenza  Vaccine (HIGH DOSE) 0.7 milliLiter(s) IntraMuscular once  insulin lispro (ADMELOG) corrective regimen sliding scale   SubCutaneous three times a day before meals  isosorbide   mononitrate ER Tablet (IMDUR) 60 milliGRAM(s) Oral daily  lactated ringers. 1000 milliLiter(s) (100 mL/Hr) IV Continuous <Continuous>  piperacillin/tazobactam IVPB.. 3.375 Gram(s) IV Intermittent every 8 hours  vancomycin  IVPB 2000 milliGRAM(s) IV Intermittent every 12 hours    MEDICATIONS  (PRN):  dextrose Oral Gel 15 Gram(s) Oral once PRN Blood Glucose LESS THAN 70 milliGRAM(s)/deciliter      Vital Signs Last 24 Hrs  T(C): 36.7 (18 Oct 2023 11:45), Max: 37.2 (18 Oct 2023 04:54)  T(F): 98.1 (18 Oct 2023 11:45), Max: 98.9 (18 Oct 2023 04:54)  HR: 70 (18 Oct 2023 11:45) (65 - 77)  BP: 137/76 (18 Oct 2023 11:45) (136/71 - 201/96)  BP(mean): 123 (17 Oct 2023 16:42) (123 - 123)  RR: 18 (18 Oct 2023 11:45) (18 - 18)  SpO2: 96% (18 Oct 2023 11:45) (96% - 100%)    Parameters below as of 18 Oct 2023 11:45  Patient On (Oxygen Delivery Method): room air      CAPILLARY BLOOD GLUCOSE      POCT Blood Glucose.: 166 mg/dL (18 Oct 2023 12:09)  POCT Blood Glucose.: 209 mg/dL (18 Oct 2023 08:38)  POCT Blood Glucose.: 197 mg/dL (17 Oct 2023 21:05)  POCT Blood Glucose.: 129 mg/dL (17 Oct 2023 17:32)    I&O's Summary    17 Oct 2023 07:01  -  18 Oct 2023 07:00  --------------------------------------------------------  IN: 250 mL / OUT: 300 mL / NET: -50 mL        PHYSICAL EXAM:  GENERAL: NAD, well-developed  HEAD:  Atraumatic, Normocephalic  EYES: EOMI, PERRLA, conjunctiva and sclera clear  NECK: Supple, No JVD  CHEST/LUNG: Clear to auscultation bilaterally; No wheeze  HEART: Regular rate and rhythm; No murmurs, rubs, or gallops  ABDOMEN: Soft, Nontender, Nondistended; Bowel sounds present  NEUROLOGY: AAOx3, non-focal      LABS:                        11.9   5.63  )-----------( 164      ( 18 Oct 2023 07:09 )             34.0     10-18    141  |  103  |  22  ----------------------------<  145<H>  3.7   |  22  |  1.24    Ca    9.3      18 Oct 2023 07:05    TPro  6.6  /  Alb  4.2  /  TBili  0.6  /  DBili  x   /  AST  9<L>  /  ALT  6<L>  /  AlkPhos  60  10-18    PT/INR - ( 16 Oct 2023 19:55 )   PT: 11.1 sec;   INR: 1.06 ratio         PTT - ( 16 Oct 2023 19:55 )  PTT:31.3 sec      Urinalysis Basic - ( 18 Oct 2023 07:05 )    Color: x / Appearance: x / SG: x / pH: x  Gluc: 145 mg/dL / Ketone: x  / Bili: x / Urobili: x   Blood: x / Protein: x / Nitrite: x   Leuk Esterase: x / RBC: x / WBC x   Sq Epi: x / Non Sq Epi: x / Bacteria: x        RADIOLOGY & ADDITIONAL TESTS:    Imaging Personally Reviewed:  MRI: + OM    Consultant(s) Notes Reviewed:  Cardiology & podiatry rec noted     Care Discussed with Consultants/Other Providers: Medicine ACP

## 2023-10-18 NOTE — PRE PROCEDURE NOTE - ATTENDING COMMENTS
PAD with nonhealing wound  Plan for right leg angiogram with possibel intervention.   Risks, benefits, and alternatives of the procedure were discussed with the patient. All questions were answered. He agrees to proceed with the procedure.

## 2023-10-18 NOTE — PROGRESS NOTE ADULT - ASSESSMENT
72M presents with right foot third digit distal tuft wound to bone.  - Pt seen and evaluated.  - Afebrile, WBC 5.53, ESR 17, CRP <0.3.  - Right foot 3rd distal tuft wound to bone, fibrotic wound bed, no purulence, erythema to the dorsal 3rd MPJ improving, scant serous drainage. Right foot distal medial 4th digit wound to dermis.   - Right Foot X-Rays: no gas, 3rd distal phalanx OM.  - Right Foot Wound Culture: Staph aureus (prelim).  - Recommend continuing IV vancomycin and zosyn.  - Vasc planning RLE angio tomorrow 10/19, appreciated.  - Right Foot MRI: 3rd distal phalanx OM.  - Pod Plan: Booked for right foot partial third ray resection on Friday 10/20 at 7:30am with Dr. Sandhu.  - Documented medical and cardiac clearance for podiatric surgery under anesthesia, appreciated.  - Seen with attending.

## 2023-10-18 NOTE — CONSULT NOTE ADULT - ASSESSMENT
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     He has no evidence of volume overload, active ischemia or uncontrolled arrhythmia. EKG without evidence of ischemia.   Most recent TTE done last month shows preserved LV and RV function without evidence of pHTN.     #CAD s/p stenting, PAD s/p bypass. Currently asymptomatic from a cardiac standpoint.   - Please resume ASA and Plavix   - Continue home BB  - Continue home anti-hypertensives  - Continue Lipitor, most recent LDL-c has been at goal   - He is optimized from a cardiac standpoint for planned procedure.     Will continue to follow closely.

## 2023-10-18 NOTE — PRE PROCEDURE NOTE - PRE PROCEDURE EVALUATION
Preop Diagnosis: Peripheral Arterial Disease  Planned Procedure: Right Lower Extremity Angiogram, possible revascularization   Surgeon: Dr. Shanks    Vital Signs Last 24 Hrs  T(C): 36.7 (18 Oct 2023 11:45), Max: 37.2 (18 Oct 2023 04:54)  T(F): 98.1 (18 Oct 2023 11:45), Max: 98.9 (18 Oct 2023 04:54)  HR: 70 (18 Oct 2023 11:45) (65 - 77)  BP: 137/76 (18 Oct 2023 11:45) (136/71 - 201/96)  BP(mean): 123 (17 Oct 2023 16:42) (123 - 123)  RR: 18 (18 Oct 2023 11:45) (18 - 18)  SpO2: 96% (18 Oct 2023 11:45) (96% - 100%)    Parameters below as of 18 Oct 2023 11:45  Patient On (Oxygen Delivery Method): room air    Daily     Daily Weight in k.8 (18 Oct 2023 12:37)    Pertinent Labs:                        11.9   5.63  )-----------( 164      ( 18 Oct 2023 07:09 )             34.0     10    141  |  103  |  22  ----------------------------<  145<H>  3.7   |  22  |  1.24    Ca    9.3      18 Oct 2023 07:05    TPro  6.6  /  Alb  4.2  /  TBili  0.6  /  DBili  x   /  AST  9<L>  /  ALT  6<L>  /  AlkPhos  60  1018    PT/INR - ( 16 Oct 2023 19:55 )   PT: 11.1 sec;   INR: 1.06 ratio    PTT - ( 16 Oct 2023 19:55 )  PTT:31.3 sec  ------------------------------------------------------------------------------------------------------------------  Type and Screen: Type + Screen (10.16.23 @ 20:02)    ABO Interpretation: B   Rh Interpretation: Positive   Antibody Screen: Negative  ------------------------------------------------------------------------------------------------------------------  U/A: Urinalysis Basic - ( 18 Oct 2023 07:05 )  Color: x / Appearance: x / SG: x / pH: x  Gluc: 145 mg/dL / Ketone: x  / Bili: x / Urobili: x   Blood: x / Protein: x / Nitrite: x   Leuk Esterase: x / RBC: x / WBC x   Sq Epi: x / Non Sq Epi: x / Bacteria: x  ------------------------------------------------------------------------------------------------------------------  CXR: < from: Xray Chest 1 View AP/PA (10.16.23 @ 21:15) >  FINDINGS:  Heart/Vascular: Heart size is poorly assessed on this projection  Pulmonary: The lungs are clear. No pleural effusion. No pneumothorax.  Bones: No acute osseous abnormalities    IMPRESSION:  Clear lungs.    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     Plan:  - OR 10/19/23 for Right Lower Extremity Angiogram, possible revascularization with Dr. Shanks  - NPO after midnight, except medications   - IVF while NPO  - Consent signed in chart  - Pre-op Labs at 4:00 am on 10/19: CBC, BMP, Mag, Phos, PT/PTT/INR, T&S   - Cardiac clearance documented on 10/19  - Medicine clearance documented on 10/19  - Discussed with primary team      Vascular Surgery  [id number]

## 2023-10-18 NOTE — CONSULT NOTE ADULT - SUBJECTIVE AND OBJECTIVE BOX
Patient is a 72y old  Male who presents with a chief complaint of right foot wound.    HPI: 72M presents complaining of right foot wound. Pt saw podiatrist, Dr. Jacobo Sandhu, earlier today and was sent in for an osteomyelitis workup. Pt was supposed to have a RLE angio with Dr. Shanks in 1 week. Pt denies N/V/F/C/SOB.      PAST MEDICAL & SURGICAL HISTORY:  Venous ulcer of left leg      Type 2 diabetes mellitus with other circulatory complication, without long-term current use of insulin      Peripheral artery disease      Coronary artery disease involving native coronary artery of native heart without angina pectoris      Essential hypertension      PAD (peripheral artery disease)      S/P debridement          MEDICATIONS  (STANDING):    MEDICATIONS  (PRN):      Allergies    No Known Allergies    Intolerances        VITALS:    Vital Signs Last 24 Hrs  T(C): 36.8 (16 Oct 2023 15:39), Max: 36.8 (16 Oct 2023 15:39)  T(F): 98.2 (16 Oct 2023 15:39), Max: 98.2 (16 Oct 2023 15:39)  HR: 72 (16 Oct 2023 15:39) (72 - 72)  BP: 165/75 (16 Oct 2023 15:39) (165/75 - 165/75)  BP(mean): --  RR: 20 (16 Oct 2023 15:39) (20 - 20)  SpO2: 98% (16 Oct 2023 15:39) (98% - 98%)    Parameters below as of 16 Oct 2023 15:39  Patient On (Oxygen Delivery Method): room air        LABS:                CAPILLARY BLOOD GLUCOSE              LOWER EXTREMITY PHYSICAL EXAM:    Vascular: DP/PT 0/4, B/L, CFT <3 seconds B/L, Temperature gradient warm to cool, B/L.   Neuro: Epicritic sensation diminished to the level of digits, B/L.  Musculoskeletal/Ortho: Unremarkable.  Skin: Right foot 3rd distal tuft wound to bone, fibrotic wound bed, no purulence, erythema to the dorsal 3rd MPJ, scant serous drainage. Right foot distal medial 4th digit wound to dermis. Left foot no wounds, no acute signs of infection.      RADIOLOGY & ADDITIONAL STUDIES:    
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 Patient endorses pain with ambulation but is able to walk without any difficulty. He was planned for RLE angiogram as an outpatient with dr Shanks from vascular surgery. Currently, denies fevers, chills, chest pain, dyspnea, palpitations, nausea, vomiting, or dizziness.     MRI positive for osteomyelitis.     This morning, he denies any symptoms. He feels well.       PAST MEDICAL & SURGICAL HISTORY:  Venous ulcer of left leg      Peripheral artery disease      Coronary artery disease involving native coronary artery of native heart without angina pectoris      Essential hypertension      PAD (peripheral artery disease)      S/P debridement          SOCIAL HISTORY:  No tobacco, ethanol, or drug abuse.    FAMILY HISTORY:  Family history of essential hypertension    Family history of diabetes mellitus (Sibling)  brother    FH: diabetes mellitus  mother      No family history of acute MI or sudden cardiac death.    MEDICATIONS  (STANDING):  amLODIPine   Tablet 10 milliGRAM(s) Oral daily  atorvastatin 20 milliGRAM(s) Oral at bedtime  carvedilol 25 milliGRAM(s) Oral every 12 hours  cloNIDine 0.1 milliGRAM(s) Oral every 12 hours  clopidogrel Tablet 75 milliGRAM(s) Oral daily  dextrose 5%. 1000 milliLiter(s) (50 mL/Hr) IV Continuous <Continuous>  dextrose 5%. 1000 milliLiter(s) (100 mL/Hr) IV Continuous <Continuous>  dextrose 50% Injectable 25 Gram(s) IV Push once  dextrose 50% Injectable 25 Gram(s) IV Push once  dextrose 50% Injectable 12.5 Gram(s) IV Push once  gabapentin 100 milliGRAM(s) Oral every 12 hours  glucagon  Injectable 1 milliGRAM(s) IntraMuscular once  hydrochlorothiazide 25 milliGRAM(s) Oral daily  influenza  Vaccine (HIGH DOSE) 0.7 milliLiter(s) IntraMuscular once  insulin lispro (ADMELOG) corrective regimen sliding scale   SubCutaneous three times a day before meals  isosorbide   mononitrate ER Tablet (IMDUR) 60 milliGRAM(s) Oral daily  lactated ringers. 1000 milliLiter(s) (100 mL/Hr) IV Continuous <Continuous>  piperacillin/tazobactam IVPB.. 3.375 Gram(s) IV Intermittent every 8 hours  vancomycin  IVPB 2000 milliGRAM(s) IV Intermittent every 12 hours    MEDICATIONS  (PRN):  dextrose Oral Gel 15 Gram(s) Oral once PRN Blood Glucose LESS THAN 70 milliGRAM(s)/deciliter      Allergies    No Known Allergies    Intolerances        REVIEW OF SYSTEMS:    CONSTITUTIONAL: No weakness, fevers or chills  EYES/ENT: No visual changes;  No vertigo or throat pain   NECK: No pain or stiffness  RESPIRATORY: No cough, wheezing, hemoptysis; No shortness of breath  CARDIOVASCULAR: No chest pain or palpitations  GASTROINTESTINAL: No abdominal pain. No nausea, vomiting, or hematemesis; No diarrhea or constipation. No melena or hematochezia.  GENITOURINARY: No dysuria, frequency or hematuria  NEUROLOGICAL: No numbness or weakness  SKIN: No itching or rash  All other review of systems is negative unless indicated above    VITAL SIGNS:   Vital Signs Last 24 Hrs  T(C): 37.2 (18 Oct 2023 04:54), Max: 37.2 (18 Oct 2023 04:54)  T(F): 98.9 (18 Oct 2023 04:54), Max: 98.9 (18 Oct 2023 04:54)  HR: 65 (18 Oct 2023 04:54) (65 - 77)  BP: 137/65 (18 Oct 2023 04:54) (136/71 - 201/96)  BP(mean): 123 (17 Oct 2023 16:42) (123 - 123)  RR: 18 (18 Oct 2023 04:54) (18 - 18)  SpO2: 99% (18 Oct 2023 04:54) (96% - 100%)    Parameters below as of 18 Oct 2023 04:54  Patient On (Oxygen Delivery Method): room air        I&O's Summary    17 Oct 2023 07:01  -  18 Oct 2023 07:00  --------------------------------------------------------  IN: 250 mL / OUT: 300 mL / NET: -50 mL        On Exam:    Constitutional: NAD, alert and oriented x 3  Lungs:  Non-labored, breath sounds are clear bilaterally, No wheezing, rales or rhonchi  Cardiovascular: RRR.  S1 and S2 positive.  No murmurs, rubs, gallops or clicks  Gastrointestinal: Bowel Sounds present, soft, nontender.   Lymph: No peripheral edema. No cervical lymphadenopathy.  Neurological: Alert, no focal deficits  Skin: No rashes or ulcers   Psych:  Mood & affect appropriate.    LABS: All Labs Reviewed:                        11.9   5.63  )-----------( 164      ( 18 Oct 2023 07:09 )             34.0                         12.6   5.56  )-----------( 182      ( 17 Oct 2023 11:22 )             36.3                         12.2   6.09  )-----------( 173      ( 16 Oct 2023 19:55 )             35.3     18 Oct 2023 07:05    141    |  103    |  22     ----------------------------<  145    3.7     |  22     |  1.24   17 Oct 2023 11:22    140    |  104    |  16     ----------------------------<  234    4.4     |  23     |  0.95   16 Oct 2023 19:55    142    |  106    |  22     ----------------------------<  119    4.7     |  22     |  0.96     Ca    9.3        18 Oct 2023 07:05  Ca    9.8        17 Oct 2023 11:22  Ca    9.9        16 Oct 2023 19:55    TPro  6.6    /  Alb  4.2    /  TBili  0.6    /  DBili  x      /  AST  9      /  ALT  6      /  AlkPhos  60     18 Oct 2023 07:05  TPro  6.8    /  Alb  4.4    /  TBili  0.8    /  DBili  x      /  AST  14     /  ALT  8      /  AlkPhos  67     17 Oct 2023 11:22  TPro  7.3    /  Alb  4.7    /  TBili  0.6    /  DBili  x      /  AST  14     /  ALT  8      /  AlkPhos  70     16 Oct 2023 19:55    PT/INR - ( 16 Oct 2023 19:55 )   PT: 11.1 sec;   INR: 1.06 ratio         PTT - ( 16 Oct 2023 19:55 )  PTT:31.3 sec      Blood Culture: Organism --  Gram Stain Blood -- Gram Stain --  Specimen Source .Blood Blood  Culture-Blood --    Organism --  Gram Stain Blood -- Gram Stain --  Specimen Source .Blood Blood  Culture-Blood --    Organism --  Gram Stain Blood -- Gram Stain   No polymorphonuclear cells seen per low power field  Few Gram positive cocci in pairs seen per oil power field  Specimen Source .Abscess right foot  Culture-Blood --            RADIOLOGY:    EKG: NSR, normal axis, RBBB. unchanged from prior.       TTE: 9/24/23:  CONCLUSIONS:  1. The left ventricular cavity is normal size. Left ventricular systolic function is normal with an ejection fraction of 57 % by Quigley's method of disks.  2. Mild left ventricular hypertrophy.  3. There is moderate (grade 2) left ventricular diastolic dysfunction.  4. Right ventricular cavity is normal in size and normal systolic function.  5. The left atrium is severely dilated in size.  6. The ascending aorta diameter is dilated, measuring 4.10 cm (indexed 1.96 cm/m²).  7. Structurally normal mitral valve with normal leaflet excursion.  8. There is calcification of the mitral valve annulus.  9. Mild mitral regurgitation.  10. Trileaflet aortic valve with normal systolic excursion. focal calcification of the aortic valve leaflets.  11. Compared to the transthoracic echocardiogram performed on 5/23/2022 a small patent foramen ovale is present, RV size appears normal.      
Surgery Consult Note  Attending: Zoltan  Service: Vascular  [id number]      HPI: 72y Male PMH DMT2, HTN, CAD w/ 3 stents (last 2 years ago) Right foot gangrene/OM PSH: right pop to PT bypass (now closed), resection of right 5th digit presents to ED after being sent in by podiatrist for worsening right toe wound. Patient 5 pack year smoker, no claudication symptoms, pain in right foot when walking. Able to ambulate independently at home. Recently saw Dr. Shanks of Vascular surgery in clinic and was planning for RLE angio soon after podiatry evaluation. Denies fevers, chills, chest pain, dyspnea, dysuria changes in vision.     In the ED VSS, labs WNL, Xray of right foot suggestive of OM. Podiatry evaluated and recommended IV abx and MRI of right foot.    PAST MEDICAL HISTORY:  PAST MEDICAL & SURGICAL HISTORY:  Venous ulcer of left leg      Type 2 diabetes mellitus with other circulatory complication, without long-term current use of insulin      Peripheral artery disease      Coronary artery disease involving native coronary artery of native heart without angina pectoris      Essential hypertension      PAD (peripheral artery disease)      S/P debridement          ALLERGIES:  Allergies    No Known Allergies    Intolerances        SOCIAL HISTORY: Negative for tobacco, etoh, or drug use    FAMILY HISTORY:  FAMILY HISTORY:  Family history of essential hypertension    Family history of diabetes mellitus (Sibling)  brother    FH: diabetes mellitus  mother        PHYSICAL EXAM:  General: NAD, resting comfortably  HEENT: NC/AT, EOMI, normal hearing, no oral lesions, no LAD, neck supple  Pulmonary: normal resp effort, CTA-B  Cardiovascular: NSR, no murmurs  Abdominal: soft, ND/NT, no organomegaly, no guarding or rebound tenderness  Extremities: WWP, normal strength, sensation intact, RLE 3rd digit wound to bone, no purulence, erythema to dorsal MPJ, 4th digit wound to dermis, no left foot wounds.  Neuro: A/O x 3, CNs II-XII grossly intact, normal sensation, no focal deficits    Vascular Pulse Exam:  Right Femoral:  Palpable       Left Femoral:  Palpable  Right Popliteal:  biphasic       Left Popliteal:  biphasic   Right DP:  biphasic                Left DP:  biphasic  Right PT:  monophasic           Left PT:  biphasic    VITAL SIGNS:  Vital Signs Last 24 Hrs  T(C): 36.8 (16 Oct 2023 22:30), Max: 36.8 (16 Oct 2023 15:39)  T(F): 98.2 (16 Oct 2023 22:30), Max: 98.2 (16 Oct 2023 15:39)  HR: 67 (16 Oct 2023 22:30) (67 - 72)  BP: 187/81 (16 Oct 2023 22:30) (165/75 - 187/81)  BP(mean): 117 (16 Oct 2023 22:30) (117 - 117)  RR: 20 (16 Oct 2023 22:30) (20 - 20)  SpO2: 98% (16 Oct 2023 22:30) (98% - 98%)    Parameters below as of 16 Oct 2023 22:30  Patient On (Oxygen Delivery Method): room air        I&O's Summary      LABS:                        12.2   6.09  )-----------( 173      ( 16 Oct 2023 19:55 )             35.3     10-16    142  |  106  |  22  ----------------------------<  119<H>  4.7   |  22  |  0.96    Ca    9.9      16 Oct 2023 19:55    TPro  7.3  /  Alb  4.7  /  TBili  0.6  /  DBili  x   /  AST  14  /  ALT  8<L>  /  AlkPhos  70  10-16    PT/INR - ( 16 Oct 2023 19:55 )   PT: 11.1 sec;   INR: 1.06 ratio         PTT - ( 16 Oct 2023 19:55 )  PTT:31.3 sec  Urinalysis Basic - ( 16 Oct 2023 19:55 )    Color: x / Appearance: x / SG: x / pH: x  Gluc: 119 mg/dL / Ketone: x  / Bili: x / Urobili: x   Blood: x / Protein: x / Nitrite: x   Leuk Esterase: x / RBC: x / WBC x   Sq Epi: x / Non Sq Epi: x / Bacteria: x      CAPILLARY BLOOD GLUCOSE        LIVER FUNCTIONS - ( 16 Oct 2023 19:55 )  Alb: 4.7 g/dL / Pro: 7.3 g/dL / ALK PHOS: 70 U/L / ALT: 8 U/L / AST: 14 U/L / GGT: x             CULTURES:      RADIOLOGY & ADDITIONAL STUDIES:

## 2023-10-19 ENCOUNTER — TRANSCRIPTION ENCOUNTER (OUTPATIENT)
Age: 73
End: 2023-10-19

## 2023-10-19 DIAGNOSIS — I73.9 PERIPHERAL VASCULAR DISEASE, UNSPECIFIED: ICD-10-CM

## 2023-10-19 LAB
ANION GAP SERPL CALC-SCNC: 13 MMOL/L — SIGNIFICANT CHANGE UP (ref 5–17)
ANION GAP SERPL CALC-SCNC: 13 MMOL/L — SIGNIFICANT CHANGE UP (ref 5–17)
APTT BLD: 27.3 SEC — SIGNIFICANT CHANGE UP (ref 24.5–35.6)
APTT BLD: 27.3 SEC — SIGNIFICANT CHANGE UP (ref 24.5–35.6)
APTT BLD: 27.8 SEC — SIGNIFICANT CHANGE UP (ref 24.5–35.6)
APTT BLD: 27.8 SEC — SIGNIFICANT CHANGE UP (ref 24.5–35.6)
BLD GP AB SCN SERPL QL: NEGATIVE — SIGNIFICANT CHANGE UP
BLD GP AB SCN SERPL QL: NEGATIVE — SIGNIFICANT CHANGE UP
BUN SERPL-MCNC: 22 MG/DL — SIGNIFICANT CHANGE UP (ref 7–23)
BUN SERPL-MCNC: 22 MG/DL — SIGNIFICANT CHANGE UP (ref 7–23)
CALCIUM SERPL-MCNC: 9.2 MG/DL — SIGNIFICANT CHANGE UP (ref 8.4–10.5)
CALCIUM SERPL-MCNC: 9.2 MG/DL — SIGNIFICANT CHANGE UP (ref 8.4–10.5)
CHLORIDE SERPL-SCNC: 103 MMOL/L — SIGNIFICANT CHANGE UP (ref 96–108)
CHLORIDE SERPL-SCNC: 103 MMOL/L — SIGNIFICANT CHANGE UP (ref 96–108)
CO2 SERPL-SCNC: 23 MMOL/L — SIGNIFICANT CHANGE UP (ref 22–31)
CO2 SERPL-SCNC: 23 MMOL/L — SIGNIFICANT CHANGE UP (ref 22–31)
CREAT SERPL-MCNC: 1.3 MG/DL — SIGNIFICANT CHANGE UP (ref 0.5–1.3)
CREAT SERPL-MCNC: 1.3 MG/DL — SIGNIFICANT CHANGE UP (ref 0.5–1.3)
EGFR: 59 ML/MIN/1.73M2 — LOW
EGFR: 59 ML/MIN/1.73M2 — LOW
GLUCOSE BLDC GLUCOMTR-MCNC: 109 MG/DL — HIGH (ref 70–99)
GLUCOSE BLDC GLUCOMTR-MCNC: 109 MG/DL — HIGH (ref 70–99)
GLUCOSE BLDC GLUCOMTR-MCNC: 114 MG/DL — HIGH (ref 70–99)
GLUCOSE BLDC GLUCOMTR-MCNC: 114 MG/DL — HIGH (ref 70–99)
GLUCOSE BLDC GLUCOMTR-MCNC: 131 MG/DL — HIGH (ref 70–99)
GLUCOSE BLDC GLUCOMTR-MCNC: 131 MG/DL — HIGH (ref 70–99)
GLUCOSE BLDC GLUCOMTR-MCNC: 176 MG/DL — HIGH (ref 70–99)
GLUCOSE BLDC GLUCOMTR-MCNC: 176 MG/DL — HIGH (ref 70–99)
GLUCOSE BLDC GLUCOMTR-MCNC: 190 MG/DL — HIGH (ref 70–99)
GLUCOSE BLDC GLUCOMTR-MCNC: 190 MG/DL — HIGH (ref 70–99)
GLUCOSE SERPL-MCNC: 163 MG/DL — HIGH (ref 70–99)
GLUCOSE SERPL-MCNC: 163 MG/DL — HIGH (ref 70–99)
HCT VFR BLD CALC: 32.1 % — LOW (ref 39–50)
HCT VFR BLD CALC: 32.1 % — LOW (ref 39–50)
HGB BLD-MCNC: 11.1 G/DL — LOW (ref 13–17)
HGB BLD-MCNC: 11.1 G/DL — LOW (ref 13–17)
INR BLD: 1.19 RATIO — HIGH (ref 0.85–1.18)
INR BLD: 1.19 RATIO — HIGH (ref 0.85–1.18)
INR BLD: 7.97 RATIO — CRITICAL HIGH (ref 0.85–1.18)
INR BLD: 7.97 RATIO — CRITICAL HIGH (ref 0.85–1.18)
MAGNESIUM SERPL-MCNC: 1.5 MG/DL — LOW (ref 1.6–2.6)
MAGNESIUM SERPL-MCNC: 1.5 MG/DL — LOW (ref 1.6–2.6)
MCHC RBC-ENTMCNC: 28.4 PG — SIGNIFICANT CHANGE UP (ref 27–34)
MCHC RBC-ENTMCNC: 28.4 PG — SIGNIFICANT CHANGE UP (ref 27–34)
MCHC RBC-ENTMCNC: 34.6 GM/DL — SIGNIFICANT CHANGE UP (ref 32–36)
MCHC RBC-ENTMCNC: 34.6 GM/DL — SIGNIFICANT CHANGE UP (ref 32–36)
MCV RBC AUTO: 82.1 FL — SIGNIFICANT CHANGE UP (ref 80–100)
MCV RBC AUTO: 82.1 FL — SIGNIFICANT CHANGE UP (ref 80–100)
NRBC # BLD: 0 /100 WBCS — SIGNIFICANT CHANGE UP (ref 0–0)
NRBC # BLD: 0 /100 WBCS — SIGNIFICANT CHANGE UP (ref 0–0)
PHOSPHATE SERPL-MCNC: 3.6 MG/DL — SIGNIFICANT CHANGE UP (ref 2.5–4.5)
PHOSPHATE SERPL-MCNC: 3.6 MG/DL — SIGNIFICANT CHANGE UP (ref 2.5–4.5)
PLATELET # BLD AUTO: 149 K/UL — LOW (ref 150–400)
PLATELET # BLD AUTO: 149 K/UL — LOW (ref 150–400)
POTASSIUM SERPL-MCNC: 3.8 MMOL/L — SIGNIFICANT CHANGE UP (ref 3.5–5.3)
POTASSIUM SERPL-MCNC: 3.8 MMOL/L — SIGNIFICANT CHANGE UP (ref 3.5–5.3)
POTASSIUM SERPL-SCNC: 3.8 MMOL/L — SIGNIFICANT CHANGE UP (ref 3.5–5.3)
POTASSIUM SERPL-SCNC: 3.8 MMOL/L — SIGNIFICANT CHANGE UP (ref 3.5–5.3)
PROTHROM AB SERPL-ACNC: 12.4 SEC — SIGNIFICANT CHANGE UP (ref 9.5–13)
PROTHROM AB SERPL-ACNC: 12.4 SEC — SIGNIFICANT CHANGE UP (ref 9.5–13)
PROTHROM AB SERPL-ACNC: 78.8 SEC — HIGH (ref 9.5–13)
PROTHROM AB SERPL-ACNC: 78.8 SEC — HIGH (ref 9.5–13)
RBC # BLD: 3.91 M/UL — LOW (ref 4.2–5.8)
RBC # BLD: 3.91 M/UL — LOW (ref 4.2–5.8)
RBC # FLD: 13.1 % — SIGNIFICANT CHANGE UP (ref 10.3–14.5)
RBC # FLD: 13.1 % — SIGNIFICANT CHANGE UP (ref 10.3–14.5)
RH IG SCN BLD-IMP: POSITIVE — SIGNIFICANT CHANGE UP
RH IG SCN BLD-IMP: POSITIVE — SIGNIFICANT CHANGE UP
SODIUM SERPL-SCNC: 139 MMOL/L — SIGNIFICANT CHANGE UP (ref 135–145)
SODIUM SERPL-SCNC: 139 MMOL/L — SIGNIFICANT CHANGE UP (ref 135–145)
VANCOMYCIN TROUGH SERPL-MCNC: 21.7 UG/ML — HIGH (ref 10–20)
VANCOMYCIN TROUGH SERPL-MCNC: 21.7 UG/ML — HIGH (ref 10–20)
WBC # BLD: 5.46 K/UL — SIGNIFICANT CHANGE UP (ref 3.8–10.5)
WBC # BLD: 5.46 K/UL — SIGNIFICANT CHANGE UP (ref 3.8–10.5)
WBC # FLD AUTO: 5.46 K/UL — SIGNIFICANT CHANGE UP (ref 3.8–10.5)
WBC # FLD AUTO: 5.46 K/UL — SIGNIFICANT CHANGE UP (ref 3.8–10.5)

## 2023-10-19 PROCEDURE — 76937 US GUIDE VASCULAR ACCESS: CPT | Mod: 26

## 2023-10-19 PROCEDURE — 99232 SBSQ HOSP IP/OBS MODERATE 35: CPT

## 2023-10-19 PROCEDURE — 37228: CPT | Mod: RT

## 2023-10-19 PROCEDURE — 75630 X-RAY AORTA LEG ARTERIES: CPT | Mod: 26,59

## 2023-10-19 PROCEDURE — 99233 SBSQ HOSP IP/OBS HIGH 50: CPT

## 2023-10-19 PROCEDURE — 36247 INS CATH ABD/L-EXT ART 3RD: CPT | Mod: RT,59

## 2023-10-19 DEVICE — CATH QUICK CROSS .035X135CM: Type: IMPLANTABLE DEVICE | Site: RIGHT | Status: FUNCTIONAL

## 2023-10-19 DEVICE — INTRO SHEATH FLEXOR ANSEL 5F: Type: IMPLANTABLE DEVICE | Site: RIGHT | Status: FUNCTIONAL

## 2023-10-19 DEVICE — CATH SUPPORT 2 CXI 2.3X150CM ANG TIP: Type: IMPLANTABLE DEVICE | Site: RIGHT | Status: FUNCTIONAL

## 2023-10-19 DEVICE — WIRE GUIDE COMMAND ES 300CM: Type: IMPLANTABLE DEVICE | Site: RIGHT | Status: FUNCTIONAL

## 2023-10-19 DEVICE — DEVICE CLOSURE 5F MYNX GRIP MUST ORDER MIN OF 10 EA: Type: IMPLANTABLE DEVICE | Site: RIGHT | Status: FUNCTIONAL

## 2023-10-19 DEVICE — GUIDEWIRE RADIFOCUS GLIDEWIRE ANGLED 0.035" X 260CM STIFF: Type: IMPLANTABLE DEVICE | Site: RIGHT | Status: FUNCTIONAL

## 2023-10-19 DEVICE — GWIRE WHISPER 300CM EXTRA SUPPORT: Type: IMPLANTABLE DEVICE | Site: RIGHT | Status: FUNCTIONAL

## 2023-10-19 DEVICE — GUIDEWIRE RADIFOCUS GLIDEWIRE STANDARD ANGLED TIP 0.035" X 260CM: Type: IMPLANTABLE DEVICE | Site: RIGHT | Status: FUNCTIONAL

## 2023-10-19 DEVICE — SHEATH INTRODUCER TERUMO PINNACLE PERIPHERAL 5FR X 10CM: Type: IMPLANTABLE DEVICE | Site: RIGHT | Status: FUNCTIONAL

## 2023-10-19 DEVICE — BLLN COYOTE OTW 2X60MMX150CM: Type: IMPLANTABLE DEVICE | Site: RIGHT | Status: FUNCTIONAL

## 2023-10-19 DEVICE — CATH QUICK CROSS .014X135CM: Type: IMPLANTABLE DEVICE | Site: RIGHT | Status: FUNCTIONAL

## 2023-10-19 DEVICE — CATH OMNI FLSH 0.035IN 5FRX65: Type: IMPLANTABLE DEVICE | Site: RIGHT | Status: FUNCTIONAL

## 2023-10-19 DEVICE — IMPLANTABLE DEVICE: Type: IMPLANTABLE DEVICE | Site: RIGHT | Status: FUNCTIONAL

## 2023-10-19 DEVICE — GUIDEWIRE ADVANTAGE .014INX300CM: Type: IMPLANTABLE DEVICE | Site: RIGHT | Status: FUNCTIONAL

## 2023-10-19 DEVICE — GUIDEWIRE BENTSON 0.035" X 145CM: Type: IMPLANTABLE DEVICE | Site: RIGHT | Status: FUNCTIONAL

## 2023-10-19 DEVICE — INTRODUCER SET MICROPUNCTURE ACCESS 21G X 7CM: Type: IMPLANTABLE DEVICE | Site: RIGHT | Status: FUNCTIONAL

## 2023-10-19 RX ORDER — DEXTROSE 50 % IN WATER 50 %
12.5 SYRINGE (ML) INTRAVENOUS ONCE
Refills: 0 | Status: DISCONTINUED | OUTPATIENT
Start: 2023-10-19 | End: 2023-10-20

## 2023-10-19 RX ORDER — PIPERACILLIN AND TAZOBACTAM 4; .5 G/20ML; G/20ML
3.38 INJECTION, POWDER, LYOPHILIZED, FOR SOLUTION INTRAVENOUS EVERY 8 HOURS
Refills: 0 | Status: DISCONTINUED | OUTPATIENT
Start: 2023-10-19 | End: 2023-10-20

## 2023-10-19 RX ORDER — GABAPENTIN 400 MG/1
100 CAPSULE ORAL EVERY 12 HOURS
Refills: 0 | Status: DISCONTINUED | OUTPATIENT
Start: 2023-10-19 | End: 2023-10-20

## 2023-10-19 RX ORDER — FENTANYL CITRATE 50 UG/ML
25 INJECTION INTRAVENOUS
Refills: 0 | Status: DISCONTINUED | OUTPATIENT
Start: 2023-10-19 | End: 2023-10-19

## 2023-10-19 RX ORDER — DEXTROSE 50 % IN WATER 50 %
25 SYRINGE (ML) INTRAVENOUS ONCE
Refills: 0 | Status: DISCONTINUED | OUTPATIENT
Start: 2023-10-19 | End: 2023-10-20

## 2023-10-19 RX ORDER — ASPIRIN/CALCIUM CARB/MAGNESIUM 324 MG
81 TABLET ORAL DAILY
Refills: 0 | Status: DISCONTINUED | OUTPATIENT
Start: 2023-10-19 | End: 2023-10-20

## 2023-10-19 RX ORDER — MAGNESIUM SULFATE 500 MG/ML
1 VIAL (ML) INJECTION ONCE
Refills: 0 | Status: COMPLETED | OUTPATIENT
Start: 2023-10-19 | End: 2023-10-19

## 2023-10-19 RX ORDER — INSULIN LISPRO 100/ML
VIAL (ML) SUBCUTANEOUS
Refills: 0 | Status: DISCONTINUED | OUTPATIENT
Start: 2023-10-19 | End: 2023-10-20

## 2023-10-19 RX ORDER — SODIUM CHLORIDE 9 MG/ML
1000 INJECTION, SOLUTION INTRAVENOUS
Refills: 0 | Status: DISCONTINUED | OUTPATIENT
Start: 2023-10-19 | End: 2023-10-20

## 2023-10-19 RX ORDER — VANCOMYCIN HCL 1 G
2000 VIAL (EA) INTRAVENOUS EVERY 12 HOURS
Refills: 0 | Status: DISCONTINUED | OUTPATIENT
Start: 2023-10-19 | End: 2023-10-19

## 2023-10-19 RX ORDER — ISOSORBIDE MONONITRATE 60 MG/1
60 TABLET, EXTENDED RELEASE ORAL DAILY
Refills: 0 | Status: DISCONTINUED | OUTPATIENT
Start: 2023-10-19 | End: 2023-10-20

## 2023-10-19 RX ORDER — GLUCAGON INJECTION, SOLUTION 0.5 MG/.1ML
1 INJECTION, SOLUTION SUBCUTANEOUS ONCE
Refills: 0 | Status: DISCONTINUED | OUTPATIENT
Start: 2023-10-19 | End: 2023-10-20

## 2023-10-19 RX ORDER — DEXTROSE 50 % IN WATER 50 %
15 SYRINGE (ML) INTRAVENOUS ONCE
Refills: 0 | Status: DISCONTINUED | OUTPATIENT
Start: 2023-10-19 | End: 2023-10-20

## 2023-10-19 RX ORDER — CLOPIDOGREL BISULFATE 75 MG/1
75 TABLET, FILM COATED ORAL DAILY
Refills: 0 | Status: DISCONTINUED | OUTPATIENT
Start: 2023-10-19 | End: 2023-10-20

## 2023-10-19 RX ORDER — VANCOMYCIN HCL 1 G
1500 VIAL (EA) INTRAVENOUS EVERY 12 HOURS
Refills: 0 | Status: DISCONTINUED | OUTPATIENT
Start: 2023-10-19 | End: 2023-10-20

## 2023-10-19 RX ORDER — AMLODIPINE BESYLATE 2.5 MG/1
10 TABLET ORAL DAILY
Refills: 0 | Status: DISCONTINUED | OUTPATIENT
Start: 2023-10-19 | End: 2023-10-20

## 2023-10-19 RX ORDER — SODIUM CHLORIDE 9 MG/ML
1000 INJECTION, SOLUTION INTRAVENOUS
Refills: 0 | Status: DISCONTINUED | OUTPATIENT
Start: 2023-10-19 | End: 2023-10-19

## 2023-10-19 RX ORDER — CARVEDILOL PHOSPHATE 80 MG/1
25 CAPSULE, EXTENDED RELEASE ORAL EVERY 12 HOURS
Refills: 0 | Status: DISCONTINUED | OUTPATIENT
Start: 2023-10-19 | End: 2023-10-20

## 2023-10-19 RX ORDER — ATORVASTATIN CALCIUM 80 MG/1
20 TABLET, FILM COATED ORAL AT BEDTIME
Refills: 0 | Status: DISCONTINUED | OUTPATIENT
Start: 2023-10-19 | End: 2023-10-20

## 2023-10-19 RX ADMIN — Medication 100 GRAM(S): at 06:37

## 2023-10-19 RX ADMIN — Medication 300 MILLIGRAM(S): at 21:53

## 2023-10-19 RX ADMIN — AMLODIPINE BESYLATE 10 MILLIGRAM(S): 2.5 TABLET ORAL at 20:40

## 2023-10-19 RX ADMIN — ISOSORBIDE MONONITRATE 60 MILLIGRAM(S): 60 TABLET, EXTENDED RELEASE ORAL at 20:40

## 2023-10-19 RX ADMIN — Medication 250 MILLIGRAM(S): at 05:07

## 2023-10-19 RX ADMIN — CARVEDILOL PHOSPHATE 25 MILLIGRAM(S): 80 CAPSULE, EXTENDED RELEASE ORAL at 18:15

## 2023-10-19 RX ADMIN — Medication 0.1 MILLIGRAM(S): at 18:15

## 2023-10-19 RX ADMIN — SODIUM CHLORIDE 75 MILLILITER(S): 9 INJECTION, SOLUTION INTRAVENOUS at 16:37

## 2023-10-19 RX ADMIN — ATORVASTATIN CALCIUM 20 MILLIGRAM(S): 80 TABLET, FILM COATED ORAL at 21:58

## 2023-10-19 RX ADMIN — Medication 81 MILLIGRAM(S): at 18:14

## 2023-10-19 RX ADMIN — AMLODIPINE BESYLATE 10 MILLIGRAM(S): 2.5 TABLET ORAL at 05:06

## 2023-10-19 RX ADMIN — CARVEDILOL PHOSPHATE 25 MILLIGRAM(S): 80 CAPSULE, EXTENDED RELEASE ORAL at 05:06

## 2023-10-19 RX ADMIN — Medication 1: at 08:56

## 2023-10-19 RX ADMIN — GABAPENTIN 100 MILLIGRAM(S): 400 CAPSULE ORAL at 05:06

## 2023-10-19 RX ADMIN — PIPERACILLIN AND TAZOBACTAM 25 GRAM(S): 4; .5 INJECTION, POWDER, LYOPHILIZED, FOR SOLUTION INTRAVENOUS at 05:07

## 2023-10-19 RX ADMIN — CLOPIDOGREL BISULFATE 75 MILLIGRAM(S): 75 TABLET, FILM COATED ORAL at 18:14

## 2023-10-19 RX ADMIN — Medication 0.1 MILLIGRAM(S): at 05:06

## 2023-10-19 RX ADMIN — GABAPENTIN 100 MILLIGRAM(S): 400 CAPSULE ORAL at 18:15

## 2023-10-19 NOTE — PROGRESS NOTE ADULT - SUBJECTIVE AND OBJECTIVE BOX
VASCULAR SURGERY PROGRESS NOTE    Interval events  - No acute events.  - Vital signs stable, afebrile, on room air.     Vital Signs Last 24 Hrs  T(C): 36.9 (19 Oct 2023 04:47), Max: 37 (18 Oct 2023 19:02)  T(F): 98.4 (19 Oct 2023 04:47), Max: 98.6 (18 Oct 2023 19:02)  HR: 72 (19 Oct 2023 04:47) (70 - 73)  BP: 134/75 (19 Oct 2023 04:47) (132/72 - 137/76)  BP(mean): --  RR: 18 (19 Oct 2023 04:47) (18 - 18)  SpO2: 97% (19 Oct 2023 04:47) (96% - 97%)    Parameters below as of 19 Oct 2023 04:29  Patient On (Oxygen Delivery Method): room air        OBJECTIVE: T(C): 36.9 (10-19-23 @ 04:47), Max: 37 (10-18-23 @ 19:02)  HR: 72 (10-19-23 @ 04:47) (70 - 73)  BP: 134/75 (10-19-23 @ 04:47) (132/72 - 137/76)  RR: 18 (10-19-23 @ 04:47) (18 - 18)  SpO2: 97% (10-19-23 @ 04:47) (96% - 97%)  Wt(kg): --  I&O's Summary    18 Oct 2023 07:01  -  19 Oct 2023 07:00  --------------------------------------------------------  IN: 1930 mL / OUT: 1100 mL / NET: 830 mL      I&O's Detail    18 Oct 2023 07:01  -  19 Oct 2023 07:00  --------------------------------------------------------  IN:    IV PiggyBack: 150 mL    Lactated Ringers: 1000 mL    Oral Fluid: 780 mL  Total IN: 1930 mL    OUT:    Voided (mL): 1100 mL  Total OUT: 1100 mL    Total NET: 830 mL          MEDICATIONS  (STANDING):  amLODIPine   Tablet 10 milliGRAM(s) Oral daily  aspirin enteric coated 81 milliGRAM(s) Oral daily  atorvastatin 20 milliGRAM(s) Oral at bedtime  carvedilol 25 milliGRAM(s) Oral every 12 hours  cloNIDine 0.1 milliGRAM(s) Oral every 12 hours  clopidogrel Tablet 75 milliGRAM(s) Oral daily  dextrose 5%. 1000 milliLiter(s) (50 mL/Hr) IV Continuous <Continuous>  dextrose 5%. 1000 milliLiter(s) (100 mL/Hr) IV Continuous <Continuous>  dextrose 50% Injectable 25 Gram(s) IV Push once  dextrose 50% Injectable 25 Gram(s) IV Push once  dextrose 50% Injectable 12.5 Gram(s) IV Push once  gabapentin 100 milliGRAM(s) Oral every 12 hours  glucagon  Injectable 1 milliGRAM(s) IntraMuscular once  hydrochlorothiazide 25 milliGRAM(s) Oral daily  influenza  Vaccine (HIGH DOSE) 0.7 milliLiter(s) IntraMuscular once  insulin lispro (ADMELOG) corrective regimen sliding scale   SubCutaneous three times a day before meals  isosorbide   mononitrate ER Tablet (IMDUR) 60 milliGRAM(s) Oral daily  lactated ringers. 1000 milliLiter(s) (100 mL/Hr) IV Continuous <Continuous>  piperacillin/tazobactam IVPB.. 3.375 Gram(s) IV Intermittent every 8 hours  vancomycin  IVPB 2000 milliGRAM(s) IV Intermittent every 12 hours    MEDICATIONS  (PRN):  dextrose Oral Gel 15 Gram(s) Oral once PRN Blood Glucose LESS THAN 70 milliGRAM(s)/deciliter      LABS:                        11.1   5.46  )-----------( 149      ( 19 Oct 2023 04:51 )             32.1     10-19    139  |  103  |  22  ----------------------------<  163<H>  3.8   |  23  |  1.30    Ca    9.2      19 Oct 2023 04:51  Phos  3.6     10-19  Mg     1.5     10-19    TPro  6.6  /  Alb  4.2  /  TBili  0.6  /  DBili  x   /  AST  9<L>  /  ALT  6<L>  /  AlkPhos  60  10-18    PT/INR - ( 19 Oct 2023 06:37 )   PT: 12.4 sec;   INR: 1.19 ratio         PTT - ( 19 Oct 2023 06:37 )  PTT:27.3 sec  Urinalysis Basic - ( 19 Oct 2023 04:51 )    Color: x / Appearance: x / SG: x / pH: x  Gluc: 163 mg/dL / Ketone: x  / Bili: x / Urobili: x   Blood: x / Protein: x / Nitrite: x   Leuk Esterase: x / RBC: x / WBC x   Sq Epi: x / Non Sq Epi: x / Bacteria: x        RADIOLOGY & ADDITIONAL STUDIES:  < from: MR Foot w/wo IV Cont, Right (10.17.23 @ 23:18) >    IMPRESSION:  Osteomyelitis of the third distal phalanx.  Mild marrow edema of the third middle phalanx with preservation of the   fat marrow signal.    < end of copied text >      PHYSICAL EXAM  General: No acute distress, resting in bed.   Pulmonary:  no increased work of breathing   Abdomen: soft, nontender, nondistended  Extremities: RLE 3rd, 4th digit wounds with erythema, warm to touch, normal strength, sensory and motor intact. (+) DP/PT signals.       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 Podiatry planning for right foot 3rd ray resection. Vascular consulted for evaluation of RLE perfusion. Pre-op labs reviewed; mag repletion ordered.     - RLE angio with possible revascularization today (10/19)      - NPO  - DVT ppx: ASA & plavix  - Care as per primary team      Vascular Surgery  [id number]

## 2023-10-19 NOTE — PROGRESS NOTE ADULT - SUBJECTIVE AND OBJECTIVE BOX
Patient is a 72y old  Male who presents with a chief complaint of right toe OM (19 Oct 2023 10:29)       INTERVAL HPI/OVERNIGHT EVENTS:  Patient seen and evaluated at bedside.  Pt is resting comfortable in NAD. Denies N/V/F/C.    Allergies    No Known Allergies    Intolerances        Vital Signs Last 24 Hrs  T(C): 36.7 (19 Oct 2023 11:40), Max: 37 (18 Oct 2023 19:02)  T(F): 98 (19 Oct 2023 11:40), Max: 98.6 (18 Oct 2023 19:02)  HR: 70 (19 Oct 2023 11:40) (70 - 73)  BP: 149/78 (19 Oct 2023 11:40) (132/72 - 149/78)  BP(mean): 123 (19 Oct 2023 11:07) (123 - 123)  RR: 18 (19 Oct 2023 11:40) (18 - 18)  SpO2: 96% (19 Oct 2023 11:40) (96% - 97%)    Parameters below as of 19 Oct 2023 11:40  Patient On (Oxygen Delivery Method): room air        LABS:                        11.1   5.46  )-----------( 149      ( 19 Oct 2023 04:51 )             32.1     10-19    139  |  103  |  22  ----------------------------<  163<H>  3.8   |  23  |  1.30    Ca    9.2      19 Oct 2023 04:51  Phos  3.6     10-19  Mg     1.5     10-19    TPro  6.6  /  Alb  4.2  /  TBili  0.6  /  DBili  x   /  AST  9<L>  /  ALT  6<L>  /  AlkPhos  60  10-18    PT/INR - ( 19 Oct 2023 06:37 )   PT: 12.4 sec;   INR: 1.19 ratio         PTT - ( 19 Oct 2023 06:37 )  PTT:27.3 sec  Urinalysis Basic - ( 19 Oct 2023 04:51 )    Color: x / Appearance: x / SG: x / pH: x  Gluc: 163 mg/dL / Ketone: x  / Bili: x / Urobili: x   Blood: x / Protein: x / Nitrite: x   Leuk Esterase: x / RBC: x / WBC x   Sq Epi: x / Non Sq Epi: x / Bacteria: x      CAPILLARY BLOOD GLUCOSE      POCT Blood Glucose.: 131 mg/dL (19 Oct 2023 11:35)  POCT Blood Glucose.: 190 mg/dL (19 Oct 2023 08:53)  POCT Blood Glucose.: 207 mg/dL (18 Oct 2023 20:45)  POCT Blood Glucose.: 148 mg/dL (18 Oct 2023 16:53)  POCT Blood Glucose.: 166 mg/dL (18 Oct 2023 12:09)      Lower Extremity Physical Exam:  Vascular: DP/PT 0/4, B/L, CFT <3 seconds B/L, Temperature gradient warm to cool, B/L.   Neuro: Epicritic sensation diminished to the level of digits, B/L.  Musculoskeletal/Ortho: Unremarkable.  Skin: Right foot 3rd distal tuft wound to bone, fibrotic wound bed, no purulence, erythema to the dorsal 3rd MPJ improving, no drainage. Right foot distal medial 4th digit wound to dermis. Left foot no wounds, no acute signs of infection.    RADIOLOGY & ADDITIONAL TESTS:

## 2023-10-19 NOTE — PROVIDER CONTACT NOTE (CRITICAL VALUE NOTIFICATION) - SITUATION
INR 7.97
Constitutional: (-) fever, (-) chills  Eyes: (-) visual changes  ENT: (-) nasal congestions  Cardiovascular: (-) chest pain, (-) syncope  Respiratory: (-) cough, (-) shortness of breath, (-) dyspnea,   Gastrointestinal: (-) vomiting, (-) diarrhea, (-)nausea,  Musculoskeletal: (-) neck pain, (-) back pain, (-) joint pain, (+) left jaw pain/left eye pain  Integumentary: (-) rash, (-) edema, (-) bruises (+)  Neurological: (-) headache, (-) loc, (-) dizziness, (-) tingling, (-)numbness  Peripheral Vascular: (-) leg swelling  :  (-)dysuria,  (-) hematuria  Allergic/Immunologic: (-) pruritus

## 2023-10-19 NOTE — PROGRESS NOTE ADULT - SUBJECTIVE AND OBJECTIVE BOX
[facility] Division of Hospital Medicine  Law Barcenas MD  Available via MS Teams    SUBJECTIVE / OVERNIGHT EVENTS: Patient seen and examined at bedside. Resting comfortably and in no acute distress. Denies chest pain, palpitations. Denies shortness of breath or cough. Denies diarrhea or constipation. Denies fever or chills. Reports no pain in his foot. ROS otherwise noncontributory.     MEDICATIONS  (STANDING):  amLODIPine   Tablet 10 milliGRAM(s) Oral daily  aspirin enteric coated 81 milliGRAM(s) Oral daily  atorvastatin 20 milliGRAM(s) Oral at bedtime  carvedilol 25 milliGRAM(s) Oral every 12 hours  cloNIDine 0.1 milliGRAM(s) Oral every 12 hours  clopidogrel Tablet 75 milliGRAM(s) Oral daily  dextrose 5%. 1000 milliLiter(s) (100 mL/Hr) IV Continuous <Continuous>  dextrose 5%. 1000 milliLiter(s) (50 mL/Hr) IV Continuous <Continuous>  dextrose 50% Injectable 25 Gram(s) IV Push once  dextrose 50% Injectable 12.5 Gram(s) IV Push once  dextrose 50% Injectable 25 Gram(s) IV Push once  gabapentin 100 milliGRAM(s) Oral every 12 hours  glucagon  Injectable 1 milliGRAM(s) IntraMuscular once  hydrochlorothiazide 25 milliGRAM(s) Oral daily  influenza  Vaccine (HIGH DOSE) 0.7 milliLiter(s) IntraMuscular once  insulin lispro (ADMELOG) corrective regimen sliding scale   SubCutaneous three times a day before meals  isosorbide   mononitrate ER Tablet (IMDUR) 60 milliGRAM(s) Oral daily  lactated ringers. 1000 milliLiter(s) (100 mL/Hr) IV Continuous <Continuous>  piperacillin/tazobactam IVPB.. 3.375 Gram(s) IV Intermittent every 8 hours  vancomycin  IVPB 2000 milliGRAM(s) IV Intermittent every 12 hours    MEDICATIONS  (PRN):  dextrose Oral Gel 15 Gram(s) Oral once PRN Blood Glucose LESS THAN 70 milliGRAM(s)/deciliter      I&O's Summary    18 Oct 2023 07:01  -  19 Oct 2023 07:00  --------------------------------------------------------  IN: 1930 mL / OUT: 1100 mL / NET: 830 mL        PHYSICAL EXAM:  Vital Signs Last 24 Hrs  T(C): 36.9 (19 Oct 2023 04:47), Max: 37 (18 Oct 2023 19:02)  T(F): 98.4 (19 Oct 2023 04:47), Max: 98.6 (18 Oct 2023 19:02)  HR: 72 (19 Oct 2023 04:47) (70 - 73)  BP: 134/75 (19 Oct 2023 04:47) (132/72 - 137/76)  RR: 18 (19 Oct 2023 04:47) (18 - 18)  SpO2: 97% (19 Oct 2023 04:47) (96% - 97%)    Parameters below as of 19 Oct 2023 04:29  Patient On (Oxygen Delivery Method): room air      CONSTITUTIONAL: NAD, well-groomed  EYES: PERRLA; conjunctiva and sclera clear  ENMT: Moist oral mucosa, no pharyngeal injection or exudates; normal dentition  NECK: Supple, no palpable masses; no thyromegaly  RESPIRATORY: Normal respiratory effort; lungs are clear to auscultation bilaterally  CARDIOVASCULAR: 2/6 systolic murmur   ABDOMEN: Nontender to palpation, normoactive bowel sounds, no rebound/guarding; No hepatosplenomegaly  MUSCULOSKELETAL:  no clubbing or cyanosis of digits; no joint swelling or tenderness to palpation  PSYCH: A+O to person, place, and time; affect appropriate  NEUROLOGY: CN 2-12 are intact and symmetric; no gross sensory deficits   SKIN: Skin changes over left lower extremity    LABS:                        11.1   5.46  )-----------( 149      ( 19 Oct 2023 04:51 )             32.1     10-19    139  |  103  |  22  ----------------------------<  163<H>  3.8   |  23  |  1.30    Ca    9.2      19 Oct 2023 04:51  Phos  3.6     10-19  Mg     1.5     10-19    TPro  6.6  /  Alb  4.2  /  TBili  0.6  /  DBili  x   /  AST  9<L>  /  ALT  6<L>  /  AlkPhos  60  10-18    PT/INR - ( 19 Oct 2023 06:37 )   PT: 12.4 sec;   INR: 1.19 ratio         PTT - ( 19 Oct 2023 06:37 )  PTT:27.3 sec      Urinalysis Basic - ( 19 Oct 2023 04:51 )    Color: x / Appearance: x / SG: x / pH: x  Gluc: 163 mg/dL / Ketone: x  / Bili: x / Urobili: x   Blood: x / Protein: x / Nitrite: x   Leuk Esterase: x / RBC: x / WBC x   Sq Epi: x / Non Sq Epi: x / Bacteria: x        Culture - Blood (collected 16 Oct 2023 22:20)  Source: .Blood Blood  Preliminary Report (19 Oct 2023 03:01):    No growth at 48 Hours    Culture - Blood (collected 16 Oct 2023 19:47)  Source: .Blood Blood  Preliminary Report (19 Oct 2023 01:01):    No growth at 48 Hours    Culture - Abscess with Gram Stain (collected 16 Oct 2023 17:54)  Source: .Abscess right foot  Gram Stain (17 Oct 2023 03:33):    No polymorphonuclear cells seen per low power field    Few Gram positive cocci in pairs seen per oil power field  Preliminary Report (17 Oct 2023 21:16):    Few Staphylococcus aureus  Organism: Staphylococcus aureus (18 Oct 2023 21:36)  Organism: Staphylococcus aureus (18 Oct 2023 21:36)

## 2023-10-19 NOTE — PROGRESS NOTE ADULT - ASSESSMENT
72M presents with right foot third digit distal tuft wound to bone.  - Pt seen and evaluated.  - Afebrile, no leukocytosis.  - Right foot 3rd distal tuft wound to bone, fibrotic wound bed, no purulence, erythema to the dorsal 3rd MPJ improving, no drainage. Right foot distal medial 4th digit wound to dermis.   - Right Foot X-Rays: no gas, 3rd distal phalanx OM.  - Right Foot Wound Culture: MSSA.  - Recommend continuing IV vancomycin and zosyn.  - [facility] planning RLE angio today10/19, appreciated.  - Right Foot MRI: 3rd distal phalanx OM.  - Pod Plan: Booked for right foot partial third ray resection on Friday 10/20 at 7:30am with Dr. Sandhu, pending [facility] recs.  - Documented medical and cardiac clearance for podiatric surgery under anesthesia, appreciated.  - NPO tonight at 11:59pm.  - Ordered AM labs including CBC, BMP, coags, and type and screen.  - Discussed with attending.

## 2023-10-19 NOTE — CHART NOTE - NSCHARTNOTEFT_GEN_A_CORE
Pt of Dr Jackman, discussed with him, He will see pt
POST-OP NOTE    JOVANNI MARTINEZ | [id number] | [facility] 373 D1    Procedure: s/p RLE angiogram with balloon angioplasty of proximal right peroneal artery    Subjective: Patient seen and examined 4 hours postoperatively. Reports feeling well after surgery. Tolerating diet.     Vital Signs Last 24 Hrs  T(C): 36.9 (19 Oct 2023 20:35), Max: 36.9 (19 Oct 2023 04:29)  T(F): 98.5 (19 Oct 2023 20:35), Max: 98.5 (19 Oct 2023 20:35)  HR: 70 (19 Oct 2023 20:35) (67 - 76)  BP: 121/70 (19 Oct 2023 20:35) (121/70 - 165/78)  BP(mean): 105 (19 Oct 2023 17:00) (88 - 123)  RR: 16 (19 Oct 2023 20:35) (16 - 20)  SpO2: 96% (19 Oct 2023 20:35) (95% - 98%)    Parameters below as of 19 Oct 2023 20:35  Patient On (Oxygen Delivery Method): room air      I&O's Summary    18 Oct 2023 07:01  -  19 Oct 2023 07:00  --------------------------------------------------------  IN: 1930 mL / OUT: 1100 mL / NET: 830 mL    19 Oct 2023 07:01  -  19 Oct 2023 21:43  --------------------------------------------------------  IN: 150 mL / OUT: 400 mL / NET: -250 mL                            11.1   5.46  )-----------( 149      ( 19 Oct 2023 04:51 )             32.1     10-19    139  |  103  |  22  ----------------------------<  163<H>  3.8   |  23  |  1.30    Ca    9.2      19 Oct 2023 04:51  Phos  3.6     10-19  Mg     1.5     10-19    TPro  6.6  /  Alb  4.2  /  TBili  0.6  /  DBili  x   /  AST  9<L>  /  ALT  6<L>  /  AlkPhos  60  10-18   PT/INR - ( 19 Oct 2023 06:37 )   PT: 12.4 sec;   INR: 1.19 ratio         PTT - ( 19 Oct 2023 06:37 )  PTT:27.3 sec    PHYSICAL EXAM:  Gen: NAD  Resp: breathing easily, no stridor  CV: RRR  Left groin: Dressing c/d/i. Site is soft without evidence of hematoma. Palpable femoral pulse.   RLE: +DP/PT/peroneal signals.   LLE: knee immobilizer removed. +DP/PT signals.     Assessment: 71 yo M s/p RLE angioplasty    Plan: Continue ASA and plavix. Care per medicine.

## 2023-10-19 NOTE — PROGRESS NOTE ADULT - SUBJECTIVE AND OBJECTIVE BOX
[facility] - Rin Navarro, Jeffery Lugo, Gino Jackman  Office Number:  [phone]    Patient resting comfortably in bed in NAD.  Laying flat with no respiratory distress.  No complaints of chest pain, dyspnea, palpitations, PND, or orthopnea.    ROS: negative unless otherwise mentioned.    Telemetry: N/A    MEDICATIONS  (STANDING):  amLODIPine   Tablet 10 milliGRAM(s) Oral daily  aspirin enteric coated 81 milliGRAM(s) Oral daily  atorvastatin 20 milliGRAM(s) Oral at bedtime  carvedilol 25 milliGRAM(s) Oral every 12 hours  cloNIDine 0.1 milliGRAM(s) Oral every 12 hours  clopidogrel Tablet 75 milliGRAM(s) Oral daily  dextrose 5%. 1000 milliLiter(s) (100 mL/Hr) IV Continuous <Continuous>  dextrose 5%. 1000 milliLiter(s) (50 mL/Hr) IV Continuous <Continuous>  dextrose 50% Injectable 25 Gram(s) IV Push once  dextrose 50% Injectable 25 Gram(s) IV Push once  dextrose 50% Injectable 12.5 Gram(s) IV Push once  gabapentin 100 milliGRAM(s) Oral every 12 hours  glucagon  Injectable 1 milliGRAM(s) IntraMuscular once  hydrochlorothiazide 25 milliGRAM(s) Oral daily  influenza  Vaccine (HIGH DOSE) 0.7 milliLiter(s) IntraMuscular once  insulin lispro (ADMELOG) corrective regimen sliding scale   SubCutaneous three times a day before meals  isosorbide   mononitrate ER Tablet (IMDUR) 60 milliGRAM(s) Oral daily  lactated ringers. 1000 milliLiter(s) (100 mL/Hr) IV Continuous <Continuous>  piperacillin/tazobactam IVPB.. 3.375 Gram(s) IV Intermittent every 8 hours  vancomycin  IVPB 2000 milliGRAM(s) IV Intermittent every 12 hours    MEDICATIONS  (PRN):  dextrose Oral Gel 15 Gram(s) Oral once PRN Blood Glucose LESS THAN 70 milliGRAM(s)/deciliter      Allergies    No Known Allergies    Intolerances        Vital Signs Last 24 Hrs  T(C): 36.9 (19 Oct 2023 04:47), Max: 37 (18 Oct 2023 19:02)  T(F): 98.4 (19 Oct 2023 04:47), Max: 98.6 (18 Oct 2023 19:02)  HR: 72 (19 Oct 2023 04:47) (70 - 73)  BP: 134/75 (19 Oct 2023 04:47) (132/72 - 137/76)  BP(mean): --  RR: 18 (19 Oct 2023 04:47) (18 - 18)  SpO2: 97% (19 Oct 2023 04:47) (96% - 97%)    Parameters below as of 19 Oct 2023 04:29  Patient On (Oxygen Delivery Method): room air        I&O's Summary    18 Oct 2023 07:01  -  19 Oct 2023 07:00  --------------------------------------------------------  IN: 1930 mL / OUT: 1100 mL / NET: 830 mL        ON EXAM:    General: NAD, awake and alert, oriented x 3  HEENT: Mucous membranes are moist, anicteric  Lungs: Non-labored, breath sounds are clear bilaterally, No wheezing, rales or rhonchi  Cardiovascular: Regular, S1 and S2, no murmurs, rubs, or gallops  Gastrointestinal: Bowel Sounds present, soft, nontender.   Lymph: No peripheral edema. No lymphadenopathy.  Skin: No rashes or ulcers  Psych:  Mood & affect appropriate    LABS: All Labs Reviewed:                        11.1   5.46  )-----------( 149      ( 19 Oct 2023 04:51 )             32.1                         11.9   5.63  )-----------( 164      ( 18 Oct 2023 07:09 )             34.0                         12.6   5.56  )-----------( 182      ( 17 Oct 2023 11:22 )             36.3     19 Oct 2023 04:51    139    |  103    |  22     ----------------------------<  163    3.8     |  23     |  1.30   18 Oct 2023 07:05    141    |  103    |  22     ----------------------------<  145    3.7     |  22     |  1.24   17 Oct 2023 11:22    140    |  104    |  16     ----------------------------<  234    4.4     |  23     |  0.95     Ca    9.2        19 Oct 2023 04:51  Ca    9.3        18 Oct 2023 07:05  Ca    9.8        17 Oct 2023 11:22  Phos  3.6       19 Oct 2023 04:51  Mg     1.5       19 Oct 2023 04:51    TPro  6.6    /  Alb  4.2    /  TBili  0.6    /  DBili  x      /  AST  9      /  ALT  6      /  AlkPhos  60     18 Oct 2023 07:05  TPro  6.8    /  Alb  4.4    /  TBili  0.8    /  DBili  x      /  AST  14     /  ALT  8      /  AlkPhos  67     17 Oct 2023 11:22  TPro  7.3    /  Alb  4.7    /  TBili  0.6    /  DBili  x      /  AST  14     /  ALT  8      /  AlkPhos  70     16 Oct 2023 19:55    PT/INR - ( 19 Oct 2023 06:37 )   PT: 12.4 sec;   INR: 1.19 ratio         PTT - ( 19 Oct 2023 06:37 )  PTT:27.3 sec      Blood Culture: Organism --  Gram Stain Blood -- Gram Stain --  Specimen Source .Blood Blood  Culture-Blood --    Organism --  Gram Stain Blood -- Gram Stain --  Specimen Source .Blood Blood  Culture-Blood --    Organism Staphylococcus aureus  Gram Stain Blood -- Gram Stain   No polymorphonuclear cells seen per low power field  Few Gram positive cocci in pairs seen per oil power field  Specimen Source .Abscess right foot  Culture-Blood --

## 2023-10-19 NOTE — PRE-OP CHECKLIST - AS TEMP SITE
This shows very mild iron deficiency since her iron levels are actually normal. If she does not already take one, she can start a multivitamin that has iron in it.   oral

## 2023-10-19 NOTE — PROVIDER CONTACT NOTE (CRITICAL VALUE NOTIFICATION) - ASSESSMENT
A&ox4, vss, no complaint of pain, dizziness, headache, SOB, no signs of bleeding, pt been NPO since 10/18/23 @ [id number] for angiogram today

## 2023-10-19 NOTE — PROGRESS NOTE ADULT - ASSESSMENT
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     He has no evidence of volume overload, active ischemia or uncontrolled arrhythmia. EKG without evidence of ischemia.   Most recent TTE done last month shows preserved LV and RV function without evidence of pHTN.     #CAD s/p stenting, PAD s/p bypass. Currently asymptomatic from a cardiac standpoint.   - Continue ASA and Plavix   - Continue home BB  - Continue home anti-hypertensives, BPs well controlled  - Continue Lipitor, most recent LDL-c has been at goal   - He is optimized from a cardiac standpoint for planned procedure.     #OM:  - Abx as per primary (On Vanc, Zosyn). Will need close monitoring of renal function  - Planned for procedure today    Will continue to follow closely.

## 2023-10-20 ENCOUNTER — TRANSCRIPTION ENCOUNTER (OUTPATIENT)
Age: 73
End: 2023-10-20

## 2023-10-20 VITALS — OXYGEN SATURATION: 97 % | SYSTOLIC BLOOD PRESSURE: 134 MMHG | HEART RATE: 62 BPM | DIASTOLIC BLOOD PRESSURE: 68 MMHG

## 2023-10-20 DIAGNOSIS — Z29.9 ENCOUNTER FOR PROPHYLACTIC MEASURES, UNSPECIFIED: ICD-10-CM

## 2023-10-20 LAB
ANION GAP SERPL CALC-SCNC: 11 MMOL/L — SIGNIFICANT CHANGE UP (ref 5–17)
ANION GAP SERPL CALC-SCNC: 11 MMOL/L — SIGNIFICANT CHANGE UP (ref 5–17)
APTT BLD: 26.9 SEC — SIGNIFICANT CHANGE UP (ref 24.5–35.6)
APTT BLD: 26.9 SEC — SIGNIFICANT CHANGE UP (ref 24.5–35.6)
BUN SERPL-MCNC: 17 MG/DL — SIGNIFICANT CHANGE UP (ref 7–23)
BUN SERPL-MCNC: 17 MG/DL — SIGNIFICANT CHANGE UP (ref 7–23)
CALCIUM SERPL-MCNC: 9.1 MG/DL — SIGNIFICANT CHANGE UP (ref 8.4–10.5)
CALCIUM SERPL-MCNC: 9.1 MG/DL — SIGNIFICANT CHANGE UP (ref 8.4–10.5)
CHLORIDE SERPL-SCNC: 104 MMOL/L — SIGNIFICANT CHANGE UP (ref 96–108)
CHLORIDE SERPL-SCNC: 104 MMOL/L — SIGNIFICANT CHANGE UP (ref 96–108)
CO2 SERPL-SCNC: 25 MMOL/L — SIGNIFICANT CHANGE UP (ref 22–31)
CO2 SERPL-SCNC: 25 MMOL/L — SIGNIFICANT CHANGE UP (ref 22–31)
CREAT SERPL-MCNC: 1.26 MG/DL — SIGNIFICANT CHANGE UP (ref 0.5–1.3)
CREAT SERPL-MCNC: 1.26 MG/DL — SIGNIFICANT CHANGE UP (ref 0.5–1.3)
EGFR: 61 ML/MIN/1.73M2 — SIGNIFICANT CHANGE UP
EGFR: 61 ML/MIN/1.73M2 — SIGNIFICANT CHANGE UP
GLUCOSE BLDC GLUCOMTR-MCNC: 136 MG/DL — HIGH (ref 70–99)
GLUCOSE BLDC GLUCOMTR-MCNC: 136 MG/DL — HIGH (ref 70–99)
GLUCOSE BLDC GLUCOMTR-MCNC: 139 MG/DL — HIGH (ref 70–99)
GLUCOSE BLDC GLUCOMTR-MCNC: 139 MG/DL — HIGH (ref 70–99)
GLUCOSE BLDC GLUCOMTR-MCNC: 146 MG/DL — HIGH (ref 70–99)
GLUCOSE BLDC GLUCOMTR-MCNC: 146 MG/DL — HIGH (ref 70–99)
GLUCOSE SERPL-MCNC: 143 MG/DL — HIGH (ref 70–99)
GLUCOSE SERPL-MCNC: 143 MG/DL — HIGH (ref 70–99)
GRAM STN FLD: SIGNIFICANT CHANGE UP
GRAM STN FLD: SIGNIFICANT CHANGE UP
HCT VFR BLD CALC: 31.5 % — LOW (ref 39–50)
HCT VFR BLD CALC: 31.5 % — LOW (ref 39–50)
HGB BLD-MCNC: 10.9 G/DL — LOW (ref 13–17)
HGB BLD-MCNC: 10.9 G/DL — LOW (ref 13–17)
INR BLD: 1.11 RATIO — SIGNIFICANT CHANGE UP (ref 0.85–1.18)
INR BLD: 1.11 RATIO — SIGNIFICANT CHANGE UP (ref 0.85–1.18)
MCHC RBC-ENTMCNC: 28.2 PG — SIGNIFICANT CHANGE UP (ref 27–34)
MCHC RBC-ENTMCNC: 28.2 PG — SIGNIFICANT CHANGE UP (ref 27–34)
MCHC RBC-ENTMCNC: 34.6 GM/DL — SIGNIFICANT CHANGE UP (ref 32–36)
MCHC RBC-ENTMCNC: 34.6 GM/DL — SIGNIFICANT CHANGE UP (ref 32–36)
MCV RBC AUTO: 81.4 FL — SIGNIFICANT CHANGE UP (ref 80–100)
MCV RBC AUTO: 81.4 FL — SIGNIFICANT CHANGE UP (ref 80–100)
NRBC # BLD: 0 /100 WBCS — SIGNIFICANT CHANGE UP (ref 0–0)
NRBC # BLD: 0 /100 WBCS — SIGNIFICANT CHANGE UP (ref 0–0)
PLATELET # BLD AUTO: 155 K/UL — SIGNIFICANT CHANGE UP (ref 150–400)
PLATELET # BLD AUTO: 155 K/UL — SIGNIFICANT CHANGE UP (ref 150–400)
POTASSIUM SERPL-MCNC: 3.7 MMOL/L — SIGNIFICANT CHANGE UP (ref 3.5–5.3)
POTASSIUM SERPL-MCNC: 3.7 MMOL/L — SIGNIFICANT CHANGE UP (ref 3.5–5.3)
POTASSIUM SERPL-SCNC: 3.7 MMOL/L — SIGNIFICANT CHANGE UP (ref 3.5–5.3)
POTASSIUM SERPL-SCNC: 3.7 MMOL/L — SIGNIFICANT CHANGE UP (ref 3.5–5.3)
PROTHROM AB SERPL-ACNC: 12.2 SEC — SIGNIFICANT CHANGE UP (ref 9.5–13)
PROTHROM AB SERPL-ACNC: 12.2 SEC — SIGNIFICANT CHANGE UP (ref 9.5–13)
RBC # BLD: 3.87 M/UL — LOW (ref 4.2–5.8)
RBC # BLD: 3.87 M/UL — LOW (ref 4.2–5.8)
RBC # FLD: 13.2 % — SIGNIFICANT CHANGE UP (ref 10.3–14.5)
RBC # FLD: 13.2 % — SIGNIFICANT CHANGE UP (ref 10.3–14.5)
SODIUM SERPL-SCNC: 140 MMOL/L — SIGNIFICANT CHANGE UP (ref 135–145)
SODIUM SERPL-SCNC: 140 MMOL/L — SIGNIFICANT CHANGE UP (ref 135–145)
SPECIMEN SOURCE: SIGNIFICANT CHANGE UP
SPECIMEN SOURCE: SIGNIFICANT CHANGE UP
WBC # BLD: 5.47 K/UL — SIGNIFICANT CHANGE UP (ref 3.8–10.5)
WBC # BLD: 5.47 K/UL — SIGNIFICANT CHANGE UP (ref 3.8–10.5)
WBC # FLD AUTO: 5.47 K/UL — SIGNIFICANT CHANGE UP (ref 3.8–10.5)
WBC # FLD AUTO: 5.47 K/UL — SIGNIFICANT CHANGE UP (ref 3.8–10.5)

## 2023-10-20 PROCEDURE — 88311 DECALCIFY TISSUE: CPT | Mod: 26

## 2023-10-20 PROCEDURE — 93923 UPR/LXTR ART STDY 3+ LVLS: CPT | Mod: 26

## 2023-10-20 PROCEDURE — 99232 SBSQ HOSP IP/OBS MODERATE 35: CPT

## 2023-10-20 PROCEDURE — 73630 X-RAY EXAM OF FOOT: CPT | Mod: 26,RT

## 2023-10-20 PROCEDURE — 99239 HOSP IP/OBS DSCHRG MGMT >30: CPT

## 2023-10-20 PROCEDURE — 88305 TISSUE EXAM BY PATHOLOGIST: CPT | Mod: 26

## 2023-10-20 RX ORDER — OXYCODONE HYDROCHLORIDE 5 MG/1
5 TABLET ORAL ONCE
Refills: 0 | Status: DISCONTINUED | OUTPATIENT
Start: 2023-10-20 | End: 2023-10-20

## 2023-10-20 RX ORDER — ACETAMINOPHEN 500 MG
650 TABLET ORAL EVERY 6 HOURS
Refills: 0 | Status: DISCONTINUED | OUTPATIENT
Start: 2023-10-20 | End: 2023-10-20

## 2023-10-20 RX ORDER — OXYCODONE AND ACETAMINOPHEN 5; 325 MG/1; MG/1
1 TABLET ORAL EVERY 6 HOURS
Refills: 0 | Status: DISCONTINUED | OUTPATIENT
Start: 2023-10-20 | End: 2023-10-20

## 2023-10-20 RX ORDER — HYDROMORPHONE HYDROCHLORIDE 2 MG/ML
0.25 INJECTION INTRAMUSCULAR; INTRAVENOUS; SUBCUTANEOUS
Refills: 0 | Status: DISCONTINUED | OUTPATIENT
Start: 2023-10-20 | End: 2023-10-20

## 2023-10-20 RX ORDER — ACETAMINOPHEN 500 MG
2 TABLET ORAL
Qty: 0 | Refills: 0 | DISCHARGE
Start: 2023-10-20

## 2023-10-20 RX ORDER — ASPIRIN/CALCIUM CARB/MAGNESIUM 324 MG
1 TABLET ORAL
Qty: 0 | Refills: 0 | DISCHARGE
Start: 2023-10-20

## 2023-10-20 RX ORDER — ONDANSETRON 8 MG/1
4 TABLET, FILM COATED ORAL ONCE
Refills: 0 | Status: DISCONTINUED | OUTPATIENT
Start: 2023-10-20 | End: 2023-10-20

## 2023-10-20 RX ORDER — MORPHINE SULFATE 50 MG/1
2 CAPSULE, EXTENDED RELEASE ORAL EVERY 4 HOURS
Refills: 0 | Status: DISCONTINUED | OUTPATIENT
Start: 2023-10-20 | End: 2023-10-20

## 2023-10-20 RX ADMIN — AMLODIPINE BESYLATE 10 MILLIGRAM(S): 2.5 TABLET ORAL at 05:04

## 2023-10-20 RX ADMIN — Medication 81 MILLIGRAM(S): at 14:31

## 2023-10-20 RX ADMIN — PIPERACILLIN AND TAZOBACTAM 25 GRAM(S): 4; .5 INJECTION, POWDER, LYOPHILIZED, FOR SOLUTION INTRAVENOUS at 11:13

## 2023-10-20 RX ADMIN — GABAPENTIN 100 MILLIGRAM(S): 400 CAPSULE ORAL at 05:04

## 2023-10-20 RX ADMIN — CLOPIDOGREL BISULFATE 75 MILLIGRAM(S): 75 TABLET, FILM COATED ORAL at 14:32

## 2023-10-20 RX ADMIN — PIPERACILLIN AND TAZOBACTAM 25 GRAM(S): 4; .5 INJECTION, POWDER, LYOPHILIZED, FOR SOLUTION INTRAVENOUS at 00:17

## 2023-10-20 RX ADMIN — Medication 0.1 MILLIGRAM(S): at 05:04

## 2023-10-20 RX ADMIN — CARVEDILOL PHOSPHATE 25 MILLIGRAM(S): 80 CAPSULE, EXTENDED RELEASE ORAL at 05:04

## 2023-10-20 RX ADMIN — ISOSORBIDE MONONITRATE 60 MILLIGRAM(S): 60 TABLET, EXTENDED RELEASE ORAL at 14:32

## 2023-10-20 NOTE — PROGRESS NOTE ADULT - PROBLEM SELECTOR PLAN 5
- c/w statin daily  - vascepa not available in hospital, will ask family to bring in
- c/w statin daily  - vascepa not available in hospital, resume as outpatient
- c/w statin daily  - vascepa not available in hospital, will ask family to bring in

## 2023-10-20 NOTE — PROGRESS NOTE ADULT - PROBLEM SELECTOR PLAN 3
- fs controlled  - fs achs  - start sliding scale insulin  - dash/ carb controlled diet
- fs controlled  - fs achs  - sliding scale insulin  - dash/ carb controlled diet
- fs controlled  - fs achs  - start sliding scale insulin  - dash/ carb controlled diet

## 2023-10-20 NOTE — DISCHARGE NOTE PROVIDER - CARE PROVIDER_API CALL
Jacobo Sandhu  Podiatric Medicine  [address], [address]  [place] [address]  Phone: [phone]  Fax: [phone]  Follow Up Time:

## 2023-10-20 NOTE — DISCHARGE NOTE PROVIDER - HOSPITAL COURSE
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 Patient endorses pain with ambulation but is able to walk without any difficulty. He was planned for RLE angiogram as an outpatient with dr Shanks from vascular surgery. Currently, denies fevers, chills, chest pain, dyspnea, palpitations, nausea, vomiting, or dizziness.      (17 Oct 2023 10:57)    Hospital Course: PT is presenting for poor healing of right third toe wound concerning for OM and consideration of resection.  MRI Right MRI-> Osteomyelitis of the third distal phalanx. Mild marrow edema of the third middle phalanx with preservation of the  fat marrow signal. Currently on vanco -> ?transition to _________________________***Vascular following -> s/p RLE Angiogram, balloon angioplasty of proximal right peroneal artery. Podiatry followed up-> s/p Right foot partial third digit amputation- No evidence of purulence or soft tissue infection. Pt is now medically stable for discharge.     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 Patient endorses pain with ambulation but is able to walk without any difficulty. He was planned for RLE angiogram as an outpatient with dr Shanks from vascular surgery. Currently, denies fevers, chills, chest pain, dyspnea, palpitations, nausea, vomiting, or dizziness.      (17 Oct 2023 10:57)    Hospital Course: PT is presenting for poor healing of right third toe wound concerning for OM and consideration of resection.  MRI Right MRI-> Osteomyelitis of the third distal phalanx. Mild marrow edema of the third middle phalanx with preservation of the  fat marrow signal. Currently on vanco -> ?transition to Augmentin 500 BID for 14 days. Vascular following -> s/p RLE Angiogram, balloon angioplasty of proximal right peroneal artery. Podiatry followed up-> s/p Right foot partial third digit amputation- No evidence of purulence or soft tissue infection. Pt is now medically stable for discharge.   Patient is medically clear for discharge by Dr. Liang to home with outpatient PT. Outpatient follow up with PCP, and podiatry   Med recc and clearance discussed with attending    Important Medication Changes and Reason: none    Active or Pending Issues Requiring Follow-up: Outpt follow up with Vascular, Podiatry, and PCP    Advanced Directives:   [ x] Full code  [ ] DNR  [ ] Hospice    Discharge Diagnoses:  OM of third distal phalanx   CAD s/p stents in 2020,   DM2   HTN   HLD    hx of right 5th toe gangrene

## 2023-10-20 NOTE — PROGRESS NOTE ADULT - SUBJECTIVE AND OBJECTIVE BOX
Podiatry Pager #: [phone]    Patient is a 72y old  Male who presents with a chief complaint of right toe OM (19 Oct 2023 12:04)      INTERVAL HPI/OVERNIGHT EVENTS:   Pt is scheduled for right foot partial third ray resection with Dr. Sandhu at 7:30. Patient is aware of procedure and is NPO since midnight.    MEDICATIONS  (STANDING):  amLODIPine   Tablet 10 milliGRAM(s) Oral daily  aspirin enteric coated 81 milliGRAM(s) Oral daily  atorvastatin 20 milliGRAM(s) Oral at bedtime  carvedilol 25 milliGRAM(s) Oral every 12 hours  cloNIDine 0.1 milliGRAM(s) Oral every 12 hours  clopidogrel Tablet 75 milliGRAM(s) Oral daily  dextrose 5%. 1000 milliLiter(s) (100 mL/Hr) IV Continuous <Continuous>  dextrose 5%. 1000 milliLiter(s) (50 mL/Hr) IV Continuous <Continuous>  dextrose 50% Injectable 25 Gram(s) IV Push once  dextrose 50% Injectable 12.5 Gram(s) IV Push once  dextrose 50% Injectable 25 Gram(s) IV Push once  gabapentin 100 milliGRAM(s) Oral every 12 hours  glucagon  Injectable 1 milliGRAM(s) IntraMuscular once  hydrochlorothiazide 25 milliGRAM(s) Oral daily  influenza  Vaccine (HIGH DOSE) 0.7 milliLiter(s) IntraMuscular once  insulin lispro (ADMELOG) corrective regimen sliding scale   SubCutaneous three times a day before meals  isosorbide   mononitrate ER Tablet (IMDUR) 60 milliGRAM(s) Oral daily  piperacillin/tazobactam IVPB.. 3.375 Gram(s) IV Intermittent every 8 hours  vancomycin  IVPB 1500 milliGRAM(s) IV Intermittent every 12 hours    MEDICATIONS  (PRN):  dextrose Oral Gel 15 Gram(s) Oral once PRN Blood Glucose LESS THAN 70 milliGRAM(s)/deciliter      Allergies    No Known Allergies    Intolerances        Vital Signs Last 24 Hrs  T(C): 36.8 (20 Oct 2023 04:36), Max: 36.9 (19 Oct 2023 11:07)  T(F): 98.2 (20 Oct 2023 04:36), Max: 98.5 (19 Oct 2023 20:35)  HR: 69 (20 Oct 2023 04:36) (67 - 76)  BP: 132/70 (20 Oct 2023 04:36) (121/70 - 165/78)  BP(mean): 105 (19 Oct 2023 17:00) (88 - 123)  RR: 18 (20 Oct 2023 04:36) (16 - 20)  SpO2: 96% (20 Oct 2023 04:36) (95% - 98%)    Parameters below as of 20 Oct 2023 04:36  Patient On (Oxygen Delivery Method): room air        LABS:                        10.9   5.47  )-----------( 155      ( 20 Oct 2023 04:52 )             31.5     10-20    140  |  104  |  17  ----------------------------<  143<H>  3.7   |  25  |  1.26    Ca    9.1      20 Oct 2023 04:52  Phos  3.6     10-19  Mg     1.5     10-19    TPro  6.6  /  Alb  4.2  /  TBili  0.6  /  DBili  x   /  AST  9<L>  /  ALT  6<L>  /  AlkPhos  60  10-18    PT/INR - ( 20 Oct 2023 04:53 )   PT: 12.2 sec;   INR: 1.11 ratio         PTT - ( 20 Oct 2023 04:53 )  PTT:26.9 sec  Urinalysis Basic - ( 20 Oct 2023 04:52 )    Color: x / Appearance: x / SG: x / pH: x  Gluc: 143 mg/dL / Ketone: x  / Bili: x / Urobili: x   Blood: x / Protein: x / Nitrite: x   Leuk Esterase: x / RBC: x / WBC x   Sq Epi: x / Non Sq Epi: x / Bacteria: x      CAPILLARY BLOOD GLUCOSE      POCT Blood Glucose.: 136 mg/dL (20 Oct 2023 06:36)  POCT Blood Glucose.: 176 mg/dL (19 Oct 2023 21:04)  POCT Blood Glucose.: 114 mg/dL (19 Oct 2023 18:17)  POCT Blood Glucose.: 109 mg/dL (19 Oct 2023 15:59)  POCT Blood Glucose.: 131 mg/dL (19 Oct 2023 11:35)  POCT Blood Glucose.: 190 mg/dL (19 Oct 2023 08:53)      RADIOLOGY & ADDITIONAL TESTS:    Plan:   To OR today at 7:30 with Dr. Sandhu for R foot partial third ray resection.   CXR on sunrise.  EKG on sunrise.  Medical/Cardiac clearance since 10/18 and documented in chart.  Consent signed and in chart.  Procedure was explained to patient in detail. All alternatives, risks and complications were discussed. All questions answered. Patient to OR 10/20/2023. Full H&P reviewed by attending surgeon.     Podiatry Pager #: [phone]    Patient is a 72y old  Male who presents with a chief complaint of right toe OM (19 Oct 2023 12:04)      INTERVAL HPI/OVERNIGHT EVENTS:   Pt is scheduled for right foot partial third ray resection with Dr. Sandhu at 7:30. Patient is aware of procedure and is NPO since midnight.    MEDICATIONS  (STANDING):  amLODIPine   Tablet 10 milliGRAM(s) Oral daily  aspirin enteric coated 81 milliGRAM(s) Oral daily  atorvastatin 20 milliGRAM(s) Oral at bedtime  carvedilol 25 milliGRAM(s) Oral every 12 hours  cloNIDine 0.1 milliGRAM(s) Oral every 12 hours  clopidogrel Tablet 75 milliGRAM(s) Oral daily  dextrose 5%. 1000 milliLiter(s) (100 mL/Hr) IV Continuous <Continuous>  dextrose 5%. 1000 milliLiter(s) (50 mL/Hr) IV Continuous <Continuous>  dextrose 50% Injectable 25 Gram(s) IV Push once  dextrose 50% Injectable 12.5 Gram(s) IV Push once  dextrose 50% Injectable 25 Gram(s) IV Push once  gabapentin 100 milliGRAM(s) Oral every 12 hours  glucagon  Injectable 1 milliGRAM(s) IntraMuscular once  hydrochlorothiazide 25 milliGRAM(s) Oral daily  influenza  Vaccine (HIGH DOSE) 0.7 milliLiter(s) IntraMuscular once  insulin lispro (ADMELOG) corrective regimen sliding scale   SubCutaneous three times a day before meals  isosorbide   mononitrate ER Tablet (IMDUR) 60 milliGRAM(s) Oral daily  piperacillin/tazobactam IVPB.. 3.375 Gram(s) IV Intermittent every 8 hours  vancomycin  IVPB 1500 milliGRAM(s) IV Intermittent every 12 hours    MEDICATIONS  (PRN):  dextrose Oral Gel 15 Gram(s) Oral once PRN Blood Glucose LESS THAN 70 milliGRAM(s)/deciliter      Allergies    No Known Allergies    Intolerances        Vital Signs Last 24 Hrs  T(C): 36.8 (20 Oct 2023 04:36), Max: 36.9 (19 Oct 2023 11:07)  T(F): 98.2 (20 Oct 2023 04:36), Max: 98.5 (19 Oct 2023 20:35)  HR: 69 (20 Oct 2023 04:36) (67 - 76)  BP: 132/70 (20 Oct 2023 04:36) (121/70 - 165/78)  BP(mean): 105 (19 Oct 2023 17:00) (88 - 123)  RR: 18 (20 Oct 2023 04:36) (16 - 20)  SpO2: 96% (20 Oct 2023 04:36) (95% - 98%)    Parameters below as of 20 Oct 2023 04:36  Patient On (Oxygen Delivery Method): room air        LABS:                        10.9   5.47  )-----------( 155      ( 20 Oct 2023 04:52 )             31.5     10-20    140  |  104  |  17  ----------------------------<  143<H>  3.7   |  25  |  1.26    Ca    9.1      20 Oct 2023 04:52  Phos  3.6     10-19  Mg     1.5     10-19    TPro  6.6  /  Alb  4.2  /  TBili  0.6  /  DBili  x   /  AST  9<L>  /  ALT  6<L>  /  AlkPhos  60  10-18    PT/INR - ( 20 Oct 2023 04:53 )   PT: 12.2 sec;   INR: 1.11 ratio         PTT - ( 20 Oct 2023 04:53 )  PTT:26.9 sec  Urinalysis Basic - ( 20 Oct 2023 04:52 )    Color: x / Appearance: x / SG: x / pH: x  Gluc: 143 mg/dL / Ketone: x  / Bili: x / Urobili: x   Blood: x / Protein: x / Nitrite: x   Leuk Esterase: x / RBC: x / WBC x   Sq Epi: x / Non Sq Epi: x / Bacteria: x      CAPILLARY BLOOD GLUCOSE      POCT Blood Glucose.: 136 mg/dL (20 Oct 2023 06:36)  POCT Blood Glucose.: 176 mg/dL (19 Oct 2023 21:04)  POCT Blood Glucose.: 114 mg/dL (19 Oct 2023 18:17)  POCT Blood Glucose.: 109 mg/dL (19 Oct 2023 15:59)  POCT Blood Glucose.: 131 mg/dL (19 Oct 2023 11:35)  POCT Blood Glucose.: 190 mg/dL (19 Oct 2023 08:53)      RADIOLOGY & ADDITIONAL TESTS:    Plan:   To OR today at 7:30 with Dr. Sandhu for R foot partial third ray resection.   CXR on sunrise.  EKG on sunrise.  Medical/Cardiac clearance since 10/18 and documented in chart.  Consent signed and in chart.  Procedure was explained to patient in detail. All alternatives, risks and complications were discussed. All questions answered.

## 2023-10-20 NOTE — PROGRESS NOTE ADULT - PROBLEM SELECTOR PLAN 1
- Right foot 3rd distal tuft wound to bone  - Angiogram with vascular sx 10/19 NPO MN  - Pod Plan: right foot partial third ray resection on Friday 10/20  - RCRI score: 2 points, Class III risk. 10.1%, 30 day risk of death, MI or cardiac arrest. Patient is a high risk for an intermediate risk procedure.
- Right foot 3rd distal tuft wound to bone  - Angiogram with vascular sx 10/19  - Pod Plan: right foot partial third ray resection on Friday 10/20  - RCRI score: 2 points, Class III risk. 10.1%, 30 day risk of death, MI or cardiac arrest. Patient is a high risk for an intermediate risk procedure.
- Right foot 3rd distal tuft wound to bone  - Angiogram with vascular sx 10/19  - s/p right foot partial third ray resection on Friday 10/20  - cleared by podiatry for dc on PO augmentin 500mg BID for 14 days  - PT: outpatient PT

## 2023-10-20 NOTE — PROGRESS NOTE ADULT - NSPROGADDITIONALINFOA_GEN_ALL_CORE
time spent reviewing prior charts, meds, discussing plan with patient= 70 min      Medicine ACP Kathryn
Case d/w ACP on IDRs
Discussed with Deion ERICKSON

## 2023-10-20 NOTE — PROGRESS NOTE ADULT - PROVIDER SPECIALTY LIST ADULT
Hospitalist
Vascular Surgery
Podiatry
Podiatry
Vascular Surgery
Vascular Surgery
Cardiology
Podiatry
Cardiology
Internal Medicine
Hospitalist

## 2023-10-20 NOTE — PROGRESS NOTE ADULT - PROBLEM SELECTOR PLAN 2
- LE angiogram pending  - c/w plavix 75 mg po daily  - started ASA 81 mg   - vascular surgery following, appreciate input
- c/w plavix 75 mg po daily  - started ASA 81 mg   - vascular surgery following, appreciate input
- LE angiogram pending  - c/w plavix 75 mg po daily  - started ASA 81 mg   - vascular surgery following

## 2023-10-20 NOTE — PROGRESS NOTE ADULT - SUBJECTIVE AND OBJECTIVE BOX
VASCULAR SURGERY PROGRESS NOTE    POD#1 s/p RLE Angiogram, balloon angioplasty of proximal right peroneal artery    Interval events  - No acute events overnight  - Vital signs stable, afebrile, on room air.   - Resumed on ASA/Plavix    Vital Signs Last 24 Hrs  T(C): 36.8 (20 Oct 2023 07:57), Max: 36.9 (19 Oct 2023 11:07)  T(F): 98.2 (20 Oct 2023 04:36), Max: 98.5 (19 Oct 2023 20:35)  HR: 69 (20 Oct 2023 07:57) (67 - 76)  BP: 132/70 (20 Oct 2023 07:57) (121/70 - 165/78)  BP(mean): 105 (20 Oct 2023 07:57) (88 - 123)  RR: 18 (20 Oct 2023 07:57) (16 - 20)  SpO2: 96% (20 Oct 2023 07:57) (95% - 98%)    Parameters below as of 20 Oct 2023 04:36  Patient On (Oxygen Delivery Method): room air        OBJECTIVE: T(C): 36.8 (10-20-23 @ 07:57), Max: 36.9 (10-19-23 @ 11:07)  HR: 69 (10-20-23 @ 07:57) (67 - 76)  BP: 132/70 (10-20-23 @ 07:57) (121/70 - 165/78)  RR: 18 (10-20-23 @ 07:57) (16 - 20)  SpO2: 96% (10-20-23 @ 07:57) (95% - 98%)  Wt(kg): --  I&O's Summary    19 Oct 2023 07:01  -  20 Oct 2023 07:00  --------------------------------------------------------  IN: 150 mL / OUT: 1600 mL / NET: -1450 mL      I&O's Detail    19 Oct 2023 07:01  -  20 Oct 2023 07:00  --------------------------------------------------------  IN:    Lactated Ringers: 150 mL  Total IN: 150 mL    OUT:    Voided (mL): 1600 mL  Total OUT: 1600 mL    Total NET: -1450 mL          MEDICATIONS  (STANDING):  amLODIPine   Tablet 10 milliGRAM(s) Oral daily  aspirin enteric coated 81 milliGRAM(s) Oral daily  atorvastatin 20 milliGRAM(s) Oral at bedtime  carvedilol 25 milliGRAM(s) Oral every 12 hours  cloNIDine 0.1 milliGRAM(s) Oral every 12 hours  clopidogrel Tablet 75 milliGRAM(s) Oral daily  dextrose 5%. 1000 milliLiter(s) (50 mL/Hr) IV Continuous <Continuous>  dextrose 5%. 1000 milliLiter(s) (100 mL/Hr) IV Continuous <Continuous>  dextrose 50% Injectable 12.5 Gram(s) IV Push once  dextrose 50% Injectable 25 Gram(s) IV Push once  dextrose 50% Injectable 25 Gram(s) IV Push once  gabapentin 100 milliGRAM(s) Oral every 12 hours  glucagon  Injectable 1 milliGRAM(s) IntraMuscular once  hydrochlorothiazide 25 milliGRAM(s) Oral daily  influenza  Vaccine (HIGH DOSE) 0.7 milliLiter(s) IntraMuscular once  insulin lispro (ADMELOG) corrective regimen sliding scale   SubCutaneous three times a day before meals  isosorbide   mononitrate ER Tablet (IMDUR) 60 milliGRAM(s) Oral daily  piperacillin/tazobactam IVPB.. 3.375 Gram(s) IV Intermittent every 8 hours  vancomycin  IVPB 1500 milliGRAM(s) IV Intermittent every 12 hours    MEDICATIONS  (PRN):  dextrose Oral Gel 15 Gram(s) Oral once PRN Blood Glucose LESS THAN 70 milliGRAM(s)/deciliter  HYDROmorphone  Injectable 0.25 milliGRAM(s) IV Push every 10 minutes PRN Severe Pain (7 - 10)  ondansetron Injectable 4 milliGRAM(s) IV Push once PRN Nausea and/or Vomiting  oxyCODONE    IR 5 milliGRAM(s) Oral once PRN Moderate Pain (4 - 6)      LABS:                        10.9   5.47  )-----------( 155      ( 20 Oct 2023 04:52 )             31.5     10-20    140  |  104  |  17  ----------------------------<  143<H>  3.7   |  25  |  1.26    Ca    9.1      20 Oct 2023 04:52  Phos  3.6     10-19  Mg     1.5     10-19      PT/INR - ( 20 Oct 2023 04:53 )   PT: 12.2 sec;   INR: 1.11 ratio         PTT - ( 20 Oct 2023 04:53 )  PTT:26.9 sec  Urinalysis Basic - ( 20 Oct 2023 04:52 )    Color: x / Appearance: x / SG: x / pH: x  Gluc: 143 mg/dL / Ketone: x  / Bili: x / Urobili: x   Blood: x / Protein: x / Nitrite: x   Leuk Esterase: x / RBC: x / WBC x   Sq Epi: x / Non Sq Epi: x / Bacteria: x        RADIOLOGY & ADDITIONAL STUDIES:  no new    PHYSICAL EXAM  General: No acute distress, resting in bed.   Extremities: Left groin soft, no hematoma, palpable fem. RLE 3rd, 4th digit wounds with erythema, warm to touch; strength, sensory and motor intact. (+) DP/PT signals.       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 Podiatry planning for right foot 3rd ray resection. Vascular consulted for evaluation of RLE perfusion. Now s/p RLE Angiogram, balloon angioplasty of proximal right peroneal artery on 10/19.    - Continue ASA & plavix  - Follow-up podiatry procedure today  - Care as per primary team      Vascular Surgery  [id number]

## 2023-10-20 NOTE — DISCHARGE NOTE PROVIDER - NSDCFUADDAPPT_GEN_ALL_CORE_FT
Podiatry Discharge Instructions:  Follow up: Please follow up with Dr. Jacobo Sandhu within 1 week of discharge from the hospital, please call [phone] for appointment and discuss that you recently were seen in the hospital.  Wound Care: Please leave your dressing clean dry intact until your follow up appointment   Weight bearing: Please weight bear as tolerated in a surgical shoe to the right heel   Antibiotics: Please continue as instructed.

## 2023-10-20 NOTE — BRIEF OPERATIVE NOTE - NSICDXBRIEFPOSTOP_GEN_ALL_CORE_FT
POST-OP DIAGNOSIS:  PAD (peripheral artery disease) 19-Oct-2023 16:00:38  Rufus Chavira  
POST-OP DIAGNOSIS:  Acute osteomyelitis 20-Oct-2023 08:46:00  Mima Maddox

## 2023-10-20 NOTE — PROGRESS NOTE ADULT - PROBLEM SELECTOR PLAN 6
- hold AC due to procedure  -PT: outpatient PT    DISPO: DC planning to home today   40 mins spent on DC planning

## 2023-10-20 NOTE — PROGRESS NOTE ADULT - SUBJECTIVE AND OBJECTIVE BOX
[facility] Division of Hospital Medicine  Kym Liang MD  Available via MS Teams    SUBJECTIVE / OVERNIGHT EVENTS: No acute events overnight. Patient s/p right foot partial third digit amputation. Patient feeling well overall and not complaining of any pain. PT recommending outpatient PT. Denies any chest pain or SOB. Denies any other concerns or complaints at this time.     Review of Systems:   CONSTITUTIONAL: No fever, weight loss  EYES: No eye pain, visual disturbances, or discharge  ENMT:  No difficulty hearing, tinnitus, vertigo; No sinus or throat pain  RESPIRATORY: No SOB. No cough, wheezing, chills or hemoptysis  CARDIOVASCULAR: No chest pain, palpitations, dizziness, or leg swelling  GASTROINTESTINAL: No abdominal or epigastric pain. No nausea, vomiting, or hematemesis; No diarrhea or constipation. No melena or hematochezia.  GENITOURINARY: No dysuria, frequency, hematuria, or incontinence  NEUROLOGICAL: No headaches, memory loss, loss of strength, numbness, or tremors  SKIN: No itching, burning, rashes, or lesions   LYMPH NODES: No enlarged glands  ENDOCRINE: No heat or cold intolerance; No hair loss  MUSCULOSKELETAL: No joint pain or swelling; No muscle, back pain  PSYCHIATRIC: No depression, anxiety, mood swings, or difficulty sleeping  HEME/LYMPH: No easy bruising, or bleeding gums      MEDICATIONS  (STANDING):  amLODIPine   Tablet 10 milliGRAM(s) Oral daily  amoxicillin  500 milliGRAM(s)/clavulanate 1 Tablet(s) Oral every 12 hours  aspirin enteric coated 81 milliGRAM(s) Oral daily  atorvastatin 20 milliGRAM(s) Oral at bedtime  carvedilol 25 milliGRAM(s) Oral every 12 hours  cloNIDine 0.1 milliGRAM(s) Oral every 12 hours  clopidogrel Tablet 75 milliGRAM(s) Oral daily  dextrose 5%. 1000 milliLiter(s) (50 mL/Hr) IV Continuous <Continuous>  dextrose 5%. 1000 milliLiter(s) (100 mL/Hr) IV Continuous <Continuous>  dextrose 50% Injectable 25 Gram(s) IV Push once  dextrose 50% Injectable 12.5 Gram(s) IV Push once  dextrose 50% Injectable 25 Gram(s) IV Push once  gabapentin 100 milliGRAM(s) Oral every 12 hours  glucagon  Injectable 1 milliGRAM(s) IntraMuscular once  hydrochlorothiazide 25 milliGRAM(s) Oral daily  influenza  Vaccine (HIGH DOSE) 0.7 milliLiter(s) IntraMuscular once  insulin lispro (ADMELOG) corrective regimen sliding scale   SubCutaneous three times a day before meals  isosorbide   mononitrate ER Tablet (IMDUR) 60 milliGRAM(s) Oral daily    MEDICATIONS  (PRN):  acetaminophen     Tablet .. 650 milliGRAM(s) Oral every 6 hours PRN Mild Pain (1 - 3)  dextrose Oral Gel 15 Gram(s) Oral once PRN Blood Glucose LESS THAN 70 milliGRAM(s)/deciliter  morphine  - Injectable 2 milliGRAM(s) IV Push every 4 hours PRN Severe Pain (7 - 10)  oxycodone    5 mG/acetaminophen 325 mG 1 Tablet(s) Oral every 6 hours PRN Moderate Pain (4 - 6)      I&O's Summary    19 Oct 2023 07:01  -  20 Oct 2023 07:00  --------------------------------------------------------  IN: 150 mL / OUT: 1600 mL / NET: -1450 mL    20 Oct 2023 07:01  -  20 Oct 2023 15:03  --------------------------------------------------------  IN: 240 mL / OUT: 0 mL / NET: 240 mL        PHYSICAL EXAM:  Vital Signs Last 24 Hrs  T(C): 36.7 (20 Oct 2023 10:44), Max: 36.9 (19 Oct 2023 20:35)  T(F): 98.1 (20 Oct 2023 10:44), Max: 98.5 (19 Oct 2023 20:35)  HR: 62 (20 Oct 2023 12:29) (59 - 76)  BP: 134/68 (20 Oct 2023 12:29) (109/58 - 159/76)  BP(mean): 80 (20 Oct 2023 09:45) (77 - 109)  RR: 18 (20 Oct 2023 10:44) (15 - 20)  SpO2: 97% (20 Oct 2023 12:29) (94% - 99%)    Parameters below as of 20 Oct 2023 12:29  Patient On (Oxygen Delivery Method): room air      CONSTITUTIONAL: NAD, well-developed, well-groomed  EYES: PERRLA; conjunctiva and sclera clear  ENMT: Moist oral mucosa, no pharyngeal injection or exudates; normal dentition  NECK: Supple, no palpable masses; no thyromegaly  RESPIRATORY: Normal respiratory effort; lungs are clear to auscultation bilaterally  CARDIOVASCULAR: Regular rate and rhythm, normal S1 and S2, no murmur/rub/gallop; No lower extremity edema; Peripheral pulses are 2+ bilaterally  ABDOMEN: Nontender to palpation, normoactive bowel sounds, no rebound/guarding; No hepatosplenomegaly  MUSCULOSKELETAL:  Normal gait; no clubbing or cyanosis of digits; no joint swelling or tenderness to palpation  PSYCH: A+O to person, place, and time; affect appropriate  NEUROLOGY: CN 2-12 are intact and symmetric; no gross sensory deficits   SKIN: No rashes; no palpable lesions    LABS:                        10.9   5.47  )-----------( 155      ( 20 Oct 2023 04:52 )             31.5     10-20    140  |  104  |  17  ----------------------------<  143<H>  3.7   |  25  |  1.26    Ca    9.1      20 Oct 2023 04:52  Phos  3.6     10-19  Mg     1.5     10-19      PT/INR - ( 20 Oct 2023 04:53 )   PT: 12.2 sec;   INR: 1.11 ratio         PTT - ( 20 Oct 2023 04:53 )  PTT:26.9 sec      Urinalysis Basic - ( 20 Oct 2023 04:52 )    Color: x / Appearance: x / SG: x / pH: x  Gluc: 143 mg/dL / Ketone: x  / Bili: x / Urobili: x   Blood: x / Protein: x / Nitrite: x   Leuk Esterase: x / RBC: x / WBC x   Sq Epi: x / Non Sq Epi: x / Bacteria: x            RADIOLOGY & ADDITIONAL TESTS:  New Imaging Personally Reviewed Today:  New Electrocardiogram Personally Reviewed Today:  Other Results Reviewed Today:   Prior or Outpatient Records Reviewed Today with Summary:    COORDINATION OF CARE:  Consultant Communication and Details of Discussion (where applicable):     [facility] Division of Hospital Medicine  Kym Liang MD  Available via MS Teams    SUBJECTIVE / OVERNIGHT EVENTS: No acute events overnight. Patient s/p right foot partial third digit amputation. Patient feeling well overall and not complaining of any pain. PT recommending outpatient PT. Denies any chest pain or SOB. Denies any other concerns or complaints at this time.     Review of Systems:   CONSTITUTIONAL: No fever   EYES: No eye pain, visual disturbances, or discharge  ENMT: No difficulty hearing   RESPIRATORY: No SOB. No cough   CARDIOVASCULAR: No chest pain   GASTROINTESTINAL: No abdominal or epigastric pain. No nausea, vomiting, or hematemesis; No diarrhea   GENITOURINARY: No dysuria   NEUROLOGICAL: No headache   SKIN: No itching    MUSCULOSKELETAL: No joint pain or swelling; No muscle or back pain  PSYCHIATRIC: No depression, anxiety, mood swings, or difficulty sleeping  HEME/LYMPH: No easy bruising, or bleeding gums      MEDICATIONS  (STANDING):  amLODIPine   Tablet 10 milliGRAM(s) Oral daily  amoxicillin  500 milliGRAM(s)/clavulanate 1 Tablet(s) Oral every 12 hours  aspirin enteric coated 81 milliGRAM(s) Oral daily  atorvastatin 20 milliGRAM(s) Oral at bedtime  carvedilol 25 milliGRAM(s) Oral every 12 hours  cloNIDine 0.1 milliGRAM(s) Oral every 12 hours  clopidogrel Tablet 75 milliGRAM(s) Oral daily  dextrose 5%. 1000 milliLiter(s) (50 mL/Hr) IV Continuous <Continuous>  dextrose 5%. 1000 milliLiter(s) (100 mL/Hr) IV Continuous <Continuous>  dextrose 50% Injectable 25 Gram(s) IV Push once  dextrose 50% Injectable 12.5 Gram(s) IV Push once  dextrose 50% Injectable 25 Gram(s) IV Push once  gabapentin 100 milliGRAM(s) Oral every 12 hours  glucagon  Injectable 1 milliGRAM(s) IntraMuscular once  hydrochlorothiazide 25 milliGRAM(s) Oral daily  influenza  Vaccine (HIGH DOSE) 0.7 milliLiter(s) IntraMuscular once  insulin lispro (ADMELOG) corrective regimen sliding scale   SubCutaneous three times a day before meals  isosorbide   mononitrate ER Tablet (IMDUR) 60 milliGRAM(s) Oral daily    MEDICATIONS  (PRN):  acetaminophen     Tablet .. 650 milliGRAM(s) Oral every 6 hours PRN Mild Pain (1 - 3)  dextrose Oral Gel 15 Gram(s) Oral once PRN Blood Glucose LESS THAN 70 milliGRAM(s)/deciliter  morphine  - Injectable 2 milliGRAM(s) IV Push every 4 hours PRN Severe Pain (7 - 10)  oxycodone    5 mG/acetaminophen 325 mG 1 Tablet(s) Oral every 6 hours PRN Moderate Pain (4 - 6)      I&O's Summary    19 Oct 2023 07:01  -  20 Oct 2023 07:00  --------------------------------------------------------  IN: 150 mL / OUT: 1600 mL / NET: -1450 mL    20 Oct 2023 07:01  -  20 Oct 2023 15:03  --------------------------------------------------------  IN: 240 mL / OUT: 0 mL / NET: 240 mL        PHYSICAL EXAM:  Vital Signs Last 24 Hrs  T(C): 36.7 (20 Oct 2023 10:44), Max: 36.9 (19 Oct 2023 20:35)  T(F): 98.1 (20 Oct 2023 10:44), Max: 98.5 (19 Oct 2023 20:35)  HR: 62 (20 Oct 2023 12:29) (59 - 76)  BP: 134/68 (20 Oct 2023 12:29) (109/58 - 159/76)  BP(mean): 80 (20 Oct 2023 09:45) (77 - 109)  RR: 18 (20 Oct 2023 10:44) (15 - 20)  SpO2: 97% (20 Oct 2023 12:29) (94% - 99%)    Parameters below as of 20 Oct 2023 12:29  Patient On (Oxygen Delivery Method): room air      CONSTITUTIONAL: NAD, well-developed, well-groomed  EYES: PERRLA; conjunctiva and sclera clear  ENMT: Moist oral mucosa, no pharyngeal injection or exudates; normal dentition  NECK: Supple, no palpable masses; no thyromegaly  RESPIRATORY: Normal respiratory effort; lungs are clear to auscultation bilaterally  CARDIOVASCULAR: Regular rate and rhythm, normal S1 and S2, no murmur/rub/gallop; No lower extremity edema; Peripheral pulses are 2+ bilaterally  ABDOMEN: Nontender to palpation, normoactive bowel sounds, no rebound/guarding; No hepatosplenomegaly  MUSCULOSKELETAL:  Normal gait; no clubbing or cyanosis of digits; no joint swelling or tenderness to palpation  PSYCH: A+O to person, place, and time; affect appropriate  NEUROLOGY: CN 2-12 are intact and symmetric; no gross sensory deficits   SKIN: No rashes; no palpable lesions    LABS:                        10.9   5.47  )-----------( 155      ( 20 Oct 2023 04:52 )             31.5     10-20    140  |  104  |  17  ----------------------------<  143<H>  3.7   |  25  |  1.26    Ca    9.1      20 Oct 2023 04:52  Phos  3.6     10-19  Mg     1.5     10-19      PT/INR - ( 20 Oct 2023 04:53 )   PT: 12.2 sec;   INR: 1.11 ratio         PTT - ( 20 Oct 2023 04:53 )  PTT:26.9 sec      Urinalysis Basic - ( 20 Oct 2023 04:52 )    Color: x / Appearance: x / SG: x / pH: x  Gluc: 143 mg/dL / Ketone: x  / Bili: x / Urobili: x   Blood: x / Protein: x / Nitrite: x   Leuk Esterase: x / RBC: x / WBC x   Sq Epi: x / Non Sq Epi: x / Bacteria: x            RADIOLOGY & ADDITIONAL TESTS:  New Imaging Personally Reviewed Today:  New Electrocardiogram Personally Reviewed Today:  Other Results Reviewed Today:   Prior or Outpatient Records Reviewed Today with Summary:    COORDINATION OF CARE:  Consultant Communication and Details of Discussion (where applicable):     [facility] Division of Hospital Medicine  Kym Liang MD  Available via MS Teams    SUBJECTIVE / OVERNIGHT EVENTS: No acute events overnight. Patient s/p right foot partial third digit amputation. Patient feeling well overall and not complaining of any pain. PT recommending outpatient PT. Denies any chest pain or SOB. Denies any other concerns or complaints at this time.     Review of Systems:   CONSTITUTIONAL: No fever   EYES: No eye pain, visual disturbances, or discharge  ENMT: No difficulty hearing   RESPIRATORY: No SOB. No cough   CARDIOVASCULAR: No chest pain   GASTROINTESTINAL: No abdominal or epigastric pain. No nausea, vomiting, or hematemesis; No diarrhea   GENITOURINARY: No dysuria   NEUROLOGICAL: No headache   SKIN: No itching    MUSCULOSKELETAL: No joint pain or swelling; No muscle or back pain  PSYCHIATRIC: No depression or anxiety   HEME/LYMPH: No easy bruising or bleeding gums      MEDICATIONS  (STANDING):  amLODIPine   Tablet 10 milliGRAM(s) Oral daily  amoxicillin  500 milliGRAM(s)/clavulanate 1 Tablet(s) Oral every 12 hours  aspirin enteric coated 81 milliGRAM(s) Oral daily  atorvastatin 20 milliGRAM(s) Oral at bedtime  carvedilol 25 milliGRAM(s) Oral every 12 hours  cloNIDine 0.1 milliGRAM(s) Oral every 12 hours  clopidogrel Tablet 75 milliGRAM(s) Oral daily  dextrose 5%. 1000 milliLiter(s) (50 mL/Hr) IV Continuous <Continuous>  dextrose 5%. 1000 milliLiter(s) (100 mL/Hr) IV Continuous <Continuous>  dextrose 50% Injectable 25 Gram(s) IV Push once  dextrose 50% Injectable 12.5 Gram(s) IV Push once  dextrose 50% Injectable 25 Gram(s) IV Push once  gabapentin 100 milliGRAM(s) Oral every 12 hours  glucagon  Injectable 1 milliGRAM(s) IntraMuscular once  hydrochlorothiazide 25 milliGRAM(s) Oral daily  influenza  Vaccine (HIGH DOSE) 0.7 milliLiter(s) IntraMuscular once  insulin lispro (ADMELOG) corrective regimen sliding scale   SubCutaneous three times a day before meals  isosorbide   mononitrate ER Tablet (IMDUR) 60 milliGRAM(s) Oral daily    MEDICATIONS  (PRN):  acetaminophen     Tablet .. 650 milliGRAM(s) Oral every 6 hours PRN Mild Pain (1 - 3)  dextrose Oral Gel 15 Gram(s) Oral once PRN Blood Glucose LESS THAN 70 milliGRAM(s)/deciliter  morphine  - Injectable 2 milliGRAM(s) IV Push every 4 hours PRN Severe Pain (7 - 10)  oxycodone    5 mG/acetaminophen 325 mG 1 Tablet(s) Oral every 6 hours PRN Moderate Pain (4 - 6)      I&O's Summary    19 Oct 2023 07:01  -  20 Oct 2023 07:00  --------------------------------------------------------  IN: 150 mL / OUT: 1600 mL / NET: -1450 mL    20 Oct 2023 07:01  -  20 Oct 2023 15:03  --------------------------------------------------------  IN: 240 mL / OUT: 0 mL / NET: 240 mL      PHYSICAL EXAM:  Vital Signs Last 24 Hrs  T(C): 36.7 (20 Oct 2023 10:44), Max: 36.9 (19 Oct 2023 20:35)  T(F): 98.1 (20 Oct 2023 10:44), Max: 98.5 (19 Oct 2023 20:35)  HR: 62 (20 Oct 2023 12:29) (59 - 76)  BP: 134/68 (20 Oct 2023 12:29) (109/58 - 159/76)  BP(mean): 80 (20 Oct 2023 09:45) (77 - 109)  RR: 18 (20 Oct 2023 10:44) (15 - 20)  SpO2: 97% (20 Oct 2023 12:29) (94% - 99%)    Parameters below as of 20 Oct 2023 12:29  Patient On (Oxygen Delivery Method): room air      CONSTITUTIONAL: NAD, well-developed   EYES: PERRLA; conjunctiva and sclera clear  ENMT: Moist oral mucosa, no pharyngeal injection or exudates   NECK: Supple   RESPIRATORY: Normal respiratory effort; lungs are clear to auscultation bilaterally  CARDIOVASCULAR: Regular rate and rhythm, normal S1 and S2, no murmur   ABDOMEN: Nontender to palpation, normoactive bowel sounds   MUSCULOSKELETAL: no clubbing or cyanosis of digits; dressing noted around right foot   PSYCH: A+O to person, place, and time; affect appropriate  NEUROLOGY: no gross sensory deficits   SKIN: No rashes    LABS:                        10.9   5.47  )-----------( 155      ( 20 Oct 2023 04:52 )             31.5     10-20    140  |  104  |  17  ----------------------------<  143<H>  3.7   |  25  |  1.26    Ca    9.1      20 Oct 2023 04:52  Phos  3.6     10-19  Mg     1.5     10-19      PT/INR - ( 20 Oct 2023 04:53 )   PT: 12.2 sec;   INR: 1.11 ratio         PTT - ( 20 Oct 2023 04:53 )  PTT:26.9 sec      Urinalysis Basic - ( 20 Oct 2023 04:52 )    Color: x / Appearance: x / SG: x / pH: x  Gluc: 143 mg/dL / Ketone: x  / Bili: x / Urobili: x   Blood: x / Protein: x / Nitrite: x   Leuk Esterase: x / RBC: x / WBC x   Sq Epi: x / Non Sq Epi: x / Bacteria: x      RADIOLOGY & ADDITIONAL TESTS:  New Imaging Personally Reviewed Today:  New Electrocardiogram Personally Reviewed Today:  Other Results Reviewed Today:   Prior or Outpatient Records Reviewed Today with Summary:    COORDINATION OF CARE:  Consultant Communication and Details of Discussion (where applicable):

## 2023-10-20 NOTE — DISCHARGE NOTE PROVIDER - NSDCMRMEDTOKEN_GEN_ALL_CORE_FT
amLODIPine 10 mg oral tablet: 1 tab(s) orally once a day  atorvastatin 20 mg oral tablet: 1 tab(s) orally once a day (at bedtime)  cloNIDine 0.1 mg oral tablet: 1 tab(s) orally 2 times a day  clopidogrel 75 mg oral tablet: 1 tab(s) orally once a day  Coreg 25 mg oral tablet: 1 tab(s) orally 2 times a day  gabapentin 100 mg oral capsule: 1 cap(s) orally 2 times a day  hydroCHLOROthiazide 25 mg oral tablet: 1 tab(s) orally once a day  Imdur 30 mg oral tablet, extended release: 2 tab(s) orally once a day (in the morning)  MetFORMIN (Eqv-Fortamet) 1000 mg oral tablet, extended release: 1 tab(s) orally once a day  Vascepa 1 g oral capsule: 1 cap(s) orally 2 times a day   acetaminophen 325 mg oral tablet: 2 tab(s) orally every 6 hours As needed Mild Pain (1 - 3)  amLODIPine 10 mg oral tablet: 1 tab(s) orally once a day  atorvastatin 20 mg oral tablet: 1 tab(s) orally once a day (at bedtime)  cloNIDine 0.1 mg oral tablet: 1 tab(s) orally 2 times a day  clopidogrel 75 mg oral tablet: 1 tab(s) orally once a day  Coreg 25 mg oral tablet: 1 tab(s) orally 2 times a day  gabapentin 100 mg oral capsule: 1 cap(s) orally 2 times a day  hydroCHLOROthiazide 25 mg oral tablet: 1 tab(s) orally once a day  Imdur 30 mg oral tablet, extended release: 2 tab(s) orally once a day (in the morning)  MetFORMIN (Eqv-Fortamet) 1000 mg oral tablet, extended release: 1 tab(s) orally once a day  Physical Therapy: Eval and Treat  Vascepa 1 g oral capsule: 1 cap(s) orally 2 times a day   acetaminophen 325 mg oral tablet: 2 tab(s) orally every 6 hours As needed Mild Pain (1 - 3)  amLODIPine 10 mg oral tablet: 1 tab(s) orally once a day  amoxicillin-clavulanate 500 mg-125 mg oral tablet: 1 tab(s) orally every 12 hours  aspirin 81 mg oral delayed release tablet: 1 tab(s) orally once a day  atorvastatin 20 mg oral tablet: 1 tab(s) orally once a day (at bedtime)  cloNIDine 0.1 mg oral tablet: 1 tab(s) orally 2 times a day  clopidogrel 75 mg oral tablet: 1 tab(s) orally once a day  Coreg 25 mg oral tablet: 1 tab(s) orally 2 times a day  gabapentin 100 mg oral capsule: 1 cap(s) orally 2 times a day  hydroCHLOROthiazide 25 mg oral tablet: 1 tab(s) orally once a day  Imdur 30 mg oral tablet, extended release: 2 tab(s) orally once a day (in the morning)  MetFORMIN (Eqv-Fortamet) 1000 mg oral tablet, extended release: 1 tab(s) orally once a day  Physical Therapy: Eval and Treat  Vascepa 1 g oral capsule: 1 cap(s) orally 2 times a day

## 2023-10-20 NOTE — DISCHARGE NOTE NURSING/CASE MANAGEMENT/SOCIAL WORK - NSDCFUADDAPPT_GEN_ALL_CORE_FT
Podiatry Discharge Instructions:  Follow up: Please follow up with Dr. Jacobo Sandhu within 1 week of discharge from the hospital, please call [phone] for appointment and discuss that you recently were seen in the hospital.  Wound Care: Please leave your dressing clean dry intact until your follow up appointment   Weight bearing: Please weight bear as tolerated in a surgical shoe to the right heel   Antibiotics: Please continue as instructed.

## 2023-10-20 NOTE — DISCHARGE NOTE PROVIDER - NSDCCPCAREPLAN_GEN_ALL_CORE_FT
PRINCIPAL DISCHARGE DIAGNOSIS  Diagnosis: Toe osteomyelitis, right  Assessment and Plan of Treatment: You underwent an amputation of the 3rd toe due to bone infection . Please follow up with Podiatry      SECONDARY DISCHARGE DIAGNOSES  Diagnosis: PAD (peripheral artery disease)  Assessment and Plan of Treatment: You were seen by Vascular, s/p RLE Angiogram, balloon angioplasty of proximal right peroneal artery. Please follow up with Vascular.     PRINCIPAL DISCHARGE DIAGNOSIS  Diagnosis: Toe osteomyelitis, right  Assessment and Plan of Treatment: You underwent an amputation of the 3rd toe due to bone infection .  Continue to take PO Augmentin 500mg BID x14 days. Please follow up with Podiatry upon discharge        SECONDARY DISCHARGE DIAGNOSES  Diagnosis: DM2 (diabetes mellitus, type 2)  Assessment and Plan of Treatment: Continue taking Metformin as prescribed. Consume a diabetic diet. Monitor your fingersticks. follow up with your PCP upon discharge for managment of your overall health    Diagnosis: Hypertension  Assessment and Plan of Treatment: Consume a diet low in sodium and follow up with PCP    Diagnosis: PAD (peripheral artery disease)  Assessment and Plan of Treatment: You were seen by Vascular, and a right lower extremity Angiogram, balloon angioplasty of proximal right peroneal artery was performed.  Continue ASA & plavix. Please follow up with Vascular.

## 2023-10-20 NOTE — DISCHARGE NOTE PROVIDER - NSFOLLOWUPCLINICS_GEN_ALL_ED_FT
[facility] - Primary Care  Primary Care  [address] [place] [address]  Phone: [phone]  Fax:   Follow Up Time: Routine

## 2023-10-20 NOTE — PHYSICAL THERAPY INITIAL EVALUATION ADULT - DID THE PATIENT HAVE SURGERY?
RLE angiogram via L femoral artery access on 10/19/23. Right 3rd toe ray amputation on 10/20/23./yes

## 2023-10-20 NOTE — BRIEF OPERATIVE NOTE - COMMENTS
Pt is s/p right foot third digit amputation, closed  - Low concern for residual soft tissue or bone infection.  - Low concern for viability, adequate intro-op bleeding.   - Podiatry Plan: Pt is pod stable for discharge on PO Augmentin 500mg BID x14 days

## 2023-10-20 NOTE — PHYSICAL THERAPY INITIAL EVALUATION ADULT - ADDITIONAL COMMENTS
Pt lives with his wife in a house with 3 steps to enter, +HR. Pt with 1st floor setup. Pt reports his wife is available to assist when needed. +drives. +eyeglasses when driving.

## 2023-10-20 NOTE — PROGRESS NOTE ADULT - REASON FOR ADMISSION
right toe OM

## 2023-10-20 NOTE — BRIEF OPERATIVE NOTE - OPERATION/FINDINGS
Right lower extremity angiogram via left femoral artery access  Balloon angioplasty of proximal right peroneal artery with 2mm and 1.5mm balloons  5Fr Mynx for closure    L DP and PT doppler signals present at end of case  R DP, PT, and peroneal doppler signals present at end of case
s/p Right foot partial third digit amputation   - Bone at proximal resection bone was hard and of good quality.  - Adequate intraop bleeding.  - No evidence of purulence or soft tissue infection.  - Incision primarily closed with 4-0 nylon

## 2023-10-20 NOTE — DISCHARGE NOTE PROVIDER - ATTENDING DISCHARGE PHYSICAL EXAMINATION:
Patient seen and examined. Feeling well post procedure. No new complaints.  Vital Signs Last 24 Hrs  T(C): 36.7 (20 Oct 2023 10:44), Max: 36.9 (19 Oct 2023 20:35)  T(F): 98.1 (20 Oct 2023 10:44), Max: 98.5 (19 Oct 2023 20:35)  HR: 62 (20 Oct 2023 12:29) (59 - 70)  BP: 134/68 (20 Oct 2023 12:29) (109/58 - 158/73)  BP(mean): 80 (20 Oct 2023 09:45) (77 - 105)  RR: 18 (20 Oct 2023 10:44) (15 - 18)  SpO2: 97% (20 Oct 2023 12:29) (94% - 99%)    Parameters below as of 20 Oct 2023 12:29  Patient On (Oxygen Delivery Method): room air    CONSTITUTIONAL: NAD, well-developed   EYES: PERRLA; conjunctiva and sclera clear  ENMT: Moist oral mucosa, no pharyngeal injection or exudates   NECK: Supple   RESPIRATORY: Normal respiratory effort; lungs are clear to auscultation bilaterally  CARDIOVASCULAR: Regular rate and rhythm, normal S1 and S2, no murmur   ABDOMEN: Nontender to palpation, normoactive bowel sounds   MUSCULOSKELETAL: no clubbing or cyanosis of digits; dressing noted around right foot   PSYCH: A+O to person, place, and time; affect appropriate  NEUROLOGY: no gross sensory deficits   SKIN: No rashes

## 2023-10-20 NOTE — BRIEF OPERATIVE NOTE - NSICDXBRIEFPREOP_GEN_ALL_CORE_FT
PRE-OP DIAGNOSIS:  PAD (peripheral artery disease) 19-Oct-2023 16:00:26  Rufus Chavira  
PRE-OP DIAGNOSIS:  Acute osteomyelitis 20-Oct-2023 08:46:19  Mima Maddox

## 2023-10-20 NOTE — PROGRESS NOTE ADULT - SUBJECTIVE AND OBJECTIVE BOX
[facility] - Rin Navarro, Jeffery Lugo, Gino Jackman  Office Number:  [phone]    Patient resting comfortably in bed in NAD.  Laying flat with no respiratory distress.  No complaints of chest pain, dyspnea, palpitations, PND, or orthopnea.    ROS: negative unless otherwise mentioned.    Telemetry:  off    MEDICATIONS  (STANDING):  amLODIPine   Tablet 10 milliGRAM(s) Oral daily  aspirin enteric coated 81 milliGRAM(s) Oral daily  atorvastatin 20 milliGRAM(s) Oral at bedtime  carvedilol 25 milliGRAM(s) Oral every 12 hours  cloNIDine 0.1 milliGRAM(s) Oral every 12 hours  clopidogrel Tablet 75 milliGRAM(s) Oral daily  dextrose 5%. 1000 milliLiter(s) (100 mL/Hr) IV Continuous <Continuous>  dextrose 5%. 1000 milliLiter(s) (50 mL/Hr) IV Continuous <Continuous>  dextrose 50% Injectable 25 Gram(s) IV Push once  dextrose 50% Injectable 12.5 Gram(s) IV Push once  dextrose 50% Injectable 25 Gram(s) IV Push once  gabapentin 100 milliGRAM(s) Oral every 12 hours  glucagon  Injectable 1 milliGRAM(s) IntraMuscular once  hydrochlorothiazide 25 milliGRAM(s) Oral daily  influenza  Vaccine (HIGH DOSE) 0.7 milliLiter(s) IntraMuscular once  insulin lispro (ADMELOG) corrective regimen sliding scale   SubCutaneous three times a day before meals  isosorbide   mononitrate ER Tablet (IMDUR) 60 milliGRAM(s) Oral daily  piperacillin/tazobactam IVPB.. 3.375 Gram(s) IV Intermittent every 8 hours  vancomycin  IVPB 1500 milliGRAM(s) IV Intermittent every 12 hours    MEDICATIONS  (PRN):  acetaminophen     Tablet .. 650 milliGRAM(s) Oral every 6 hours PRN Mild Pain (1 - 3)  dextrose Oral Gel 15 Gram(s) Oral once PRN Blood Glucose LESS THAN 70 milliGRAM(s)/deciliter  HYDROmorphone  Injectable 0.25 milliGRAM(s) IV Push every 10 minutes PRN Severe Pain (7 - 10)  morphine  - Injectable 2 milliGRAM(s) IV Push every 4 hours PRN Severe Pain (7 - 10)  ondansetron Injectable 4 milliGRAM(s) IV Push once PRN Nausea and/or Vomiting  oxycodone    5 mG/acetaminophen 325 mG 1 Tablet(s) Oral every 6 hours PRN Moderate Pain (4 - 6)  oxyCODONE    IR 5 milliGRAM(s) Oral once PRN Moderate Pain (4 - 6)      Allergies    No Known Allergies    Intolerances        Vital Signs Last 24 Hrs  T(C): 36.6 (20 Oct 2023 08:34), Max: 36.9 (19 Oct 2023 11:07)  T(F): 97.9 (20 Oct 2023 08:34), Max: 98.5 (19 Oct 2023 20:35)  HR: 64 (20 Oct 2023 08:40) (64 - 76)  BP: 118/59 (20 Oct 2023 08:40) (118/59 - 165/78)  BP(mean): 85 (20 Oct 2023 08:40) (81 - 123)  RR: 16 (20 Oct 2023 08:40) (15 - 20)  SpO2: 97% (20 Oct 2023 08:40) (95% - 98%)    Parameters below as of 20 Oct 2023 08:40  Patient On (Oxygen Delivery Method): nasal cannula  O2 Flow (L/min): 2      I&O's Summary    19 Oct 2023 07:01  -  20 Oct 2023 07:00  --------------------------------------------------------  IN: 150 mL / OUT: 1600 mL / NET: -1450 mL        ON EXAM:    General: NAD, awake and alert, oriented x 3  HEENT: Mucous membranes are moist, anicteric  Lungs: Non-labored, breath sounds are clear bilaterally, No wheezing, rales or rhonchi  Cardiovascular: Regular, S1 and S2, no murmurs, rubs, or gallops  Gastrointestinal: Bowel Sounds present, soft, nontender.   Lymph: No peripheral edema. No lymphadenopathy.  Skin: No rashes or ulcers  Psych:  Mood & affect appropriate    LABS: All Labs Reviewed:                        10.9   5.47  )-----------( 155      ( 20 Oct 2023 04:52 )             31.5                         11.1   5.46  )-----------( 149      ( 19 Oct 2023 04:51 )             32.1                         11.9   5.63  )-----------( 164      ( 18 Oct 2023 07:09 )             34.0     20 Oct 2023 04:52    140    |  104    |  17     ----------------------------<  143    3.7     |  25     |  1.26   19 Oct 2023 04:51    139    |  103    |  22     ----------------------------<  163    3.8     |  23     |  1.30   18 Oct 2023 07:05    141    |  103    |  22     ----------------------------<  145    3.7     |  22     |  1.24     Ca    9.1        20 Oct 2023 04:52  Ca    9.2        19 Oct 2023 04:51  Ca    9.3        18 Oct 2023 07:05  Phos  3.6       19 Oct 2023 04:51  Mg     1.5       19 Oct 2023 04:51    TPro  6.6    /  Alb  4.2    /  TBili  0.6    /  DBili  x      /  AST  9      /  ALT  6      /  AlkPhos  60     18 Oct 2023 07:05  TPro  6.8    /  Alb  4.4    /  TBili  0.8    /  DBili  x      /  AST  14     /  ALT  8      /  AlkPhos  67     17 Oct 2023 11:22    PT/INR - ( 20 Oct 2023 04:53 )   PT: 12.2 sec;   INR: 1.11 ratio         PTT - ( 20 Oct 2023 04:53 )  PTT:26.9 sec      Blood Culture: Organism --  Gram Stain Blood -- Gram Stain --  Specimen Source .Blood Blood  Culture-Blood --    Organism --  Gram Stain Blood -- Gram Stain --  Specimen Source .Blood Blood  Culture-Blood --    Organism Staphylococcus aureus  Gram Stain Blood -- Gram Stain   No polymorphonuclear cells seen per low power field  Few Gram positive cocci in pairs seen per oil power field  Specimen Source .Abscess right foot  Culture-Blood --

## 2023-10-20 NOTE — PROGRESS NOTE ADULT - ASSESSMENT
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     He has no evidence of volume overload, active ischemia or uncontrolled arrhythmia. EKG without evidence of ischemia.   Most recent TTE done last month shows preserved LV and RV function without evidence of pHTN.     #CAD s/p stenting, PAD s/p bypass. Currently asymptomatic from a cardiac standpoint.   - Continue ASA and Plavix   - Continue home BB  - Continue home anti-hypertensives, BPs well controlled  - Continue Lipitor, most recent LDL-c has been at goal   - He is optimized from a cardiac standpoint for planned procedure.     #OM:  - Abx as per primary (On Vanc, Zosyn). Will need close monitoring of renal function  - Planned for procedure today. No cardiac contraindication to proceeding.    Will continue to follow closely.  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     He has no evidence of volume overload, active ischemia or uncontrolled arrhythmia. EKG without evidence of ischemia.   Most recent TTE done last month shows preserved LV and RV function without evidence of pHTN.     #CAD s/p stenting, PAD s/p bypass. Currently asymptomatic from a cardiac standpoint.   - restart ASA and Plavix when no podiatric contraindication   - Continue home BB  - Continue home anti-hypertensives, BPs well controlled  - Continue Lipitor, most recent LDL-c has been at goal   - He is optimized from a cardiac standpoint for planned procedure.     #OM:  - Abx as per primary (On Vanc, Zosyn). Will need close monitoring of renal function  - Planned for procedure today. No cardiac contraindication to proceeding.    Will continue to follow closely.

## 2023-10-20 NOTE — DISCHARGE NOTE NURSING/CASE MANAGEMENT/SOCIAL WORK - PATIENT PORTAL LINK FT
You can access the FollowMyHealth Patient Portal offered by [facility] by registering at the following website: [email or web address]. By joining [facility]’s FollowMyHealth portal, you will also be able to view your health information using other applications (apps) compatible with our system.

## 2023-10-20 NOTE — BRIEF OPERATIVE NOTE - NSICDXBRIEFPROCEDURE_GEN_ALL_CORE_FT
PROCEDURES:  Angiogram, lower extremity, unilateral 19-Oct-2023 16:00:19  Rufus Chavira  
PROCEDURES:  Ray amputation of right third toe 20-Oct-2023 08:45:48  Mima Maddox

## 2023-10-21 LAB
CULTURE RESULTS: SIGNIFICANT CHANGE UP
CULTURE RESULTS: SIGNIFICANT CHANGE UP
NIGHT BLUE STAIN TISS: SIGNIFICANT CHANGE UP
NIGHT BLUE STAIN TISS: SIGNIFICANT CHANGE UP
ORGANISM # SPEC MICROSCOPIC CNT: SIGNIFICANT CHANGE UP
SPECIMEN SOURCE: SIGNIFICANT CHANGE UP

## 2023-10-22 LAB
CULTURE RESULTS: SIGNIFICANT CHANGE UP
SPECIMEN SOURCE: SIGNIFICANT CHANGE UP

## 2023-10-23 LAB
-  AMPICILLIN/SULBACTAM: SIGNIFICANT CHANGE UP
-  AMPICILLIN/SULBACTAM: SIGNIFICANT CHANGE UP
-  CEFAZOLIN: SIGNIFICANT CHANGE UP
-  CEFAZOLIN: SIGNIFICANT CHANGE UP
-  CLINDAMYCIN: SIGNIFICANT CHANGE UP
-  CLINDAMYCIN: SIGNIFICANT CHANGE UP
-  ERYTHROMYCIN: SIGNIFICANT CHANGE UP
-  ERYTHROMYCIN: SIGNIFICANT CHANGE UP
-  GENTAMICIN: SIGNIFICANT CHANGE UP
-  GENTAMICIN: SIGNIFICANT CHANGE UP
-  OXACILLIN: SIGNIFICANT CHANGE UP
-  OXACILLIN: SIGNIFICANT CHANGE UP
-  PENICILLIN: SIGNIFICANT CHANGE UP
-  PENICILLIN: SIGNIFICANT CHANGE UP
-  RIFAMPIN: SIGNIFICANT CHANGE UP
-  RIFAMPIN: SIGNIFICANT CHANGE UP
-  TETRACYCLINE: SIGNIFICANT CHANGE UP
-  TETRACYCLINE: SIGNIFICANT CHANGE UP
-  TRIMETHOPRIM/SULFAMETHOXAZOLE: SIGNIFICANT CHANGE UP
-  TRIMETHOPRIM/SULFAMETHOXAZOLE: SIGNIFICANT CHANGE UP
-  VANCOMYCIN: SIGNIFICANT CHANGE UP
-  VANCOMYCIN: SIGNIFICANT CHANGE UP
METHOD TYPE: SIGNIFICANT CHANGE UP
METHOD TYPE: SIGNIFICANT CHANGE UP

## 2023-10-25 LAB
CULTURE RESULTS: ABNORMAL
CULTURE RESULTS: ABNORMAL
GRAM STN FLD: ABNORMAL
GRAM STN FLD: ABNORMAL
ORGANISM # SPEC MICROSCOPIC CNT: ABNORMAL
SPECIMEN SOURCE: SIGNIFICANT CHANGE UP
SPECIMEN SOURCE: SIGNIFICANT CHANGE UP

## 2023-10-30 LAB
SURGICAL PATHOLOGY STUDY: SIGNIFICANT CHANGE UP
SURGICAL PATHOLOGY STUDY: SIGNIFICANT CHANGE UP

## 2023-11-01 ENCOUNTER — APPOINTMENT (OUTPATIENT)
Dept: VASCULAR SURGERY | Facility: CLINIC | Age: 73
End: 2023-11-01
Payer: MEDICARE

## 2023-11-01 VITALS
WEIGHT: 216 LBS | TEMPERATURE: 97.3 F | SYSTOLIC BLOOD PRESSURE: 158 MMHG | OXYGEN SATURATION: 99 % | HEIGHT: 68 IN | HEART RATE: 67 BPM | BODY MASS INDEX: 32.74 KG/M2 | DIASTOLIC BLOOD PRESSURE: 76 MMHG

## 2023-11-01 PROCEDURE — 99213 OFFICE O/P EST LOW 20 MIN: CPT

## 2023-11-01 RX ORDER — DULAGLUTIDE 1.5 MG/.5ML
1.5 INJECTION, SOLUTION SUBCUTANEOUS
Qty: 3 | Refills: 2 | Status: ACTIVE | COMMUNITY
Start: 2021-05-17 | End: 1900-01-01

## 2023-11-01 RX ORDER — BRIMONIDINE TARTRATE 1.5 MG/ML
0.15 SOLUTION/ DROPS OPHTHALMIC
Qty: 5 | Refills: 0 | Status: DISCONTINUED | COMMUNITY
Start: 2022-12-04 | End: 2023-11-01

## 2023-11-01 RX ORDER — BRIMONIDINE TARTRATE 2 MG/MG
0.2 SOLUTION/ DROPS OPHTHALMIC
Qty: 5 | Refills: 0 | Status: DISCONTINUED | COMMUNITY
Start: 2022-09-14 | End: 2023-11-01

## 2023-11-08 ENCOUNTER — OUTPATIENT (OUTPATIENT)
Dept: OUTPATIENT SERVICES | Facility: HOSPITAL | Age: 73
LOS: 1 days | End: 2023-11-08
Payer: MEDICARE

## 2023-11-08 ENCOUNTER — APPOINTMENT (OUTPATIENT)
Dept: WOUND CARE | Facility: CLINIC | Age: 73
End: 2023-11-08
Payer: MEDICARE

## 2023-11-08 DIAGNOSIS — I70.235 ATHEROSCLEROSIS OF NATIVE ARTERIES OF RIGHT LEG WITH ULCERATION OF OTHER PART OF FOOT: ICD-10-CM

## 2023-11-08 DIAGNOSIS — Z98.890 OTHER SPECIFIED POSTPROCEDURAL STATES: Chronic | ICD-10-CM

## 2023-11-08 DIAGNOSIS — L97.514 NON-PRESSURE CHRONIC ULCER OF OTHER PART OF RIGHT FOOT WITH NECROSIS OF BONE: ICD-10-CM

## 2023-11-08 PROCEDURE — G0463: CPT

## 2023-11-08 PROCEDURE — 99215 OFFICE O/P EST HI 40 MIN: CPT

## 2023-11-08 NOTE — PATIENT PROFILE ADULT. - TEACHING/LEARNING OTHER LEARNERS
[address] [facility] [place]  [address] [facility], [address] [place], [address]  Dept: [phone]  Dept [facility]:[phone]    Lidia Sutherland is a 64 y.o. female who presents today for her medical conditions/complaints as notedbelow. Lidia Sutherland is c/o of Diabetes (6 month f/u)      Assessment/Plan:     1. Type 2 diabetes mellitus without complication, without long-term current use of insulin (HCC)  -     POCT glycosylated hemoglobin (Hb A1C)  -     Microalbumin, Ur; Future  -     Comprehensive Metabolic Panel; Future  -     Lipid Panel; Future  -     Vitamin B12; Future  -     Magnesium; Future  2. Need for influenza vaccination  -     Influenza, FLUCELVAX, (age 10 mo+), IM, PF, 0.5 mL  3. Severe obesity (BMI 35.0-39. 9) with comorbidity ([address])  4. 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 Mixed hyperlipidemia  6. Primary hypertension  -     CBC with Auto Differential; Future  7. Medication monitoring encounter  -     Vitamin B12; Future  -     Magnesium; Future  -     TSH With Reflex Ft4; Future  8. Left arm cellulitis  -     cephALEXin (KEFLEX) 500 MG capsule; Take 2 capsules by mouth 2 times daily for 7 days, Disp-28 capsule, R-0Normal    Continue with the current Ozempic dosage. Diabetes is asymptomatic and very well controlled. Continue to work with her psychiatrist and encouraged to try the medication change as recommended in light of persistent symptoms. Hypertension and hyperlipidemia both asymptomatic well-controlled. labs ordered to ensure no adverse med effects    Cephalexin for the cellulitis today. Can also try discussing the naltrexone possibly for self cutting based on several small reports of help with this.     Lab Results   Component Value Date    WBC 5.3 02/23/2023    HGB 13.3 02/23/2023    HCT 41.3 02/23/2023    .0 02/23/2023    CHOL 277 (H) 02/23/2023    TRIG 106 02/23/2023    HDL 82 (H) 02/23/2023    ALT 25 02/23/2023    AST 25 02/23/2023    NA Have you had an allergic reaction to the flu (influenza) shot? no  Are you allergic to eggs or any component of the flu vaccine? no  Do you have a history of Guillain-Laura Syndrome (GBS), which is paralysis after receiving the flu vaccine? no  Are you feeling well today? yes  Flu vaccine given as ordered. Patient tolerated it well. No questions re: VIS information. After obtaining consent, and per orders of , injection of FLU VACCINE given in Left vastus lateralis by Cade Ivory MA. Patient tolerated well. Patient instructed to remain in clinic for 20 minutes afterwards, and to report any adverse reaction immediately. no spouse

## 2023-11-09 PROBLEM — I70.235 ATHEROSCLEROSIS OF NATIVE ARTERY OF RIGHT LOWER EXTREMITY WITH ULCERATION OF OTHER PART OF FOOT: Status: ACTIVE | Noted: 2019-12-27

## 2023-11-09 PROBLEM — L97.514: Status: ACTIVE | Noted: 2017-02-23

## 2023-11-13 DIAGNOSIS — L97.509 NON-PRESSURE CHRONIC ULCER OF OTHER PART OF UNSPECIFIED FOOT WITH UNSPECIFIED SEVERITY: ICD-10-CM

## 2023-11-13 DIAGNOSIS — E08.621 DIABETES MELLITUS DUE TO UNDERLYING CONDITION WITH FOOT ULCER: ICD-10-CM

## 2023-11-13 DIAGNOSIS — I73.9 PERIPHERAL VASCULAR DISEASE, UNSPECIFIED: ICD-10-CM

## 2023-11-13 DIAGNOSIS — L97.512 NON-PRESSURE CHRONIC ULCER OF OTHER PART OF RIGHT FOOT WITH FAT LAYER EXPOSED: ICD-10-CM

## 2023-11-13 DIAGNOSIS — I70.235 ATHEROSCLEROSIS OF NATIVE ARTERIES OF RIGHT LEG WITH ULCERATION OF OTHER PART OF FOOT: ICD-10-CM

## 2023-11-13 DIAGNOSIS — E11.51 TYPE 2 DIABETES MELLITUS WITH DIABETIC PERIPHERAL ANGIOPATHY WITHOUT GANGRENE: ICD-10-CM

## 2023-11-13 DIAGNOSIS — L97.514 NON-PRESSURE CHRONIC ULCER OF OTHER PART OF RIGHT FOOT WITH NECROSIS OF BONE: ICD-10-CM

## 2023-11-13 PROCEDURE — 99285 EMERGENCY DEPT VISIT HI MDM: CPT | Mod: 25

## 2023-11-13 PROCEDURE — 71045 X-RAY EXAM CHEST 1 VIEW: CPT

## 2023-11-13 PROCEDURE — 86850 RBC ANTIBODY SCREEN: CPT

## 2023-11-13 PROCEDURE — 87186 SC STD MICRODIL/AGAR DIL: CPT

## 2023-11-13 PROCEDURE — 93923 UPR/LXTR ART STDY 3+ LVLS: CPT

## 2023-11-13 PROCEDURE — 84100 ASSAY OF PHOSPHORUS: CPT

## 2023-11-13 PROCEDURE — 87206 SMEAR FLUORESCENT/ACID STAI: CPT

## 2023-11-13 PROCEDURE — 86900 BLOOD TYPING SEROLOGIC ABO: CPT

## 2023-11-13 PROCEDURE — 82330 ASSAY OF CALCIUM: CPT

## 2023-11-13 PROCEDURE — 85730 THROMBOPLASTIN TIME PARTIAL: CPT

## 2023-11-13 PROCEDURE — C1769: CPT

## 2023-11-13 PROCEDURE — 76000 FLUOROSCOPY <1 HR PHYS/QHP: CPT

## 2023-11-13 PROCEDURE — 80053 COMPREHEN METABOLIC PANEL: CPT

## 2023-11-13 PROCEDURE — 80048 BASIC METABOLIC PNL TOTAL CA: CPT

## 2023-11-13 PROCEDURE — A9585: CPT

## 2023-11-13 PROCEDURE — 85025 COMPLETE CBC W/AUTO DIFF WBC: CPT

## 2023-11-13 PROCEDURE — 85027 COMPLETE CBC AUTOMATED: CPT

## 2023-11-13 PROCEDURE — 82803 BLOOD GASES ANY COMBINATION: CPT

## 2023-11-13 PROCEDURE — 96365 THER/PROPH/DIAG IV INF INIT: CPT

## 2023-11-13 PROCEDURE — 84132 ASSAY OF SERUM POTASSIUM: CPT

## 2023-11-13 PROCEDURE — 85018 HEMOGLOBIN: CPT

## 2023-11-13 PROCEDURE — 73720 MRI LWR EXTREMITY W/O&W/DYE: CPT | Mod: MF

## 2023-11-13 PROCEDURE — 87070 CULTURE OTHR SPECIMN AEROBIC: CPT

## 2023-11-13 PROCEDURE — C1725: CPT

## 2023-11-13 PROCEDURE — 85652 RBC SED RATE AUTOMATED: CPT

## 2023-11-13 PROCEDURE — 85610 PROTHROMBIN TIME: CPT

## 2023-11-13 PROCEDURE — C1894: CPT

## 2023-11-13 PROCEDURE — 83036 HEMOGLOBIN GLYCOSYLATED A1C: CPT

## 2023-11-13 PROCEDURE — 82435 ASSAY OF BLOOD CHLORIDE: CPT

## 2023-11-13 PROCEDURE — C1760: CPT

## 2023-11-13 PROCEDURE — 82962 GLUCOSE BLOOD TEST: CPT

## 2023-11-13 PROCEDURE — 80202 ASSAY OF VANCOMYCIN: CPT

## 2023-11-13 PROCEDURE — 87040 BLOOD CULTURE FOR BACTERIA: CPT

## 2023-11-13 PROCEDURE — 86901 BLOOD TYPING SEROLOGIC RH(D): CPT

## 2023-11-13 PROCEDURE — G1004: CPT

## 2023-11-13 PROCEDURE — 96367 TX/PROPH/DG ADDL SEQ IV INF: CPT

## 2023-11-13 PROCEDURE — 84295 ASSAY OF SERUM SODIUM: CPT

## 2023-11-13 PROCEDURE — 36415 COLL VENOUS BLD VENIPUNCTURE: CPT

## 2023-11-13 PROCEDURE — 85014 HEMATOCRIT: CPT

## 2023-11-13 PROCEDURE — 88311 DECALCIFY TISSUE: CPT

## 2023-11-13 PROCEDURE — 87116 MYCOBACTERIA CULTURE: CPT

## 2023-11-13 PROCEDURE — 87015 SPECIMEN INFECT AGNT CONCNTJ: CPT

## 2023-11-13 PROCEDURE — 82947 ASSAY GLUCOSE BLOOD QUANT: CPT

## 2023-11-13 PROCEDURE — 73630 X-RAY EXAM OF FOOT: CPT

## 2023-11-13 PROCEDURE — 97161 PT EVAL LOW COMPLEX 20 MIN: CPT

## 2023-11-13 PROCEDURE — 86140 C-REACTIVE PROTEIN: CPT

## 2023-11-13 PROCEDURE — C1887: CPT

## 2023-11-13 PROCEDURE — 83735 ASSAY OF MAGNESIUM: CPT

## 2023-11-13 PROCEDURE — 87102 FUNGUS ISOLATION CULTURE: CPT

## 2023-11-13 PROCEDURE — 87075 CULTR BACTERIA EXCEPT BLOOD: CPT

## 2023-11-13 PROCEDURE — 88305 TISSUE EXAM BY PATHOLOGIST: CPT

## 2023-11-13 PROCEDURE — 87205 SMEAR GRAM STAIN: CPT

## 2023-11-13 PROCEDURE — 83605 ASSAY OF LACTIC ACID: CPT

## 2023-11-18 LAB
CULTURE RESULTS: SIGNIFICANT CHANGE UP
CULTURE RESULTS: SIGNIFICANT CHANGE UP
SPECIMEN SOURCE: SIGNIFICANT CHANGE UP
SPECIMEN SOURCE: SIGNIFICANT CHANGE UP

## 2023-11-19 NOTE — PATIENT PROFILE ADULT - DO YOU FEEL UNSAFE AT SCHOOL?
Patient reports fall this morning at 0800.  He reports a slip due to frost on shoes and patio, rolled onto left arm and wrist causing it to bend back.  Reports swelling has gotten worse throughout the day.  Noted swelling and contusion to posterior hand and lateral wrist.  Denies hitting head and denies LOC.   not applicable

## 2023-11-28 ENCOUNTER — OUTPATIENT (OUTPATIENT)
Dept: OUTPATIENT SERVICES | Facility: HOSPITAL | Age: 73
LOS: 1 days | End: 2023-11-28

## 2023-11-28 DIAGNOSIS — Z98.890 OTHER SPECIFIED POSTPROCEDURAL STATES: Chronic | ICD-10-CM

## 2023-11-29 ENCOUNTER — OUTPATIENT (OUTPATIENT)
Dept: OUTPATIENT SERVICES | Facility: HOSPITAL | Age: 73
LOS: 1 days | End: 2023-11-29
Payer: MEDICARE

## 2023-11-29 ENCOUNTER — APPOINTMENT (OUTPATIENT)
Dept: HYPERBARIC MEDICINE | Facility: CLINIC | Age: 73
End: 2023-11-29
Payer: MEDICARE

## 2023-11-29 VITALS
RESPIRATION RATE: 16 BRPM | DIASTOLIC BLOOD PRESSURE: 78 MMHG | HEART RATE: 88 BPM | OXYGEN SATURATION: 97 % | SYSTOLIC BLOOD PRESSURE: 139 MMHG

## 2023-11-29 VITALS
RESPIRATION RATE: 16 BRPM | DIASTOLIC BLOOD PRESSURE: 74 MMHG | TEMPERATURE: 98.1 F | SYSTOLIC BLOOD PRESSURE: 153 MMHG | OXYGEN SATURATION: 97 % | HEART RATE: 73 BPM

## 2023-11-29 DIAGNOSIS — E11.621 TYPE 2 DIABETES MELLITUS WITH FOOT ULCER: ICD-10-CM

## 2023-11-29 DIAGNOSIS — M86.9 OSTEOMYELITIS, UNSPECIFIED: ICD-10-CM

## 2023-11-29 DIAGNOSIS — Z98.890 OTHER SPECIFIED POSTPROCEDURAL STATES: Chronic | ICD-10-CM

## 2023-11-29 PROCEDURE — 82962 GLUCOSE BLOOD TEST: CPT

## 2023-11-29 PROCEDURE — G0277: CPT

## 2023-11-29 PROCEDURE — 99183 HYPERBARIC OXYGEN THERAPY: CPT

## 2023-11-30 ENCOUNTER — APPOINTMENT (OUTPATIENT)
Dept: HYPERBARIC MEDICINE | Facility: CLINIC | Age: 73
End: 2023-11-30
Payer: MEDICARE

## 2023-11-30 ENCOUNTER — OUTPATIENT (OUTPATIENT)
Dept: OUTPATIENT SERVICES | Facility: HOSPITAL | Age: 73
LOS: 1 days | End: 2023-11-30
Payer: MEDICARE

## 2023-11-30 VITALS
SYSTOLIC BLOOD PRESSURE: 138 MMHG | HEART RATE: 72 BPM | RESPIRATION RATE: 16 BRPM | TEMPERATURE: 97.6 F | DIASTOLIC BLOOD PRESSURE: 76 MMHG | OXYGEN SATURATION: 99 %

## 2023-11-30 VITALS
DIASTOLIC BLOOD PRESSURE: 80 MMHG | OXYGEN SATURATION: 100 % | RESPIRATION RATE: 16 BRPM | SYSTOLIC BLOOD PRESSURE: 145 MMHG | HEART RATE: 63 BPM

## 2023-11-30 DIAGNOSIS — Z98.890 OTHER SPECIFIED POSTPROCEDURAL STATES: Chronic | ICD-10-CM

## 2023-11-30 DIAGNOSIS — E11.621 TYPE 2 DIABETES MELLITUS WITH FOOT ULCER: ICD-10-CM

## 2023-11-30 DIAGNOSIS — M86.9 OSTEOMYELITIS, UNSPECIFIED: ICD-10-CM

## 2023-11-30 PROCEDURE — 99183 HYPERBARIC OXYGEN THERAPY: CPT

## 2023-11-30 PROCEDURE — G0277: CPT

## 2023-11-30 PROCEDURE — 82962 GLUCOSE BLOOD TEST: CPT

## 2023-12-01 ENCOUNTER — OUTPATIENT (OUTPATIENT)
Dept: OUTPATIENT SERVICES | Facility: HOSPITAL | Age: 73
LOS: 1 days | End: 2023-12-01
Payer: MEDICARE

## 2023-12-01 ENCOUNTER — APPOINTMENT (OUTPATIENT)
Dept: HYPERBARIC MEDICINE | Facility: CLINIC | Age: 73
End: 2023-12-01
Payer: MEDICARE

## 2023-12-01 VITALS
RESPIRATION RATE: 16 BRPM | TEMPERATURE: 97.2 F | HEART RATE: 64 BPM | DIASTOLIC BLOOD PRESSURE: 72 MMHG | SYSTOLIC BLOOD PRESSURE: 134 MMHG | OXYGEN SATURATION: 98 %

## 2023-12-01 VITALS
TEMPERATURE: 97.6 F | HEART RATE: 66 BPM | SYSTOLIC BLOOD PRESSURE: 147 MMHG | RESPIRATION RATE: 16 BRPM | OXYGEN SATURATION: 99 % | DIASTOLIC BLOOD PRESSURE: 71 MMHG

## 2023-12-01 DIAGNOSIS — E11.621 TYPE 2 DIABETES MELLITUS WITH FOOT ULCER: ICD-10-CM

## 2023-12-01 DIAGNOSIS — Z98.890 OTHER SPECIFIED POSTPROCEDURAL STATES: Chronic | ICD-10-CM

## 2023-12-01 DIAGNOSIS — M86.9 OSTEOMYELITIS, UNSPECIFIED: ICD-10-CM

## 2023-12-01 DIAGNOSIS — M79.606 PAIN IN LEG, UNSPECIFIED: ICD-10-CM

## 2023-12-01 PROCEDURE — 99183 HYPERBARIC OXYGEN THERAPY: CPT

## 2023-12-01 PROCEDURE — G0277: CPT

## 2023-12-01 PROCEDURE — 82962 GLUCOSE BLOOD TEST: CPT

## 2023-12-04 ENCOUNTER — OUTPATIENT (OUTPATIENT)
Dept: OUTPATIENT SERVICES | Facility: HOSPITAL | Age: 73
LOS: 1 days | End: 2023-12-04
Payer: MEDICARE

## 2023-12-04 ENCOUNTER — APPOINTMENT (OUTPATIENT)
Dept: HYPERBARIC MEDICINE | Facility: CLINIC | Age: 73
End: 2023-12-04
Payer: MEDICARE

## 2023-12-04 VITALS
HEART RATE: 75 BPM | SYSTOLIC BLOOD PRESSURE: 158 MMHG | OXYGEN SATURATION: 98 % | DIASTOLIC BLOOD PRESSURE: 82 MMHG | RESPIRATION RATE: 16 BRPM

## 2023-12-04 VITALS
DIASTOLIC BLOOD PRESSURE: 80 MMHG | TEMPERATURE: 97.8 F | HEART RATE: 74 BPM | OXYGEN SATURATION: 98 % | SYSTOLIC BLOOD PRESSURE: 172 MMHG | RESPIRATION RATE: 16 BRPM

## 2023-12-04 DIAGNOSIS — M79.606 PAIN IN LEG, UNSPECIFIED: ICD-10-CM

## 2023-12-04 PROCEDURE — 99183 HYPERBARIC OXYGEN THERAPY: CPT

## 2023-12-04 PROCEDURE — G0277: CPT

## 2023-12-04 PROCEDURE — 82962 GLUCOSE BLOOD TEST: CPT

## 2023-12-05 ENCOUNTER — OUTPATIENT (OUTPATIENT)
Dept: OUTPATIENT SERVICES | Facility: HOSPITAL | Age: 73
LOS: 1 days | End: 2023-12-05
Payer: MEDICARE

## 2023-12-05 ENCOUNTER — APPOINTMENT (OUTPATIENT)
Dept: HYPERBARIC MEDICINE | Facility: CLINIC | Age: 73
End: 2023-12-05
Payer: MEDICARE

## 2023-12-05 VITALS
RESPIRATION RATE: 16 BRPM | OXYGEN SATURATION: 100 % | DIASTOLIC BLOOD PRESSURE: 65 MMHG | HEART RATE: 69 BPM | SYSTOLIC BLOOD PRESSURE: 129 MMHG

## 2023-12-05 VITALS
HEART RATE: 71 BPM | DIASTOLIC BLOOD PRESSURE: 68 MMHG | RESPIRATION RATE: 16 BRPM | TEMPERATURE: 97.7 F | SYSTOLIC BLOOD PRESSURE: 143 MMHG | OXYGEN SATURATION: 100 %

## 2023-12-05 DIAGNOSIS — E11.621 TYPE 2 DIABETES MELLITUS WITH FOOT ULCER: ICD-10-CM

## 2023-12-05 DIAGNOSIS — M86.9 OSTEOMYELITIS, UNSPECIFIED: ICD-10-CM

## 2023-12-05 DIAGNOSIS — M79.606 PAIN IN LEG, UNSPECIFIED: ICD-10-CM

## 2023-12-05 PROCEDURE — 82962 GLUCOSE BLOOD TEST: CPT

## 2023-12-05 PROCEDURE — 99183 HYPERBARIC OXYGEN THERAPY: CPT

## 2023-12-05 PROCEDURE — G0277: CPT

## 2023-12-06 ENCOUNTER — APPOINTMENT (OUTPATIENT)
Dept: HYPERBARIC MEDICINE | Facility: CLINIC | Age: 73
End: 2023-12-06
Payer: MEDICARE

## 2023-12-06 ENCOUNTER — OUTPATIENT (OUTPATIENT)
Dept: OUTPATIENT SERVICES | Facility: HOSPITAL | Age: 73
LOS: 1 days | End: 2023-12-06
Payer: MEDICARE

## 2023-12-06 VITALS
OXYGEN SATURATION: 99 % | DIASTOLIC BLOOD PRESSURE: 72 MMHG | HEART RATE: 67 BPM | RESPIRATION RATE: 16 BRPM | SYSTOLIC BLOOD PRESSURE: 122 MMHG

## 2023-12-06 VITALS
SYSTOLIC BLOOD PRESSURE: 130 MMHG | DIASTOLIC BLOOD PRESSURE: 70 MMHG | OXYGEN SATURATION: 99 % | HEART RATE: 70 BPM | TEMPERATURE: 97.3 F | RESPIRATION RATE: 16 BRPM

## 2023-12-06 DIAGNOSIS — Z98.890 OTHER SPECIFIED POSTPROCEDURAL STATES: Chronic | ICD-10-CM

## 2023-12-06 DIAGNOSIS — M86.9 OSTEOMYELITIS, UNSPECIFIED: ICD-10-CM

## 2023-12-06 DIAGNOSIS — E11.621 TYPE 2 DIABETES MELLITUS WITH FOOT ULCER: ICD-10-CM

## 2023-12-06 DIAGNOSIS — M79.606 PAIN IN LEG, UNSPECIFIED: ICD-10-CM

## 2023-12-06 PROCEDURE — 99183 HYPERBARIC OXYGEN THERAPY: CPT

## 2023-12-06 PROCEDURE — G0277: CPT

## 2023-12-06 PROCEDURE — 82962 GLUCOSE BLOOD TEST: CPT

## 2023-12-07 ENCOUNTER — APPOINTMENT (OUTPATIENT)
Dept: HYPERBARIC MEDICINE | Facility: CLINIC | Age: 73
End: 2023-12-07
Payer: MEDICARE

## 2023-12-07 ENCOUNTER — OUTPATIENT (OUTPATIENT)
Dept: OUTPATIENT SERVICES | Facility: HOSPITAL | Age: 73
LOS: 1 days | End: 2023-12-07
Payer: MEDICARE

## 2023-12-07 VITALS
HEART RATE: 73 BPM | TEMPERATURE: 97.9 F | SYSTOLIC BLOOD PRESSURE: 171 MMHG | OXYGEN SATURATION: 99 % | DIASTOLIC BLOOD PRESSURE: 76 MMHG | RESPIRATION RATE: 16 BRPM

## 2023-12-07 VITALS
HEART RATE: 70 BPM | DIASTOLIC BLOOD PRESSURE: 74 MMHG | RESPIRATION RATE: 16 BRPM | OXYGEN SATURATION: 99 % | SYSTOLIC BLOOD PRESSURE: 143 MMHG

## 2023-12-07 DIAGNOSIS — M79.606 PAIN IN LEG, UNSPECIFIED: ICD-10-CM

## 2023-12-07 DIAGNOSIS — M86.9 OSTEOMYELITIS, UNSPECIFIED: ICD-10-CM

## 2023-12-07 DIAGNOSIS — E11.621 TYPE 2 DIABETES MELLITUS WITH FOOT ULCER: ICD-10-CM

## 2023-12-07 PROCEDURE — 99183 HYPERBARIC OXYGEN THERAPY: CPT

## 2023-12-07 PROCEDURE — 82962 GLUCOSE BLOOD TEST: CPT

## 2023-12-07 PROCEDURE — G0277: CPT

## 2023-12-08 ENCOUNTER — APPOINTMENT (OUTPATIENT)
Dept: HYPERBARIC MEDICINE | Facility: CLINIC | Age: 73
End: 2023-12-08
Payer: MEDICARE

## 2023-12-08 ENCOUNTER — OUTPATIENT (OUTPATIENT)
Dept: OUTPATIENT SERVICES | Facility: HOSPITAL | Age: 73
LOS: 1 days | End: 2023-12-08
Payer: MEDICARE

## 2023-12-08 VITALS
OXYGEN SATURATION: 99 % | RESPIRATION RATE: 16 BRPM | DIASTOLIC BLOOD PRESSURE: 66 MMHG | HEART RATE: 75 BPM | SYSTOLIC BLOOD PRESSURE: 148 MMHG | TEMPERATURE: 98.3 F

## 2023-12-08 VITALS
SYSTOLIC BLOOD PRESSURE: 144 MMHG | RESPIRATION RATE: 16 BRPM | OXYGEN SATURATION: 99 % | DIASTOLIC BLOOD PRESSURE: 79 MMHG | HEART RATE: 76 BPM

## 2023-12-08 DIAGNOSIS — M79.606 PAIN IN LEG, UNSPECIFIED: ICD-10-CM

## 2023-12-08 DIAGNOSIS — E11.621 TYPE 2 DIABETES MELLITUS WITH FOOT ULCER: ICD-10-CM

## 2023-12-08 DIAGNOSIS — Z98.890 OTHER SPECIFIED POSTPROCEDURAL STATES: Chronic | ICD-10-CM

## 2023-12-08 DIAGNOSIS — M86.9 OSTEOMYELITIS, UNSPECIFIED: ICD-10-CM

## 2023-12-08 PROCEDURE — 99183 HYPERBARIC OXYGEN THERAPY: CPT

## 2023-12-08 PROCEDURE — 82962 GLUCOSE BLOOD TEST: CPT

## 2023-12-08 PROCEDURE — G0277: CPT

## 2023-12-11 ENCOUNTER — APPOINTMENT (OUTPATIENT)
Dept: HYPERBARIC MEDICINE | Facility: CLINIC | Age: 73
End: 2023-12-11
Payer: MEDICARE

## 2023-12-11 ENCOUNTER — OUTPATIENT (OUTPATIENT)
Dept: OUTPATIENT SERVICES | Facility: HOSPITAL | Age: 73
LOS: 1 days | End: 2023-12-11
Payer: MEDICARE

## 2023-12-11 ENCOUNTER — APPOINTMENT (OUTPATIENT)
Dept: WOUND CARE | Facility: HOSPITAL | Age: 73
End: 2023-12-11
Payer: MEDICARE

## 2023-12-11 ENCOUNTER — OUTPATIENT (OUTPATIENT)
Dept: OUTPATIENT SERVICES | Facility: HOSPITAL | Age: 73
LOS: 1 days | End: 2023-12-11

## 2023-12-11 VITALS
HEART RATE: 80 BPM | RESPIRATION RATE: 16 BRPM | OXYGEN SATURATION: 99 % | TEMPERATURE: 98.4 F | DIASTOLIC BLOOD PRESSURE: 80 MMHG | SYSTOLIC BLOOD PRESSURE: 160 MMHG

## 2023-12-11 VITALS
SYSTOLIC BLOOD PRESSURE: 156 MMHG | HEART RATE: 69 BPM | RESPIRATION RATE: 16 BRPM | DIASTOLIC BLOOD PRESSURE: 87 MMHG | OXYGEN SATURATION: 99 %

## 2023-12-11 DIAGNOSIS — L97.524 NON-PRESSURE CHRONIC ULCER OF OTHER PART OF LEFT FOOT WITH NECROSIS OF BONE: ICD-10-CM

## 2023-12-11 DIAGNOSIS — L97.512 NON-PRESSURE CHRONIC ULCER OF OTHER PART OF RIGHT FOOT WITH FAT LAYER EXPOSED: ICD-10-CM

## 2023-12-11 DIAGNOSIS — Z98.890 OTHER SPECIFIED POSTPROCEDURAL STATES: Chronic | ICD-10-CM

## 2023-12-11 DIAGNOSIS — M86.9 OSTEOMYELITIS, UNSPECIFIED: ICD-10-CM

## 2023-12-11 DIAGNOSIS — M79.606 PAIN IN LEG, UNSPECIFIED: ICD-10-CM

## 2023-12-11 DIAGNOSIS — E11.621 TYPE 2 DIABETES MELLITUS WITH FOOT ULCER: ICD-10-CM

## 2023-12-11 PROCEDURE — G0463: CPT

## 2023-12-11 PROCEDURE — 99183 HYPERBARIC OXYGEN THERAPY: CPT

## 2023-12-11 PROCEDURE — 82962 GLUCOSE BLOOD TEST: CPT

## 2023-12-11 PROCEDURE — G0277: CPT

## 2023-12-11 PROCEDURE — ZZZZZ: CPT

## 2023-12-12 ENCOUNTER — OUTPATIENT (OUTPATIENT)
Dept: OUTPATIENT SERVICES | Facility: HOSPITAL | Age: 73
LOS: 1 days | End: 2023-12-12
Payer: MEDICARE

## 2023-12-12 ENCOUNTER — APPOINTMENT (OUTPATIENT)
Dept: HYPERBARIC MEDICINE | Facility: CLINIC | Age: 73
End: 2023-12-12
Payer: MEDICARE

## 2023-12-12 VITALS
DIASTOLIC BLOOD PRESSURE: 72 MMHG | HEART RATE: 78 BPM | RESPIRATION RATE: 16 BRPM | OXYGEN SATURATION: 99 % | SYSTOLIC BLOOD PRESSURE: 156 MMHG | TEMPERATURE: 98.2 F

## 2023-12-12 VITALS
HEART RATE: 67 BPM | SYSTOLIC BLOOD PRESSURE: 138 MMHG | DIASTOLIC BLOOD PRESSURE: 82 MMHG | OXYGEN SATURATION: 99 % | RESPIRATION RATE: 16 BRPM

## 2023-12-12 DIAGNOSIS — Z98.890 OTHER SPECIFIED POSTPROCEDURAL STATES: Chronic | ICD-10-CM

## 2023-12-12 DIAGNOSIS — M86.9 OSTEOMYELITIS, UNSPECIFIED: ICD-10-CM

## 2023-12-12 DIAGNOSIS — M79.606 PAIN IN LEG, UNSPECIFIED: ICD-10-CM

## 2023-12-12 DIAGNOSIS — E11.621 TYPE 2 DIABETES MELLITUS WITH FOOT ULCER: ICD-10-CM

## 2023-12-12 PROCEDURE — 82962 GLUCOSE BLOOD TEST: CPT

## 2023-12-12 PROCEDURE — G0277: CPT

## 2023-12-12 PROCEDURE — 99183 HYPERBARIC OXYGEN THERAPY: CPT

## 2023-12-13 ENCOUNTER — APPOINTMENT (OUTPATIENT)
Dept: HYPERBARIC MEDICINE | Facility: CLINIC | Age: 73
End: 2023-12-13
Payer: MEDICARE

## 2023-12-13 ENCOUNTER — OUTPATIENT (OUTPATIENT)
Dept: OUTPATIENT SERVICES | Facility: HOSPITAL | Age: 73
LOS: 1 days | End: 2023-12-13

## 2023-12-13 VITALS
DIASTOLIC BLOOD PRESSURE: 81 MMHG | HEART RATE: 77 BPM | SYSTOLIC BLOOD PRESSURE: 149 MMHG | OXYGEN SATURATION: 98 % | TEMPERATURE: 98.3 F | RESPIRATION RATE: 16 BRPM

## 2023-12-13 VITALS
RESPIRATION RATE: 16 BRPM | DIASTOLIC BLOOD PRESSURE: 82 MMHG | OXYGEN SATURATION: 98 % | HEART RATE: 73 BPM | SYSTOLIC BLOOD PRESSURE: 146 MMHG

## 2023-12-13 DIAGNOSIS — M79.606 PAIN IN LEG, UNSPECIFIED: ICD-10-CM

## 2023-12-13 DIAGNOSIS — Z98.890 OTHER SPECIFIED POSTPROCEDURAL STATES: Chronic | ICD-10-CM

## 2023-12-13 PROCEDURE — 99183 HYPERBARIC OXYGEN THERAPY: CPT

## 2023-12-13 NOTE — PROCEDURE
[Outpatient] : Outpatient [Ambulatory] : Patient is ambulatory. [THIS CHAMBER HAS BEEN CLEANED / DISINFECTED] : This chamber has been cleaned / disinfected according to local and hospital policy and procedure prior to this treatment. [____] : Post-Dive: Time - [unfilled] [___] : Post-Dive: Value - [unfilled] mg/dL [I have examined and evaluated this patient today, including ears and lungs and have cleared the patient] : I have examined and evaluated this patient today, including ears and lungs and have cleared the patient to continue HBOT as prescribed.  [Time MD/Provider assessed Patient: _____] : Time MD/Provider assessed Patient: [unfilled] [I have ordered 1 HBOT session at the indicated protocol as seen below] : I have ordered 1 HBOT session at the indicated protocol as seen below [Patient demonstrated and verbalized proper technique for using air break mask] : Patient demonstrated and verbalized proper technique for using air break mask [Patient educated on the risks of SMOKING prior to HBOT with understanding] : Patient educated on the risks of SMOKING prior to HBOT with understanding [Patient educated on the risks of CONSUMING ALCOHOL prior to HBOT with understanding] : Patient educated on the risks of CONSUMING ALCOHOL prior to HBOT with understanding [100% Cotton] : 100% cotton [Empty all pockets] : empty all pockets [No hair oils, wigs, hairpieces, pins] : no hair oils, wigs, hairpieces, pins  [Pre tx medications] : pre tx medications  [No make-up, creams] : no make-up, creams  [No jewelry] : no jewelry  [No matches, cigarettes, lighters] : no matches, cigarettes, lighters  [Hearing aid removed] : hearing aid removed [Dentures removed] : dentures removed [Ground bracelet on pt's wrist] : ground bracelet on pt's wrist  [Contacts removed] : contacts removed  [Remove nail polish] : remove nail polish  [No reading material] : no reading material  [Bra, undergarments removed] : bra, undergarments removed  [No contraindicated dressings] : no contraindicated dressings [Ground Wire - VISUAL Verification - Intact/Free of Obstruction] : Ground Wire - VISUAL Verification - Intact/Free of Obstruction  [Ground Continuity - Verified < 1 ohm w/ Wrist Strap Sheng] : Ground Continuity - Verified < 1 ohm w/ Wrist Strap Sheng [Diagnosis: ___] : Diagnosis: [unfilled] [Number: ___] : Number: [unfilled] [Clear all fields] : clear all fields [] : No [FreeTextEntry1] : 2.0 sabina [FreeTextEntry3] : 90 min [FreeTextEntry5] : 3328 [FreeTextEntry7] : 3034 [[facility] HeightstEntry9] : 3220 [de-identified] : 1699 [de-identified] : 110 min

## 2023-12-13 NOTE — ADDENDUM
[FreeTextEntry1] : Pt descended to 2.0 MARIMAR @ 1.5 PSI/min without incident in chamber #4 Pt resting @ depth with chest rise and fall observed throughout tx.  Pt ascended from 2.0 MARIMAR @ 1.5 PSI/min without incident.  Pt tolerated tx well.

## 2023-12-14 ENCOUNTER — APPOINTMENT (OUTPATIENT)
Dept: HYPERBARIC MEDICINE | Facility: CLINIC | Age: 73
End: 2023-12-14
Payer: MEDICARE

## 2023-12-14 ENCOUNTER — OUTPATIENT (OUTPATIENT)
Dept: OUTPATIENT SERVICES | Facility: HOSPITAL | Age: 73
LOS: 1 days | End: 2023-12-14
Payer: MEDICARE

## 2023-12-14 VITALS
TEMPERATURE: 98 F | HEART RATE: 68 BPM | OXYGEN SATURATION: 97 % | DIASTOLIC BLOOD PRESSURE: 74 MMHG | SYSTOLIC BLOOD PRESSURE: 150 MMHG | RESPIRATION RATE: 16 BRPM

## 2023-12-14 VITALS
RESPIRATION RATE: 16 BRPM | OXYGEN SATURATION: 97 % | HEART RATE: 70 BPM | SYSTOLIC BLOOD PRESSURE: 146 MMHG | DIASTOLIC BLOOD PRESSURE: 77 MMHG

## 2023-12-14 DIAGNOSIS — M79.606 PAIN IN LEG, UNSPECIFIED: ICD-10-CM

## 2023-12-14 PROCEDURE — G0277: CPT

## 2023-12-14 PROCEDURE — 99183 HYPERBARIC OXYGEN THERAPY: CPT

## 2023-12-14 PROCEDURE — 82962 GLUCOSE BLOOD TEST: CPT

## 2023-12-14 NOTE — PROCEDURE
[Outpatient] : Outpatient [Ambulatory] : Patient is ambulatory. [THIS CHAMBER HAS BEEN CLEANED / DISINFECTED] : This chamber has been cleaned / disinfected according to local and hospital policy and procedure prior to this treatment. [____] : Post-Dive: Time - [unfilled] [___] : Post-Dive: Value - [unfilled] mg/dL [I have examined and evaluated this patient today, including ears and lungs and have cleared the patient] : I have examined and evaluated this patient today, including ears and lungs and have cleared the patient to continue HBOT as prescribed.  [Time MD/Provider assessed Patient: _____] : Time MD/Provider assessed Patient: [unfilled] [I have ordered 1 HBOT session at the indicated protocol as seen below] : I have ordered 1 HBOT session at the indicated protocol as seen below [Patient demonstrated and verbalized proper technique for using air break mask] : Patient demonstrated and verbalized proper technique for using air break mask [Patient educated on the risks of SMOKING prior to HBOT with understanding] : Patient educated on the risks of SMOKING prior to HBOT with understanding [Patient educated on the risks of CONSUMING ALCOHOL prior to HBOT with understanding] : Patient educated on the risks of CONSUMING ALCOHOL prior to HBOT with understanding [100% Cotton] : 100% cotton [Empty all pockets] : empty all pockets [No hair oils, wigs, hairpieces, pins] : no hair oils, wigs, hairpieces, pins  [Pre tx medications] : pre tx medications  [No make-up, creams] : no make-up, creams  [No jewelry] : no jewelry  [No matches, cigarettes, lighters] : no matches, cigarettes, lighters  [Hearing aid removed] : hearing aid removed [Dentures removed] : dentures removed [Ground bracelet on pt's wrist] : ground bracelet on pt's wrist  [Contacts removed] : contacts removed  [Remove nail polish] : remove nail polish  [No reading material] : no reading material  [Bra, undergarments removed] : bra, undergarments removed  [No contraindicated dressings] : no contraindicated dressings [Ground Wire - VISUAL Verification - Intact/Free of Obstruction] : Ground Wire - VISUAL Verification - Intact/Free of Obstruction  [Ground Continuity - Verified < 1 ohm w/ Wrist Strap Sheng] : Ground Continuity - Verified < 1 ohm w/ Wrist Strap Sheng [Diagnosis: ___] : Diagnosis: [unfilled] [Number: ___] : Number: [unfilled] [Clear all fields] : clear all fields [] : No [FreeTextEntry1] : 2.0 sabina [FreeTextEntry3] : 90 min [FreeTextEntry5] : 1028 [FreeTextEntry7] : 1164 [FreeTextEntry9] : 2453 [de-identified] : 6922 [de-identified] : 110 min

## 2023-12-15 ENCOUNTER — APPOINTMENT (OUTPATIENT)
Dept: HYPERBARIC MEDICINE | Facility: CLINIC | Age: 73
End: 2023-12-15
Payer: MEDICARE

## 2023-12-15 ENCOUNTER — OUTPATIENT (OUTPATIENT)
Dept: OUTPATIENT SERVICES | Facility: HOSPITAL | Age: 73
LOS: 1 days | End: 2023-12-15
Payer: MEDICARE

## 2023-12-15 VITALS
RESPIRATION RATE: 16 BRPM | SYSTOLIC BLOOD PRESSURE: 162 MMHG | HEART RATE: 70 BPM | OXYGEN SATURATION: 100 % | DIASTOLIC BLOOD PRESSURE: 83 MMHG

## 2023-12-15 VITALS
RESPIRATION RATE: 16 BRPM | OXYGEN SATURATION: 100 % | SYSTOLIC BLOOD PRESSURE: 134 MMHG | HEART RATE: 74 BPM | DIASTOLIC BLOOD PRESSURE: 75 MMHG | TEMPERATURE: 98.3 F

## 2023-12-15 DIAGNOSIS — Z98.890 OTHER SPECIFIED POSTPROCEDURAL STATES: Chronic | ICD-10-CM

## 2023-12-15 DIAGNOSIS — L97.509 NON-PRESSURE CHRONIC ULCER OF OTHER PART OF UNSPECIFIED FOOT WITH UNSPECIFIED SEVERITY: ICD-10-CM

## 2023-12-15 DIAGNOSIS — M79.606 PAIN IN LEG, UNSPECIFIED: ICD-10-CM

## 2023-12-15 DIAGNOSIS — E11.51 TYPE 2 DIABETES MELLITUS WITH DIABETIC PERIPHERAL ANGIOPATHY WITHOUT GANGRENE: ICD-10-CM

## 2023-12-15 DIAGNOSIS — L97.512 NON-PRESSURE CHRONIC ULCER OF OTHER PART OF RIGHT FOOT WITH FAT LAYER EXPOSED: ICD-10-CM

## 2023-12-15 DIAGNOSIS — E08.621 DIABETES MELLITUS DUE TO UNDERLYING CONDITION WITH FOOT ULCER: ICD-10-CM

## 2023-12-15 DIAGNOSIS — L97.524 NON-PRESSURE CHRONIC ULCER OF OTHER PART OF LEFT FOOT WITH NECROSIS OF BONE: ICD-10-CM

## 2023-12-15 PROCEDURE — 99183 HYPERBARIC OXYGEN THERAPY: CPT

## 2023-12-15 PROCEDURE — G0277: CPT

## 2023-12-15 PROCEDURE — 82962 GLUCOSE BLOOD TEST: CPT

## 2023-12-15 NOTE — ASSESSMENT
[No change from previous assessment] : No change from previous assessment [Patient prepared for dive] : Patient prepared for dive [Patient undergoing HBO treatment for __________] : Patient undergoing HBO treatment for [unfilled] [Patient descended without problem for 9 minutes] : Patient descended without problem for 9 minutes [No dizziness or thirst] :  No dizziness or thirst [No ear problems] : No ear problems [Vital signs stable] : Vital signs stable [Tolerating dive well] : Tolerating dive well [No Chest Pain, shortness of breath] : No Chest Pain, shortness of breath Was A Bandage Applied: Yes [Respiratory Rate Stable] : Respiratory Rate Stable Hide Topical Anesthesia?: No [No chest pain, shortness of breath, or ear pain] :  No chest pain, shortness of breath, or ear pain  Dressing: bandage [Tolerated Ascent well] : Tolerated Ascent well Anesthesia Volume In Cc (Will Not Render If 0): 0.5 [Vital Signs stable] : Vital Signs stable Type Of Destruction Used: Curettage [A physician was present throughout the entire HBOT] : A physician was present throughout the entire HBOT Electrodesiccation And Curettage Text: The wound bed was treated with electrodesiccation and curettage after the biopsy was performed. [No] : No Billing Type: Third-Party Bill [Clinically Stable] : Clinically stable [Continue Treatment Plan] : Continue treatment plan Lab: 19 Curettage Text: The wound bed was treated with curettage after the biopsy was performed. Detail Level: Detailed Biopsy Method: Dermablade Hemostasis: Drysol Silver Nitrate Text: The wound bed was treated with silver nitrate after the biopsy was performed. Additional Anesthesia Volume In Cc (Will Not Render If 0): 0 Post-Care Instructions: I reviewed with the patient in detail post-care instructions. Patient is to keep the biopsy site dry overnight, and then apply bacitracin twice daily until healed. Patient may apply hydrogen peroxide soaks to remove any crusting. Cryotherapy Text: The wound bed was treated with cryotherapy after the biopsy was performed. Wound Care: Petrolatum Anesthesia Type: 1% lidocaine with epinephrine Biopsy Type: H and E Consent: Written consent was obtained and risks were reviewed including but not limited to scarring, infection, bleeding, scabbing, incomplete removal, nerve damage and allergy to anesthesia. Notification Instructions: Patient will be notified of biopsy results. However, patient instructed to call the office if not contacted within 2 weeks. Electrodesiccation Text: The wound bed was treated with electrodesiccation after the biopsy was performed. Depth Of Biopsy: dermis

## 2023-12-18 ENCOUNTER — APPOINTMENT (OUTPATIENT)
Dept: HYPERBARIC MEDICINE | Facility: CLINIC | Age: 73
End: 2023-12-18
Payer: MEDICARE

## 2023-12-18 ENCOUNTER — OUTPATIENT (OUTPATIENT)
Dept: OUTPATIENT SERVICES | Facility: HOSPITAL | Age: 73
LOS: 1 days | End: 2023-12-18
Payer: MEDICARE

## 2023-12-18 VITALS
SYSTOLIC BLOOD PRESSURE: 131 MMHG | DIASTOLIC BLOOD PRESSURE: 68 MMHG | HEART RATE: 67 BPM | RESPIRATION RATE: 16 BRPM | OXYGEN SATURATION: 98 %

## 2023-12-18 VITALS
HEART RATE: 70 BPM | SYSTOLIC BLOOD PRESSURE: 149 MMHG | DIASTOLIC BLOOD PRESSURE: 84 MMHG | TEMPERATURE: 98.2 F | OXYGEN SATURATION: 98 % | RESPIRATION RATE: 16 BRPM

## 2023-12-18 DIAGNOSIS — Z98.890 OTHER SPECIFIED POSTPROCEDURAL STATES: Chronic | ICD-10-CM

## 2023-12-18 DIAGNOSIS — M79.606 PAIN IN LEG, UNSPECIFIED: ICD-10-CM

## 2023-12-18 PROCEDURE — 82962 GLUCOSE BLOOD TEST: CPT

## 2023-12-18 PROCEDURE — G0277: CPT

## 2023-12-18 PROCEDURE — 99183 HYPERBARIC OXYGEN THERAPY: CPT

## 2023-12-18 NOTE — PROCEDURE
[Outpatient] : Outpatient [Ambulatory] : Patient is ambulatory. [THIS CHAMBER HAS BEEN CLEANED / DISINFECTED] : This chamber has been cleaned / disinfected according to local and hospital policy and procedure prior to this treatment. [____] : Post-Dive: Time - [unfilled] [___] : Post-Dive: Value - [unfilled] mg/dL [I have examined and evaluated this patient today, including ears and lungs and have cleared the patient] : I have examined and evaluated this patient today, including ears and lungs and have cleared the patient to continue HBOT as prescribed.  [Time MD/Provider assessed Patient: _____] : Time MD/Provider assessed Patient: [unfilled] [I have ordered 1 HBOT session at the indicated protocol as seen below] : I have ordered 1 HBOT session at the indicated protocol as seen below [Patient demonstrated and verbalized proper technique for using air break mask] : Patient demonstrated and verbalized proper technique for using air break mask [Patient educated on the risks of SMOKING prior to HBOT with understanding] : Patient educated on the risks of SMOKING prior to HBOT with understanding [Patient educated on the risks of CONSUMING ALCOHOL prior to HBOT with understanding] : Patient educated on the risks of CONSUMING ALCOHOL prior to HBOT with understanding [100% Cotton] : 100% cotton [Empty all pockets] : empty all pockets [No hair oils, wigs, hairpieces, pins] : no hair oils, wigs, hairpieces, pins  [Pre tx medications] : pre tx medications  [No make-up, creams] : no make-up, creams  [No jewelry] : no jewelry  [No matches, cigarettes, lighters] : no matches, cigarettes, lighters  [Hearing aid removed] : hearing aid removed [Dentures removed] : dentures removed [Ground bracelet on pt's wrist] : ground bracelet on pt's wrist  [Contacts removed] : contacts removed  [Remove nail polish] : remove nail polish  [No reading material] : no reading material  [Bra, undergarments removed] : bra, undergarments removed  [No contraindicated dressings] : no contraindicated dressings [Ground Wire - VISUAL Verification - Intact/Free of Obstruction] : Ground Wire - VISUAL Verification - Intact/Free of Obstruction  [Ground Continuity - Verified < 1 ohm w/ Wrist Strap Sheng] : Ground Continuity - Verified < 1 ohm w/ Wrist Strap Sheng [Diagnosis: ___] : Diagnosis: [unfilled] [Number: ___] : Number: [unfilled] [Clear all fields] : clear all fields [] : No [FreeTextEntry1] : 2.0 sabina [FreeTextEntry3] : 90 min [FreeTextEntry5] : 1036 [FreeTextEntry7] : 1049 [FreeTextEntry9] : 1211 [de-identified] : 6347 [de-identified] : 110 min

## 2023-12-18 NOTE — PROCEDURE
[Outpatient] : Outpatient [Ambulatory] : Patient is ambulatory. [THIS CHAMBER HAS BEEN CLEANED / DISINFECTED] : This chamber has been cleaned / disinfected according to local and hospital policy and procedure prior to this treatment. [____] : Post-Dive: Time - [unfilled] [___] : Post-Dive: Value - [unfilled] mg/dL [I have examined and evaluated this patient today, including ears and lungs and have cleared the patient] : I have examined and evaluated this patient today, including ears and lungs and have cleared the patient to continue HBOT as prescribed.  [Time MD/Provider assessed Patient: _____] : Time MD/Provider assessed Patient: [unfilled] [I have ordered 1 HBOT session at the indicated protocol as seen below] : I have ordered 1 HBOT session at the indicated protocol as seen below [Patient demonstrated and verbalized proper technique for using air break mask] : Patient demonstrated and verbalized proper technique for using air break mask [Patient educated on the risks of SMOKING prior to HBOT with understanding] : Patient educated on the risks of SMOKING prior to HBOT with understanding [Patient educated on the risks of CONSUMING ALCOHOL prior to HBOT with understanding] : Patient educated on the risks of CONSUMING ALCOHOL prior to HBOT with understanding [100% Cotton] : 100% cotton [Empty all pockets] : empty all pockets [No hair oils, wigs, hairpieces, pins] : no hair oils, wigs, hairpieces, pins  [Pre tx medications] : pre tx medications  [No make-up, creams] : no make-up, creams  [No jewelry] : no jewelry  [No matches, cigarettes, lighters] : no matches, cigarettes, lighters  [Hearing aid removed] : hearing aid removed [Dentures removed] : dentures removed [Ground bracelet on pt's wrist] : ground bracelet on pt's wrist  [Contacts removed] : contacts removed  [Remove nail polish] : remove nail polish  [No reading material] : no reading material  [Bra, undergarments removed] : bra, undergarments removed  [No contraindicated dressings] : no contraindicated dressings [Ground Wire - VISUAL Verification - Intact/Free of Obstruction] : Ground Wire - VISUAL Verification - Intact/Free of Obstruction  [Ground Continuity - Verified < 1 ohm w/ Wrist Strap Sheng] : Ground Continuity - Verified < 1 ohm w/ Wrist Strap Sheng [Diagnosis: ___] : Diagnosis: [unfilled] [Number: ___] : Number: [unfilled] [Clear all fields] : clear all fields [FreeTextEntry1] : 2.0 sabina [] : No [FreeTextEntry3] : 90 min [FreeTextEntry5] : 103 [FreeTextEntry7] : 1044 [FreeTextEntry9] : 121 [de-identified] : [id number] [de-identified] : 110 min

## 2023-12-19 ENCOUNTER — APPOINTMENT (OUTPATIENT)
Dept: HYPERBARIC MEDICINE | Facility: CLINIC | Age: 73
End: 2023-12-19
Payer: MEDICARE

## 2023-12-19 ENCOUNTER — OUTPATIENT (OUTPATIENT)
Dept: OUTPATIENT SERVICES | Facility: HOSPITAL | Age: 73
LOS: 1 days | End: 2023-12-19
Payer: MEDICARE

## 2023-12-19 VITALS
RESPIRATION RATE: 16 BRPM | SYSTOLIC BLOOD PRESSURE: 145 MMHG | OXYGEN SATURATION: 97 % | DIASTOLIC BLOOD PRESSURE: 80 MMHG | HEART RATE: 69 BPM

## 2023-12-19 VITALS
RESPIRATION RATE: 16 BRPM | OXYGEN SATURATION: 97 % | HEART RATE: 72 BPM | TEMPERATURE: 98.1 F | DIASTOLIC BLOOD PRESSURE: 72 MMHG | SYSTOLIC BLOOD PRESSURE: 172 MMHG

## 2023-12-19 DIAGNOSIS — M79.606 PAIN IN LEG, UNSPECIFIED: ICD-10-CM

## 2023-12-19 DIAGNOSIS — Z98.890 OTHER SPECIFIED POSTPROCEDURAL STATES: Chronic | ICD-10-CM

## 2023-12-19 PROCEDURE — G0277: CPT

## 2023-12-19 PROCEDURE — 99183 HYPERBARIC OXYGEN THERAPY: CPT

## 2023-12-19 PROCEDURE — 82962 GLUCOSE BLOOD TEST: CPT

## 2023-12-19 NOTE — PROCEDURE
[Outpatient] : Outpatient [Ambulatory] : Patient is ambulatory. [THIS CHAMBER HAS BEEN CLEANED / DISINFECTED] : This chamber has been cleaned / disinfected according to local and hospital policy and procedure prior to this treatment. [____] : Post-Dive: Time - [unfilled] [___] : Post-Dive: Value - [unfilled] mg/dL [I have examined and evaluated this patient today, including ears and lungs and have cleared the patient] : I have examined and evaluated this patient today, including ears and lungs and have cleared the patient to continue HBOT as prescribed.  [Time MD/Provider assessed Patient: _____] : Time MD/Provider assessed Patient: [unfilled] [I have ordered 1 HBOT session at the indicated protocol as seen below] : I have ordered 1 HBOT session at the indicated protocol as seen below [Patient demonstrated and verbalized proper technique for using air break mask] : Patient demonstrated and verbalized proper technique for using air break mask [Patient educated on the risks of SMOKING prior to HBOT with understanding] : Patient educated on the risks of SMOKING prior to HBOT with understanding [Patient educated on the risks of CONSUMING ALCOHOL prior to HBOT with understanding] : Patient educated on the risks of CONSUMING ALCOHOL prior to HBOT with understanding [100% Cotton] : 100% cotton [Empty all pockets] : empty all pockets [No hair oils, wigs, hairpieces, pins] : no hair oils, wigs, hairpieces, pins  [Pre tx medications] : pre tx medications  [No make-up, creams] : no make-up, creams  [No jewelry] : no jewelry  [No matches, cigarettes, lighters] : no matches, cigarettes, lighters  [Hearing aid removed] : hearing aid removed [Dentures removed] : dentures removed [Ground bracelet on pt's wrist] : ground bracelet on pt's wrist  [Contacts removed] : contacts removed  [Remove nail polish] : remove nail polish  [No reading material] : no reading material  [Bra, undergarments removed] : bra, undergarments removed  [No contraindicated dressings] : no contraindicated dressings [Ground Wire - VISUAL Verification - Intact/Free of Obstruction] : Ground Wire - VISUAL Verification - Intact/Free of Obstruction  [Ground Continuity - Verified < 1 ohm w/ Wrist Strap Sheng] : Ground Continuity - Verified < 1 ohm w/ Wrist Strap Sheng [Diagnosis: ___] : Diagnosis: [unfilled] [Number: ___] : Number: [unfilled] [Clear all fields] : clear all fields [] : No [FreeTextEntry1] : 2.0 sabina [FreeTextEntry3] : 90 min [FreeTextEntry5] : 1033 [FreeTextEntry7] : 1042 [FreeTextEntry9] : 1212 [de-identified] : [id number] [de-identified] : 110 min

## 2023-12-20 ENCOUNTER — OUTPATIENT (OUTPATIENT)
Dept: OUTPATIENT SERVICES | Facility: HOSPITAL | Age: 73
LOS: 1 days | End: 2023-12-20
Payer: MEDICARE

## 2023-12-20 ENCOUNTER — APPOINTMENT (OUTPATIENT)
Dept: HYPERBARIC MEDICINE | Facility: CLINIC | Age: 73
End: 2023-12-20
Payer: MEDICARE

## 2023-12-20 VITALS
SYSTOLIC BLOOD PRESSURE: 149 MMHG | DIASTOLIC BLOOD PRESSURE: 92 MMHG | RESPIRATION RATE: 16 BRPM | HEART RATE: 76 BPM | OXYGEN SATURATION: 99 %

## 2023-12-20 VITALS
SYSTOLIC BLOOD PRESSURE: 128 MMHG | TEMPERATURE: 97.9 F | DIASTOLIC BLOOD PRESSURE: 68 MMHG | HEART RATE: 75 BPM | OXYGEN SATURATION: 99 % | RESPIRATION RATE: 16 BRPM

## 2023-12-20 DIAGNOSIS — Z98.890 OTHER SPECIFIED POSTPROCEDURAL STATES: Chronic | ICD-10-CM

## 2023-12-20 DIAGNOSIS — M79.606 PAIN IN LEG, UNSPECIFIED: ICD-10-CM

## 2023-12-20 PROCEDURE — G0277: CPT

## 2023-12-20 PROCEDURE — 82962 GLUCOSE BLOOD TEST: CPT

## 2023-12-20 PROCEDURE — 99183 HYPERBARIC OXYGEN THERAPY: CPT

## 2023-12-20 NOTE — PROCEDURE
[Outpatient] : Outpatient [Ambulatory] : Patient is ambulatory. [THIS CHAMBER HAS BEEN CLEANED / DISINFECTED] : This chamber has been cleaned / disinfected according to local and hospital policy and procedure prior to this treatment. [____] : Post-Dive: Time - [unfilled] [___] : Post-Dive: Value - [unfilled] mg/dL [I have examined and evaluated this patient today, including ears and lungs and have cleared the patient] : I have examined and evaluated this patient today, including ears and lungs and have cleared the patient to continue HBOT as prescribed.  [Time MD/Provider assessed Patient: _____] : Time MD/Provider assessed Patient: [unfilled] [I have ordered 1 HBOT session at the indicated protocol as seen below] : I have ordered 1 HBOT session at the indicated protocol as seen below [Patient demonstrated and verbalized proper technique for using air break mask] : Patient demonstrated and verbalized proper technique for using air break mask [Patient educated on the risks of SMOKING prior to HBOT with understanding] : Patient educated on the risks of SMOKING prior to HBOT with understanding [Patient educated on the risks of CONSUMING ALCOHOL prior to HBOT with understanding] : Patient educated on the risks of CONSUMING ALCOHOL prior to HBOT with understanding [100% Cotton] : 100% cotton [Empty all pockets] : empty all pockets [No hair oils, wigs, hairpieces, pins] : no hair oils, wigs, hairpieces, pins  [Pre tx medications] : pre tx medications  [No make-up, creams] : no make-up, creams  [No jewelry] : no jewelry  [No matches, cigarettes, lighters] : no matches, cigarettes, lighters  [Hearing aid removed] : hearing aid removed [Dentures removed] : dentures removed [Ground bracelet on pt's wrist] : ground bracelet on pt's wrist  [Contacts removed] : contacts removed  [Remove nail polish] : remove nail polish  [No reading material] : no reading material  [Bra, undergarments removed] : bra, undergarments removed  [No contraindicated dressings] : no contraindicated dressings [Ground Wire - VISUAL Verification - Intact/Free of Obstruction] : Ground Wire - VISUAL Verification - Intact/Free of Obstruction  [Ground Continuity - Verified < 1 ohm w/ Wrist Strap Sheng] : Ground Continuity - Verified < 1 ohm w/ Wrist Strap Sheng [Diagnosis: ___] : Diagnosis: [unfilled] [Number: ___] : Number: [unfilled] [Clear all fields] : clear all fields [] : No [FreeTextEntry1] : 2.0 sabina [FreeTextEntry3] : 90 min [FreeTextEntry5] : 1035 [FreeTextEntry7] : 1043 [FreeTextEntry9] : 1218 [de-identified] : [id number] [de-identified] : 110 min

## 2023-12-21 ENCOUNTER — APPOINTMENT (OUTPATIENT)
Dept: HYPERBARIC MEDICINE | Facility: CLINIC | Age: 73
End: 2023-12-21
Payer: MEDICARE

## 2023-12-21 ENCOUNTER — OUTPATIENT (OUTPATIENT)
Dept: OUTPATIENT SERVICES | Facility: HOSPITAL | Age: 73
LOS: 1 days | End: 2023-12-21
Payer: MEDICARE

## 2023-12-21 VITALS
DIASTOLIC BLOOD PRESSURE: 74 MMHG | OXYGEN SATURATION: 99 % | TEMPERATURE: 97.8 F | HEART RATE: 76 BPM | RESPIRATION RATE: 16 BRPM | SYSTOLIC BLOOD PRESSURE: 149 MMHG

## 2023-12-21 VITALS
RESPIRATION RATE: 16 BRPM | SYSTOLIC BLOOD PRESSURE: 153 MMHG | OXYGEN SATURATION: 99 % | HEART RATE: 69 BPM | DIASTOLIC BLOOD PRESSURE: 85 MMHG

## 2023-12-21 DIAGNOSIS — M79.606 PAIN IN LEG, UNSPECIFIED: ICD-10-CM

## 2023-12-21 DIAGNOSIS — Z98.890 OTHER SPECIFIED POSTPROCEDURAL STATES: Chronic | ICD-10-CM

## 2023-12-21 PROCEDURE — 99183 HYPERBARIC OXYGEN THERAPY: CPT

## 2023-12-21 PROCEDURE — 82962 GLUCOSE BLOOD TEST: CPT

## 2023-12-21 PROCEDURE — G0277: CPT

## 2023-12-21 NOTE — PROCEDURE
[Outpatient] : Outpatient [Ambulatory] : Patient is ambulatory. [THIS CHAMBER HAS BEEN CLEANED / DISINFECTED] : This chamber has been cleaned / disinfected according to local and hospital policy and procedure prior to this treatment. [____] : Post-Dive: Time - [unfilled] [___] : Post-Dive: Value - [unfilled] mg/dL [I have examined and evaluated this patient today, including ears and lungs and have cleared the patient] : I have examined and evaluated this patient today, including ears and lungs and have cleared the patient to continue HBOT as prescribed.  [Time MD/Provider assessed Patient: _____] : Time MD/Provider assessed Patient: [unfilled] [I have ordered 1 HBOT session at the indicated protocol as seen below] : I have ordered 1 HBOT session at the indicated protocol as seen below [Patient demonstrated and verbalized proper technique for using air break mask] : Patient demonstrated and verbalized proper technique for using air break mask [Patient educated on the risks of SMOKING prior to HBOT with understanding] : Patient educated on the risks of SMOKING prior to HBOT with understanding [Patient educated on the risks of CONSUMING ALCOHOL prior to HBOT with understanding] : Patient educated on the risks of CONSUMING ALCOHOL prior to HBOT with understanding [100% Cotton] : 100% cotton [Empty all pockets] : empty all pockets [No hair oils, wigs, hairpieces, pins] : no hair oils, wigs, hairpieces, pins  [Pre tx medications] : pre tx medications  [No make-up, creams] : no make-up, creams  [No jewelry] : no jewelry  [No matches, cigarettes, lighters] : no matches, cigarettes, lighters  [Hearing aid removed] : hearing aid removed [Dentures removed] : dentures removed [Ground bracelet on pt's wrist] : ground bracelet on pt's wrist  [Contacts removed] : contacts removed  [Remove nail polish] : remove nail polish  [No reading material] : no reading material  [Bra, undergarments removed] : bra, undergarments removed  [No contraindicated dressings] : no contraindicated dressings [Ground Wire - VISUAL Verification - Intact/Free of Obstruction] : Ground Wire - VISUAL Verification - Intact/Free of Obstruction  [Ground Continuity - Verified < 1 ohm w/ Wrist Strap Sheng] : Ground Continuity - Verified < 1 ohm w/ Wrist Strap Sheng [Diagnosis: ___] : Diagnosis: [unfilled] [Number: ___] : Number: [unfilled] [Clear all fields] : clear all fields [] : No [FreeTextEntry1] : 2.0 sabina [FreeTextEntry5] : 1032 [FreeTextEntry3] : 90 min [FreeTextEntry7] : 1046 [FreeTextEntry9] : 121 [de-identified] : [id number] [de-identified] : 110 min

## 2023-12-22 ENCOUNTER — OUTPATIENT (OUTPATIENT)
Dept: OUTPATIENT SERVICES | Facility: HOSPITAL | Age: 73
LOS: 1 days | End: 2023-12-22
Payer: MEDICARE

## 2023-12-22 ENCOUNTER — APPOINTMENT (OUTPATIENT)
Dept: HYPERBARIC MEDICINE | Facility: CLINIC | Age: 73
End: 2023-12-22
Payer: MEDICARE

## 2023-12-22 VITALS
RESPIRATION RATE: 16 BRPM | HEART RATE: 66 BPM | SYSTOLIC BLOOD PRESSURE: 147 MMHG | OXYGEN SATURATION: 100 % | DIASTOLIC BLOOD PRESSURE: 82 MMHG

## 2023-12-22 VITALS
OXYGEN SATURATION: 100 % | HEART RATE: 74 BPM | TEMPERATURE: 97.6 F | SYSTOLIC BLOOD PRESSURE: 146 MMHG | DIASTOLIC BLOOD PRESSURE: 72 MMHG | RESPIRATION RATE: 16 BRPM

## 2023-12-22 DIAGNOSIS — M79.606 PAIN IN LEG, UNSPECIFIED: ICD-10-CM

## 2023-12-22 DIAGNOSIS — Z98.890 OTHER SPECIFIED POSTPROCEDURAL STATES: Chronic | ICD-10-CM

## 2023-12-22 PROCEDURE — 82962 GLUCOSE BLOOD TEST: CPT

## 2023-12-22 PROCEDURE — 99183 HYPERBARIC OXYGEN THERAPY: CPT

## 2023-12-22 PROCEDURE — G0277: CPT

## 2023-12-22 NOTE — ADDENDUM
[FreeTextEntry1] : Pt descended to 2.0 MARIMAR @ 1.5 PSI/min without incident in chamber #3 Pt resting @ depth with chest rise and fall observed throughout tx.  Pt ascended from 2.0 MARIMAR @ 1.5 PSI/min without incident.  Pt tolerated tx well.

## 2023-12-22 NOTE — PROCEDURE
[Outpatient] : Outpatient [Ambulatory] : Patient is ambulatory. [THIS CHAMBER HAS BEEN CLEANED / DISINFECTED] : This chamber has been cleaned / disinfected according to local and hospital policy and procedure prior to this treatment. [I have examined and evaluated this patient today, including ears and lungs and have cleared the patient] : I have examined and evaluated this patient today, including ears and lungs and have cleared the patient to continue HBOT as prescribed.  [Time MD/Provider assessed Patient: _____] : Time MD/Provider assessed Patient: [unfilled] [I have ordered 1 HBOT session at the indicated protocol as seen below] : I have ordered 1 HBOT session at the indicated protocol as seen below [Patient demonstrated and verbalized proper technique for using air break mask] : Patient demonstrated and verbalized proper technique for using air break mask [Patient educated on the risks of SMOKING prior to HBOT with understanding] : Patient educated on the risks of SMOKING prior to HBOT with understanding [Patient educated on the risks of CONSUMING ALCOHOL prior to HBOT with understanding] : Patient educated on the risks of CONSUMING ALCOHOL prior to HBOT with understanding [100% Cotton] : 100% cotton [Empty all pockets] : empty all pockets [No hair oils, wigs, hairpieces, pins] : no hair oils, wigs, hairpieces, pins  [Pre tx medications] : pre tx medications  [No make-up, creams] : no make-up, creams  [No jewelry] : no jewelry  [No matches, cigarettes, lighters] : no matches, cigarettes, lighters  [Hearing aid removed] : hearing aid removed [Dentures removed] : dentures removed [Ground bracelet on pt's wrist] : ground bracelet on pt's wrist  [Contacts removed] : contacts removed  [Remove nail polish] : remove nail polish  [No reading material] : no reading material  [Bra, undergarments removed] : bra, undergarments removed  [No contraindicated dressings] : no contraindicated dressings [Ground Wire - VISUAL Verification - Intact/Free of Obstruction] : Ground Wire - VISUAL Verification - Intact/Free of Obstruction  [Ground Continuity - Verified < 1 ohm w/ Wrist Strap Sheng] : Ground Continuity - Verified < 1 ohm w/ Wrist Strap Sheng [Diagnosis: ___] : Diagnosis: [unfilled] [____] : Post-Dive: Time - [unfilled] [___] : Post-Dive: Value - [unfilled] mg/dL [Number: ___] : Number: [unfilled] [Clear all fields] : clear all fields [] : No [FreeTextEntry1] : 2.0 sabina [FreeTextEntry3] : 90 min [FreeTextEntry5] : 1031 [FreeTextEntry7] : 1048 [FreeTextEntry9] : 1213 [de-identified] : [id number] [de-identified] : 110 min

## 2023-12-26 ENCOUNTER — APPOINTMENT (OUTPATIENT)
Dept: HYPERBARIC MEDICINE | Facility: CLINIC | Age: 73
End: 2023-12-26
Payer: MEDICARE

## 2023-12-26 ENCOUNTER — OUTPATIENT (OUTPATIENT)
Dept: OUTPATIENT SERVICES | Facility: HOSPITAL | Age: 73
LOS: 1 days | End: 2023-12-26
Payer: MEDICARE

## 2023-12-26 VITALS
SYSTOLIC BLOOD PRESSURE: 154 MMHG | OXYGEN SATURATION: 99 % | DIASTOLIC BLOOD PRESSURE: 72 MMHG | TEMPERATURE: 98.3 F | RESPIRATION RATE: 16 BRPM | HEART RATE: 72 BPM

## 2023-12-26 VITALS
SYSTOLIC BLOOD PRESSURE: 133 MMHG | RESPIRATION RATE: 16 BRPM | HEART RATE: 62 BPM | DIASTOLIC BLOOD PRESSURE: 82 MMHG | OXYGEN SATURATION: 99 %

## 2023-12-26 DIAGNOSIS — E11.621 TYPE 2 DIABETES MELLITUS WITH FOOT ULCER: ICD-10-CM

## 2023-12-26 DIAGNOSIS — M86.9 OSTEOMYELITIS, UNSPECIFIED: ICD-10-CM

## 2023-12-26 DIAGNOSIS — M79.606 PAIN IN LEG, UNSPECIFIED: ICD-10-CM

## 2023-12-26 DIAGNOSIS — Z98.890 OTHER SPECIFIED POSTPROCEDURAL STATES: Chronic | ICD-10-CM

## 2023-12-26 PROCEDURE — 99183 HYPERBARIC OXYGEN THERAPY: CPT

## 2023-12-26 PROCEDURE — 82962 GLUCOSE BLOOD TEST: CPT

## 2023-12-26 PROCEDURE — G0277: CPT

## 2023-12-26 NOTE — PROCEDURE
[Outpatient] : Outpatient [Ambulatory] : Patient is ambulatory. [THIS CHAMBER HAS BEEN CLEANED / DISINFECTED] : This chamber has been cleaned / disinfected according to local and hospital policy and procedure prior to this treatment. [____] : Post-Dive: Time - [unfilled] [___] : Post-Dive: Value - [unfilled] mg/dL [I have examined and evaluated this patient today, including ears and lungs and have cleared the patient] : I have examined and evaluated this patient today, including ears and lungs and have cleared the patient to continue HBOT as prescribed.  [Time MD/Provider assessed Patient: _____] : Time MD/Provider assessed Patient: [unfilled] [I have ordered 1 HBOT session at the indicated protocol as seen below] : I have ordered 1 HBOT session at the indicated protocol as seen below [Patient demonstrated and verbalized proper technique for using air break mask] : Patient demonstrated and verbalized proper technique for using air break mask [Patient educated on the risks of SMOKING prior to HBOT with understanding] : Patient educated on the risks of SMOKING prior to HBOT with understanding [Patient educated on the risks of CONSUMING ALCOHOL prior to HBOT with understanding] : Patient educated on the risks of CONSUMING ALCOHOL prior to HBOT with understanding [100% Cotton] : 100% cotton [Empty all pockets] : empty all pockets [No hair oils, wigs, hairpieces, pins] : no hair oils, wigs, hairpieces, pins  [Pre tx medications] : pre tx medications  [No make-up, creams] : no make-up, creams  [No jewelry] : no jewelry  [No matches, cigarettes, lighters] : no matches, cigarettes, lighters  [Hearing aid removed] : hearing aid removed [Dentures removed] : dentures removed [Ground bracelet on pt's wrist] : ground bracelet on pt's wrist  [Contacts removed] : contacts removed  [Remove nail polish] : remove nail polish  [No reading material] : no reading material  [Bra, undergarments removed] : bra, undergarments removed  [No contraindicated dressings] : no contraindicated dressings [Ground Wire - VISUAL Verification - Intact/Free of Obstruction] : Ground Wire - VISUAL Verification - Intact/Free of Obstruction  [Ground Continuity - Verified < 1 ohm w/ Wrist Strap Sheng] : Ground Continuity - Verified < 1 ohm w/ Wrist Strap Sheng [Diagnosis: ___] : Diagnosis: [unfilled] [Number: ___] : Number: [unfilled] [Clear all fields] : clear all fields [] : No [FreeTextEntry1] : 2.0 sabina [FreeTextEntry3] : 90 min [FreeTextEntry5] : 1029 [FreeTextEntry9] : 1200 [FreeTextEntry7] : 1032 [de-identified] : 1219 [de-identified] : 110 min

## 2023-12-27 ENCOUNTER — APPOINTMENT (OUTPATIENT)
Dept: HYPERBARIC MEDICINE | Facility: CLINIC | Age: 73
End: 2023-12-27
Payer: MEDICARE

## 2023-12-27 ENCOUNTER — OUTPATIENT (OUTPATIENT)
Dept: OUTPATIENT SERVICES | Facility: HOSPITAL | Age: 73
LOS: 1 days | End: 2023-12-27
Payer: MEDICARE

## 2023-12-27 VITALS
OXYGEN SATURATION: 99 % | RESPIRATION RATE: 16 BRPM | SYSTOLIC BLOOD PRESSURE: 167 MMHG | DIASTOLIC BLOOD PRESSURE: 85 MMHG | HEART RATE: 72 BPM

## 2023-12-27 VITALS
RESPIRATION RATE: 16 BRPM | DIASTOLIC BLOOD PRESSURE: 76 MMHG | TEMPERATURE: 98.1 F | HEART RATE: 77 BPM | OXYGEN SATURATION: 99 % | SYSTOLIC BLOOD PRESSURE: 154 MMHG

## 2023-12-27 DIAGNOSIS — M79.606 PAIN IN LEG, UNSPECIFIED: ICD-10-CM

## 2023-12-27 DIAGNOSIS — Z98.890 OTHER SPECIFIED POSTPROCEDURAL STATES: Chronic | ICD-10-CM

## 2023-12-27 PROCEDURE — 82962 GLUCOSE BLOOD TEST: CPT

## 2023-12-27 PROCEDURE — G0277: CPT

## 2023-12-27 PROCEDURE — 99183 HYPERBARIC OXYGEN THERAPY: CPT

## 2023-12-27 NOTE — PROCEDURE
[Outpatient] : Outpatient [Ambulatory] : Patient is ambulatory. [THIS CHAMBER HAS BEEN CLEANED / DISINFECTED] : This chamber has been cleaned / disinfected according to local and hospital policy and procedure prior to this treatment. [____] : Post-Dive: Time - [unfilled] [___] : Post-Dive: Value - [unfilled] mg/dL [I have examined and evaluated this patient today, including ears and lungs and have cleared the patient] : I have examined and evaluated this patient today, including ears and lungs and have cleared the patient to continue HBOT as prescribed.  [Time MD/Provider assessed Patient: _____] : Time MD/Provider assessed Patient: [unfilled] [I have ordered 1 HBOT session at the indicated protocol as seen below] : I have ordered 1 HBOT session at the indicated protocol as seen below [Patient demonstrated and verbalized proper technique for using air break mask] : Patient demonstrated and verbalized proper technique for using air break mask [Patient educated on the risks of SMOKING prior to HBOT with understanding] : Patient educated on the risks of SMOKING prior to HBOT with understanding [Patient educated on the risks of CONSUMING ALCOHOL prior to HBOT with understanding] : Patient educated on the risks of CONSUMING ALCOHOL prior to HBOT with understanding [100% Cotton] : 100% cotton [Empty all pockets] : empty all pockets [No hair oils, wigs, hairpieces, pins] : no hair oils, wigs, hairpieces, pins  [Pre tx medications] : pre tx medications  [No make-up, creams] : no make-up, creams  [No jewelry] : no jewelry  [No matches, cigarettes, lighters] : no matches, cigarettes, lighters  [Hearing aid removed] : hearing aid removed [Dentures removed] : dentures removed [Ground bracelet on pt's wrist] : ground bracelet on pt's wrist  [Contacts removed] : contacts removed  [Remove nail polish] : remove nail polish  [No reading material] : no reading material  [Bra, undergarments removed] : bra, undergarments removed  [No contraindicated dressings] : no contraindicated dressings [Ground Wire - VISUAL Verification - Intact/Free of Obstruction] : Ground Wire - VISUAL Verification - Intact/Free of Obstruction  [Ground Continuity - Verified < 1 ohm w/ Wrist Strap Sheng] : Ground Continuity - Verified < 1 ohm w/ Wrist Strap Sheng [Diagnosis: ___] : Diagnosis: [unfilled] [Number: ___] : Number: [unfilled] [Clear all fields] : clear all fields [] : No [FreeTextEntry1] : 2.0 sabina [FreeTextEntry3] : 90 min [FreeTextEntry5] : 1012 [FreeTextEntry7] : 1020 [FreeTextEntry9] : 4501 [de-identified] : [id number] [de-identified] : 110 min

## 2023-12-27 NOTE — ASSESSMENT

## 2023-12-28 ENCOUNTER — APPOINTMENT (OUTPATIENT)
Dept: HYPERBARIC MEDICINE | Facility: CLINIC | Age: 73
End: 2023-12-28

## 2023-12-28 DIAGNOSIS — M79.606 PAIN IN LEG, UNSPECIFIED: ICD-10-CM

## 2023-12-29 ENCOUNTER — APPOINTMENT (OUTPATIENT)
Dept: HYPERBARIC MEDICINE | Facility: CLINIC | Age: 73
End: 2023-12-29

## 2024-01-02 ENCOUNTER — APPOINTMENT (OUTPATIENT)
Dept: HYPERBARIC MEDICINE | Facility: CLINIC | Age: 74
End: 2024-01-02
Payer: MEDICARE

## 2024-01-02 ENCOUNTER — OUTPATIENT (OUTPATIENT)
Dept: OUTPATIENT SERVICES | Facility: HOSPITAL | Age: 74
LOS: 1 days | End: 2024-01-02
Payer: MEDICARE

## 2024-01-02 VITALS
HEART RATE: 76 BPM | OXYGEN SATURATION: 99 % | SYSTOLIC BLOOD PRESSURE: 161 MMHG | RESPIRATION RATE: 16 BRPM | TEMPERATURE: 98 F | DIASTOLIC BLOOD PRESSURE: 76 MMHG

## 2024-01-02 VITALS
RESPIRATION RATE: 16 BRPM | DIASTOLIC BLOOD PRESSURE: 86 MMHG | HEART RATE: 74 BPM | SYSTOLIC BLOOD PRESSURE: 148 MMHG | OXYGEN SATURATION: 99 %

## 2024-01-02 DIAGNOSIS — Z98.890 OTHER SPECIFIED POSTPROCEDURAL STATES: Chronic | ICD-10-CM

## 2024-01-02 PROCEDURE — G0277: CPT

## 2024-01-02 PROCEDURE — 82962 GLUCOSE BLOOD TEST: CPT

## 2024-01-02 PROCEDURE — 99183 HYPERBARIC OXYGEN THERAPY: CPT

## 2024-01-02 NOTE — PROCEDURE
[Outpatient] : Outpatient [Ambulatory] : Patient is ambulatory. [THIS CHAMBER HAS BEEN CLEANED / DISINFECTED] : This chamber has been cleaned / disinfected according to local and hospital policy and procedure prior to this treatment. [____] : Post-Dive: Time - [unfilled] [___] : Post-Dive: Value - [unfilled] mg/dL [I have examined and evaluated this patient today, including ears and lungs and have cleared the patient] : I have examined and evaluated this patient today, including ears and lungs and have cleared the patient to continue HBOT as prescribed.  [Time MD/Provider assessed Patient: _____] : Time MD/Provider assessed Patient: [unfilled] [I have ordered 1 HBOT session at the indicated protocol as seen below] : I have ordered 1 HBOT session at the indicated protocol as seen below [Patient demonstrated and verbalized proper technique for using air break mask] : Patient demonstrated and verbalized proper technique for using air break mask [Patient educated on the risks of SMOKING prior to HBOT with understanding] : Patient educated on the risks of SMOKING prior to HBOT with understanding [Patient educated on the risks of CONSUMING ALCOHOL prior to HBOT with understanding] : Patient educated on the risks of CONSUMING ALCOHOL prior to HBOT with understanding [100% Cotton] : 100% cotton [Empty all pockets] : empty all pockets [No hair oils, wigs, hairpieces, pins] : no hair oils, wigs, hairpieces, pins  [Pre tx medications] : pre tx medications  [No make-up, creams] : no make-up, creams  [No jewelry] : no jewelry  [No matches, cigarettes, lighters] : no matches, cigarettes, lighters  [Hearing aid removed] : hearing aid removed [Dentures removed] : dentures removed [Ground bracelet on pt's wrist] : ground bracelet on pt's wrist  [Contacts removed] : contacts removed  [Remove nail polish] : remove nail polish  [No reading material] : no reading material  [Bra, undergarments removed] : bra, undergarments removed  [No contraindicated dressings] : no contraindicated dressings [Ground Wire - VISUAL Verification - Intact/Free of Obstruction] : Ground Wire - VISUAL Verification - Intact/Free of Obstruction  [Ground Continuity - Verified < 1 ohm w/ Wrist Strap Sheng] : Ground Continuity - Verified < 1 ohm w/ Wrist Strap Sheng [Diagnosis: ___] : Diagnosis: [unfilled] [Number: ___] : Number: [unfilled] [Clear all fields] : clear all fields [] : No [FreeTextEntry1] : 2.0 sabina [FreeTextEntry3] : 90 min [FreeTextEntry5] : 102 [FreeTextEntry7] : 1036 [FreeTextEntry9] : 1200 [de-identified] : 121 [de-identified] : 110 min

## 2024-01-03 ENCOUNTER — APPOINTMENT (OUTPATIENT)
Dept: HYPERBARIC MEDICINE | Facility: CLINIC | Age: 74
End: 2024-01-03
Payer: MEDICARE

## 2024-01-03 ENCOUNTER — OUTPATIENT (OUTPATIENT)
Dept: OUTPATIENT SERVICES | Facility: HOSPITAL | Age: 74
LOS: 1 days | End: 2024-01-03
Payer: MEDICARE

## 2024-01-03 VITALS
HEART RATE: 71 BPM | SYSTOLIC BLOOD PRESSURE: 136 MMHG | OXYGEN SATURATION: 99 % | RESPIRATION RATE: 16 BRPM | DIASTOLIC BLOOD PRESSURE: 84 MMHG

## 2024-01-03 VITALS
OXYGEN SATURATION: 99 % | DIASTOLIC BLOOD PRESSURE: 72 MMHG | SYSTOLIC BLOOD PRESSURE: 139 MMHG | HEART RATE: 75 BPM | RESPIRATION RATE: 16 BRPM | TEMPERATURE: 98.1 F

## 2024-01-03 DIAGNOSIS — M79.606 PAIN IN LEG, UNSPECIFIED: ICD-10-CM

## 2024-01-03 DIAGNOSIS — Z98.890 OTHER SPECIFIED POSTPROCEDURAL STATES: Chronic | ICD-10-CM

## 2024-01-03 PROCEDURE — G0277: CPT

## 2024-01-03 PROCEDURE — 82962 GLUCOSE BLOOD TEST: CPT

## 2024-01-03 PROCEDURE — 99183 HYPERBARIC OXYGEN THERAPY: CPT

## 2024-01-04 ENCOUNTER — APPOINTMENT (OUTPATIENT)
Dept: HYPERBARIC MEDICINE | Facility: CLINIC | Age: 74
End: 2024-01-04
Payer: MEDICARE

## 2024-01-04 ENCOUNTER — OUTPATIENT (OUTPATIENT)
Dept: OUTPATIENT SERVICES | Facility: HOSPITAL | Age: 74
LOS: 1 days | End: 2024-01-04
Payer: MEDICARE

## 2024-01-04 VITALS
SYSTOLIC BLOOD PRESSURE: 146 MMHG | RESPIRATION RATE: 16 BRPM | OXYGEN SATURATION: 99 % | HEART RATE: 68 BPM | DIASTOLIC BLOOD PRESSURE: 80 MMHG

## 2024-01-04 VITALS
DIASTOLIC BLOOD PRESSURE: 75 MMHG | RESPIRATION RATE: 16 BRPM | OXYGEN SATURATION: 99 % | TEMPERATURE: 98.3 F | SYSTOLIC BLOOD PRESSURE: 144 MMHG | HEART RATE: 76 BPM

## 2024-01-04 DIAGNOSIS — Z98.890 OTHER SPECIFIED POSTPROCEDURAL STATES: Chronic | ICD-10-CM

## 2024-01-04 DIAGNOSIS — M79.606 PAIN IN LEG, UNSPECIFIED: ICD-10-CM

## 2024-01-04 PROCEDURE — 99183 HYPERBARIC OXYGEN THERAPY: CPT

## 2024-01-04 PROCEDURE — 82962 GLUCOSE BLOOD TEST: CPT

## 2024-01-04 PROCEDURE — G0277: CPT

## 2024-01-04 NOTE — PROCEDURE
[Outpatient] : Outpatient [Ambulatory] : Patient is ambulatory. [THIS CHAMBER HAS BEEN CLEANED / DISINFECTED] : This chamber has been cleaned / disinfected according to local and hospital policy and procedure prior to this treatment. [____] : Post-Dive: Time - [unfilled] [___] : Post-Dive: Value - [unfilled] mg/dL [I have examined and evaluated this patient today, including ears and lungs and have cleared the patient] : I have examined and evaluated this patient today, including ears and lungs and have cleared the patient to continue HBOT as prescribed.  [Time MD/Provider assessed Patient: _____] : Time MD/Provider assessed Patient: [unfilled] [I have ordered 1 HBOT session at the indicated protocol as seen below] : I have ordered 1 HBOT session at the indicated protocol as seen below [Patient demonstrated and verbalized proper technique for using air break mask] : Patient demonstrated and verbalized proper technique for using air break mask [Patient educated on the risks of SMOKING prior to HBOT with understanding] : Patient educated on the risks of SMOKING prior to HBOT with understanding [Patient educated on the risks of CONSUMING ALCOHOL prior to HBOT with understanding] : Patient educated on the risks of CONSUMING ALCOHOL prior to HBOT with understanding [100% Cotton] : 100% cotton [Empty all pockets] : empty all pockets [No hair oils, wigs, hairpieces, pins] : no hair oils, wigs, hairpieces, pins  [Pre tx medications] : pre tx medications  [No make-up, creams] : no make-up, creams  [No jewelry] : no jewelry  [No matches, cigarettes, lighters] : no matches, cigarettes, lighters  [Hearing aid removed] : hearing aid removed [Dentures removed] : dentures removed [Ground bracelet on pt's wrist] : ground bracelet on pt's wrist  [Contacts removed] : contacts removed  [Remove nail polish] : remove nail polish  [No reading material] : no reading material  [Bra, undergarments removed] : bra, undergarments removed  [No contraindicated dressings] : no contraindicated dressings [Ground Wire - VISUAL Verification - Intact/Free of Obstruction] : Ground Wire - VISUAL Verification - Intact/Free of Obstruction  [Ground Continuity - Verified < 1 ohm w/ Wrist Strap Sheng] : Ground Continuity - Verified < 1 ohm w/ Wrist Strap Sheng [Diagnosis: ___] : Diagnosis: [unfilled] [Number: ___] : Number: [unfilled] [Clear all fields] : clear all fields [] : No [FreeTextEntry1] : 2.0 sabina [FreeTextEntry3] : 90 min [FreeTextEntry5] : 1028 [FreeTextEntry7] : 1037 [FreeTextEntry9] : 0492 [de-identified] : 0946 [de-identified] : 110 min

## 2024-01-05 ENCOUNTER — APPOINTMENT (OUTPATIENT)
Dept: HYPERBARIC MEDICINE | Facility: CLINIC | Age: 74
End: 2024-01-05
Payer: MEDICARE

## 2024-01-05 ENCOUNTER — OUTPATIENT (OUTPATIENT)
Dept: OUTPATIENT SERVICES | Facility: HOSPITAL | Age: 74
LOS: 1 days | End: 2024-01-05
Payer: MEDICARE

## 2024-01-05 VITALS
DIASTOLIC BLOOD PRESSURE: 79 MMHG | HEART RATE: 79 BPM | OXYGEN SATURATION: 100 % | SYSTOLIC BLOOD PRESSURE: 167 MMHG | RESPIRATION RATE: 16 BRPM | TEMPERATURE: 98 F

## 2024-01-05 VITALS
HEART RATE: 72 BPM | RESPIRATION RATE: 16 BRPM | SYSTOLIC BLOOD PRESSURE: 169 MMHG | DIASTOLIC BLOOD PRESSURE: 78 MMHG | OXYGEN SATURATION: 100 %

## 2024-01-05 DIAGNOSIS — Z98.890 OTHER SPECIFIED POSTPROCEDURAL STATES: Chronic | ICD-10-CM

## 2024-01-05 DIAGNOSIS — M79.606 PAIN IN LEG, UNSPECIFIED: ICD-10-CM

## 2024-01-05 PROCEDURE — 99183 HYPERBARIC OXYGEN THERAPY: CPT

## 2024-01-05 PROCEDURE — G0277: CPT

## 2024-01-05 PROCEDURE — 82962 GLUCOSE BLOOD TEST: CPT

## 2024-01-05 NOTE — PROCEDURE
[Outpatient] : Outpatient [Ambulatory] : Patient is ambulatory. [THIS CHAMBER HAS BEEN CLEANED / DISINFECTED] : This chamber has been cleaned / disinfected according to local and hospital policy and procedure prior to this treatment. [____] : Post-Dive: Time - [unfilled] [___] : Post-Dive: Value - [unfilled] mg/dL [I have examined and evaluated this patient today, including ears and lungs and have cleared the patient] : I have examined and evaluated this patient today, including ears and lungs and have cleared the patient to continue HBOT as prescribed.  [Time MD/Provider assessed Patient: _____] : Time MD/Provider assessed Patient: [unfilled] [I have ordered 1 HBOT session at the indicated protocol as seen below] : I have ordered 1 HBOT session at the indicated protocol as seen below [Patient demonstrated and verbalized proper technique for using air break mask] : Patient demonstrated and verbalized proper technique for using air break mask [Patient educated on the risks of SMOKING prior to HBOT with understanding] : Patient educated on the risks of SMOKING prior to HBOT with understanding [Patient educated on the risks of CONSUMING ALCOHOL prior to HBOT with understanding] : Patient educated on the risks of CONSUMING ALCOHOL prior to HBOT with understanding [100% Cotton] : 100% cotton [Empty all pockets] : empty all pockets [No hair oils, wigs, hairpieces, pins] : no hair oils, wigs, hairpieces, pins  [Pre tx medications] : pre tx medications  [No make-up, creams] : no make-up, creams  [No jewelry] : no jewelry  [No matches, cigarettes, lighters] : no matches, cigarettes, lighters  [Hearing aid removed] : hearing aid removed [Dentures removed] : dentures removed [Ground bracelet on pt's wrist] : ground bracelet on pt's wrist  [Contacts removed] : contacts removed  [Remove nail polish] : remove nail polish  [No reading material] : no reading material  [Bra, undergarments removed] : bra, undergarments removed  [No contraindicated dressings] : no contraindicated dressings [Ground Wire - VISUAL Verification - Intact/Free of Obstruction] : Ground Wire - VISUAL Verification - Intact/Free of Obstruction  [Ground Continuity - Verified < 1 ohm w/ Wrist Strap Sheng] : Ground Continuity - Verified < 1 ohm w/ Wrist Strap Sheng [Diagnosis: ___] : Diagnosis: [unfilled] [Number: ___] : Number: [unfilled] [Clear all fields] : clear all fields [] : No [FreeTextEntry1] : 2.0 sabina [FreeTextEntry3] : 90 min [FreeTextEntry5] : 1037 [FreeTextEntry7] : 1044 [FreeTextEntry9] : 1211 [de-identified] : [id number] [de-identified] : 110 min

## 2024-01-08 ENCOUNTER — APPOINTMENT (OUTPATIENT)
Dept: HYPERBARIC MEDICINE | Facility: CLINIC | Age: 74
End: 2024-01-08
Payer: MEDICARE

## 2024-01-08 ENCOUNTER — OUTPATIENT (OUTPATIENT)
Dept: OUTPATIENT SERVICES | Facility: HOSPITAL | Age: 74
LOS: 1 days | End: 2024-01-08
Payer: MEDICARE

## 2024-01-08 VITALS
DIASTOLIC BLOOD PRESSURE: 75 MMHG | RESPIRATION RATE: 16 BRPM | HEART RATE: 67 BPM | SYSTOLIC BLOOD PRESSURE: 146 MMHG | OXYGEN SATURATION: 100 %

## 2024-01-08 VITALS
TEMPERATURE: 97.7 F | OXYGEN SATURATION: 100 % | DIASTOLIC BLOOD PRESSURE: 84 MMHG | SYSTOLIC BLOOD PRESSURE: 151 MMHG | HEART RATE: 74 BPM | RESPIRATION RATE: 16 BRPM

## 2024-01-08 DIAGNOSIS — Z98.890 OTHER SPECIFIED POSTPROCEDURAL STATES: Chronic | ICD-10-CM

## 2024-01-08 DIAGNOSIS — M79.606 PAIN IN LEG, UNSPECIFIED: ICD-10-CM

## 2024-01-08 PROCEDURE — 99183 HYPERBARIC OXYGEN THERAPY: CPT

## 2024-01-08 PROCEDURE — 82962 GLUCOSE BLOOD TEST: CPT

## 2024-01-08 PROCEDURE — G0277: CPT

## 2024-01-08 NOTE — PROCEDURE
[Outpatient] : Outpatient [Ambulatory] : Patient is ambulatory. [THIS CHAMBER HAS BEEN CLEANED / DISINFECTED] : This chamber has been cleaned / disinfected according to local and hospital policy and procedure prior to this treatment. [____] : Post-Dive: Time - [unfilled] [___] : Post-Dive: Value - [unfilled] mg/dL [I have examined and evaluated this patient today, including ears and lungs and have cleared the patient] : I have examined and evaluated this patient today, including ears and lungs and have cleared the patient to continue HBOT as prescribed.  [Time MD/Provider assessed Patient: _____] : Time MD/Provider assessed Patient: [unfilled] [I have ordered 1 HBOT session at the indicated protocol as seen below] : I have ordered 1 HBOT session at the indicated protocol as seen below [Patient demonstrated and verbalized proper technique for using air break mask] : Patient demonstrated and verbalized proper technique for using air break mask [Patient educated on the risks of SMOKING prior to HBOT with understanding] : Patient educated on the risks of SMOKING prior to HBOT with understanding [Patient educated on the risks of CONSUMING ALCOHOL prior to HBOT with understanding] : Patient educated on the risks of CONSUMING ALCOHOL prior to HBOT with understanding [100% Cotton] : 100% cotton [Empty all pockets] : empty all pockets [No hair oils, wigs, hairpieces, pins] : no hair oils, wigs, hairpieces, pins  [Pre tx medications] : pre tx medications  [No make-up, creams] : no make-up, creams  [No jewelry] : no jewelry  [No matches, cigarettes, lighters] : no matches, cigarettes, lighters  [Hearing aid removed] : hearing aid removed [Dentures removed] : dentures removed [Ground bracelet on pt's wrist] : ground bracelet on pt's wrist  [Contacts removed] : contacts removed  [Remove nail polish] : remove nail polish  [No reading material] : no reading material  [Bra, undergarments removed] : bra, undergarments removed  [No contraindicated dressings] : no contraindicated dressings [Ground Wire - VISUAL Verification - Intact/Free of Obstruction] : Ground Wire - VISUAL Verification - Intact/Free of Obstruction  [Ground Continuity - Verified < 1 ohm w/ Wrist Strap Sheng] : Ground Continuity - Verified < 1 ohm w/ Wrist Strap Sheng [Diagnosis: ___] : Diagnosis: [unfilled] [Number: ___] : Number: [unfilled] [Clear all fields] : clear all fields [] : No [FreeTextEntry1] : 2.0 sabina [FreeTextEntry3] : 90 min [FreeTextEntry5] : 103 [FreeTextEntry7] : 1045 [FreeTextEntry9] : 1212 [de-identified] : [id number] [de-identified] : 110 min

## 2024-01-09 ENCOUNTER — OUTPATIENT (OUTPATIENT)
Dept: OUTPATIENT SERVICES | Facility: HOSPITAL | Age: 74
LOS: 1 days | End: 2024-01-09
Payer: MEDICARE

## 2024-01-09 ENCOUNTER — APPOINTMENT (OUTPATIENT)
Dept: HYPERBARIC MEDICINE | Facility: CLINIC | Age: 74
End: 2024-01-09
Payer: MEDICARE

## 2024-01-09 VITALS
DIASTOLIC BLOOD PRESSURE: 92 MMHG | HEART RATE: 72 BPM | SYSTOLIC BLOOD PRESSURE: 150 MMHG | RESPIRATION RATE: 16 BRPM | OXYGEN SATURATION: 100 %

## 2024-01-09 VITALS
SYSTOLIC BLOOD PRESSURE: 158 MMHG | HEART RATE: 73 BPM | TEMPERATURE: 98.2 F | RESPIRATION RATE: 16 BRPM | OXYGEN SATURATION: 100 % | DIASTOLIC BLOOD PRESSURE: 73 MMHG

## 2024-01-09 DIAGNOSIS — M79.606 PAIN IN LEG, UNSPECIFIED: ICD-10-CM

## 2024-01-09 DIAGNOSIS — Z98.890 OTHER SPECIFIED POSTPROCEDURAL STATES: Chronic | ICD-10-CM

## 2024-01-09 PROCEDURE — G0277: CPT

## 2024-01-09 PROCEDURE — 82962 GLUCOSE BLOOD TEST: CPT

## 2024-01-09 PROCEDURE — 99183 HYPERBARIC OXYGEN THERAPY: CPT

## 2024-01-10 ENCOUNTER — APPOINTMENT (OUTPATIENT)
Dept: HYPERBARIC MEDICINE | Facility: CLINIC | Age: 74
End: 2024-01-10
Payer: MEDICARE

## 2024-01-10 ENCOUNTER — OUTPATIENT (OUTPATIENT)
Dept: OUTPATIENT SERVICES | Facility: HOSPITAL | Age: 74
LOS: 1 days | End: 2024-01-10
Payer: MEDICARE

## 2024-01-10 VITALS
OXYGEN SATURATION: 99 % | SYSTOLIC BLOOD PRESSURE: 143 MMHG | RESPIRATION RATE: 16 BRPM | HEART RATE: 66 BPM | DIASTOLIC BLOOD PRESSURE: 67 MMHG

## 2024-01-10 VITALS
TEMPERATURE: 98.3 F | SYSTOLIC BLOOD PRESSURE: 146 MMHG | OXYGEN SATURATION: 99 % | HEART RATE: 68 BPM | RESPIRATION RATE: 16 BRPM | DIASTOLIC BLOOD PRESSURE: 64 MMHG

## 2024-01-10 DIAGNOSIS — M86.9 OSTEOMYELITIS, UNSPECIFIED: ICD-10-CM

## 2024-01-10 DIAGNOSIS — Z98.890 OTHER SPECIFIED POSTPROCEDURAL STATES: Chronic | ICD-10-CM

## 2024-01-10 DIAGNOSIS — E11.621 TYPE 2 DIABETES MELLITUS WITH FOOT ULCER: ICD-10-CM

## 2024-01-10 DIAGNOSIS — M79.606 PAIN IN LEG, UNSPECIFIED: ICD-10-CM

## 2024-01-10 PROCEDURE — 82962 GLUCOSE BLOOD TEST: CPT

## 2024-01-10 PROCEDURE — 99183 HYPERBARIC OXYGEN THERAPY: CPT

## 2024-01-10 PROCEDURE — G0277: CPT

## 2024-01-10 NOTE — PROCEDURE
[Outpatient] : Outpatient [Ambulatory] : Patient is ambulatory. [THIS CHAMBER HAS BEEN CLEANED / DISINFECTED] : This chamber has been cleaned / disinfected according to local and hospital policy and procedure prior to this treatment. [____] : Post-Dive: Time - [unfilled] [___] : Post-Dive: Value - [unfilled] mg/dL [I have examined and evaluated this patient today, including ears and lungs and have cleared the patient] : I have examined and evaluated this patient today, including ears and lungs and have cleared the patient to continue HBOT as prescribed.  [Time MD/Provider assessed Patient: _____] : Time MD/Provider assessed Patient: [unfilled] [I have ordered 1 HBOT session at the indicated protocol as seen below] : I have ordered 1 HBOT session at the indicated protocol as seen below [Patient demonstrated and verbalized proper technique for using air break mask] : Patient demonstrated and verbalized proper technique for using air break mask [Patient educated on the risks of SMOKING prior to HBOT with understanding] : Patient educated on the risks of SMOKING prior to HBOT with understanding [Patient educated on the risks of CONSUMING ALCOHOL prior to HBOT with understanding] : Patient educated on the risks of CONSUMING ALCOHOL prior to HBOT with understanding [100% Cotton] : 100% cotton [Empty all pockets] : empty all pockets [No hair oils, wigs, hairpieces, pins] : no hair oils, wigs, hairpieces, pins  [Pre tx medications] : pre tx medications  [No make-up, creams] : no make-up, creams  [No jewelry] : no jewelry  [No matches, cigarettes, lighters] : no matches, cigarettes, lighters  [Hearing aid removed] : hearing aid removed [Dentures removed] : dentures removed [Ground bracelet on pt's wrist] : ground bracelet on pt's wrist  [Contacts removed] : contacts removed  [Remove nail polish] : remove nail polish  [No reading material] : no reading material  [Bra, undergarments removed] : bra, undergarments removed  [No contraindicated dressings] : no contraindicated dressings [Ground Wire - VISUAL Verification - Intact/Free of Obstruction] : Ground Wire - VISUAL Verification - Intact/Free of Obstruction  [Ground Continuity - Verified < 1 ohm w/ Wrist Strap Sheng] : Ground Continuity - Verified < 1 ohm w/ Wrist Strap Sheng [Diagnosis: ___] : Diagnosis: [unfilled] [Number: ___] : Number: [unfilled] [Clear all fields] : clear all fields [] : No [FreeTextEntry1] : 2.0 sabina [FreeTextEntry3] : 90 min [FreeTextEntry5] : 1029 [FreeTextEntry7] : 103 [FreeTextEntry9] : 1200 [de-identified] : 1217 [de-identified] : 110 min

## 2024-01-11 ENCOUNTER — OUTPATIENT (OUTPATIENT)
Dept: OUTPATIENT SERVICES | Facility: HOSPITAL | Age: 74
LOS: 1 days | End: 2024-01-11
Payer: MEDICARE

## 2024-01-11 ENCOUNTER — APPOINTMENT (OUTPATIENT)
Dept: HYPERBARIC MEDICINE | Facility: CLINIC | Age: 74
End: 2024-01-11
Payer: MEDICARE

## 2024-01-11 VITALS
DIASTOLIC BLOOD PRESSURE: 89 MMHG | SYSTOLIC BLOOD PRESSURE: 159 MMHG | RESPIRATION RATE: 16 BRPM | TEMPERATURE: 98.3 F | HEART RATE: 65 BPM | OXYGEN SATURATION: 98 %

## 2024-01-11 VITALS
OXYGEN SATURATION: 98 % | DIASTOLIC BLOOD PRESSURE: 83 MMHG | SYSTOLIC BLOOD PRESSURE: 160 MMHG | RESPIRATION RATE: 16 BRPM | HEART RATE: 70 BPM

## 2024-01-11 DIAGNOSIS — M79.606 PAIN IN LEG, UNSPECIFIED: ICD-10-CM

## 2024-01-11 DIAGNOSIS — Z98.890 OTHER SPECIFIED POSTPROCEDURAL STATES: Chronic | ICD-10-CM

## 2024-01-11 PROCEDURE — G0277: CPT

## 2024-01-11 PROCEDURE — 99183 HYPERBARIC OXYGEN THERAPY: CPT

## 2024-01-11 PROCEDURE — 82962 GLUCOSE BLOOD TEST: CPT

## 2024-01-11 NOTE — PROCEDURE
[Outpatient] : Outpatient [Ambulatory] : Patient is ambulatory. [THIS CHAMBER HAS BEEN CLEANED / DISINFECTED] : This chamber has been cleaned / disinfected according to local and hospital policy and procedure prior to this treatment. [____] : Post-Dive: Time - [unfilled] [___] : Post-Dive: Value - [unfilled] mg/dL [I have examined and evaluated this patient today, including ears and lungs and have cleared the patient] : I have examined and evaluated this patient today, including ears and lungs and have cleared the patient to continue HBOT as prescribed.  [Time MD/Provider assessed Patient: _____] : Time MD/Provider assessed Patient: [unfilled] [I have ordered 1 HBOT session at the indicated protocol as seen below] : I have ordered 1 HBOT session at the indicated protocol as seen below [Patient demonstrated and verbalized proper technique for using air break mask] : Patient demonstrated and verbalized proper technique for using air break mask [Patient educated on the risks of SMOKING prior to HBOT with understanding] : Patient educated on the risks of SMOKING prior to HBOT with understanding [Patient educated on the risks of CONSUMING ALCOHOL prior to HBOT with understanding] : Patient educated on the risks of CONSUMING ALCOHOL prior to HBOT with understanding [100% Cotton] : 100% cotton [Empty all pockets] : empty all pockets [No hair oils, wigs, hairpieces, pins] : no hair oils, wigs, hairpieces, pins  [Pre tx medications] : pre tx medications  [No make-up, creams] : no make-up, creams  [No jewelry] : no jewelry  [No matches, cigarettes, lighters] : no matches, cigarettes, lighters  [Hearing aid removed] : hearing aid removed [Dentures removed] : dentures removed [Ground bracelet on pt's wrist] : ground bracelet on pt's wrist  [Contacts removed] : contacts removed  [Remove nail polish] : remove nail polish  [No reading material] : no reading material  [Bra, undergarments removed] : bra, undergarments removed  [No contraindicated dressings] : no contraindicated dressings [Ground Wire - VISUAL Verification - Intact/Free of Obstruction] : Ground Wire - VISUAL Verification - Intact/Free of Obstruction  [Ground Continuity - Verified < 1 ohm w/ Wrist Strap Sheng] : Ground Continuity - Verified < 1 ohm w/ Wrist Strap Sheng [Diagnosis: ___] : Diagnosis: [unfilled] [Number: ___] : Number: [unfilled] [Clear all fields] : clear all fields [] : No [FreeTextEntry1] : 2.0 sabina [FreeTextEntry3] : 90 min [FreeTextEntry5] : 2766 [FreeTextEntry7] : 8326 [FreeTextEntry9] : 5938 [de-identified] : [id number] [de-identified] : 110 min

## 2024-01-12 ENCOUNTER — APPOINTMENT (OUTPATIENT)
Dept: HYPERBARIC MEDICINE | Facility: CLINIC | Age: 74
End: 2024-01-12
Payer: MEDICARE

## 2024-01-12 ENCOUNTER — OUTPATIENT (OUTPATIENT)
Dept: OUTPATIENT SERVICES | Facility: HOSPITAL | Age: 74
LOS: 1 days | End: 2024-01-12
Payer: MEDICARE

## 2024-01-12 VITALS
HEART RATE: 73 BPM | TEMPERATURE: 98 F | RESPIRATION RATE: 16 BRPM | OXYGEN SATURATION: 98 % | SYSTOLIC BLOOD PRESSURE: 146 MMHG | DIASTOLIC BLOOD PRESSURE: 74 MMHG

## 2024-01-12 VITALS
OXYGEN SATURATION: 98 % | SYSTOLIC BLOOD PRESSURE: 165 MMHG | HEART RATE: 64 BPM | RESPIRATION RATE: 16 BRPM | DIASTOLIC BLOOD PRESSURE: 75 MMHG

## 2024-01-12 DIAGNOSIS — M79.606 PAIN IN LEG, UNSPECIFIED: ICD-10-CM

## 2024-01-12 PROCEDURE — 99183 HYPERBARIC OXYGEN THERAPY: CPT

## 2024-01-12 PROCEDURE — 82962 GLUCOSE BLOOD TEST: CPT

## 2024-01-12 PROCEDURE — G0277: CPT

## 2024-01-12 NOTE — PROCEDURE
[Outpatient] : Outpatient [Ambulatory] : Patient is ambulatory. [THIS CHAMBER HAS BEEN CLEANED / DISINFECTED] : This chamber has been cleaned / disinfected according to local and hospital policy and procedure prior to this treatment. [____] : Post-Dive: Time - [unfilled] [___] : Post-Dive: Value - [unfilled] mg/dL [I have examined and evaluated this patient today, including ears and lungs and have cleared the patient] : I have examined and evaluated this patient today, including ears and lungs and have cleared the patient to continue HBOT as prescribed.  [Time MD/Provider assessed Patient: _____] : Time MD/Provider assessed Patient: [unfilled] [I have ordered 1 HBOT session at the indicated protocol as seen below] : I have ordered 1 HBOT session at the indicated protocol as seen below [Patient demonstrated and verbalized proper technique for using air break mask] : Patient demonstrated and verbalized proper technique for using air break mask [Patient educated on the risks of SMOKING prior to HBOT with understanding] : Patient educated on the risks of SMOKING prior to HBOT with understanding [Patient educated on the risks of CONSUMING ALCOHOL prior to HBOT with understanding] : Patient educated on the risks of CONSUMING ALCOHOL prior to HBOT with understanding [100% Cotton] : 100% cotton [Empty all pockets] : empty all pockets [No hair oils, wigs, hairpieces, pins] : no hair oils, wigs, hairpieces, pins  [Pre tx medications] : pre tx medications  [No make-up, creams] : no make-up, creams  [No jewelry] : no jewelry  [No matches, cigarettes, lighters] : no matches, cigarettes, lighters  [Hearing aid removed] : hearing aid removed [Dentures removed] : dentures removed [Ground bracelet on pt's wrist] : ground bracelet on pt's wrist  [Contacts removed] : contacts removed  [Remove nail polish] : remove nail polish  [No reading material] : no reading material  [Bra, undergarments removed] : bra, undergarments removed  [No contraindicated dressings] : no contraindicated dressings [Ground Wire - VISUAL Verification - Intact/Free of Obstruction] : Ground Wire - VISUAL Verification - Intact/Free of Obstruction  [Ground Continuity - Verified < 1 ohm w/ Wrist Strap Sheng] : Ground Continuity - Verified < 1 ohm w/ Wrist Strap Sheng [Diagnosis: ___] : Diagnosis: [unfilled] [Number: ___] : Number: [unfilled] [Clear all fields] : clear all fields [] : No [FreeTextEntry1] : 2.0 sabina [FreeTextEntry3] : 90 min [FreeTextEntry5] : 1039 [FreeTextEntry7] : 1047 [FreeTextEntry9] : 121 [de-identified] : [id number] [de-identified] : 110 min

## 2024-01-16 ENCOUNTER — APPOINTMENT (OUTPATIENT)
Dept: HYPERBARIC MEDICINE | Facility: CLINIC | Age: 74
End: 2024-01-16

## 2024-01-16 DIAGNOSIS — E11.621 TYPE 2 DIABETES MELLITUS WITH FOOT ULCER: ICD-10-CM

## 2024-01-16 DIAGNOSIS — M86.9 OSTEOMYELITIS, UNSPECIFIED: ICD-10-CM

## 2024-01-16 DIAGNOSIS — M79.606 PAIN IN LEG, UNSPECIFIED: ICD-10-CM

## 2024-01-17 ENCOUNTER — NON-APPOINTMENT (OUTPATIENT)
Age: 74
End: 2024-01-17

## 2024-01-17 ENCOUNTER — OUTPATIENT (OUTPATIENT)
Dept: OUTPATIENT SERVICES | Facility: HOSPITAL | Age: 74
LOS: 1 days | End: 2024-01-17
Payer: MEDICARE

## 2024-01-17 ENCOUNTER — APPOINTMENT (OUTPATIENT)
Dept: OTOLARYNGOLOGY | Facility: CLINIC | Age: 74
End: 2024-01-17
Payer: MEDICARE

## 2024-01-17 ENCOUNTER — APPOINTMENT (OUTPATIENT)
Dept: HYPERBARIC MEDICINE | Facility: CLINIC | Age: 74
End: 2024-01-17
Payer: MEDICARE

## 2024-01-17 VITALS
RESPIRATION RATE: 16 BRPM | DIASTOLIC BLOOD PRESSURE: 91 MMHG | SYSTOLIC BLOOD PRESSURE: 151 MMHG | HEART RATE: 71 BPM | OXYGEN SATURATION: 98 % | TEMPERATURE: 98.1 F

## 2024-01-17 VITALS
WEIGHT: 216 LBS | HEIGHT: 68 IN | BODY MASS INDEX: 32.74 KG/M2 | DIASTOLIC BLOOD PRESSURE: 91 MMHG | HEART RATE: 75 BPM | SYSTOLIC BLOOD PRESSURE: 175 MMHG

## 2024-01-17 VITALS
HEART RATE: 66 BPM | SYSTOLIC BLOOD PRESSURE: 135 MMHG | OXYGEN SATURATION: 98 % | RESPIRATION RATE: 16 BRPM | DIASTOLIC BLOOD PRESSURE: 65 MMHG

## 2024-01-17 DIAGNOSIS — H61.20 IMPACTED CERUMEN, UNSPECIFIED EAR: ICD-10-CM

## 2024-01-17 DIAGNOSIS — Z98.890 OTHER SPECIFIED POSTPROCEDURAL STATES: Chronic | ICD-10-CM

## 2024-01-17 DIAGNOSIS — H69.90 UNSPECIFIED EUSTACHIAN TUBE DISORDER, UNSPECIFIED EAR: ICD-10-CM

## 2024-01-17 PROCEDURE — G0277: CPT

## 2024-01-17 PROCEDURE — 99183 HYPERBARIC OXYGEN THERAPY: CPT

## 2024-01-17 PROCEDURE — 69210 REMOVE IMPACTED EAR WAX UNI: CPT

## 2024-01-17 PROCEDURE — 99203 OFFICE O/P NEW LOW 30 MIN: CPT | Mod: 25

## 2024-01-17 PROCEDURE — 82962 GLUCOSE BLOOD TEST: CPT

## 2024-01-17 NOTE — HISTORY OF PRESENT ILLNESS
[de-identified] : EARS CLOGGED RIGHT MORE THAN LEFT ON HYPERBARIC O2 TREATMENT FOR VASCULITIS LOWER LEGS MEDICAL HX REVIEWED

## 2024-01-17 NOTE — PHYSICAL EXAM
[de-identified] : IMPACTED CERUMEN REMOVED/ RIGHT MORE THAN LEFT/ HEARING IMPROVED [Normal] : mucosa is normal [Midline] : trachea located in midline position

## 2024-01-17 NOTE — PROCEDURE
[Outpatient] : Outpatient [Ambulatory] : Patient is ambulatory. [THIS CHAMBER HAS BEEN CLEANED / DISINFECTED] : This chamber has been cleaned / disinfected according to local and hospital policy and procedure prior to this treatment. [____] : Post-Dive: Time - [unfilled] [___] : Post-Dive: Value - [unfilled] mg/dL [I have examined and evaluated this patient today, including ears and lungs and have cleared the patient] : I have examined and evaluated this patient today, including ears and lungs and have cleared the patient to continue HBOT as prescribed.  [I have ordered 1 HBOT session at the indicated protocol as seen below] : I have ordered 1 HBOT session at the indicated protocol as seen below [Patient demonstrated and verbalized proper technique for using air break mask] : Patient demonstrated and verbalized proper technique for using air break mask [Patient educated on the risks of SMOKING prior to HBOT with understanding] : Patient educated on the risks of SMOKING prior to HBOT with understanding [Patient educated on the risks of CONSUMING ALCOHOL prior to HBOT with understanding] : Patient educated on the risks of CONSUMING ALCOHOL prior to HBOT with understanding [100% Cotton] : 100% cotton [Empty all pockets] : empty all pockets [No hair oils, wigs, hairpieces, pins] : no hair oils, wigs, hairpieces, pins  [Pre tx medications] : pre tx medications  [No make-up, creams] : no make-up, creams  [No jewelry] : no jewelry  [No matches, cigarettes, lighters] : no matches, cigarettes, lighters  [Hearing aid removed] : hearing aid removed [Dentures removed] : dentures removed [Ground bracelet on pt's wrist] : ground bracelet on pt's wrist  [Contacts removed] : contacts removed  [Remove nail polish] : remove nail polish  [No reading material] : no reading material  [Bra, undergarments removed] : bra, undergarments removed  [No contraindicated dressings] : no contraindicated dressings [Ground Wire - VISUAL Verification - Intact/Free of Obstruction] : Ground Wire - VISUAL Verification - Intact/Free of Obstruction  [Ground Continuity - Verified < 1 ohm w/ Wrist Strap Sheng] : Ground Continuity - Verified < 1 ohm w/ Wrist Strap Sheng [Diagnosis: ___] : Diagnosis: [unfilled] [Number: ___] : Number: [unfilled] [Time MD/Provider assessed Patient: _____] : Time MD/Provider assessed Patient: [unfilled] [Clear all fields] : clear all fields [] : No [FreeTextEntry1] : 2.0 sabina [FreeTextEntry3] : 90 min [FreeTextEntry5] : 7304 [FreeTextEntry7] : 3229 [FreeTextEntry9] : 3023 [de-identified] : [id number] [de-identified] : 110 min

## 2024-01-18 ENCOUNTER — APPOINTMENT (OUTPATIENT)
Dept: HYPERBARIC MEDICINE | Facility: CLINIC | Age: 74
End: 2024-01-18
Payer: MEDICARE

## 2024-01-18 ENCOUNTER — OUTPATIENT (OUTPATIENT)
Dept: OUTPATIENT SERVICES | Facility: HOSPITAL | Age: 74
LOS: 1 days | End: 2024-01-18
Payer: MEDICARE

## 2024-01-18 VITALS
HEART RATE: 72 BPM | OXYGEN SATURATION: 99 % | SYSTOLIC BLOOD PRESSURE: 151 MMHG | DIASTOLIC BLOOD PRESSURE: 86 MMHG | RESPIRATION RATE: 16 BRPM

## 2024-01-18 VITALS
SYSTOLIC BLOOD PRESSURE: 157 MMHG | OXYGEN SATURATION: 99 % | TEMPERATURE: 98.2 F | RESPIRATION RATE: 16 BRPM | DIASTOLIC BLOOD PRESSURE: 77 MMHG | HEART RATE: 74 BPM

## 2024-01-18 DIAGNOSIS — M79.606 PAIN IN LEG, UNSPECIFIED: ICD-10-CM

## 2024-01-18 DIAGNOSIS — Z98.890 OTHER SPECIFIED POSTPROCEDURAL STATES: Chronic | ICD-10-CM

## 2024-01-18 PROCEDURE — 99183 HYPERBARIC OXYGEN THERAPY: CPT

## 2024-01-18 PROCEDURE — 82962 GLUCOSE BLOOD TEST: CPT

## 2024-01-18 PROCEDURE — G0277: CPT

## 2024-01-22 ENCOUNTER — OUTPATIENT (OUTPATIENT)
Dept: OUTPATIENT SERVICES | Facility: HOSPITAL | Age: 74
LOS: 1 days | End: 2024-01-22
Payer: MEDICARE

## 2024-01-22 ENCOUNTER — APPOINTMENT (OUTPATIENT)
Dept: HYPERBARIC MEDICINE | Facility: CLINIC | Age: 74
End: 2024-01-22
Payer: MEDICARE

## 2024-01-22 VITALS
HEART RATE: 74 BPM | OXYGEN SATURATION: 99 % | SYSTOLIC BLOOD PRESSURE: 154 MMHG | DIASTOLIC BLOOD PRESSURE: 84 MMHG | RESPIRATION RATE: 16 BRPM

## 2024-01-22 VITALS
SYSTOLIC BLOOD PRESSURE: 154 MMHG | HEART RATE: 71 BPM | RESPIRATION RATE: 16 BRPM | TEMPERATURE: 97.7 F | OXYGEN SATURATION: 99 % | DIASTOLIC BLOOD PRESSURE: 82 MMHG

## 2024-01-22 DIAGNOSIS — M79.606 PAIN IN LEG, UNSPECIFIED: ICD-10-CM

## 2024-01-22 DIAGNOSIS — Z98.890 OTHER SPECIFIED POSTPROCEDURAL STATES: Chronic | ICD-10-CM

## 2024-01-22 PROCEDURE — G0277: CPT

## 2024-01-22 PROCEDURE — 99183 HYPERBARIC OXYGEN THERAPY: CPT

## 2024-01-22 PROCEDURE — 82962 GLUCOSE BLOOD TEST: CPT

## 2024-01-22 NOTE — PROCEDURE
[Outpatient] : Outpatient [Ambulatory] : Patient is ambulatory. [THIS CHAMBER HAS BEEN CLEANED / DISINFECTED] : This chamber has been cleaned / disinfected according to local and hospital policy and procedure prior to this treatment. [____] : Post-Dive: Time - [unfilled] [___] : Post-Dive: Value - [unfilled] mg/dL [I have examined and evaluated this patient today, including ears and lungs and have cleared the patient] : I have examined and evaluated this patient today, including ears and lungs and have cleared the patient to continue HBOT as prescribed.  [Time MD/Provider assessed Patient: _____] : Time MD/Provider assessed Patient: [unfilled] [I have ordered 1 HBOT session at the indicated protocol as seen below] : I have ordered 1 HBOT session at the indicated protocol as seen below [Patient demonstrated and verbalized proper technique for using air break mask] : Patient demonstrated and verbalized proper technique for using air break mask [Patient educated on the risks of SMOKING prior to HBOT with understanding] : Patient educated on the risks of SMOKING prior to HBOT with understanding [Patient educated on the risks of CONSUMING ALCOHOL prior to HBOT with understanding] : Patient educated on the risks of CONSUMING ALCOHOL prior to HBOT with understanding [100% Cotton] : 100% cotton [Empty all pockets] : empty all pockets [No hair oils, wigs, hairpieces, pins] : no hair oils, wigs, hairpieces, pins  [Pre tx medications] : pre tx medications  [No make-up, creams] : no make-up, creams  [No jewelry] : no jewelry  [No matches, cigarettes, lighters] : no matches, cigarettes, lighters  [Hearing aid removed] : hearing aid removed [Dentures removed] : dentures removed [Ground bracelet on pt's wrist] : ground bracelet on pt's wrist  [Contacts removed] : contacts removed  [Remove nail polish] : remove nail polish  [No reading material] : no reading material  [Bra, undergarments removed] : bra, undergarments removed  [No contraindicated dressings] : no contraindicated dressings [Ground Wire - VISUAL Verification - Intact/Free of Obstruction] : Ground Wire - VISUAL Verification - Intact/Free of Obstruction  [Ground Continuity - Verified < 1 ohm w/ Wrist Strap Sheng] : Ground Continuity - Verified < 1 ohm w/ Wrist Strap Sheng [Diagnosis: ___] : Diagnosis: [unfilled] [Number: ___] : Number: [unfilled] [Clear all fields] : clear all fields [] : No [FreeTextEntry1] : 2.0 sabina [FreeTextEntry5] : 2132 [FreeTextEntry3] : 90 min [FreeTextEntry7] : 0294 [FreeTextEntry9] : 1944 [de-identified] : [id number] [de-identified] : 110 min

## 2024-01-22 NOTE — PROCEDURE
[Outpatient] : Outpatient [Ambulatory] : Patient is ambulatory. [THIS CHAMBER HAS BEEN CLEANED / DISINFECTED] : This chamber has been cleaned / disinfected according to local and hospital policy and procedure prior to this treatment. [I have examined and evaluated this patient today, including ears and lungs and have cleared the patient] : I have examined and evaluated this patient today, including ears and lungs and have cleared the patient to continue HBOT as prescribed.  [Time MD/Provider assessed Patient: _____] : Time MD/Provider assessed Patient: [unfilled] [I have ordered 1 HBOT session at the indicated protocol as seen below] : I have ordered 1 HBOT session at the indicated protocol as seen below [Patient demonstrated and verbalized proper technique for using air break mask] : Patient demonstrated and verbalized proper technique for using air break mask [Patient educated on the risks of SMOKING prior to HBOT with understanding] : Patient educated on the risks of SMOKING prior to HBOT with understanding [Patient educated on the risks of CONSUMING ALCOHOL prior to HBOT with understanding] : Patient educated on the risks of CONSUMING ALCOHOL prior to HBOT with understanding [100% Cotton] : 100% cotton [No hair oils, wigs, hairpieces, pins] : no hair oils, wigs, hairpieces, pins  [Empty all pockets] : empty all pockets [Pre tx medications] : pre tx medications  [No make-up, creams] : no make-up, creams  [No jewelry] : no jewelry  [Hearing aid removed] : hearing aid removed [No matches, cigarettes, lighters] : no matches, cigarettes, lighters  [Dentures removed] : dentures removed [Contacts removed] : contacts removed  [Ground bracelet on pt's wrist] : ground bracelet on pt's wrist  [Remove nail polish] : remove nail polish  [No reading material] : no reading material  [Bra, undergarments removed] : bra, undergarments removed  [No contraindicated dressings] : no contraindicated dressings [Ground Wire - VISUAL Verification - Intact/Free of Obstruction] : Ground Wire - VISUAL Verification - Intact/Free of Obstruction  [Ground Continuity - Verified < 1 ohm w/ Wrist Strap Sheng] : Ground Continuity - Verified < 1 ohm w/ Wrist Strap Sheng [Diagnosis: ___] : Diagnosis: [unfilled] [____] : Post-Dive: Time - [unfilled] [___] : Post-Dive: Value - [unfilled] mg/dL [Number: ___] : Number: [unfilled] [Clear all fields] : clear all fields [] : No [FreeTextEntry1] : 2.0 sabina [FreeTextEntry3] : 90 min [FreeTextEntry5] : 2147 [FreeTextEntry7] : 1841 [FreeTextEntry9] : 4880 [de-identified] : 1062 [de-identified] : 110 min

## 2024-01-22 NOTE — ASSESSMENT
[No change from previous assessment] : No change from previous assessment [Patient undergoing HBO treatment for __________] : Patient undergoing HBO treatment for [unfilled] [Patient prepared for dive] : Patient prepared for dive [Patient descended without problem for 9 minutes] : Patient descended without problem for 9 minutes [No dizziness or thirst] :  No dizziness or thirst [No ear problems] : No ear problems [Tolerating dive well] : Tolerating dive well [Vital signs stable] : Vital signs stable [No Chest Pain, shortness of breath] : No Chest Pain, shortness of breath [Respiratory Rate Stable] : Respiratory Rate Stable [No chest pain, shortness of breath, or ear pain] :  No chest pain, shortness of breath, or ear pain  [Tolerated Ascent well] : Tolerated Ascent well [A physician was present throughout the entire HBOT] : A physician was present throughout the entire HBOT [Vital Signs stable] : Vital Signs stable [No] : No [Clinically Stable] : Clinically stable [Continue Treatment Plan] : Continue treatment plan

## 2024-01-23 ENCOUNTER — APPOINTMENT (OUTPATIENT)
Dept: HYPERBARIC MEDICINE | Facility: CLINIC | Age: 74
End: 2024-01-23
Payer: MEDICARE

## 2024-01-23 ENCOUNTER — OUTPATIENT (OUTPATIENT)
Dept: OUTPATIENT SERVICES | Facility: HOSPITAL | Age: 74
LOS: 1 days | End: 2024-01-23
Payer: MEDICARE

## 2024-01-23 VITALS
TEMPERATURE: 98.2 F | HEART RATE: 85 BPM | SYSTOLIC BLOOD PRESSURE: 163 MMHG | OXYGEN SATURATION: 98 % | RESPIRATION RATE: 16 BRPM | DIASTOLIC BLOOD PRESSURE: 83 MMHG

## 2024-01-23 VITALS
OXYGEN SATURATION: 98 % | RESPIRATION RATE: 16 BRPM | HEART RATE: 74 BPM | DIASTOLIC BLOOD PRESSURE: 89 MMHG | SYSTOLIC BLOOD PRESSURE: 172 MMHG

## 2024-01-23 DIAGNOSIS — M79.606 PAIN IN LEG, UNSPECIFIED: ICD-10-CM

## 2024-01-23 DIAGNOSIS — Z98.890 OTHER SPECIFIED POSTPROCEDURAL STATES: Chronic | ICD-10-CM

## 2024-01-23 PROCEDURE — G0277: CPT

## 2024-01-23 PROCEDURE — 82962 GLUCOSE BLOOD TEST: CPT

## 2024-01-23 PROCEDURE — 99183 HYPERBARIC OXYGEN THERAPY: CPT

## 2024-01-23 NOTE — PROCEDURE
[Outpatient] : Outpatient [Ambulatory] : Patient is ambulatory. [THIS CHAMBER HAS BEEN CLEANED / DISINFECTED] : This chamber has been cleaned / disinfected according to local and hospital policy and procedure prior to this treatment. [I have examined and evaluated this patient today, including ears and lungs and have cleared the patient] : I have examined and evaluated this patient today, including ears and lungs and have cleared the patient to continue HBOT as prescribed.  [Time MD/Provider assessed Patient: _____] : Time MD/Provider assessed Patient: [unfilled] [I have ordered 1 HBOT session at the indicated protocol as seen below] : I have ordered 1 HBOT session at the indicated protocol as seen below [Patient demonstrated and verbalized proper technique for using air break mask] : Patient demonstrated and verbalized proper technique for using air break mask [Patient educated on the risks of SMOKING prior to HBOT with understanding] : Patient educated on the risks of SMOKING prior to HBOT with understanding [Patient educated on the risks of CONSUMING ALCOHOL prior to HBOT with understanding] : Patient educated on the risks of CONSUMING ALCOHOL prior to HBOT with understanding [100% Cotton] : 100% cotton [Empty all pockets] : empty all pockets [No hair oils, wigs, hairpieces, pins] : no hair oils, wigs, hairpieces, pins  [Pre tx medications] : pre tx medications  [No make-up, creams] : no make-up, creams  [No jewelry] : no jewelry  [No matches, cigarettes, lighters] : no matches, cigarettes, lighters  [Hearing aid removed] : hearing aid removed [Dentures removed] : dentures removed [Ground bracelet on pt's wrist] : ground bracelet on pt's wrist  [Contacts removed] : contacts removed  [Remove nail polish] : remove nail polish  [No reading material] : no reading material  [Bra, undergarments removed] : bra, undergarments removed  [No contraindicated dressings] : no contraindicated dressings [Ground Wire - VISUAL Verification - Intact/Free of Obstruction] : Ground Wire - VISUAL Verification - Intact/Free of Obstruction  [Ground Continuity - Verified < 1 ohm w/ Wrist Strap Sheng] : Ground Continuity - Verified < 1 ohm w/ Wrist Strap Sheng [Diagnosis: ___] : Diagnosis: [unfilled] [____] : Post-Dive: Time - [unfilled] [___] : Post-Dive: Value - [unfilled] mg/dL [Number: ___] : Number: [unfilled] [Clear all fields] : clear all fields [] : No [FreeTextEntry1] : 2.0 sabnia [FreeTextEntry3] : 90 min [FreeTextEntry5] : 1034 [FreeTextEntry7] : 1044 [FreeTextEntry9] : 1215 [de-identified] : 2372 [de-identified] : 110 min

## 2024-01-24 ENCOUNTER — APPOINTMENT (OUTPATIENT)
Dept: HYPERBARIC MEDICINE | Facility: CLINIC | Age: 74
End: 2024-01-24
Payer: MEDICARE

## 2024-01-24 ENCOUNTER — OUTPATIENT (OUTPATIENT)
Dept: OUTPATIENT SERVICES | Facility: HOSPITAL | Age: 74
LOS: 1 days | End: 2024-01-24
Payer: MEDICARE

## 2024-01-24 VITALS
RESPIRATION RATE: 16 BRPM | HEART RATE: 65 BPM | OXYGEN SATURATION: 98 % | DIASTOLIC BLOOD PRESSURE: 90 MMHG | SYSTOLIC BLOOD PRESSURE: 160 MMHG

## 2024-01-24 VITALS
OXYGEN SATURATION: 98 % | DIASTOLIC BLOOD PRESSURE: 93 MMHG | TEMPERATURE: 98 F | RESPIRATION RATE: 16 BRPM | SYSTOLIC BLOOD PRESSURE: 137 MMHG | HEART RATE: 73 BPM

## 2024-01-24 DIAGNOSIS — M79.606 PAIN IN LEG, UNSPECIFIED: ICD-10-CM

## 2024-01-24 DIAGNOSIS — M86.9 OSTEOMYELITIS, UNSPECIFIED: ICD-10-CM

## 2024-01-24 DIAGNOSIS — E11.621 TYPE 2 DIABETES MELLITUS WITH FOOT ULCER: ICD-10-CM

## 2024-01-24 DIAGNOSIS — Z98.890 OTHER SPECIFIED POSTPROCEDURAL STATES: Chronic | ICD-10-CM

## 2024-01-24 PROCEDURE — 99183 HYPERBARIC OXYGEN THERAPY: CPT

## 2024-01-24 PROCEDURE — G0277: CPT

## 2024-01-24 PROCEDURE — 82962 GLUCOSE BLOOD TEST: CPT

## 2024-01-24 NOTE — ASSESSMENT
[No change from previous assessment] : No change from previous assessment [Patient prepared for dive] : Patient prepared for dive [Patient descended without problem for 9 minutes] : Patient descended without problem for 9 minutes [Patient undergoing HBO treatment for __________] : Patient undergoing HBO treatment for [unfilled] [No dizziness or thirst] :  No dizziness or thirst [No ear problems] : No ear problems [Vital signs stable] : Vital signs stable [Tolerating dive well] : Tolerating dive well [No Chest Pain, shortness of breath] : No Chest Pain, shortness of breath [Respiratory Rate Stable] : Respiratory Rate Stable [No chest pain, shortness of breath, or ear pain] :  No chest pain, shortness of breath, or ear pain  [Tolerated Ascent well] : Tolerated Ascent well [Vital Signs stable] : Vital Signs stable [A physician was present throughout the entire HBOT] : A physician was present throughout the entire HBOT [No] : No [Clinically Stable] : Clinically stable [Continue Treatment Plan] : Continue treatment plan

## 2024-01-24 NOTE — PROCEDURE
2018       RE: Brendan Collins  [address]  [address]  [facility] [address]     Dear Colleague,    Thank you for referring your patient, Brendan Collins, to the [facility] EAR NOSE AND THROAT at [facility]. Please see a copy of my visit note below.        SLEEP SURGERY CONSULTATION    Patient: Brendan Collins  : 1966  CHIEF COMPLAINT: EMMA    IDENTIFICATION: Dr. Tian consulted Dr. Logan for surgical evaluation and possible treatment of obstructive sleep apnea syndrome for Brendan Collins.    HPI:  Brendan Collins is a 51 year old year old male who has Obstructive Sleep Apnea.The patient's past medical history is significant for traumatic brain injury and closed head trauma and facial fractures in the past.  He has a history of mild obstructive sleep apnea with an overall AHI of 10 and a lowest oxygen of 86%.  Treatment has been somewhat difficult for Brendan because he has not really been able to use CPAP due to difficulties with breathing through his nose.  There is a high likelihood that he would have a mouth leak with a nasal mask and difficulty with tolerance to a full face mask.  He was evaluated by Dr. Christo Layne in  for his nose.  He has sustained a nasal fracture of the nose.  Brendan does have a difficult time breathing through the right side of his nose all the time.  Dr. Layen noticed a significant septal deviation and recommended a septoplasty with a rhinoplasty as well for external valve collapse.  Brendan did try to use Flonase.  He used it for approximately three months without any significant improvement.  Per Dr. Layne's note, he had recommended a functional septorhinoplasty with lateral nasal wall reconstruction and turbinate reduction.  Brendan reports that he did not go through with the surgery because he was not interested in having another surgery at that time.  In that timeframe, he had  a lot of surgeries and was wanting to not have to go through another one.  In terms of his sleep apnea, he has not tried CPAP because of his nasal difficulties with his nose.  He did see a dentist for an oral appliance but did not think he would be able to tolerate it.  Dr. Tian asked me to evaluate to see if there was any upper airway surgery that I could do to improve his sleep apnea.  Of note, patient also complains of difficulty with swallowing and regurgitation of food.  He did have a swallow study performed in 2014 which was normal.  He has not had any issues with his swallowing for the last year to year and a half,  but he is very careful with how he eats and what he eats.       PAST MEDICAL HISTORY:  Past Medical History:   Diagnosis Date     Anxiety      Bipolar 1 disorder (H)      Depressive disorder      Hep C w/o coma, chronic (H)      Migraine      Opiate abuse, episodic      PTSD (post-traumatic stress disorder)      TBI (traumatic brain injury) (H)        PAST SURGICAL HISTORY:  Past Surgical History:   Procedure Laterality Date     ARTHROPLASTY KNEE Right 11/9/2015    Procedure: ARTHROPLASTY KNEE;  Surgeon: Elijah Ly MD;  Location:  OR     ARTHROSCOPY KNEE  2/1/2012    Procedure:ARTHROSCOPY KNEE; Left Knee Arthroscopy, Partial Lateral Meniscectomy; Surgeon:ELIJAH LY; Location: OR       MEDICATIONS:  Current Outpatient Prescriptions   Medication Sig Dispense Refill     aspirin  MG EC tablet Take 2 tablets (650 mg) by mouth 2 times daily as needed for moderate pain 60 tablet 1     buprenorphine (SUBUTEX) 8 MG SUBL sublingual tablet Place 2 tablets (16 mg) under the tongue daily 60 each 0     Cholecalciferol (VITAMIN D3 PO) Take 5,000 Units by mouth three times a week        diazepam (VALIUM) 5 MG tablet Take 1 tablet (5 mg) by mouth every 8 hours as needed for anxiety 14 tablet 0     DiphenhydrAMINE HCl (BENADRYL PO) Take 25-75 mg by mouth nightly as needed         diphenhydrAMINE-zinc acetate (BENADRYL) cream Apply topically 3 times daily as needed for itching       hydrOXYzine (ATARAX) 25 MG tablet Take 1-2 tablets (25-50 mg) by mouth every 4 hours as needed for itching or anxiety 60 tablet 1     nicotine (NICOTROL) 10 MG/ML SOLN inhalation solution Spray 1 spray in nostril every hour as needed for smoking cessation 1 Bottle 1     senna-docusate (SENOKOT-S;PERICOLACE) 8.6-50 MG per tablet Take 2 tablets by mouth daily 60 tablet 1     sucralfate (CARAFATE) 1 GM tablet Take 1 g by mouth 4 times daily as needed       SUMAtriptan Succinate (IMITREX PO) Take 100 mg by mouth daily as needed for migraine (May take 1 more tablet 2 hours after first dose if no relief)         ALLERGIES:  Allergies   Allergen Reactions     Droperidol Other (See Comments)     Spine turns to right and gets stiff, unable to move     Haloperidol Lactate Other (See Comments)     Spine turns to right becomes stiff and is unable to move     Nefazodone Difficulty breathing     Rofecoxib Difficulty breathing     Trazodone Difficulty breathing     Lamictal Starter Rash     Naproxen Nausea and Vomiting     Gabapentin Other (See Comments)     Migraine and shaking       SOCIAL HISTORY:  Social History     Social History     Marital status: Single     Spouse name: N/A     Number of children: N/A     Years of education: N/A     Occupational History     Not on file.     Social History Main Topics     Smoking status: Current Some Day Smoker     Smokeless tobacco: Never Used     Alcohol use Yes      Comment: on weekends,Last dran was last night     Drug use: Yes     Special: Marijuana      Comment: Last used 2 days ago     Sexual activity: Yes     Other Topics Concern     Not on file     Social History Narrative       FAMILY HISTORY:  Family History   Problem Relation Age of Onset     Glaucoma No family hx of      Macular Degeneration No family hx of      Thyroid Disease No family hx of      Eye Surgery No family hx of  "     Retinal detachment No family hx of      Amblyopia No family hx of      Strabismus No family hx of        REVIEW OF SYSTEMS:   ENT ROS 9/6/2018   Constitutional Weight gain   Neurology Dizzy spells, Unexplained weakness, Headache   Psychology Frequently feeling anxious   Eyes Double vision   Ears, Nose, Throat Hearing loss, Ringing/noise in ears, Nasal congestion or drainage, Trouble swallowing   Gastrointestinal/Genitourinary Heartburn/indigestion   Musculoskeletal Sore or stiff joints, Swollen joints, Back pain, Neck pain, Swollen legs/feet   Endocrine Thirst         PHYSICAL EXAM  Ht 1.88 m (6' 2\")  Wt 88.5 kg (195 lb)  BMI 25.04 kg/m2    Constitutional: healthy, alert and no distress  ENT:   NOSE:  Examination of the external nose does show a slightly sunken in appearance on the right side.  Septum is significantly deviated to the right.  He does appear to have some narrowing of the right internal nasal valve area.   ORAL CAVITY:  Modified Mallampati II.  Tonsils are 1+.  The palate is in good position.     ASSESSMENT:  1.  Mild obstructive sleep apnea,      2. 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  Therefore, I do not think more traditional upper airway surgery procedures would be of help for him.  I discussed with him that if he wanted to try CPAP, we would likely need to correct his nasal septal deformity.  I also discussed with him that most options with sleep apnea have pros and cons to them, and there is not necessarily an easy way out.  In terms of his nose, given the deformities seen, I would recommend that if he does choose to have surgery that it would be done by a facial plastic surgeon so that he would have the best chance of gaining improvement in his nasal breathing.  He could see Dr. Layne at [facility] or Dr. Clary Viveros here " at the [facility].  The biggest issue is that the patient is not sure if he really wants to go through with another surgery.  I encouraged him to consider his options and think about how much his nasal breathing bothers him.  If it does bother him, I do think surgery would be worthwhile.  I did discuss with him that nasal surgery may not necessarily improve sleep apnea.  I would say there would be a 50:50 chance that it would improve his sleep apnea to a significant degree.       I spent 45 minutes face-to-face with Brendan Collins during today's office visit, of which more than 50% was spent on counseling and coordination of care, which included discussion of pathophysiology of patient's obstructive sleep apnea, treatment options, risks and benefits of each option.          Again, thank you for allowing me to participate in the care of your patient.      Sincerely,    Jayna Logan MD       [Outpatient] : Outpatient [Ambulatory] : Patient is ambulatory. [THIS CHAMBER HAS BEEN CLEANED / DISINFECTED] : This chamber has been cleaned / disinfected according to local and hospital policy and procedure prior to this treatment. [____] : Post-Dive: Time - [unfilled] [___] : Post-Dive: Value - [unfilled] mg/dL [I have examined and evaluated this patient today, including ears and lungs and have cleared the patient] : I have examined and evaluated this patient today, including ears and lungs and have cleared the patient to continue HBOT as prescribed.  [Time MD/Provider assessed Patient: _____] : Time MD/Provider assessed Patient: [unfilled] [I have ordered 1 HBOT session at the indicated protocol as seen below] : I have ordered 1 HBOT session at the indicated protocol as seen below [Patient demonstrated and verbalized proper technique for using air break mask] : Patient demonstrated and verbalized proper technique for using air break mask [Patient educated on the risks of SMOKING prior to HBOT with understanding] : Patient educated on the risks of SMOKING prior to HBOT with understanding [Patient educated on the risks of CONSUMING ALCOHOL prior to HBOT with understanding] : Patient educated on the risks of CONSUMING ALCOHOL prior to HBOT with understanding [100% Cotton] : 100% cotton [Empty all pockets] : empty all pockets [No hair oils, wigs, hairpieces, pins] : no hair oils, wigs, hairpieces, pins  [Pre tx medications] : pre tx medications  [No make-up, creams] : no make-up, creams  [No jewelry] : no jewelry  [No matches, cigarettes, lighters] : no matches, cigarettes, lighters  [Hearing aid removed] : hearing aid removed [Dentures removed] : dentures removed [Ground bracelet on pt's wrist] : ground bracelet on pt's wrist  [Contacts removed] : contacts removed  [Remove nail polish] : remove nail polish  [No reading material] : no reading material  [Bra, undergarments removed] : bra, undergarments removed  [No contraindicated dressings] : no contraindicated dressings [Ground Wire - VISUAL Verification - Intact/Free of Obstruction] : Ground Wire - VISUAL Verification - Intact/Free of Obstruction  [Ground Continuity - Verified < 1 ohm w/ Wrist Strap Sheng] : Ground Continuity - Verified < 1 ohm w/ Wrist Strap Sheng [Diagnosis: ___] : Diagnosis: [unfilled] [Number: ___] : Number: [unfilled] [Clear all fields] : clear all fields [] : No [FreeTextEntry1] : 2.0 sabina [FreeTextEntry3] : 90 min [FreeTextEntry7] : 1046 [FreeTextEntry5] : 1038 [FreeTextEntry9] : 1216 [de-identified] : 2412 [de-identified] : 110 min

## 2024-01-25 ENCOUNTER — OUTPATIENT (OUTPATIENT)
Dept: OUTPATIENT SERVICES | Facility: HOSPITAL | Age: 74
LOS: 1 days | End: 2024-01-25
Payer: MEDICARE

## 2024-01-25 ENCOUNTER — APPOINTMENT (OUTPATIENT)
Dept: HYPERBARIC MEDICINE | Facility: CLINIC | Age: 74
End: 2024-01-25
Payer: MEDICARE

## 2024-01-25 VITALS
DIASTOLIC BLOOD PRESSURE: 73 MMHG | TEMPERATURE: 98.4 F | SYSTOLIC BLOOD PRESSURE: 162 MMHG | OXYGEN SATURATION: 98 % | RESPIRATION RATE: 16 BRPM | HEART RATE: 85 BPM

## 2024-01-25 VITALS
RESPIRATION RATE: 16 BRPM | OXYGEN SATURATION: 98 % | SYSTOLIC BLOOD PRESSURE: 174 MMHG | DIASTOLIC BLOOD PRESSURE: 90 MMHG | HEART RATE: 75 BPM

## 2024-01-25 DIAGNOSIS — M79.606 PAIN IN LEG, UNSPECIFIED: ICD-10-CM

## 2024-01-25 DIAGNOSIS — E11.621 TYPE 2 DIABETES MELLITUS WITH FOOT ULCER: ICD-10-CM

## 2024-01-25 DIAGNOSIS — M86.9 OSTEOMYELITIS, UNSPECIFIED: ICD-10-CM

## 2024-01-25 DIAGNOSIS — Z98.890 OTHER SPECIFIED POSTPROCEDURAL STATES: Chronic | ICD-10-CM

## 2024-01-25 PROCEDURE — 99183 HYPERBARIC OXYGEN THERAPY: CPT

## 2024-01-25 PROCEDURE — 82962 GLUCOSE BLOOD TEST: CPT

## 2024-01-25 PROCEDURE — G0277: CPT

## 2024-01-26 ENCOUNTER — APPOINTMENT (OUTPATIENT)
Dept: HYPERBARIC MEDICINE | Facility: CLINIC | Age: 74
End: 2024-01-26
Payer: MEDICARE

## 2024-01-26 ENCOUNTER — OUTPATIENT (OUTPATIENT)
Dept: OUTPATIENT SERVICES | Facility: HOSPITAL | Age: 74
LOS: 1 days | End: 2024-01-26
Payer: COMMERCIAL

## 2024-01-26 VITALS
HEART RATE: 75 BPM | TEMPERATURE: 98 F | OXYGEN SATURATION: 100 % | RESPIRATION RATE: 16 BRPM | DIASTOLIC BLOOD PRESSURE: 96 MMHG | SYSTOLIC BLOOD PRESSURE: 179 MMHG

## 2024-01-26 DIAGNOSIS — Z98.890 OTHER SPECIFIED POSTPROCEDURAL STATES: Chronic | ICD-10-CM

## 2024-01-26 DIAGNOSIS — E11.621 TYPE 2 DIABETES MELLITUS WITH FOOT ULCER: ICD-10-CM

## 2024-01-26 DIAGNOSIS — M79.606 PAIN IN LEG, UNSPECIFIED: ICD-10-CM

## 2024-01-26 DIAGNOSIS — M86.9 OSTEOMYELITIS, UNSPECIFIED: ICD-10-CM

## 2024-01-26 PROCEDURE — 82962 GLUCOSE BLOOD TEST: CPT

## 2024-01-26 PROCEDURE — G0277: CPT

## 2024-01-26 PROCEDURE — 99183 HYPERBARIC OXYGEN THERAPY: CPT

## 2024-01-26 NOTE — ASSESSMENT
[No change from previous assessment] : No change from previous assessment [Time MD/Provider assessed Patient:_______] : Time MD/Provider assessed Patient: [unfilled] [Patient prepared for dive] : Patient prepared for dive [Patient undergoing HBO treatment for __________] : Patient undergoing HBO treatment for [unfilled] [Patient descended without problem for 9 minutes] : Patient descended without problem for 9 minutes [No dizziness or thirst] :  No dizziness or thirst [No ear problems] : No ear problems [Vital signs stable] : Vital signs stable [Tolerating dive well] : Tolerating dive well [No Chest Pain, shortness of breath] : No Chest Pain, shortness of breath [Respiratory Rate Stable] : Respiratory Rate Stable [No chest pain, shortness of breath, or ear pain] :  No chest pain, shortness of breath, or ear pain  [Tolerated Ascent well] : Tolerated Ascent well [Vital Signs stable] : Vital Signs stable [A physician was present throughout the entire HBOT] : A physician was present throughout the entire HBOT [Clinically Stable] : Clinically stable [Yes] : Yes [Continue Treatment Plan] : Continue treatment plan [0] : 0 out of 10 [FreeTextEntry1] : RODRIGUEZ KRISHNAN [FreeTextEntry2] : /83 HR 75 T 98 O2% 100

## 2024-01-26 NOTE — PROCEDURE
[Outpatient] : Outpatient [Ambulatory] : Patient is ambulatory. [THIS CHAMBER HAS BEEN CLEANED / DISINFECTED] : This chamber has been cleaned / disinfected according to local and hospital policy and procedure prior to this treatment. [____] : Post-Dive: Time - [unfilled] [___] : Post-Dive: Value - [unfilled] mg/dL [Time MD/Provider assessed Patient: _____] : Time MD/Provider assessed Patient: [unfilled] [I have examined and evaluated this patient today, including ears and lungs and have cleared the patient] : I have examined and evaluated this patient today, including ears and lungs and have cleared the patient to continue HBOT as prescribed.  [I have ordered 1 HBOT session at the indicated protocol as seen below] : I have ordered 1 HBOT session at the indicated protocol as seen below [Patient demonstrated and verbalized proper technique for using air break mask] : Patient demonstrated and verbalized proper technique for using air break mask [Patient educated on the risks of SMOKING prior to HBOT with understanding] : Patient educated on the risks of SMOKING prior to HBOT with understanding [Patient educated on the risks of CONSUMING ALCOHOL prior to HBOT with understanding] : Patient educated on the risks of CONSUMING ALCOHOL prior to HBOT with understanding [100% Cotton] : 100% cotton [Empty all pockets] : empty all pockets [No hair oils, wigs, hairpieces, pins] : no hair oils, wigs, hairpieces, pins  [Pre tx medications] : pre tx medications  [No make-up, creams] : no make-up, creams  [No jewelry] : no jewelry  [Hearing aid removed] : hearing aid removed [No matches, cigarettes, lighters] : no matches, cigarettes, lighters  [Dentures removed] : dentures removed [Ground bracelet on pt's wrist] : ground bracelet on pt's wrist  [Remove nail polish] : remove nail polish  [Contacts removed] : contacts removed  [No reading material] : no reading material  [Bra, undergarments removed] : bra, undergarments removed  [No contraindicated dressings] : no contraindicated dressings [Ground Wire - VISUAL Verification - Intact/Free of Obstruction] : Ground Wire - VISUAL Verification - Intact/Free of Obstruction  [Ground Continuity - Verified < 1 ohm w/ Wrist Strap Sheng] : Ground Continuity - Verified < 1 ohm w/ Wrist Strap Sheng [Clear all fields] : clear all fields [Number: ___] : Number: [unfilled] [Diagnosis: ___] : Diagnosis: [unfilled] [] : No [FreeTextEntry1] : 2.0 [FreeTextEntry3] : 90 [FreeTextEntry5] : 1032 [FreeTextEntry7] : 1041 [de-identified] : 8284 [FreeTextEntry9] : 8750 [de-identified] : 110

## 2024-01-29 ENCOUNTER — APPOINTMENT (OUTPATIENT)
Dept: HYPERBARIC MEDICINE | Facility: CLINIC | Age: 74
End: 2024-01-29

## 2024-01-29 DIAGNOSIS — M79.606 PAIN IN LEG, UNSPECIFIED: ICD-10-CM

## 2024-01-30 ENCOUNTER — OUTPATIENT (OUTPATIENT)
Dept: OUTPATIENT SERVICES | Facility: HOSPITAL | Age: 74
LOS: 1 days | End: 2024-01-30
Payer: COMMERCIAL

## 2024-01-30 ENCOUNTER — APPOINTMENT (OUTPATIENT)
Dept: HYPERBARIC MEDICINE | Facility: CLINIC | Age: 74
End: 2024-01-30
Payer: MEDICARE

## 2024-01-30 VITALS
DIASTOLIC BLOOD PRESSURE: 91 MMHG | OXYGEN SATURATION: 98 % | RESPIRATION RATE: 18 BRPM | TEMPERATURE: 97.3 F | HEART RATE: 76 BPM | SYSTOLIC BLOOD PRESSURE: 181 MMHG

## 2024-01-30 DIAGNOSIS — Z98.890 OTHER SPECIFIED POSTPROCEDURAL STATES: Chronic | ICD-10-CM

## 2024-01-30 PROCEDURE — 82962 GLUCOSE BLOOD TEST: CPT

## 2024-01-30 PROCEDURE — G0277: CPT

## 2024-01-30 PROCEDURE — 99183 HYPERBARIC OXYGEN THERAPY: CPT

## 2024-01-30 NOTE — PROCEDURE
[Outpatient] : Outpatient [Ambulatory] : Patient is ambulatory. [THIS CHAMBER HAS BEEN CLEANED / DISINFECTED] : This chamber has been cleaned / disinfected according to local and hospital policy and procedure prior to this treatment. [I have examined and evaluated this patient today, including ears and lungs and have cleared the patient] : I have examined and evaluated this patient today, including ears and lungs and have cleared the patient to continue HBOT as prescribed.  [Time MD/Provider assessed Patient: _____] : Time MD/Provider assessed Patient: [unfilled] [I have ordered 1 HBOT session at the indicated protocol as seen below] : I have ordered 1 HBOT session at the indicated protocol as seen below [Patient demonstrated and verbalized proper technique for using air break mask] : Patient demonstrated and verbalized proper technique for using air break mask [Patient educated on the risks of SMOKING prior to HBOT with understanding] : Patient educated on the risks of SMOKING prior to HBOT with understanding [Patient educated on the risks of CONSUMING ALCOHOL prior to HBOT with understanding] : Patient educated on the risks of CONSUMING ALCOHOL prior to HBOT with understanding [100% Cotton] : 100% cotton [Empty all pockets] : empty all pockets [No hair oils, wigs, hairpieces, pins] : no hair oils, wigs, hairpieces, pins  [Pre tx medications] : pre tx medications  [No make-up, creams] : no make-up, creams  [No jewelry] : no jewelry  [No matches, cigarettes, lighters] : no matches, cigarettes, lighters  [Hearing aid removed] : hearing aid removed [Dentures removed] : dentures removed [Ground bracelet on pt's wrist] : ground bracelet on pt's wrist  [Contacts removed] : contacts removed  [Remove nail polish] : remove nail polish  [No reading material] : no reading material  [Bra, undergarments removed] : bra, undergarments removed  [No contraindicated dressings] : no contraindicated dressings [Ground Wire - VISUAL Verification - Intact/Free of Obstruction] : Ground Wire - VISUAL Verification - Intact/Free of Obstruction  [Ground Continuity - Verified < 1 ohm w/ Wrist Strap Sheng] : Ground Continuity - Verified < 1 ohm w/ Wrist Strap Sheng [Number: ___] : Number: [unfilled] [Diagnosis: ___] : Diagnosis: [unfilled] [____] : Post-Dive: Time - [unfilled] [___] : Post-Dive: Value - [unfilled] mg/dL [] : No [Clear all fields] : clear all fields [FreeTextEntry1] : 2.0 sabina [FreeTextEntry3] : 90 [FreeTextEntry5] : 1032 [FreeTextEntry7] : 1046 [FreeTextEntry9] : 1218 [de-identified] : 7543 [de-identified] : 110

## 2024-01-31 ENCOUNTER — OUTPATIENT (OUTPATIENT)
Dept: OUTPATIENT SERVICES | Facility: HOSPITAL | Age: 74
LOS: 1 days | End: 2024-01-31
Payer: MEDICARE

## 2024-01-31 ENCOUNTER — APPOINTMENT (OUTPATIENT)
Dept: HYPERBARIC MEDICINE | Facility: CLINIC | Age: 74
End: 2024-01-31
Payer: MEDICARE

## 2024-01-31 VITALS
SYSTOLIC BLOOD PRESSURE: 120 MMHG | DIASTOLIC BLOOD PRESSURE: 68 MMHG | TEMPERATURE: 97.8 F | RESPIRATION RATE: 16 BRPM | HEART RATE: 86 BPM

## 2024-01-31 DIAGNOSIS — M79.606 PAIN IN LEG, UNSPECIFIED: ICD-10-CM

## 2024-01-31 DIAGNOSIS — Z98.890 OTHER SPECIFIED POSTPROCEDURAL STATES: Chronic | ICD-10-CM

## 2024-01-31 PROCEDURE — G0277: CPT

## 2024-01-31 PROCEDURE — 99183 HYPERBARIC OXYGEN THERAPY: CPT

## 2024-01-31 PROCEDURE — 82962 GLUCOSE BLOOD TEST: CPT

## 2024-01-31 NOTE — PROCEDURE
[Outpatient] : Outpatient [Ambulatory] : Patient is ambulatory. [THIS CHAMBER HAS BEEN CLEANED / DISINFECTED] : This chamber has been cleaned / disinfected according to local and hospital policy and procedure prior to this treatment. [____] : Post-Dive: Time - [unfilled] [___] : Post-Dive: Value - [unfilled] mg/dL [I have examined and evaluated this patient today, including ears and lungs and have cleared the patient] : I have examined and evaluated this patient today, including ears and lungs and have cleared the patient to continue HBOT as prescribed.  [Time MD/Provider assessed Patient: _____] : Time MD/Provider assessed Patient: [unfilled] [I have ordered 1 HBOT session at the indicated protocol as seen below] : I have ordered 1 HBOT session at the indicated protocol as seen below [Patient demonstrated and verbalized proper technique for using air break mask] : Patient demonstrated and verbalized proper technique for using air break mask [Patient educated on the risks of SMOKING prior to HBOT with understanding] : Patient educated on the risks of SMOKING prior to HBOT with understanding [Patient educated on the risks of CONSUMING ALCOHOL prior to HBOT with understanding] : Patient educated on the risks of CONSUMING ALCOHOL prior to HBOT with understanding [100% Cotton] : 100% cotton [Empty all pockets] : empty all pockets [No hair oils, wigs, hairpieces, pins] : no hair oils, wigs, hairpieces, pins  [Pre tx medications] : pre tx medications  [No make-up, creams] : no make-up, creams  [No jewelry] : no jewelry  [No matches, cigarettes, lighters] : no matches, cigarettes, lighters  [Hearing aid removed] : hearing aid removed [Dentures removed] : dentures removed [Ground bracelet on pt's wrist] : ground bracelet on pt's wrist  [Contacts removed] : contacts removed  [Remove nail polish] : remove nail polish  [No reading material] : no reading material  [Bra, undergarments removed] : bra, undergarments removed  [No contraindicated dressings] : no contraindicated dressings [Ground Wire - VISUAL Verification - Intact/Free of Obstruction] : Ground Wire - VISUAL Verification - Intact/Free of Obstruction  [Ground Continuity - Verified < 1 ohm w/ Wrist Strap Sheng] : Ground Continuity - Verified < 1 ohm w/ Wrist Strap Sheng [Clear all fields] : clear all fields [Number: ___] : Number: [unfilled] [Diagnosis: ___] : Diagnosis: [unfilled] [] : No [FreeTextEntry1] : 2.0 sabina [FreeTextEntry3] : 90 [FreeTextEntry5] : 1032 [FreeTextEntry7] : 1048 [FreeTextEntry9] : 1216 [de-identified] : 6430 [de-identified] : 110

## 2024-02-01 ENCOUNTER — OUTPATIENT (OUTPATIENT)
Dept: OUTPATIENT SERVICES | Facility: HOSPITAL | Age: 74
LOS: 1 days | End: 2024-02-01
Payer: MEDICARE

## 2024-02-01 ENCOUNTER — APPOINTMENT (OUTPATIENT)
Dept: HYPERBARIC MEDICINE | Facility: CLINIC | Age: 74
End: 2024-02-01
Payer: MEDICARE

## 2024-02-01 VITALS
RESPIRATION RATE: 16 BRPM | DIASTOLIC BLOOD PRESSURE: 69 MMHG | SYSTOLIC BLOOD PRESSURE: 113 MMHG | HEART RATE: 77 BPM | TEMPERATURE: 97.9 F | OXYGEN SATURATION: 99 %

## 2024-02-01 VITALS
HEART RATE: 69 BPM | SYSTOLIC BLOOD PRESSURE: 151 MMHG | DIASTOLIC BLOOD PRESSURE: 83 MMHG | OXYGEN SATURATION: 99 % | RESPIRATION RATE: 16 BRPM

## 2024-02-01 DIAGNOSIS — Z98.890 OTHER SPECIFIED POSTPROCEDURAL STATES: Chronic | ICD-10-CM

## 2024-02-01 DIAGNOSIS — E11.621 TYPE 2 DIABETES MELLITUS WITH FOOT ULCER: ICD-10-CM

## 2024-02-01 DIAGNOSIS — M79.606 PAIN IN LEG, UNSPECIFIED: ICD-10-CM

## 2024-02-01 DIAGNOSIS — M86.9 OSTEOMYELITIS, UNSPECIFIED: ICD-10-CM

## 2024-02-01 PROCEDURE — 82962 GLUCOSE BLOOD TEST: CPT

## 2024-02-01 PROCEDURE — 99183 HYPERBARIC OXYGEN THERAPY: CPT

## 2024-02-01 PROCEDURE — G0277: CPT

## 2024-02-01 RX ORDER — BLOOD SUGAR DIAGNOSTIC
STRIP MISCELLANEOUS
Qty: 180 | Refills: 2 | Status: ACTIVE | COMMUNITY
Start: 2022-05-31 | End: 1900-01-01

## 2024-02-01 NOTE — PROCEDURE
[Outpatient] : Outpatient [Ambulatory] : Patient is ambulatory. [THIS CHAMBER HAS BEEN CLEANED / DISINFECTED] : This chamber has been cleaned / disinfected according to local and hospital policy and procedure prior to this treatment. [____] : Post-Dive: Time - [unfilled] [___] : Post-Dive: Value - [unfilled] mg/dL [I have examined and evaluated this patient today, including ears and lungs and have cleared the patient] : I have examined and evaluated this patient today, including ears and lungs and have cleared the patient to continue HBOT as prescribed.  [Time MD/Provider assessed Patient: _____] : Time MD/Provider assessed Patient: [unfilled] [I have ordered 1 HBOT session at the indicated protocol as seen below] : I have ordered 1 HBOT session at the indicated protocol as seen below [Patient demonstrated and verbalized proper technique for using air break mask] : Patient demonstrated and verbalized proper technique for using air break mask [Patient educated on the risks of SMOKING prior to HBOT with understanding] : Patient educated on the risks of SMOKING prior to HBOT with understanding [Patient educated on the risks of CONSUMING ALCOHOL prior to HBOT with understanding] : Patient educated on the risks of CONSUMING ALCOHOL prior to HBOT with understanding [100% Cotton] : 100% cotton [Empty all pockets] : empty all pockets [No hair oils, wigs, hairpieces, pins] : no hair oils, wigs, hairpieces, pins  [Pre tx medications] : pre tx medications  [No make-up, creams] : no make-up, creams  [No jewelry] : no jewelry  [No matches, cigarettes, lighters] : no matches, cigarettes, lighters  [Hearing aid removed] : hearing aid removed [Dentures removed] : dentures removed [Ground bracelet on pt's wrist] : ground bracelet on pt's wrist  [Contacts removed] : contacts removed  [Remove nail polish] : remove nail polish  [No reading material] : no reading material  [Bra, undergarments removed] : bra, undergarments removed  [No contraindicated dressings] : no contraindicated dressings [Ground Wire - VISUAL Verification - Intact/Free of Obstruction] : Ground Wire - VISUAL Verification - Intact/Free of Obstruction  [Ground Continuity - Verified < 1 ohm w/ Wrist Strap Sheng] : Ground Continuity - Verified < 1 ohm w/ Wrist Strap Sheng [Diagnosis: ___] : Diagnosis: [unfilled] [Number: ___] : Number: [unfilled] [Clear all fields] : clear all fields [] : No [FreeTextEntry1] : 2.0 sabina [FreeTextEntry3] : 90 min [FreeTextEntry5] : 103 [FreeTextEntry7] : 1043 [FreeTextEntry9] : 1215 [de-identified] : [id number] [de-identified] : 110 min

## 2024-02-02 ENCOUNTER — OUTPATIENT (OUTPATIENT)
Dept: OUTPATIENT SERVICES | Facility: HOSPITAL | Age: 74
LOS: 1 days | End: 2024-02-02
Payer: MEDICARE

## 2024-02-02 ENCOUNTER — APPOINTMENT (OUTPATIENT)
Dept: HYPERBARIC MEDICINE | Facility: CLINIC | Age: 74
End: 2024-02-02
Payer: MEDICARE

## 2024-02-02 VITALS
HEART RATE: 70 BPM | SYSTOLIC BLOOD PRESSURE: 163 MMHG | RESPIRATION RATE: 16 BRPM | OXYGEN SATURATION: 98 % | DIASTOLIC BLOOD PRESSURE: 83 MMHG

## 2024-02-02 VITALS
OXYGEN SATURATION: 98 % | RESPIRATION RATE: 16 BRPM | SYSTOLIC BLOOD PRESSURE: 152 MMHG | DIASTOLIC BLOOD PRESSURE: 73 MMHG | HEART RATE: 77 BPM | TEMPERATURE: 98 F

## 2024-02-02 DIAGNOSIS — Z98.890 OTHER SPECIFIED POSTPROCEDURAL STATES: Chronic | ICD-10-CM

## 2024-02-02 DIAGNOSIS — M79.606 PAIN IN LEG, UNSPECIFIED: ICD-10-CM

## 2024-02-02 PROCEDURE — G0277: CPT

## 2024-02-02 PROCEDURE — 82962 GLUCOSE BLOOD TEST: CPT

## 2024-02-02 PROCEDURE — 99183 HYPERBARIC OXYGEN THERAPY: CPT

## 2024-02-02 NOTE — PROCEDURE
[Outpatient] : Outpatient [Ambulatory] : Patient is ambulatory. [THIS CHAMBER HAS BEEN CLEANED / DISINFECTED] : This chamber has been cleaned / disinfected according to local and hospital policy and procedure prior to this treatment. [____] : Post-Dive: Time - [unfilled] [___] : Post-Dive: Value - [unfilled] mg/dL [I have examined and evaluated this patient today, including ears and lungs and have cleared the patient] : I have examined and evaluated this patient today, including ears and lungs and have cleared the patient to continue HBOT as prescribed.  [I have ordered 1 HBOT session at the indicated protocol as seen below] : I have ordered 1 HBOT session at the indicated protocol as seen below [Patient demonstrated and verbalized proper technique for using air break mask] : Patient demonstrated and verbalized proper technique for using air break mask [Patient educated on the risks of SMOKING prior to HBOT with understanding] : Patient educated on the risks of SMOKING prior to HBOT with understanding [Patient educated on the risks of CONSUMING ALCOHOL prior to HBOT with understanding] : Patient educated on the risks of CONSUMING ALCOHOL prior to HBOT with understanding [100% Cotton] : 100% cotton [Empty all pockets] : empty all pockets [No hair oils, wigs, hairpieces, pins] : no hair oils, wigs, hairpieces, pins  [Pre tx medications] : pre tx medications  [No make-up, creams] : no make-up, creams  [No jewelry] : no jewelry  [No matches, cigarettes, lighters] : no matches, cigarettes, lighters  [Hearing aid removed] : hearing aid removed [Dentures removed] : dentures removed [Ground bracelet on pt's wrist] : ground bracelet on pt's wrist  [Contacts removed] : contacts removed  [Remove nail polish] : remove nail polish  [No reading material] : no reading material  [Bra, undergarments removed] : bra, undergarments removed  [No contraindicated dressings] : no contraindicated dressings [Ground Wire - VISUAL Verification - Intact/Free of Obstruction] : Ground Wire - VISUAL Verification - Intact/Free of Obstruction  [Ground Continuity - Verified < 1 ohm w/ Wrist Strap Sheng] : Ground Continuity - Verified < 1 ohm w/ Wrist Strap Sheng [Diagnosis: ___] : Diagnosis: [unfilled] [Number: ___] : Number: [unfilled] [Clear all fields] : clear all fields [] : No [FreeTextEntry1] : 2.0 sabina [FreeTextEntry3] : 90 min [FreeTextEntry5] : 2609 [FreeTextEntry7] : 5678 [FreeTextEntry9] : 3471 [de-identified] : 1856 [de-identified] : 110 min

## 2024-02-04 ENCOUNTER — RX RENEWAL (OUTPATIENT)
Age: 74
End: 2024-02-04

## 2024-02-05 DIAGNOSIS — M86.9 OSTEOMYELITIS, UNSPECIFIED: ICD-10-CM

## 2024-02-05 DIAGNOSIS — M79.606 PAIN IN LEG, UNSPECIFIED: ICD-10-CM

## 2024-02-05 DIAGNOSIS — E11.621 TYPE 2 DIABETES MELLITUS WITH FOOT ULCER: ICD-10-CM

## 2024-02-07 ENCOUNTER — APPOINTMENT (OUTPATIENT)
Dept: VASCULAR SURGERY | Facility: CLINIC | Age: 74
End: 2024-02-07
Payer: MEDICARE

## 2024-02-07 DIAGNOSIS — I73.9 PERIPHERAL VASCULAR DISEASE, UNSPECIFIED: ICD-10-CM

## 2024-02-07 PROCEDURE — 99213 OFFICE O/P EST LOW 20 MIN: CPT

## 2024-02-07 PROCEDURE — 93923 UPR/LXTR ART STDY 3+ LVLS: CPT

## 2024-02-08 DIAGNOSIS — M86.9 OSTEOMYELITIS, UNSPECIFIED: ICD-10-CM

## 2024-02-08 DIAGNOSIS — E11.621 TYPE 2 DIABETES MELLITUS WITH FOOT ULCER: ICD-10-CM

## 2024-02-09 PROBLEM — I73.9 PAD (PERIPHERAL ARTERY DISEASE): Status: ACTIVE | Noted: 2022-03-02

## 2024-02-09 NOTE — HISTORY OF PRESENT ILLNESS
[FreeTextEntry1] : JOVANNI MARTINEZ is a 70 year old male s/p right Pop to PT bypass with RGSV on 11/25/2019 for right foot wound and osteomyelitis.    6/11/2020 - Doing well since last visit. Right foot wounds have healed completely at this time. No complaints. Denies claudication.  1/7/2021 - Doing well since visit. Patient reports stable mild numbness at the forefoot. Denies lower extremity pain. No fever or chills.  1/27/2021-right lower extremity angiogram revealed occlusion of the distal posterior tibial artery distal to the anastomosis, nonreconstructible.  2/18/2021-Doing well since angiogram. Reports continued mild right foot numbness and pain. This is worse in the morning but improves during the day. The patient denies any wounds on the right foot. Has taken gabapentin with some improvement in symptoms. No fever or chills.  5/20/2021-Patient presents for follow-up with his wife.  He reports stable right lower extremity symptoms.  He reports continued bilateral foot numbness at the soles.  He continues to take the gabapentin with some relief in symptoms.  He recently saw his cardiologist who performed an echocardiogram.  Although today he is hypertensive with a systolic blood pressure 175, his wife reports that repeated measurements at home range from 135 to 150 mmHg. Patient continues to take plavix.  3/1/2022-Pt presents for follow up with his wife. Since last visit, has been doing well. Denies lower extremity pain or wounds. He continues to walk without issues. He takes his aspirin and statin.  8/9/2022-Pt presents for follow up. Since last visit, patient reports stable right leg symptoms. Denies rest pain or bilateral foot wounds. He continues to take gabapentin 100 mg BID. He has been active at home and walks daily. Continues to take aspirin, plavix, statin.  2/15/2023 - Pt presents for follow up visit. He reports stable foot symptoms with bilateral sole of feet numbness. He continues to take gabapentin with relief of his symptoms. Denies any rest pain or lower extremity wounds. Continues to take aspirin, plavix, statin.  10/11/2023 - Pt presents for follow up visit. PResnts w/ wife. one week history of right third toe wound. initially began as a small spot, now enlarging. No pain. no fever or chills. does not recall how wound occurred. has appt w/ podiatry next week.  10/19/2023 - Right leg tibial angioplasty of peroneal artery (single vessel runoff to foot) 10/20/2023 - Right third toe amputation by Dr. Gunter 11/1/2023 - Pt presents for follow up. Saw Dr. Gunter yesterday. Toe amputation site appears to be healing. Continues to take aspirin, plavix, statin, antihypertensives, and diabetic medications. Has appointment with wound care 11/27.  [de-identified] : 2/7/2024 - Pt presents for follow up. Right toe amp site has finally healed. He is no longer going back for hyperbaric oxygen therapy. Continues to take his medications as prescribed.

## 2024-02-09 NOTE — ASSESSMENT
[FreeTextEntry1] : JOVANNI MARTINEZ is a 73 year old male presents for follow up.  > PAD s/p right pop-PT bypass w/ rgsv now closed. - s/p right peroneal angioplasty 10/19, third toe amputation 10/20 - right third toe amp site healed.  - continue aspirin, plavix, statin, antihypertensives and antidiabetic medications.  - follow up in three months w/ repeat deondre/pvrs with toe pressures.  - will refill gabapentin.

## 2024-02-09 NOTE — PHYSICAL EXAM
[0] : left 0 [Varicose Veins Of Lower Extremities] : present [] : present [No Rash or Lesion] : No rash or lesion [Calm] : calm [JVD] : no jugular venous distention  [Ankle Swelling (On Exam)] : not present [de-identified] : Well-appearing  [de-identified] : EOMI, anicteric  [de-identified] : soft, nt, nd  [de-identified] : motor and sensation intact in all four extremities   [de-identified] : right third toe amputation site healed. no wounds on left foot.  [de-identified] : A&Ox4  [TextEntry] : Vitals: P 65 /71 Temp 98.1 F Ht: 5'8" Wt: 232 pounds

## 2024-02-09 NOTE — DATA REVIEWED
[FreeTextEntry1] : 10/20/023 -  HUANG R HUANG 0.95, Left HUANG 0.62 --------------------- 10/11/2023 - HUANG R HUANG 0.72, TBI 0.23, toe pressure 44 L UHANG 1.1, TBI 0.37, toe pressure 70 -------------------------------- 2/14/2023 - huang/pvrs R HUANG 1.1, TBI 0.30, toe pressure 40 L HUANG 1.1, TBI 0.52, toe pressure 69 --------------------------------- 8/9/2022 -HUANG/PVR Right HUANG 0.45, TBI 0.30, toe pressure 38 Left HUANG 0.89, TBI 0.24, toe pressure 30 ----------------------------------  3/1/2022-HUANG R HUANG 1.38, TBI 0.36, toe pressure 51 L HUANG 1.12, TBI 0.44, toe pressure 63  3/1/2022-BLE venous duplex Negative for DVT bilaterally. Right - GSV harvested. Reflux in GSV mid-distal calf.  Left - Chronic poorly recanalized SVT in GSV with reflux in GSV and SSV.  ---------------------------------------- 5/20/2021 - HUANG R TBI 0.54, toe pressure 87 L TBI 0.43, toe pressure 70, HUANG 1.08.

## 2024-03-05 DIAGNOSIS — E08.621 DIABETES MELLITUS DUE TO UNDERLYING CONDITION WITH FOOT ULCER: ICD-10-CM

## 2024-03-05 DIAGNOSIS — L97.509 DIABETES MELLITUS DUE TO UNDERLYING CONDITION WITH FOOT ULCER: ICD-10-CM

## 2024-03-06 ENCOUNTER — APPOINTMENT (OUTPATIENT)
Dept: ENDOCRINOLOGY | Facility: CLINIC | Age: 74
End: 2024-03-06

## 2024-03-06 ENCOUNTER — APPOINTMENT (OUTPATIENT)
Dept: CARDIOLOGY | Facility: CLINIC | Age: 74
End: 2024-03-06
Payer: MEDICARE

## 2024-03-06 ENCOUNTER — NON-APPOINTMENT (OUTPATIENT)
Age: 74
End: 2024-03-06

## 2024-03-06 ENCOUNTER — RESULT CHARGE (OUTPATIENT)
Age: 74
End: 2024-03-06

## 2024-03-06 VITALS
SYSTOLIC BLOOD PRESSURE: 136 MMHG | HEIGHT: 68 IN | DIASTOLIC BLOOD PRESSURE: 70 MMHG | HEART RATE: 62 BPM | OXYGEN SATURATION: 98 % | BODY MASS INDEX: 32.13 KG/M2 | WEIGHT: 212 LBS

## 2024-03-06 DIAGNOSIS — R94.31 ABNORMAL ELECTROCARDIOGRAM [ECG] [EKG]: ICD-10-CM

## 2024-03-06 PROCEDURE — G2211 COMPLEX E/M VISIT ADD ON: CPT

## 2024-03-06 PROCEDURE — 99214 OFFICE O/P EST MOD 30 MIN: CPT

## 2024-03-06 PROCEDURE — 93000 ELECTROCARDIOGRAM COMPLETE: CPT

## 2024-03-06 RX ORDER — LISINOPRIL 40 MG/1
40 TABLET ORAL
Qty: 90 | Refills: 3 | Status: ACTIVE | COMMUNITY
Start: 2020-09-17

## 2024-03-06 RX ORDER — CARVEDILOL 25 MG/1
25 TABLET, FILM COATED ORAL
Qty: 180 | Refills: 3 | Status: ACTIVE | COMMUNITY
Start: 2021-10-08

## 2024-03-06 RX ORDER — CLOPIDOGREL BISULFATE 75 MG/1
75 TABLET, FILM COATED ORAL
Qty: 90 | Refills: 3 | Status: ACTIVE | COMMUNITY
Start: 2023-04-25

## 2024-03-06 RX ORDER — ISOSORBIDE MONONITRATE 60 MG/1
60 TABLET, EXTENDED RELEASE ORAL
Qty: 90 | Refills: 3 | Status: ACTIVE | COMMUNITY
Start: 2023-04-25

## 2024-03-06 RX ORDER — ICOSAPENT ETHYL 1 G/1
1 CAPSULE ORAL
Qty: 360 | Refills: 3 | Status: ACTIVE | COMMUNITY
Start: 2022-05-27

## 2024-03-06 RX ORDER — AMLODIPINE BESYLATE 10 MG/1
10 TABLET ORAL
Qty: 90 | Refills: 3 | Status: ACTIVE | COMMUNITY
Start: 2021-07-30

## 2024-03-06 RX ORDER — CLONIDINE HYDROCHLORIDE 0.1 MG/1
0.1 TABLET ORAL
Qty: 180 | Refills: 3 | Status: ACTIVE | COMMUNITY
Start: 2022-02-17

## 2024-03-06 RX ORDER — HYDROCHLOROTHIAZIDE 25 MG/1
25 TABLET ORAL DAILY
Qty: 90 | Refills: 3 | Status: ACTIVE | COMMUNITY

## 2024-03-06 RX ORDER — ATORVASTATIN CALCIUM 20 MG/1
20 TABLET, FILM COATED ORAL
Qty: 90 | Refills: 3 | Status: ACTIVE | COMMUNITY
Start: 2021-05-18

## 2024-03-06 NOTE — HISTORY OF PRESENT ILLNESS
[FreeTextEntry1] : Kirill is a 73 year old male with HTN, DM2 and CAD with stents (most recent 3-4 years ago), here for follow up.  He was admitted to the hospital in 11/2019, with a nonhealing right foot ulcer. On 11/25, he had a popliteal artery bypass to posterior tibial artery. In 2021, RLE angiogram with occlusion of the distal posterior tibial artery.  I last saw him in 11/23.  He is feeling well overall. He denies chest pain, difficulty breathing, palpitations, lower extremity swelling, orthopnea, PND, dizziness, lightheadedness, near syncope. He reports that his blood pressures are much better these days. His diabetes has been better controlled. His most recent LDL was 49 His SBP has been in the 130-140's at home at home.   A pharmacologic stress test in 2/22 showed inferior infarct, but no evidence of ischemia.  A carotid Doppler showed mild to moderate atherosclerosis, without significant stenosis. His echocardiogram in 2021 showed an EF of 53% with mid-mod MR, mild DD and mild LVH. This was unchanged in 5/22, though EF was noted to be 60-65%. In 9/23, his LV function remained normal, with mild LVH and mild dilated aortic root, mild MR.

## 2024-03-06 NOTE — DISCUSSION/SUMMARY
[___ Month(s)] : in [unfilled] month(s) [FreeTextEntry1] : Kirill has a history of hypertension, diabetes, CAD with stents, and peripheral vascular disease status post right lower extremity bypass in 11/2019.  From a cardiac perspective, he is doing well. His blood pressure is notably better. His EKG demonstrates a sinus rhythm with a right bundle branch block, unchanged from prior tracings.  For now, he will continue his current blood pressure regimen. He has normal LV function, with mild LVH. He will remain on aspirin, Plavix, and a statin, along with Vascepa. His most recent LDL was in the 40's.  His stress test demonstrated inferior infarct, without ischemia, and his carotid Doppler demonstrated mild to moderate atherosclerosis, without stenosis.   I have ordered a full set of BW. I stressed the importance of diet, exercise, and weight loss, to reduce his overall cardiovascular risk.  If no issues, I will see him again in 6 months. [EKG obtained to assist in diagnosis and management of assessed problem(s)] : EKG obtained to assist in diagnosis and management of assessed problem(s)

## 2024-03-06 NOTE — PHYSICAL EXAM
[Well Developed] : well developed [Well Nourished] : well nourished [No Acute Distress] : no acute distress [Normal Venous Pressure] : normal venous pressure [Normal Conjunctiva] : normal conjunctiva [No Carotid Bruit] : no carotid bruit [Normal S1, S2] : normal S1, S2 [No Rub] : no rub [No Gallop] : no gallop [Clear Lung Fields] : clear lung fields [Good Air Entry] : good air entry [Soft] : abdomen soft [No Respiratory Distress] : no respiratory distress  [Non Tender] : non-tender [No Masses/organomegaly] : no masses/organomegaly [Normal Bowel Sounds] : normal bowel sounds [Normal Gait] : normal gait [No Edema] : no edema [No Cyanosis] : no cyanosis [No Clubbing] : no clubbing [No Varicosities] : no varicosities [No Rash] : no rash [No Skin Lesions] : no skin lesions [Moves all extremities] : moves all extremities [No Focal Deficits] : no focal deficits [Normal Speech] : normal speech [Alert and Oriented] : alert and oriented [Normal memory] : normal memory [de-identified] : + SM at /sb

## 2024-03-08 LAB
BASOPHILS # BLD AUTO: 0.04 K/UL
BASOPHILS NFR BLD AUTO: 0.8 %
EOSINOPHIL # BLD AUTO: 0.11 K/UL
EOSINOPHIL NFR BLD AUTO: 2.1 %
HCT VFR BLD CALC: 35.9 %
HGB BLD-MCNC: 11.9 G/DL
IMM GRANULOCYTES NFR BLD AUTO: 0.2 %
LYMPHOCYTES # BLD AUTO: 1.3 K/UL
LYMPHOCYTES NFR BLD AUTO: 25.4 %
MAN DIFF?: NORMAL
MCHC RBC-ENTMCNC: 27 PG
MCHC RBC-ENTMCNC: 33.1 GM/DL
MCV RBC AUTO: 81.4 FL
MONOCYTES # BLD AUTO: 0.33 K/UL
MONOCYTES NFR BLD AUTO: 6.4 %
NEUTROPHILS # BLD AUTO: 3.33 K/UL
NEUTROPHILS NFR BLD AUTO: 65.1 %
PLATELET # BLD AUTO: 176 K/UL
RBC # BLD: 4.41 M/UL
RBC # FLD: 14.3 %
WBC # FLD AUTO: 5.12 K/UL

## 2024-03-11 LAB
25(OH)D3 SERPL-MCNC: 22 NG/ML
ALBUMIN SERPL ELPH-MCNC: 4.5 G/DL
ALP BLD-CCNC: 64 U/L
ALT SERPL-CCNC: 7 U/L
ANION GAP SERPL CALC-SCNC: 12 MMOL/L
APPEARANCE: CLEAR
AST SERPL-CCNC: 10 U/L
BACTERIA: NEGATIVE /HPF
BILIRUB SERPL-MCNC: 0.5 MG/DL
BILIRUBIN URINE: NEGATIVE
BLOOD URINE: NEGATIVE
BUN SERPL-MCNC: 25 MG/DL
CALCIUM SERPL-MCNC: 9.4 MG/DL
CAST: 11 /LPF
CHLORIDE SERPL-SCNC: 105 MMOL/L
CHOLEST SERPL-MCNC: 104 MG/DL
CO2 SERPL-SCNC: 22 MMOL/L
COLOR: YELLOW
CREAT SERPL-MCNC: 1.12 MG/DL
EGFR: 70 ML/MIN/1.73M2
EPITHELIAL CELLS: 1 /HPF
ESTIMATED AVERAGE GLUCOSE: 131 MG/DL
FINE GRANULAR CASTS: PRESENT
GLUCOSE QUALITATIVE U: NEGATIVE MG/DL
GLUCOSE SERPL-MCNC: 175 MG/DL
HBA1C MFR BLD HPLC: 6.2 %
HDLC SERPL-MCNC: 36 MG/DL
HYALINE CASTS: PRESENT
KETONES URINE: NEGATIVE MG/DL
LDLC SERPL CALC-MCNC: 51 MG/DL
LEUKOCYTE ESTERASE URINE: NEGATIVE
MICROSCOPIC-UA: NORMAL
NITRITE URINE: NEGATIVE
NONHDLC SERPL-MCNC: 69 MG/DL
PH URINE: 5.5
POTASSIUM SERPL-SCNC: 5.3 MMOL/L
PROT SERPL-MCNC: 6.6 G/DL
PROTEIN URINE: 30 MG/DL
RED BLOOD CELLS URINE: 0 /HPF
REVIEW: NORMAL
SODIUM SERPL-SCNC: 139 MMOL/L
SPECIFIC GRAVITY URINE: 1.02
TRIGL SERPL-MCNC: 95 MG/DL
TSH SERPL-ACNC: 1.25 UIU/ML
UROBILINOGEN URINE: 0.2 MG/DL
WHITE BLOOD CELLS URINE: 1 /HPF

## 2024-03-12 ENCOUNTER — APPOINTMENT (OUTPATIENT)
Dept: ENDOCRINOLOGY | Facility: CLINIC | Age: 74
End: 2024-03-12
Payer: MEDICARE

## 2024-03-12 PROCEDURE — 99214 OFFICE O/P EST MOD 30 MIN: CPT

## 2024-03-12 RX ORDER — METFORMIN HYDROCHLORIDE 1000 MG/1
1000 TABLET, COATED ORAL
Qty: 180 | Refills: 2 | Status: ACTIVE | COMMUNITY
Start: 2020-11-09 | End: 1900-01-01

## 2024-03-12 NOTE — ASSESSMENT
[FreeTextEntry1] : 73 year old male with past medical history of Diabetes Mellitus Type 2, Osteomyelitis of R foot s/p debridement (11/2019), CAD who presents for management of his diabetes  1. DM 2- controlled, complicated Patient counseled on the importance of diabetic control and complications. Patient's diet and the necessary changes discussed. Discussed diet. Patient has to be more cautious. Hga1c is trending up.  Cont Metformin 1000 mg BID Cont Trulicity 1.5 mg qweekly Check fsg BID Cont diabetic diet Cont exercise foot exam up to date labs up to date optho up to date  2. HLD- stable Cont Atorvastatin

## 2024-03-12 NOTE — REASON FOR VISIT
[Medical Office: ([facility])___] : at the medical office located in  [Home] : at home, [unfilled] , at the time of the visit. [Patient] : the patient [Follow - Up] : a follow-up visit [DM Type 2] : DM Type 2

## 2024-03-12 NOTE — HISTORY OF PRESENT ILLNESS
[FreeTextEntry1] : Mr. JOVANNI MARTINEZ  is a 73 year old male with past medical history of Diabetes Mellitus Type 2, Osteomyelitis of R foot s/p debridement (11/2019), CAD who presents for management of his diabetes.    POCT Glucose:  POCT Hga1c:    Diabetes first diagnosed: 3 years ago Type: 2  Current diabetic regimen: Metformin 1000 mg BID Glimepiride 2 mg QD (stopped) Trulicity 1.5 mg Qweekly  Other relevant medications: atorvastatin 20 lisinopril 40  Pt checks FSG     { 1-2    } times per day:  FSG: Pre-breakfast: 125-135 Pre-dinner:140-160  Hypoglycemia: denies   Diet: Breakfast: cereal, waffles Lunch: meat, vegetables, rice/pasta Dinner: meat, vegetables/salads Snacks: nuts, apples  Exercise: none  Urine micro:91 (3/2022), 638 (2/2021)  elevated (6/2020) lipid profile:LDL 49 (12/2022)LDL 55 (3/2022), LDL 73 (2/2021),  LDL 50 6/2020, 41 2/21/20 last hba1c:6.4% (4/2023), 6.3% (12/2022), 7% (5/2022),  6.9% (2/2022), 6.5% (8/2021), 5.5% (5/2021), 6.2% 2/2021, 7.7% 6/2020, 6.4% 2/21/20 eye exam: +retinopathy, s/p cataract surgery diabetic foot exam/podiatry: 2/2022 in office, sees podiatry

## 2024-04-16 NOTE — DISCHARGE NOTE PROVIDER - NSDCCPGOAL_GEN_ALL_CORE_FT
MALIA GAUTHIER  72y, Male  Allergy: penicillin (Rash)  penicillin G benzathine (Rash)    Hospital Day: 3d    Patient seen and examined. No acute events overnight    PMH/PSH:  PAST MEDICAL & SURGICAL HISTORY:  HTN (hypertension)      Hypothyroidism, unspecified type      Obesity      Other secondary osteoarthritis of right hand      Testicular seminoma      History of eye removal  Right eye      H/O melanoma excision      S/P appendectomy  18 yrs old          VITALS:  T(F): 97.1 (04-16-24 @ 07:49), Max: 97.7 (04-15-24 @ 17:14)  HR: 68 (04-16-24 @ 07:49)  BP: 122/61 (04-16-24 @ 07:49) (119/61 - 122/61)  RR: 16 (04-16-24 @ 07:49)  SpO2: 98% (04-16-24 @ 07:49)    TESTS & MEASUREMENTS:  Weight (Kg):   BMI (kg/m2): 33.2 (04-13)                          7.1    1.55  )-----------( 13       ( 16 Apr 2024 06:02 )             21.1       04-16    138  |  102  |  32<H>  ----------------------------<  80  3.7   |  29  |  0.9    Ca    7.9<L>      16 Apr 2024 06:02  Phos  1.9     04-16  Mg     1.9     04-16    TPro  4.6<L>  /  Alb  2.9<L>  /  TBili  0.4  /  DBili  x   /  AST  9   /  ALT  9   /  AlkPhos  57  04-16    LIVER FUNCTIONS - ( 16 Apr 2024 06:02 )  Alb: 2.9 g/dL / Pro: 4.6 g/dL / ALK PHOS: 57 U/L / ALT: 9 U/L / AST: 9 U/L / GGT: x                 Culture - Urine (collected 04-14-24 @ 13:03)  Source: Clean Catch Clean Catch (Midstream)  Final Report (04-16-24 @ 09:14):    No growth    Culture - Blood (collected 04-14-24 @ 11:26)  Source: .Blood None  Preliminary Report (04-15-24 @ 22:02):    No growth at 24 hours    Culture - Blood (collected 04-13-24 @ 21:20)  Source: .Blood Blood-Peripheral  Gram Stain (04-14-24 @ 17:20):    Growth in anaerobic bottle: Gram Negative Rods  Final Report (04-16-24 @ 07:11):    Growth in anaerobic bottle: Escherichia coli    Direct identification is available within approximately 3-5    hours either by Blood Panel Multiplexed PCR or Direct    MALDI-TOF. Details: [email or web address]  Organism: Blood Culture PCR  Escherichia coli (04-16-24 @ 07:11)  Organism: Escherichia coli (04-16-24 @ 07:11)      Method Type: HANH      -  Ampicillin: S <=8 These ampicillin results predict results for amoxicillin      -  Ampicillin/Sulbactam: S <=4/2      -  Aztreonam: S <=4      -  Cefazolin: S <=2      -  Cefepime: S <=2      -  Cefoxitin: S <=8      -  Ceftriaxone: S <=1      -  Ciprofloxacin: S <=0.25      -  Ertapenem: S <=0.5      -  Gentamicin: S <=2      -  Imipenem: S <=1      -  Levofloxacin: S <=0.5      -  Meropenem: S <=1      -  Piperacillin/Tazobactam: S <=8      -  Tobramycin: S <=2      -  Trimethoprim/Sulfamethoxazole: S <=0.5/9.5  Organism: Blood Culture PCR (04-16-24 @ 07:11)      Method Type: PCR      -  Escherichia coli: Detec      Urinalysis Basic - ( 16 Apr 2024 06:02 )    Color: x / Appearance: x / SG: x / pH: x  Gluc: 80 mg/dL / Ketone: x  / Bili: x / Urobili: x   Blood: x / Protein: x / Nitrite: x   Leuk Esterase: x / RBC: x / WBC x   Sq Epi: x / Non Sq Epi: x / Bacteria: x        RADIOLOGY & ADDITIONAL TESTS:    RECENT DIAGNOSTIC ORDERS:  Complete Blood Count: 16:00 (04-16-24 @ 09:34)  US Abdomen Upper Quadrant Right: Routine   Indication: Thickening of gallbaldder on CT  Transport: [facility] (04-16-24 @ 09:32)  TTE Echo Complete w/o Contrast w/ Doppler:   Transport: [facility]  Monitor: w/o Monitor (04-15-24 @ 12:36)  Culture - Abscess with Gram Stain: Routine  Specimen Source: Leg - Left  Addl Info: 2 of 2 (04-15-24 @ 12:18)  Culture - Abscess with Gram Stain: Routine  Specimen Source: Leg - Left  Addl Info: 1 of 2 (04-15-24 @ 12:18)      MEDICATIONS:  MEDICATIONS  (STANDING):  cefTRIAXone   IVPB 2000 milliGRAM(s) IV Intermittent every 24 hours  filgrastim-sndz (ZARXIO) Injectable 480 MICROGram(s) SubCutaneous every 24 hours  lactated ringers. 1000 milliLiter(s) (100 mL/Hr) IV Continuous <Continuous>  levothyroxine 137 MICROGram(s) Oral daily  metoprolol tartrate 12.5 milliGRAM(s) Oral two times a day  metroNIDAZOLE    Tablet 500 milliGRAM(s) Oral every 8 hours  vancomycin    Solution 125 milliGRAM(s) Oral every 6 hours    MEDICATIONS  (PRN):  acetaminophen     Tablet .. 650 milliGRAM(s) Oral every 6 hours PRN Temp greater or equal to 38C (100.4F)      HOME MEDICATIONS:  Levothroid 137 mcg (0.137 mg) oral tablet (02-11)  losartan 50 mg oral tablet (02-11)  Multiple Vitamins oral tablet (02-11)  Vitamin D3 1000 intl units oral tablet (02-11)      REVIEW OF SYSTEMS:  All other review of systems is negative unless indicated above.     PHYSICAL EXAM:  PHYSICAL EXAM:  GENERAL: NAD  HEAD:  Atraumatic, Normocephalic  NECK: Supple, No JVD  CHEST/LUNG: Clear to auscultation bilaterally; No wheeze  HEART: Regular rate and rhythm; No murmurs, rubs, or gallops  ABDOMEN: Soft, Nontender, Nondistended; Bowel sounds present  EXTREMITIES:  2+ Peripheral Pulses, No clubbing, cyanosis, or edema; left thigh lesions s/p I/D  SKIN: No rashes or lesions       To get better and follow your care plan as instructed.

## 2024-04-18 ENCOUNTER — APPOINTMENT (OUTPATIENT)
Dept: INTERNAL MEDICINE | Facility: CLINIC | Age: 74
End: 2024-04-18
Payer: MEDICARE

## 2024-04-18 VITALS
TEMPERATURE: 98.3 F | OXYGEN SATURATION: 97 % | RESPIRATION RATE: 16 BRPM | HEIGHT: 68 IN | DIASTOLIC BLOOD PRESSURE: 78 MMHG | HEART RATE: 68 BPM | BODY MASS INDEX: 32.13 KG/M2 | WEIGHT: 212 LBS | SYSTOLIC BLOOD PRESSURE: 126 MMHG

## 2024-04-18 DIAGNOSIS — I10 ESSENTIAL (PRIMARY) HYPERTENSION: ICD-10-CM

## 2024-04-18 DIAGNOSIS — Z00.00 ENCOUNTER FOR GENERAL ADULT MEDICAL EXAMINATION W/OUT ABNORMAL FINDINGS: ICD-10-CM

## 2024-04-18 DIAGNOSIS — E11.51 TYPE 2 DIABETES MELLITUS WITH DIABETIC PERIPHERAL ANGIOPATHY W/OUT GANGRENE: ICD-10-CM

## 2024-04-18 DIAGNOSIS — I25.10 ATHEROSCLEROTIC HEART DISEASE OF NATIVE CORONARY ARTERY W/OUT ANGINA PECTORIS: ICD-10-CM

## 2024-04-18 DIAGNOSIS — E78.5 HYPERLIPIDEMIA, UNSPECIFIED: ICD-10-CM

## 2024-04-18 PROCEDURE — G0439: CPT

## 2024-04-18 PROCEDURE — 36415 COLL VENOUS BLD VENIPUNCTURE: CPT

## 2024-04-18 NOTE — PHYSICAL EXAM
[No Acute Distress] : no acute distress [Well Nourished] : well nourished [Well Developed] : well developed [Well-Appearing] : well-appearing [Normal Sclera/Conjunctiva] : normal sclera/conjunctiva [PERRL] : pupils equal round and reactive to light [EOMI] : extraocular movements intact [Normal Outer Ear/Nose] : the outer ears and nose were normal in appearance [Normal Oropharynx] : the oropharynx was normal [No JVD] : no jugular venous distention [No Lymphadenopathy] : no lymphadenopathy [Supple] : supple [Thyroid Normal, No Nodules] : the thyroid was normal and there were no nodules present [No Respiratory Distress] : no respiratory distress  [No Accessory Muscle Use] : no accessory muscle use [Clear to Auscultation] : lungs were clear to auscultation bilaterally [Normal Rate] : normal rate  [Regular Rhythm] : with a regular rhythm [Normal S1, S2] : normal S1 and S2 [No Murmur] : no murmur heard [No Carotid Bruits] : no carotid bruits [No Abdominal Bruit] : a ~M bruit was not heard ~T in the abdomen [No Varicosities] : no varicosities [Pedal Pulses Present] : the pedal pulses are present [No Edema] : there was no peripheral edema [No Palpable Aorta] : no palpable aorta [No Extremity Clubbing/Cyanosis] : no extremity clubbing/cyanosis [Soft] : abdomen soft [Non Tender] : non-tender [Non-distended] : non-distended [No Masses] : no abdominal mass palpated [No HSM] : no HSM [Normal Bowel Sounds] : normal bowel sounds [Normal Posterior Cervical Nodes] : no posterior cervical lymphadenopathy [Normal Anterior Cervical Nodes] : no anterior cervical lymphadenopathy [No CVA Tenderness] : no CVA  tenderness [No Spinal Tenderness] : no spinal tenderness [No Joint Swelling] : no joint swelling [Grossly Normal Strength/Tone] : grossly normal strength/tone [No Rash] : no rash [Coordination Grossly Intact] : coordination grossly intact [No Focal Deficits] : no focal deficits [Normal Gait] : normal gait [Deep Tendon Reflexes (DTR)] : deep tendon reflexes were 2+ and symmetric [Normal Affect] : the affect was normal [Normal Insight/Judgement] : insight and judgment were intact [de-identified] : several lesions on face

## 2024-04-18 NOTE — HEALTH RISK ASSESSMENT
[Very Good] : ~his/her~  mood as very good [Yes] : Yes [Former] : Former [5-9] : 5-9 [> 15 Years] : > 15 Years [de-identified] : social [de-identified] : Doesn't exercise much

## 2024-04-18 NOTE — PLAN
[FreeTextEntry1] : Check labs See GI, Dr. Valentino, regarding colonoscopy See Dr. Velazquez. Dermatology, for facial lesions

## 2024-04-18 NOTE — HISTORY OF PRESENT ILLNESS
[Spouse] : spouse [FreeTextEntry1] : Here for CPE, Overall feeling well Has two new lesions on his facer [de-identified] : Former smoker . Quit about 40 years ago. Previously smoked 1/2 ppd for 10 years. Social alcohol. Hasn't had a colonoscopy.  Doesn't exercise but works in the garden.

## 2024-04-19 LAB
CREAT SPEC-SCNC: 174 MG/DL
FOLATE SERPL-MCNC: 8.7 NG/ML
MICROALBUMIN 24H UR DL<=1MG/L-MCNC: 11.1 MG/DL
MICROALBUMIN/CREAT 24H UR-RTO: 64 MG/G
PSA SERPL-MCNC: 0.82 NG/ML
VIT B12 SERPL-MCNC: 382 PG/ML

## 2024-05-15 ENCOUNTER — APPOINTMENT (OUTPATIENT)
Dept: VASCULAR SURGERY | Facility: CLINIC | Age: 74
End: 2024-05-15
Payer: MEDICARE

## 2024-05-15 VITALS
OXYGEN SATURATION: 99 % | SYSTOLIC BLOOD PRESSURE: 112 MMHG | WEIGHT: 212 LBS | RESPIRATION RATE: 17 BRPM | HEIGHT: 68 IN | BODY MASS INDEX: 32.13 KG/M2 | TEMPERATURE: 98 F | HEART RATE: 58 BPM | DIASTOLIC BLOOD PRESSURE: 65 MMHG

## 2024-05-15 PROCEDURE — 99213 OFFICE O/P EST LOW 20 MIN: CPT

## 2024-05-15 PROCEDURE — 93923 UPR/LXTR ART STDY 3+ LVLS: CPT

## 2024-05-15 RX ORDER — LATANOPROST/PF 0.005 %
0.01 DROPS OPHTHALMIC (EYE)
Refills: 0 | Status: ACTIVE | COMMUNITY

## 2024-05-15 RX ORDER — GABAPENTIN 100 MG/1
100 CAPSULE ORAL
Qty: 180 | Refills: 2 | Status: ACTIVE | COMMUNITY
Start: 2021-01-07 | End: 1900-01-01

## 2024-05-15 NOTE — PHYSICAL EXAM
[0] : left 0 [Varicose Veins Of Lower Extremities] : present [] : present [No Rash or Lesion] : No rash or lesion [Calm] : calm [JVD] : no jugular venous distention  [Ankle Swelling (On Exam)] : not present [de-identified] : Well-appearing  [de-identified] : EOMI, anicteric  [de-identified] : soft, nt, nd  [de-identified] : motor and sensation intact in all four extremities   [de-identified] : right third toe amputation site healed. no wounds on left foot.  [de-identified] : A&Ox4

## 2024-05-15 NOTE — DATA REVIEWED
[FreeTextEntry1] : 5/15/2024 - HUANG R HUANG 1.07, tbi 0.4, toe pressure 52 L huang 0.95, tbi 0.35, toe pressure 46 -------------------- 2/7/24 - HUANG R HUANG 1.1, L HUANG 0.91 --------------------- 10/20/2023 -  HUANG R HUANG 0.95, Left HUANG 0.62 --------------------- 10/11/2023 - HUANG R HUANG 0.72, TBI 0.23, toe pressure 44 L HUANG 1.1, TBI 0.37, toe pressure 70 -------------------------------- 2/14/2023 - huang/pvrs R HUANG 1.1, TBI 0.30, toe pressure 40 L HUANG 1.1, TBI 0.52, toe pressure 69 --------------------------------- 8/9/2022 -HUANG/PVR Right HUANG 0.45, TBI 0.30, toe pressure 38 Left HUANG 0.89, TBI 0.24, toe pressure 30 ---------------------------------- 3/1/2022-HUANG R HUANG 1.38, TBI 0.36, toe pressure 51 L HUANG 1.12, TBI 0.44, toe pressure 63  3/1/2022-BLE venous duplex Negative for DVT bilaterally. Right - GSV harvested. Reflux in GSV mid-distal calf.  Left - Chronic poorly recanalized SVT in GSV with reflux in GSV and SSV.  ---------------------------------------- 5/20/2021 - HUANG R TBI 0.54, toe pressure 87 L TBI 0.43, toe pressure 70, HUANG 1.08.

## 2024-05-15 NOTE — HISTORY OF PRESENT ILLNESS
[FreeTextEntry1] : JOVANNI MARTINEZ is a 70 year old male s/p right Pop to PT bypass with RGSV on 11/25/2019 for right foot wound and osteomyelitis.    6/11/2020 - Doing well since last visit. Right foot wounds have healed completely at this time. No complaints. Denies claudication.  1/7/2021 - Doing well since visit. Patient reports stable mild numbness at the forefoot. Denies lower extremity pain. No fever or chills.  1/27/2021-right lower extremity angiogram revealed occlusion of the distal posterior tibial artery distal to the anastomosis, nonreconstructible.  2/18/2021-Doing well since angiogram. Reports continued mild right foot numbness and pain. This is worse in the morning but improves during the day. The patient denies any wounds on the right foot. Has taken gabapentin with some improvement in symptoms. No fever or chills.  5/20/2021-Patient presents for follow-up with his wife.  He reports stable right lower extremity symptoms.  He reports continued bilateral foot numbness at the soles.  He continues to take the gabapentin with some relief in symptoms.  He recently saw his cardiologist who performed an echocardiogram.  Although today he is hypertensive with a systolic blood pressure 175, his wife reports that repeated measurements at home range from 135 to 150 mmHg. Patient continues to take plavix.  3/1/2022-Pt presents for follow up with his wife. Since last visit, has been doing well. Denies lower extremity pain or wounds. He continues to walk without issues. He takes his aspirin and statin.  8/9/2022-Pt presents for follow up. Since last visit, patient reports stable right leg symptoms. Denies rest pain or bilateral foot wounds. He continues to take gabapentin 100 mg BID. He has been active at home and walks daily. Continues to take aspirin, plavix, statin.  2/15/2023 - Pt presents for follow up visit. He reports stable foot symptoms with bilateral sole of feet numbness. He continues to take gabapentin with relief of his symptoms. Denies any rest pain or lower extremity wounds. Continues to take aspirin, plavix, statin.  10/11/2023 - Pt presents for follow up visit. PResnts w/ wife. one week history of right third toe wound. initially began as a small spot, now enlarging. No pain. no fever or chills. does not recall how wound occurred. has appt w/ podiatry next week.  10/19/2023 - Right leg tibial angioplasty of peroneal artery (single vessel runoff to foot) 10/20/2023 - Right third toe amputation by Dr. Gunter 11/1/2023 - Pt presents for follow up. Saw Dr. Gunter yesterday. Toe amputation site appears to be healing. Continues to take aspirin, plavix, statin, antihypertensives, and diabetic medications. Has appointment with wound care 11/27.  [de-identified] : 2/7/2024 - Pt presents for follow up. Right toe amp site has finally healed. He is no longer going back for hyperbaric oxygen therapy. Continues to take his medications as prescribed.   5/15/2024 - Pt presents for follow up visit. Doing well. Denies leg pain, stable foot numbness for which he takes gabapentin with improvement in symptoms. Continues to take aspirin, plavix, statin. Has been spending more time gardening, walking, which he feels is helping his legs feel better.

## 2024-05-15 NOTE — HISTORY OF PRESENT ILLNESS
[FreeTextEntry1] : JOVANNI MARTINEZ is a 70 year old male s/p right Pop to PT bypass with RGSV on 11/25/2019 for right foot wound and osteomyelitis.    6/11/2020 - Doing well since last visit. Right foot wounds have healed completely at this time. No complaints. Denies claudication.  1/7/2021 - Doing well since visit. Patient reports stable mild numbness at the forefoot. Denies lower extremity pain. No fever or chills.  1/27/2021-right lower extremity angiogram revealed occlusion of the distal posterior tibial artery distal to the anastomosis, nonreconstructible.  2/18/2021-Doing well since angiogram. Reports continued mild right foot numbness and pain. This is worse in the morning but improves during the day. The patient denies any wounds on the right foot. Has taken gabapentin with some improvement in symptoms. No fever or chills.  5/20/2021-Patient presents for follow-up with his wife.  He reports stable right lower extremity symptoms.  He reports continued bilateral foot numbness at the soles.  He continues to take the gabapentin with some relief in symptoms.  He recently saw his cardiologist who performed an echocardiogram.  Although today he is hypertensive with a systolic blood pressure 175, his wife reports that repeated measurements at home range from 135 to 150 mmHg. Patient continues to take plavix.  3/1/2022-Pt presents for follow up with his wife. Since last visit, has been doing well. Denies lower extremity pain or wounds. He continues to walk without issues. He takes his aspirin and statin.  8/9/2022-Pt presents for follow up. Since last visit, patient reports stable right leg symptoms. Denies rest pain or bilateral foot wounds. He continues to take gabapentin 100 mg BID. He has been active at home and walks daily. Continues to take aspirin, plavix, statin.  2/15/2023 - Pt presents for follow up visit. He reports stable foot symptoms with bilateral sole of feet numbness. He continues to take gabapentin with relief of his symptoms. Denies any rest pain or lower extremity wounds. Continues to take aspirin, plavix, statin.  10/11/2023 - Pt presents for follow up visit. PResnts w/ wife. one week history of right third toe wound. initially began as a small spot, now enlarging. No pain. no fever or chills. does not recall how wound occurred. has appt w/ podiatry next week.  10/19/2023 - Right leg tibial angioplasty of peroneal artery (single vessel runoff to foot) 10/20/2023 - Right third toe amputation by Dr. Gunter 11/1/2023 - Pt presents for follow up. Saw Dr. Gunter yesterday. Toe amputation site appears to be healing. Continues to take aspirin, plavix, statin, antihypertensives, and diabetic medications. Has appointment with wound care 11/27.  [de-identified] : 2/7/2024 - Pt presents for follow up. Right toe amp site has finally healed. He is no longer going back for hyperbaric oxygen therapy. Continues to take his medications as prescribed.   5/15/2024 - Pt presents for follow up visit. Doing well. Denies leg pain, stable foot numbness for which he takes gabapentin with improvement in symptoms. Continues to take aspirin, plavix, statin. Has been spending more time gardening, walking, which he feels is helping his legs feel better.

## 2024-05-15 NOTE — PHYSICAL EXAM
[0] : left 0 [Varicose Veins Of Lower Extremities] : present [] : present [No Rash or Lesion] : No rash or lesion [Calm] : calm [JVD] : no jugular venous distention  [Ankle Swelling (On Exam)] : not present [de-identified] : Well-appearing  [de-identified] : EOMI, anicteric  [de-identified] : soft, nt, nd  [de-identified] : motor and sensation intact in all four extremities   [de-identified] : right third toe amputation site healed. no wounds on left foot.  [de-identified] : A&Ox4

## 2024-05-15 NOTE — ASSESSMENT
[FreeTextEntry1] : JOVANNI MARTINEZ is a 73 year old male presents for follow up.  > PAD s/p right pop-PT bypass w/ rgsv now closed. - s/p right peroneal angioplasty 10/19, third toe amputation 10/20 - right third toe amp site healed.  - continue aspirin, plavix, statin, antihypertensives and antidiabetic medications.  - reviewed results of deondre/pvr with patient. deondre erroneously elevated due to calcified vessels. tbi/toe pressures suggestive of arterial insufficiency.  - patient without rest pain or tissue loww- continue med management.  - follow up in six months with repeat deondre/pvrs w/ toe pressures.  - refill gabapentin.

## 2024-05-30 ENCOUNTER — APPOINTMENT (OUTPATIENT)
Dept: DERMATOLOGY | Facility: CLINIC | Age: 74
End: 2024-05-30

## 2024-06-09 NOTE — PATIENT PROFILE ADULT - FALL HARM RISK - ATTEMPT OOB
Pt blood pressure rremains unstable  Levophed started at 3mcg/min  Pt awake, following commands  Responses delayed a/ox4  Admits to using iv drugs this morning     No

## 2024-09-10 ENCOUNTER — APPOINTMENT (OUTPATIENT)
Dept: CARDIOLOGY | Facility: CLINIC | Age: 74
End: 2024-09-10
Payer: MEDICARE

## 2024-09-10 ENCOUNTER — NON-APPOINTMENT (OUTPATIENT)
Age: 74
End: 2024-09-10

## 2024-09-10 VITALS
OXYGEN SATURATION: 99 % | WEIGHT: 212 LBS | HEIGHT: 68 IN | SYSTOLIC BLOOD PRESSURE: 158 MMHG | BODY MASS INDEX: 32.13 KG/M2 | DIASTOLIC BLOOD PRESSURE: 74 MMHG | HEART RATE: 62 BPM

## 2024-09-10 VITALS — DIASTOLIC BLOOD PRESSURE: 72 MMHG | SYSTOLIC BLOOD PRESSURE: 138 MMHG

## 2024-09-10 DIAGNOSIS — R94.31 ABNORMAL ELECTROCARDIOGRAM [ECG] [EKG]: ICD-10-CM

## 2024-09-10 DIAGNOSIS — I25.10 ATHEROSCLEROTIC HEART DISEASE OF NATIVE CORONARY ARTERY W/OUT ANGINA PECTORIS: ICD-10-CM

## 2024-09-10 DIAGNOSIS — I71.20 THORACIC AORTIC ANEURYSM, WITHOUT RUPTURE, UNSPECIFIED: ICD-10-CM

## 2024-09-10 PROCEDURE — 93000 ELECTROCARDIOGRAM COMPLETE: CPT

## 2024-09-10 PROCEDURE — 99214 OFFICE O/P EST MOD 30 MIN: CPT

## 2024-09-10 NOTE — PHYSICAL EXAM
[Well Developed] : well developed [Well Nourished] : well nourished [No Acute Distress] : no acute distress [Normal Conjunctiva] : normal conjunctiva [Normal Venous Pressure] : normal venous pressure [No Carotid Bruit] : no carotid bruit [Normal S1, S2] : normal S1, S2 [No Rub] : no rub [No Gallop] : no gallop [Clear Lung Fields] : clear lung fields [Good Air Entry] : good air entry [No Respiratory Distress] : no respiratory distress  [Soft] : abdomen soft [Non Tender] : non-tender [No Masses/organomegaly] : no masses/organomegaly [Normal Bowel Sounds] : normal bowel sounds [Normal Gait] : normal gait [No Edema] : no edema [No Cyanosis] : no cyanosis [No Clubbing] : no clubbing [No Varicosities] : no varicosities [No Rash] : no rash [No Skin Lesions] : no skin lesions [Moves all extremities] : moves all extremities [No Focal Deficits] : no focal deficits [Normal Speech] : normal speech [Alert and Oriented] : alert and oriented [Normal memory] : normal memory [de-identified] : + SM at /sb

## 2024-09-10 NOTE — HISTORY OF PRESENT ILLNESS
[FreeTextEntry1] : Kirill is a 73 year old male with HTN, DM2 and CAD with stents (most recent 3-4 years ago), here for follow up.  He was admitted to the hospital in 11/2019, with a nonhealing right foot ulcer. On 11/25, he had a popliteal artery bypass to posterior tibial artery. In 2021, RLE angiogram with occlusion of the distal posterior tibial artery.  I last saw him in 3/24.  He is feeling well overall. He denies chest pain, difficulty breathing, palpitations, lower extremity swelling, orthopnea, PND, dizziness, lightheadedness, near syncope. He reports that his blood pressures are much better these days. His diabetes has been better controlled. His most recent LDL was 51 His SBP has been in the 130-140's at home at home.   A pharmacologic stress test in 2/22 showed inferior infarct, but no evidence of ischemia.  A carotid Doppler showed mild to moderate atherosclerosis, without significant stenosis. His echocardiogram in 2021 showed an EF of 53% with mid-mod MR, mild DD and mild LVH. This was unchanged in 5/22, though EF was noted to be 60-65%. In 9/23, his LV function remained normal, with mild LVH and mild dilated aortic root, mild MR.

## 2024-09-10 NOTE — DISCUSSION/SUMMARY
[___ Month(s)] : in [unfilled] month(s) [FreeTextEntry1] : Kirill has a history of hypertension, diabetes, CAD with stents, and peripheral vascular disease status post right lower extremity bypass in 11/2019.  From a cardiac perspective, he is doing well. His blood pressure is better overall, generally in the 130 range. His EKG demonstrates a sinus rhythm with a right bundle branch block, unchanged from prior tracings.  For now, he will continue his current blood pressure regimen. He has normal LV function, with mild LVH. We will repeat an echocardiogram this September to evaluate the size of his aortic root.  He will remain on aspirin, Plavix, and a statin, along with Vascepa. His most recent LDL was in the 50's.  His stress test demonstrated inferior infarct, without ischemia, and his carotid Doppler demonstrated mild to moderate atherosclerosis, without stenosis.   I stressed the importance of diet, exercise, and weight loss, to reduce his overall cardiovascular risk.  If no issues, I will see him again in 6 months. [EKG obtained to assist in diagnosis and management of assessed problem(s)] : EKG obtained to assist in diagnosis and management of assessed problem(s)

## 2024-09-17 ENCOUNTER — APPOINTMENT (OUTPATIENT)
Dept: CARDIOLOGY | Facility: CLINIC | Age: 74
End: 2024-09-17

## 2024-09-17 PROCEDURE — 93306 TTE W/DOPPLER COMPLETE: CPT

## 2024-10-16 ENCOUNTER — APPOINTMENT (OUTPATIENT)
Dept: ENDOCRINOLOGY | Facility: CLINIC | Age: 74
End: 2024-10-16
Payer: MEDICARE

## 2024-10-16 VITALS
OXYGEN SATURATION: 100 % | BODY MASS INDEX: 31.98 KG/M2 | DIASTOLIC BLOOD PRESSURE: 72 MMHG | WEIGHT: 211 LBS | HEIGHT: 68 IN | SYSTOLIC BLOOD PRESSURE: 132 MMHG | HEART RATE: 65 BPM

## 2024-10-16 PROCEDURE — G2211 COMPLEX E/M VISIT ADD ON: CPT

## 2024-10-16 PROCEDURE — 99214 OFFICE O/P EST MOD 30 MIN: CPT

## 2024-10-16 PROCEDURE — 83036 HEMOGLOBIN GLYCOSYLATED A1C: CPT | Mod: QW

## 2024-10-16 RX ORDER — DULAGLUTIDE 1.5 MG/.5ML
1.5 INJECTION, SOLUTION SUBCUTANEOUS
Qty: 12 | Refills: 2 | Status: ACTIVE | COMMUNITY
Start: 2024-10-16 | End: 1900-01-01

## 2024-10-17 ENCOUNTER — APPOINTMENT (OUTPATIENT)
Dept: INTERNAL MEDICINE | Facility: CLINIC | Age: 74
End: 2024-10-17
Payer: MEDICARE

## 2024-10-17 VITALS
BODY MASS INDEX: 32.58 KG/M2 | TEMPERATURE: 97.8 F | OXYGEN SATURATION: 97 % | RESPIRATION RATE: 17 BRPM | DIASTOLIC BLOOD PRESSURE: 70 MMHG | WEIGHT: 215 LBS | SYSTOLIC BLOOD PRESSURE: 130 MMHG | HEIGHT: 68 IN | HEART RATE: 67 BPM

## 2024-10-17 DIAGNOSIS — I10 ESSENTIAL (PRIMARY) HYPERTENSION: ICD-10-CM

## 2024-10-17 DIAGNOSIS — E11.51 TYPE 2 DIABETES MELLITUS WITH DIABETIC PERIPHERAL ANGIOPATHY W/OUT GANGRENE: ICD-10-CM

## 2024-10-17 DIAGNOSIS — Z23 ENCOUNTER FOR IMMUNIZATION: ICD-10-CM

## 2024-10-17 DIAGNOSIS — E78.5 HYPERLIPIDEMIA, UNSPECIFIED: ICD-10-CM

## 2024-10-17 DIAGNOSIS — I25.10 ATHEROSCLEROTIC HEART DISEASE OF NATIVE CORONARY ARTERY W/OUT ANGINA PECTORIS: ICD-10-CM

## 2024-10-17 LAB
ALBUMIN SERPL ELPH-MCNC: 4.4 G/DL
ALP BLD-CCNC: 57 U/L
ALT SERPL-CCNC: 8 U/L
ANION GAP SERPL CALC-SCNC: 12 MMOL/L
AST SERPL-CCNC: 15 U/L
BILIRUB SERPL-MCNC: 0.6 MG/DL
BUN SERPL-MCNC: 26 MG/DL
CALCIUM SERPL-MCNC: 9.4 MG/DL
CHLORIDE SERPL-SCNC: 106 MMOL/L
CHOLEST SERPL-MCNC: 105 MG/DL
CO2 SERPL-SCNC: 23 MMOL/L
CREAT SERPL-MCNC: 1.15 MG/DL
EGFR: 68 ML/MIN/1.73M2
ESTIMATED AVERAGE GLUCOSE: 131 MG/DL
FOLATE SERPL-MCNC: 4.4 NG/ML
GLUCOSE SERPL-MCNC: 158 MG/DL
HBA1C MFR BLD HPLC: 6.1
HBA1C MFR BLD HPLC: 6.2 %
HCT VFR BLD CALC: 34 %
HDLC SERPL-MCNC: 34 MG/DL
HGB BLD-MCNC: 11.3 G/DL
LDLC SERPL CALC-MCNC: 51 MG/DL
MCHC RBC-ENTMCNC: 28.4 PG
MCHC RBC-ENTMCNC: 33.2 GM/DL
MCV RBC AUTO: 85.4 FL
NONHDLC SERPL-MCNC: 70 MG/DL
PLATELET # BLD AUTO: 198 K/UL
POTASSIUM SERPL-SCNC: 4.9 MMOL/L
PROT SERPL-MCNC: 6.6 G/DL
PSA SERPL-MCNC: 0.81 NG/ML
RBC # BLD: 3.98 M/UL
RBC # FLD: 14.1 %
SODIUM SERPL-SCNC: 141 MMOL/L
TRIGL SERPL-MCNC: 101 MG/DL
TSH SERPL-ACNC: 0.64 UIU/ML
VIT B12 SERPL-MCNC: 267 PG/ML
WBC # FLD AUTO: 4.61 K/UL

## 2024-10-17 PROCEDURE — G0008: CPT

## 2024-10-17 PROCEDURE — 99214 OFFICE O/P EST MOD 30 MIN: CPT

## 2024-10-17 PROCEDURE — G2211 COMPLEX E/M VISIT ADD ON: CPT

## 2024-10-17 PROCEDURE — 90662 IIV NO PRSV INCREASED AG IM: CPT

## 2024-10-19 LAB
APPEARANCE: CLEAR
BILIRUBIN URINE: NEGATIVE
BLOOD URINE: NEGATIVE
COLOR: YELLOW
GLUCOSE QUALITATIVE U: NEGATIVE MG/DL
KETONES URINE: NEGATIVE MG/DL
LEUKOCYTE ESTERASE URINE: NEGATIVE
NITRITE URINE: NEGATIVE
PH URINE: 5.5
PROTEIN URINE: NEGATIVE MG/DL
SPECIFIC GRAVITY URINE: 1.02
UROBILINOGEN URINE: 0.2 MG/DL

## 2024-11-06 ENCOUNTER — APPOINTMENT (OUTPATIENT)
Dept: VASCULAR SURGERY | Facility: CLINIC | Age: 74
End: 2024-11-06
Payer: MEDICARE

## 2024-11-06 VITALS
DIASTOLIC BLOOD PRESSURE: 70 MMHG | BODY MASS INDEX: 31.98 KG/M2 | HEIGHT: 68 IN | TEMPERATURE: 97.7 F | WEIGHT: 211 LBS | HEART RATE: 64 BPM | OXYGEN SATURATION: 98 % | SYSTOLIC BLOOD PRESSURE: 146 MMHG

## 2024-11-06 DIAGNOSIS — I73.9 PERIPHERAL VASCULAR DISEASE, UNSPECIFIED: ICD-10-CM

## 2024-11-06 PROCEDURE — 93923 UPR/LXTR ART STDY 3+ LVLS: CPT

## 2024-11-06 PROCEDURE — 99213 OFFICE O/P EST LOW 20 MIN: CPT

## 2025-01-07 ENCOUNTER — APPOINTMENT (OUTPATIENT)
Dept: ENDOCRINOLOGY | Facility: CLINIC | Age: 75
End: 2025-01-07

## 2025-02-11 ENCOUNTER — RX RENEWAL (OUTPATIENT)
Age: 75
End: 2025-02-11

## 2025-03-12 ENCOUNTER — NON-APPOINTMENT (OUTPATIENT)
Age: 75
End: 2025-03-12

## 2025-03-12 ENCOUNTER — APPOINTMENT (OUTPATIENT)
Dept: CARDIOLOGY | Facility: CLINIC | Age: 75
End: 2025-03-12
Payer: MEDICARE

## 2025-03-12 VITALS — DIASTOLIC BLOOD PRESSURE: 65 MMHG | SYSTOLIC BLOOD PRESSURE: 138 MMHG

## 2025-03-12 VITALS
BODY MASS INDEX: 31.83 KG/M2 | WEIGHT: 210 LBS | DIASTOLIC BLOOD PRESSURE: 70 MMHG | OXYGEN SATURATION: 98 % | HEART RATE: 59 BPM | SYSTOLIC BLOOD PRESSURE: 153 MMHG | HEIGHT: 68 IN

## 2025-03-12 DIAGNOSIS — I25.10 ATHEROSCLEROTIC HEART DISEASE OF NATIVE CORONARY ARTERY W/OUT ANGINA PECTORIS: ICD-10-CM

## 2025-03-12 DIAGNOSIS — I11.9 HYPERTENSIVE HEART DISEASE W/OUT HEART FAILURE: ICD-10-CM

## 2025-03-12 DIAGNOSIS — R94.31 ABNORMAL ELECTROCARDIOGRAM [ECG] [EKG]: ICD-10-CM

## 2025-03-12 PROCEDURE — 93000 ELECTROCARDIOGRAM COMPLETE: CPT

## 2025-03-12 PROCEDURE — 99214 OFFICE O/P EST MOD 30 MIN: CPT

## 2025-04-12 ENCOUNTER — RX RENEWAL (OUTPATIENT)
Age: 75
End: 2025-04-12

## 2025-04-14 ENCOUNTER — APPOINTMENT (OUTPATIENT)
Dept: CARDIOLOGY | Facility: CLINIC | Age: 75
End: 2025-04-14

## 2025-04-17 ENCOUNTER — APPOINTMENT (OUTPATIENT)
Dept: INTERNAL MEDICINE | Facility: CLINIC | Age: 75
End: 2025-04-17

## 2025-04-28 ENCOUNTER — RX RENEWAL (OUTPATIENT)
Age: 75
End: 2025-04-28

## 2025-07-23 ENCOUNTER — APPOINTMENT (OUTPATIENT)
Dept: VASCULAR SURGERY | Facility: CLINIC | Age: 75
End: 2025-07-23
Payer: MEDICARE

## 2025-07-23 VITALS
OXYGEN SATURATION: 99 % | HEART RATE: 54 BPM | HEIGHT: 68 IN | BODY MASS INDEX: 31.98 KG/M2 | SYSTOLIC BLOOD PRESSURE: 135 MMHG | TEMPERATURE: 97.8 F | WEIGHT: 211 LBS | DIASTOLIC BLOOD PRESSURE: 68 MMHG

## 2025-07-23 DIAGNOSIS — I87.2 VENOUS INSUFFICIENCY (CHRONIC) (PERIPHERAL): ICD-10-CM

## 2025-07-23 DIAGNOSIS — I73.9 PERIPHERAL VASCULAR DISEASE, UNSPECIFIED: ICD-10-CM

## 2025-07-23 PROCEDURE — 93923 UPR/LXTR ART STDY 3+ LVLS: CPT

## 2025-07-23 PROCEDURE — 99213 OFFICE O/P EST LOW 20 MIN: CPT

## 2025-07-30 ENCOUNTER — APPOINTMENT (OUTPATIENT)
Dept: ENDOCRINOLOGY | Facility: CLINIC | Age: 75
End: 2025-07-30
Payer: MEDICARE

## 2025-07-30 ENCOUNTER — RESULT CHARGE (OUTPATIENT)
Age: 75
End: 2025-07-30

## 2025-07-30 VITALS
OXYGEN SATURATION: 95 % | HEIGHT: 68 IN | BODY MASS INDEX: 31.07 KG/M2 | SYSTOLIC BLOOD PRESSURE: 120 MMHG | DIASTOLIC BLOOD PRESSURE: 58 MMHG | WEIGHT: 205 LBS | HEART RATE: 56 BPM

## 2025-07-30 DIAGNOSIS — E78.5 HYPERLIPIDEMIA, UNSPECIFIED: ICD-10-CM

## 2025-07-30 DIAGNOSIS — L97.509 DIABETES MELLITUS DUE TO UNDERLYING CONDITION WITH FOOT ULCER: ICD-10-CM

## 2025-07-30 DIAGNOSIS — E08.621 DIABETES MELLITUS DUE TO UNDERLYING CONDITION WITH FOOT ULCER: ICD-10-CM

## 2025-07-30 PROCEDURE — 99214 OFFICE O/P EST MOD 30 MIN: CPT

## 2025-07-30 PROCEDURE — G2211 COMPLEX E/M VISIT ADD ON: CPT

## 2025-07-30 PROCEDURE — 83036 HEMOGLOBIN GLYCOSYLATED A1C: CPT | Mod: QW

## 2025-08-08 LAB
ALBUMIN SERPL ELPH-MCNC: 4.4 G/DL
ALBUMIN, RANDOM URINE: 10.9 MG/DL
ALP BLD-CCNC: 63 U/L
ALT SERPL-CCNC: 10 U/L
ANION GAP SERPL CALC-SCNC: 13 MMOL/L
AST SERPL-CCNC: 18 U/L
BILIRUB SERPL-MCNC: 0.6 MG/DL
BUN SERPL-MCNC: 31 MG/DL
CALCIUM SERPL-MCNC: 9.3 MG/DL
CHLORIDE SERPL-SCNC: 106 MMOL/L
CHOLEST SERPL-MCNC: 105 MG/DL
CO2 SERPL-SCNC: 21 MMOL/L
CREAT SERPL-MCNC: 1.2 MG/DL
CREAT SPEC-SCNC: 381 MG/DL
EGFRCR SERPLBLD CKD-EPI 2021: 64 ML/MIN/1.73M2
FOLATE SERPL-MCNC: 10.1 NG/ML
GLUCOSE SERPL-MCNC: 126 MG/DL
HCT VFR BLD CALC: 33.4 %
HDLC SERPL-MCNC: 44 MG/DL
HGB BLD-MCNC: 11.2 G/DL
LDLC SERPL-MCNC: 46 MG/DL
MCHC RBC-ENTMCNC: 28.6 PG
MCHC RBC-ENTMCNC: 33.5 G/DL
MCV RBC AUTO: 85.2 FL
MICROALBUMIN/CREAT 24H UR-RTO: 29 MG/G
NONHDLC SERPL-MCNC: 60 MG/DL
PLATELET # BLD AUTO: 176 K/UL
POTASSIUM SERPL-SCNC: 5.2 MMOL/L
PROT SERPL-MCNC: 6.8 G/DL
RBC # BLD: 3.92 M/UL
RBC # FLD: 14.3 %
SODIUM SERPL-SCNC: 139 MMOL/L
TRIGL SERPL-MCNC: 67 MG/DL
TSH SERPL-ACNC: 0.74 UIU/ML
VIT B12 SERPL-MCNC: 267 PG/ML
WBC # FLD AUTO: 4.95 K/UL

## 2025-08-18 ENCOUNTER — NON-APPOINTMENT (OUTPATIENT)
Age: 75
End: 2025-08-18

## 2025-08-20 ENCOUNTER — APPOINTMENT (OUTPATIENT)
Dept: CARDIOLOGY | Facility: CLINIC | Age: 75
End: 2025-08-20
Payer: MEDICARE

## 2025-08-20 PROCEDURE — 93015 CV STRESS TEST SUPVJ I&R: CPT

## 2025-08-20 PROCEDURE — 78452 HT MUSCLE IMAGE SPECT MULT: CPT

## 2025-08-20 PROCEDURE — A9500: CPT

## 2025-09-08 ENCOUNTER — APPOINTMENT (OUTPATIENT)
Dept: CARDIOLOGY | Facility: CLINIC | Age: 75
End: 2025-09-08
Payer: MEDICARE

## 2025-09-08 VITALS
SYSTOLIC BLOOD PRESSURE: 188 MMHG | WEIGHT: 199 LBS | DIASTOLIC BLOOD PRESSURE: 80 MMHG | OXYGEN SATURATION: 99 % | HEIGHT: 68 IN | HEART RATE: 77 BPM | BODY MASS INDEX: 30.16 KG/M2

## 2025-09-08 VITALS — DIASTOLIC BLOOD PRESSURE: 82 MMHG | SYSTOLIC BLOOD PRESSURE: 155 MMHG

## 2025-09-08 DIAGNOSIS — I25.10 ATHEROSCLEROTIC HEART DISEASE OF NATIVE CORONARY ARTERY W/OUT ANGINA PECTORIS: ICD-10-CM

## 2025-09-08 DIAGNOSIS — R94.31 ABNORMAL ELECTROCARDIOGRAM [ECG] [EKG]: ICD-10-CM

## 2025-09-08 DIAGNOSIS — I10 ESSENTIAL (PRIMARY) HYPERTENSION: ICD-10-CM

## 2025-09-08 PROCEDURE — 93000 ELECTROCARDIOGRAM COMPLETE: CPT

## 2025-09-08 PROCEDURE — 99214 OFFICE O/P EST MOD 30 MIN: CPT

## 2025-09-17 ENCOUNTER — APPOINTMENT (OUTPATIENT)
Dept: VASCULAR SURGERY | Facility: CLINIC | Age: 75
End: 2025-09-17
Payer: MEDICARE

## 2025-09-17 VITALS
HEART RATE: 66 BPM | SYSTOLIC BLOOD PRESSURE: 124 MMHG | HEIGHT: 68 IN | OXYGEN SATURATION: 99 % | WEIGHT: 199 LBS | DIASTOLIC BLOOD PRESSURE: 77 MMHG | TEMPERATURE: 97.9 F | BODY MASS INDEX: 30.16 KG/M2

## 2025-09-17 DIAGNOSIS — I73.9 PERIPHERAL VASCULAR DISEASE, UNSPECIFIED: ICD-10-CM

## 2025-09-17 DIAGNOSIS — S81.802D UNSPECIFIED OPEN WOUND, LEFT LOWER LEG, SUBSEQUENT ENCOUNTER: ICD-10-CM

## 2025-09-17 PROCEDURE — 99214 OFFICE O/P EST MOD 30 MIN: CPT

## 2025-09-17 PROCEDURE — 93925 LOWER EXTREMITY STUDY: CPT

## (undated) DEVICE — DRSG KLING 4"

## (undated) DEVICE — NDL HYPO REGULAR BEVEL 25G X 1.5" (BLUE)

## (undated) DEVICE — PACK GENERAL MINOR

## (undated) DEVICE — DRAPE INSTRUMENT POUCH 6.75" X 11"

## (undated) DEVICE — GLV 7 PROTEXIS (WHITE)

## (undated) DEVICE — GLV 8 PROTEXIS (WHITE)

## (undated) DEVICE — DRAPE IOBAN 23" X 23"

## (undated) DEVICE — FOLEY TRAY 16FR 5CC LTX UMETER CLOSED

## (undated) DEVICE — MEDICATION LABELS W MARKER

## (undated) DEVICE — BUR STRYKER OVAL SOLID CARBIDE MED 4MM

## (undated) DEVICE — BLADE SCALPEL SAFETYLOCK #15

## (undated) DEVICE — SOL IRR POUR H2O 250ML

## (undated) DEVICE — PACK EXTREMITY

## (undated) DEVICE — SUCTION YANKAUER NO CONTROL VENT

## (undated) DEVICE — ELCTR BOVIE PENCIL HANDPIECE

## (undated) DEVICE — PACK BASIN SPECIAL PROCEDURE

## (undated) DEVICE — DRAPE ISOLATION BAG 20X20"

## (undated) DEVICE — STAPLER SKIN VISI-STAT 35 WIDE

## (undated) DEVICE — NDL PERC BASEPLT 18GX7CM

## (undated) DEVICE — DRSG COMBINE 5X9"

## (undated) DEVICE — GLV 7.5 PROTEXIS (WHITE)

## (undated) DEVICE — DRAPE MAGNETIC INSTRUMENT MEDIUM

## (undated) DEVICE — MARKING PEN W RULER

## (undated) DEVICE — SUT MONOSOF 3-0 18" C-14

## (undated) DEVICE — DRSG STOCKINETTE IMPERVIOUS XL

## (undated) DEVICE — GLV 8.5 PROTEXIS (WHITE)

## (undated) DEVICE — DRAPE 1/2 SHEET 40X57"

## (undated) DEVICE — CONN DUAL HOSE

## (undated) DEVICE — ELCTR BVI ACCU-TEMP CAUTERY SHAFT FINE TIP 1/2"

## (undated) DEVICE — SUT PROLENE 7-0 24" BV175-6

## (undated) DEVICE — SPECIMEN CONTAINER 100ML

## (undated) DEVICE — BLADE SCALPEL SAFETYLOCK #11

## (undated) DEVICE — PACKING GAUZE PLAIN 2"

## (undated) DEVICE — SAW BLADE MICROAIRE SAGITTAL 5.8X25.4X0.6 MM

## (undated) DEVICE — DRAPE MAYO STAND 30"

## (undated) DEVICE — Device

## (undated) DEVICE — DRAPE TOWEL BLUE 17" X 24"

## (undated) DEVICE — SAW BLADE MICROAIRE SAGITTAL 9.4MMX25.4MMX0.6MM

## (undated) DEVICE — DRAPE FEMORAL ANGIOGRAPHY W TROUGH

## (undated) DEVICE — SOL IRR POUR NS 0.9% 500ML

## (undated) DEVICE — TUBING HIGH POWER CONTRAST INJ

## (undated) DEVICE — GLV 6.5 PROTEXIS (WHITE)

## (undated) DEVICE — DRAPE IOBAN 33" X 23"

## (undated) DEVICE — WARMING BLANKET UPPER ADULT

## (undated) DEVICE — SOL IRR BAG NS 0.9% 3000ML

## (undated) DEVICE — SUT BIOSYN 4-0 18" P-12

## (undated) DEVICE — SYR LUER LOK 20CC

## (undated) DEVICE — DRSG STERISTRIPS 0.5 X 4"

## (undated) DEVICE — TORQUE DEVICE FOR GUIDEWIRE 0.0100.038"

## (undated) DEVICE — DRSG OPSITE 13.75 X 4"

## (undated) DEVICE — BLADE SCALPEL SAFETYLOCK #10

## (undated) DEVICE — GOWN XL

## (undated) DEVICE — NDL ENTRY PERC MCKNIGHT 18G

## (undated) DEVICE — POSITIONER FOAM EGG CRATE ULNAR 2PCS (PINK)

## (undated) DEVICE — NDL HYPO REGULAR BEVEL 22G X 1.5" (TURQUOISE)

## (undated) DEVICE — DRAPE EQUIPMENT BANDED BAG 30 X 30" (SHOWER CAP)

## (undated) DEVICE — PACKING GAUZE PLAIN 0.5"

## (undated) DEVICE — VISITEC 4X4

## (undated) DEVICE — STRYKER INTERPULSE HANDPIECE W IRR SUCTION TUBE

## (undated) DEVICE — DRAPE 3/4 SHEET W REINFORCEMENT 56X77"

## (undated) DEVICE — DRAPE CAMERA VIDEO 7"X96"

## (undated) DEVICE — DRSG ACE BANDAGE 6"

## (undated) DEVICE — DRAPE LIGHT HANDLE COVER (BLUE)

## (undated) DEVICE — TAPE GLO-N-TELL RADIOPAQUE 20 STRIPS

## (undated) DEVICE — PREP CHLORAPREP HI-LITE ORANGE 26ML

## (undated) DEVICE — INFLATION DEVICE BASIXCOMPAK

## (undated) DEVICE — LAP PAD 18 X 18"

## (undated) DEVICE — NDL COUNTER FOAM AND MAGNET 40-70

## (undated) DEVICE — SYR LUER LOK 10CC

## (undated) DEVICE — VENODYNE/SCD SLEEVE CALF LARGE

## (undated) DEVICE — GOWN TRIMAX LG

## (undated) DEVICE — DRSG TEGADERM 6"X8"

## (undated) DEVICE — FEMORAL CANAL SUCTION TIP IRR SYS